# Patient Record
Sex: FEMALE | Race: WHITE | Employment: OTHER | URBAN - METROPOLITAN AREA
[De-identification: names, ages, dates, MRNs, and addresses within clinical notes are randomized per-mention and may not be internally consistent; named-entity substitution may affect disease eponyms.]

---

## 2017-10-26 ENCOUNTER — HOSPITAL ENCOUNTER (EMERGENCY)
Age: 62
Discharge: HOME | End: 2017-10-26
Attending: EMERGENCY MEDICINE
Payer: COMMERCIAL

## 2017-10-26 VITALS
TEMPERATURE: 98.6 F | HEIGHT: 65 IN | SYSTOLIC BLOOD PRESSURE: 122 MMHG | RESPIRATION RATE: 16 BRPM | OXYGEN SATURATION: 100 % | DIASTOLIC BLOOD PRESSURE: 61 MMHG | WEIGHT: 138.67 LBS | BODY MASS INDEX: 23.1 KG/M2 | HEART RATE: 43 BPM

## 2017-10-26 DIAGNOSIS — R00.1 SINUS BRADYCARDIA: Primary | ICD-10-CM

## 2017-10-26 LAB
ALBUMIN SERPL-MCNC: 3.9 G/DL (ref 3.5–5)
ALBUMIN/GLOB SERPL: 1 {RATIO} (ref 1.1–2.2)
ALP SERPL-CCNC: 63 U/L (ref 45–117)
ALT SERPL-CCNC: 25 U/L (ref 12–78)
ANION GAP SERPL CALC-SCNC: 8 MMOL/L (ref 5–15)
AST SERPL-CCNC: 14 U/L (ref 15–37)
BASOPHILS # BLD: 0 K/UL (ref 0–0.1)
BASOPHILS NFR BLD: 1 % (ref 0–1)
BILIRUB SERPL-MCNC: 0.3 MG/DL (ref 0.2–1)
BUN SERPL-MCNC: 14 MG/DL (ref 6–20)
BUN/CREAT SERPL: 19 (ref 12–20)
CALCIUM SERPL-MCNC: 8.7 MG/DL (ref 8.5–10.1)
CHLORIDE SERPL-SCNC: 103 MMOL/L (ref 97–108)
CK MB CFR SERPL CALC: NORMAL % (ref 0–2.5)
CK MB SERPL-MCNC: <1 NG/ML (ref 5–25)
CK SERPL-CCNC: 35 U/L (ref 26–192)
CO2 SERPL-SCNC: 27 MMOL/L (ref 21–32)
CREAT SERPL-MCNC: 0.72 MG/DL (ref 0.55–1.02)
EOSINOPHIL # BLD: 0 K/UL (ref 0–0.4)
EOSINOPHIL NFR BLD: 1 % (ref 0–7)
ERYTHROCYTE [DISTWIDTH] IN BLOOD BY AUTOMATED COUNT: 14.4 % (ref 11.5–14.5)
GLOBULIN SER CALC-MCNC: 3.8 G/DL (ref 2–4)
GLUCOSE SERPL-MCNC: 101 MG/DL (ref 65–100)
HCT VFR BLD AUTO: 37.3 % (ref 35–47)
HGB BLD-MCNC: 12.2 G/DL (ref 11.5–16)
LIPASE SERPL-CCNC: 218 U/L (ref 73–393)
LYMPHOCYTES # BLD: 2.5 K/UL (ref 0.8–3.5)
LYMPHOCYTES NFR BLD: 48 % (ref 12–49)
MAGNESIUM SERPL-MCNC: 2 MG/DL (ref 1.6–2.4)
MCH RBC QN AUTO: 30.2 PG (ref 26–34)
MCHC RBC AUTO-ENTMCNC: 32.7 G/DL (ref 30–36.5)
MCV RBC AUTO: 92.3 FL (ref 80–99)
MONOCYTES # BLD: 0.4 K/UL (ref 0–1)
MONOCYTES NFR BLD: 8 % (ref 5–13)
NEUTS SEG # BLD: 2.2 K/UL (ref 1.8–8)
NEUTS SEG NFR BLD: 42 % (ref 32–75)
PLATELET # BLD AUTO: 260 K/UL (ref 150–400)
POTASSIUM SERPL-SCNC: 3.8 MMOL/L (ref 3.5–5.1)
PROT SERPL-MCNC: 7.7 G/DL (ref 6.4–8.2)
RBC # BLD AUTO: 4.04 M/UL (ref 3.8–5.2)
SODIUM SERPL-SCNC: 138 MMOL/L (ref 136–145)
TROPONIN I SERPL-MCNC: <0.04 NG/ML
TSH SERPL DL<=0.05 MIU/L-ACNC: 2.62 UIU/ML (ref 0.36–3.74)
WBC # BLD AUTO: 5.2 K/UL (ref 3.6–11)

## 2017-10-26 PROCEDURE — 99285 EMERGENCY DEPT VISIT HI MDM: CPT

## 2017-10-26 PROCEDURE — 74011250636 HC RX REV CODE- 250/636: Performed by: EMERGENCY MEDICINE

## 2017-10-26 PROCEDURE — 85025 COMPLETE CBC W/AUTO DIFF WBC: CPT | Performed by: EMERGENCY MEDICINE

## 2017-10-26 PROCEDURE — 84443 ASSAY THYROID STIM HORMONE: CPT | Performed by: EMERGENCY MEDICINE

## 2017-10-26 PROCEDURE — 36415 COLL VENOUS BLD VENIPUNCTURE: CPT | Performed by: EMERGENCY MEDICINE

## 2017-10-26 PROCEDURE — 82550 ASSAY OF CK (CPK): CPT | Performed by: EMERGENCY MEDICINE

## 2017-10-26 PROCEDURE — 96361 HYDRATE IV INFUSION ADD-ON: CPT

## 2017-10-26 PROCEDURE — 96374 THER/PROPH/DIAG INJ IV PUSH: CPT

## 2017-10-26 PROCEDURE — 84484 ASSAY OF TROPONIN QUANT: CPT | Performed by: EMERGENCY MEDICINE

## 2017-10-26 PROCEDURE — 80053 COMPREHEN METABOLIC PANEL: CPT | Performed by: EMERGENCY MEDICINE

## 2017-10-26 PROCEDURE — 96376 TX/PRO/DX INJ SAME DRUG ADON: CPT

## 2017-10-26 PROCEDURE — 83735 ASSAY OF MAGNESIUM: CPT | Performed by: EMERGENCY MEDICINE

## 2017-10-26 PROCEDURE — 83690 ASSAY OF LIPASE: CPT | Performed by: EMERGENCY MEDICINE

## 2017-10-26 PROCEDURE — 93005 ELECTROCARDIOGRAM TRACING: CPT

## 2017-10-26 RX ORDER — ONDANSETRON 4 MG/1
4 TABLET, ORALLY DISINTEGRATING ORAL
Qty: 10 TAB | Refills: 0 | Status: SHIPPED | OUTPATIENT
Start: 2017-10-26 | End: 2019-01-01

## 2017-10-26 RX ORDER — ONDANSETRON 2 MG/ML
4 INJECTION INTRAMUSCULAR; INTRAVENOUS
Status: COMPLETED | OUTPATIENT
Start: 2017-10-26 | End: 2017-10-26

## 2017-10-26 RX ORDER — ATROPINE SULFATE 0.1 MG/ML
INJECTION INTRAVENOUS
Status: DISCONTINUED
Start: 2017-10-26 | End: 2017-10-26 | Stop reason: HOSPADM

## 2017-10-26 RX ORDER — METOPROLOL TARTRATE 50 MG/1
25 TABLET ORAL 2 TIMES DAILY
COMMUNITY
End: 2017-10-26

## 2017-10-26 RX ADMIN — ONDANSETRON HYDROCHLORIDE 4 MG: 2 INJECTION, SOLUTION INTRAMUSCULAR; INTRAVENOUS at 13:36

## 2017-10-26 RX ADMIN — ONDANSETRON HYDROCHLORIDE 4 MG: 2 INJECTION, SOLUTION INTRAMUSCULAR; INTRAVENOUS at 11:43

## 2017-10-26 RX ADMIN — SODIUM CHLORIDE 1000 ML: 900 INJECTION, SOLUTION INTRAVENOUS at 11:43

## 2017-10-26 NOTE — ED NOTES
1 amp atropine placed at bedside per md request.  Not opened at this time. Will continue to monitor.

## 2017-10-26 NOTE — ED PROVIDER NOTES
[address]  EMERGENCY DEPARTMENT HISTORY AND PHYSICAL EXAM       Date of Service: 10/26/2017   Patient Name: Laureen Cr   YOB: 1955  Medical Record Number: [id number]    History of Presenting Illness     Chief Complaint   Patient presents with    Fatigue     along with diaphoresis, nausea        History Provided By:  patient    Additional History:   Laureen Cr is a 60 y.o. female with PMhx significant for HTN and hyperthroidism who presents ambulatory to the ED with cc of progressively worsening, mild fatigue x months. Pt also presents with associated sweating, lightheadedness, and nausea since 10:00 AM this morning. Pt notes she takes Metoprolol and ASA daily. She states she had a cardiac catheretization performed in 2008 and was diagnosed with CAD. She notes her vessels were too small for stents so they recommended to manage her medically. She specifically denies any vomiting, CP, SOB or palpitations. Social Hx: - Tobacco, + EtOH, - Illicit Drugs    There are no other complaints, changes or physical findings at this time. Primary Care Provider: Brea Reynolds MD   Specialist: Dr. Link Brown, Cardiology    Past History     Past Medical History:   Past Medical History:   Diagnosis Date    Acid reflux     Hypertension     Other ill-defined conditions(319.89)     high cholesterol    Thyroid disease     hyperthyroidism        Past Surgical History:   Past Surgical History:   Procedure Laterality Date    HX CHOLECYSTECTOMY      HX GYN      hysterectomy    HX HEENT      thyroid        Family History:   History reviewed. No pertinent family history. Social History:   Social History   Substance Use Topics    Smoking status: Never Smoker    Smokeless tobacco: Never Used    Alcohol use Yes      Comment: Socially. Allergies:    Allergies   Allergen Reactions    Codeine Itching    Lisinopril Angioedema         Review of Systems Review of Systems   Constitutional: Positive for diaphoresis and fatigue. Negative for chills, fever and unexpected weight change. HENT: Negative for rhinorrhea and sore throat. Eyes: Negative for pain. Respiratory: Negative for shortness of breath, wheezing and stridor. Cardiovascular: Negative for chest pain, palpitations and leg swelling. Gastrointestinal: Positive for nausea. Negative for abdominal pain, blood in stool, diarrhea and vomiting. Genitourinary: Negative for difficulty urinating, dysuria and flank pain. Musculoskeletal: Negative for back pain and neck stiffness. Skin: Negative for rash. Neurological: Positive for light-headedness. Negative for seizures, syncope, weakness and headaches. Psychiatric/Behavioral: Negative for confusion. Physical Exam  Physical Exam   Constitutional: She is oriented to person, place, and time. She appears well-developed and well-nourished. No distress. HENT:   Nose: Nose normal.   Mouth/Throat: Oropharynx is clear and moist. No oropharyngeal exudate. Eyes: Conjunctivae and EOM are normal. Pupils are equal, round, and reactive to light. Right eye exhibits no discharge. Left eye exhibits no discharge. No scleral icterus. Neck: Normal range of motion. Neck supple. No JVD present. Cardiovascular: Regular rhythm, normal heart sounds and intact distal pulses. Bradycardia present. No murmur heard. Pulmonary/Chest: Effort normal and breath sounds normal. No stridor. No respiratory distress. She has no wheezes. She has no rales. Abdominal: Soft. Bowel sounds are normal. She exhibits no distension. There is no tenderness. There is no rebound and no guarding. Musculoskeletal: She exhibits no edema or tenderness. Neurological: She is alert and oriented to person, place, and time. Skin: Skin is warm and dry. No rash noted. She is not diaphoretic. Well healed thyroidectomy scar    Psychiatric: She has a normal mood and affect. Nursing note and vitals reviewed. Medical Decision Making   I am the first provider for this patient. I reviewed the vital signs, available nursing notes, past medical history, past surgical history, family history and social history. Old Medical Records: Old medical records. Catheretization record reviewed from 12/24/08 and showed diffuse RCA, atherosclerosis of mid distal portion, small vessel throughout course, diffuse mid and distal LAD disease, normal circumflex. EF 55%. Provider Notes:   Symptomatic sinus bradycardia. Pt hemodynamically stable. Labs unremarkable. Discussed case with cardiology who recommend discontinuing beta blocker. Pt can follow up as outpatient. Stable for discharge. ED Course:  11:25 AM   Initial assessment performed. The patients presenting problems have been discussed, and they are in agreement with the care plan formulated and outlined with them. I have encouraged them to ask questions as they arise throughout their visit. Progress Notes:   12:11 PM   Pt states she has been taking Metoprolol 25 mg BID since May. Consult Note:  1:57 PM  Carley Gurrola MD spoke with Dr. Andrew Christopher  Specialty: Cardiology  Discussed pts hx, disposition, and available diagnostic and imaging results. Reviewed care plans. Consultant agrees with plans as outlined. Recommends discontinuing the Metoprolol and to follow up with cardiology.        Diagnostic Study Results     Labs -      Recent Results (from the past 12 hour(s))   CBC WITH AUTOMATED DIFF    Collection Time: 10/26/17 11:25 AM   Result Value Ref Range    WBC 5.2 3.6 - 11.0 K/uL    RBC 4.04 3.80 - 5.20 M/uL    HGB 12.2 11.5 - 16.0 g/dL    HCT 37.3 35.0 - 47.0 %    MCV 92.3 80.0 - 99.0 FL    MCH 30.2 26.0 - 34.0 PG    MCHC 32.7 30.0 - 36.5 g/dL    RDW 14.4 11.5 - 14.5 %    PLATELET 157 646 - 603 K/uL    NEUTROPHILS 42 32 - 75 %    LYMPHOCYTES 48 12 - 49 %    MONOCYTES 8 5 - 13 %    EOSINOPHILS 1 0 - 7 %    BASOPHILS 1 0 - 1 %    ABS. NEUTROPHILS 2.2 1.8 - 8.0 K/UL    ABS. LYMPHOCYTES 2.5 0.8 - 3.5 K/UL    ABS. MONOCYTES 0.4 0.0 - 1.0 K/UL    ABS. EOSINOPHILS 0.0 0.0 - 0.4 K/UL    ABS. BASOPHILS 0.0 0.0 - 0.1 K/UL   METABOLIC PANEL, COMPREHENSIVE    Collection Time: 10/26/17 11:25 AM   Result Value Ref Range    Sodium 138 136 - 145 mmol/L    Potassium 3.8 3.5 - 5.1 mmol/L    Chloride 103 97 - 108 mmol/L    CO2 27 21 - 32 mmol/L    Anion gap 8 5 - 15 mmol/L    Glucose 101 (H) 65 - 100 mg/dL    BUN 14 6 - 20 MG/DL    Creatinine 0.72 0.55 - 1.02 MG/DL    BUN/Creatinine ratio 19 12 - 20      GFR est AA >60 >60 ml/min/1.73m2    GFR est non-AA >60 >60 ml/min/1.73m2    Calcium 8.7 8.5 - 10.1 MG/DL    Bilirubin, total 0.3 0.2 - 1.0 MG/DL    ALT (SGPT) 25 12 - 78 U/L    AST (SGOT) 14 (L) 15 - 37 U/L    Alk. phosphatase 63 45 - 117 U/L    Protein, total 7.7 6.4 - 8.2 g/dL    Albumin 3.9 3.5 - 5.0 g/dL    Globulin 3.8 2.0 - 4.0 g/dL    A-G Ratio 1.0 (L) 1.1 - 2.2     LIPASE    Collection Time: 10/26/17 11:25 AM   Result Value Ref Range    Lipase 218 73 - 393 U/L   MAGNESIUM    Collection Time: 10/26/17 11:40 AM   Result Value Ref Range    Magnesium 2.0 1.6 - 2.4 mg/dL   TSH 3RD GENERATION    Collection Time: 10/26/17 11:40 AM   Result Value Ref Range    TSH 2.62 0.36 - 3.74 uIU/mL   CK W/ CKMB & INDEX    Collection Time: 10/26/17 11:40 AM   Result Value Ref Range    CK 35 26 - 192 U/L    CK - MB <1.0 <3.6 NG/ML    CK-MB Index Cannot be calculated 0 - 2.5     TROPONIN I    Collection Time: 10/26/17 11:40 AM   Result Value Ref Range    Troponin-I, Qt. <0.04 <0.05 ng/mL       Vital Signs-Reviewed the patient's vital signs.    Patient Vitals for the past 12 hrs:   Temp Pulse Resp BP SpO2   10/26/17 1415 - (!) 43 16 122/61 100 %   10/26/17 1400 - (!) 44 12 121/56 98 %   10/26/17 1330 - (!) 47 11 116/66 100 %   10/26/17 1300 - (!) 43 13 130/55 98 %   10/26/17 1230 - (!) 45 16 150/59 100 %   10/26/17 1200 - (!) 45 16 156/61 99 %   10/26/17 1130 - (!) 42 12 139/74 100 %   10/26/17 1121 98.6 °F (37 °C) (!) 38 18 157/66 100 %   10/26/17 1120 - (!) 42 15 145/70 100 %   10/26/17 1118 - (!) 40 17 157/66 100 %     EKG interpretation: (Preliminary) 11:13  Rhythm: sinus bradycardia; and regular . Rate (approx.): 40 bpm; Axis: normal; OH interval: normal; QRS interval: normal ; ST/T wave: normal.  Written by Sarkis Patiño, ED Scribe, as dictated by Carley Gurrola MD.      Medications Given in the ED:  Medications   sodium chloride 0.9 % bolus infusion 1,000 mL (1,000 mL IntraVENous New Bag 10/26/17 1143)   ondansetron (ZOFRAN) injection 4 mg (4 mg IntraVENous Given 10/26/17 1143)   ondansetron (ZOFRAN) injection 4 mg (4 mg IntraVENous Given 10/26/17 1336)       Diagnosis   Clinical Impression:   1. Sinus bradycardia         Plan:  1:   Follow-up Information     Follow up With Details Comments [address], [place] Call in 1 day  [address]  [address]  [facility]  [phone]      hospitals EMERGENCY DEPT  As needed, If symptoms worsen [address] [place] [address]  [phone]        2:   Discharge Medication List as of 10/26/2017  2:28 PM      CONTINUE these medications which have NOT CHANGED    Details   famotidine (PEPCID) 20 mg tablet Take 1 Tab by mouth two (2) times a day., Print, Disp-20 Tab, R-0      diazepam (VALIUM) 5 mg tablet Take 1 tablet by mouth every twelve (12) hours as needed (spasm). , Print, Disp-10 tablet, R-0      omeprazole (PRILOSEC) 20 mg capsule Take 20 mg by mouth daily. Historical Med, 20 mg      estradiol (ESTRACE) 1 mg tablet Take 1 mg by mouth daily. Historical Med, 1 mg      aspirin 81 mg tablet Take 81 mg by mouth. Historical Med, 81 mg         STOP taking these medications       metoprolol tartrate (LOPRESSOR) 50 mg tablet Comments:   Reason for Stopping:             Return to ED if Worse    Disposition Note:  DISCHARGE NOTE  2:31 PM  The patient has been re-evaluated and is ready for discharge. Reviewed available results with patient. Counseled pt on diagnosis and care plan. Pt has expressed understanding, and all questions have been answered. Pt agrees with plan and agrees to follow up as recommended, or return to the ED if their symptoms worsen. Discharge instructions have been provided and explained to the pt, along with reasons to return to the ED. Attestations: This note is prepared by Juventino Ling, acting as Scribe for Khushbu Canas MD.    Khushbu Canas MD: The scribe's documentation has been prepared under my direction and personally reviewed by me in its entirety. I confirm that the note above accurately reflects all work, treatment, procedures, and medical decision making performed by me.

## 2017-10-26 NOTE — DISCHARGE INSTRUCTIONS
Bradycardia: Care Instructions  Your Care Instructions    Bradycardia is a slow heart rate. If your heart beats too slowly, it can't supply your body with enough blood. This can make you weak or dizzy. Or it may make you pass out. Sometimes medicine can cause this problem. If this happens, your doctor may have you adjust one of your medicines. If a medicine is not the problem, your doctor may recommend a pacemaker. It is important to treat bradycardia so that you don't get more serious health problems. Your doctor will want to see you on a routine schedule to make sure that your heartbeat is normal.  Follow-up care is a key part of your treatment and safety. Be sure to make and go to all appointments, and call your doctor if you are having problems. It's also a good idea to know your test results and keep a list of the medicines you take. How can you care for yourself at home? · Take your medicines exactly as prescribed. Call your doctor if you think you are having a problem with your medicine. If your bradycardia is caused by another disease, your doctor will try to treat the disease. If it is caused by heart medicines, he or she will adjust your medicines. · Make lifestyle changes to improve your heart health. ¨ Get regular exercise. Try for 30 minutes on most days of the week. If you do not have other heart problems, you likely do not have limits on the type or level of activity that you can do. You may want to walk, swim, bike, or do other activities. Ask your doctor what level of exercise is safe for you. ¨ To control your cholesterol, avoid foods with a lot of fat, saturated fat, or sodium. Try to eat more fiber. And if your doctor says it's okay, get some exercise on most days. ¨ Do not smoke. Smoking can make your heart condition worse. If you need help quitting, talk to your doctor about stop-smoking programs and medicines. These can increase your chances of quitting for good.   ¨ Limit alcohol to 2 drinks a day for men and 1 drink a day for women. Too much alcohol can cause health problems. Pacemaker  If you have a pacemaker, you will get more specific information about it. Be sure to:  · Check your pulse as your doctor tells you. · Have your pacemaker checked as often as your doctor recommends. You may be able to do this over the phone or computer. · Avoid strong magnetic or electrical fields. These include wand metal detectors used in airports, MRIs, welding equipment, and generators. · You will be checked several times right after you get your pacemaker and when it is time to have the battery changed. Batteries last for 5 to 15 years. · You can talk on a cell phone. But keep it 6 inches away from your pacemaker. · Microwaves, TVs, radios, and kitchen and bathroom appliances won't harm you. When should you call for help? Call 911 anytime you think you may need emergency care. For example, call if:  ? · You have symptoms of sudden heart failure. These may include:  ¨ Severe trouble breathing. ¨ A fast or irregular heartbeat. ¨ Coughing up pink, foamy mucus. ¨ You passed out. ? · You have symptoms of a stroke. These may include:  ¨ Sudden numbness, tingling, weakness, or loss of movement in your face, arm, or leg, especially on only one side of your body. ¨ Sudden vision changes. ¨ Sudden trouble speaking. ¨ Sudden confusion or trouble understanding simple statements. ¨ Sudden problems with walking or balance. ¨ A sudden, severe headache that is different from past headaches. ?Call your doctor now or seek immediate medical care if:  ? · You have new or changed symptoms of heart failure, such as:  ¨ New or increased shortness of breath. ¨ New or worse swelling in your legs, ankles, or feet. ¨ Sudden weight gain, such as more than 2 to 3 pounds in a day or 5 pounds in a week.  (Your doctor may suggest a different range of weight gain.)  ¨ Feeling dizzy or lightheaded or like you may faint.  ¨ Feeling so tired or weak that you cannot do your usual activities. ¨ Not sleeping well. Shortness of breath wakes you at night. You need extra pillows to prop yourself up to breathe easier. ? Watch closely for changes in your health, and be sure to contact your doctor if:  ? · You do not get better as expected. Where can you learn more? Go to http://olivia-salina.info/. Enter Z787 in the search box to learn more about \"Bradycardia: Care Instructions. \"  Current as of: September 21, 2016  Content Version: 11.4  © 6320-7564 [employer]. Care instructions adapted under license by [employer] (which disclaims liability or warranty for this information). If you have questions about a medical condition or this instruction, always ask your healthcare professional. [address] any warranty or liability for your use of this information.

## 2017-10-26 NOTE — ED TRIAGE NOTES
Pt states that she was out and about this am, and then developed an acute onset of weakness, dizziness, nausea, and profuse diaphoresis. Denies any pain or sob. States symptoms have about all resolved, with the exception of her nose itching.

## 2017-10-27 LAB
ATRIAL RATE: 27 BPM
CALCULATED T AXIS, ECG11: 40 DEGREES
DIAGNOSIS, 93000: NORMAL
Q-T INTERVAL, ECG07: 500 MS
QRS DURATION, ECG06: 86 MS
QTC CALCULATION (BEZET), ECG08: 407 MS
VENTRICULAR RATE, ECG03: 40 BPM

## 2017-11-26 ENCOUNTER — APPOINTMENT (OUTPATIENT)
Dept: CT IMAGING | Age: 62
End: 2017-11-26
Attending: EMERGENCY MEDICINE
Payer: COMMERCIAL

## 2017-11-26 ENCOUNTER — APPOINTMENT (OUTPATIENT)
Dept: GENERAL RADIOLOGY | Age: 62
End: 2017-11-26
Attending: EMERGENCY MEDICINE
Payer: COMMERCIAL

## 2017-11-26 ENCOUNTER — HOSPITAL ENCOUNTER (EMERGENCY)
Age: 62
Discharge: HOME | End: 2017-11-26
Attending: EMERGENCY MEDICINE
Payer: COMMERCIAL

## 2017-11-26 VITALS
WEIGHT: 148.37 LBS | TEMPERATURE: 98.1 F | RESPIRATION RATE: 20 BRPM | HEIGHT: 64 IN | HEART RATE: 77 BPM | OXYGEN SATURATION: 100 % | BODY MASS INDEX: 25.33 KG/M2 | SYSTOLIC BLOOD PRESSURE: 166 MMHG | DIASTOLIC BLOOD PRESSURE: 82 MMHG
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
## 2017-11-26 DIAGNOSIS — S09.90XA CLOSED HEAD INJURY, INITIAL ENCOUNTER: Primary | ICD-10-CM

## 2017-11-26 DIAGNOSIS — S80.01XA CONTUSION OF RIGHT KNEE, INITIAL ENCOUNTER: ICD-10-CM

## 2017-11-26 DIAGNOSIS — S46.911A STRAIN OF RIGHT SHOULDER, INITIAL ENCOUNTER: ICD-10-CM

## 2017-11-26 LAB
ATRIAL RATE: 78 BPM
CALCULATED P AXIS, ECG09: 47 DEGREES
CALCULATED R AXIS, ECG10: -13 DEGREES
CALCULATED T AXIS, ECG11: 42 DEGREES
DIAGNOSIS, 93000: NORMAL
P-R INTERVAL, ECG05: 150 MS
Q-T INTERVAL, ECG07: 408 MS
QRS DURATION, ECG06: 90 MS
QTC CALCULATION (BEZET), ECG08: 465 MS
VENTRICULAR RATE, ECG03: 78 BPM

## 2017-11-26 PROCEDURE — 73562 X-RAY EXAM OF KNEE 3: CPT

## 2017-11-26 PROCEDURE — 93005 ELECTROCARDIOGRAM TRACING: CPT

## 2017-11-26 PROCEDURE — 70450 CT HEAD/BRAIN W/O DYE: CPT

## 2017-11-26 PROCEDURE — 99284 EMERGENCY DEPT VISIT MOD MDM: CPT

## 2017-11-26 PROCEDURE — 71020 XR CHEST PA LAT: CPT

## 2017-11-26 PROCEDURE — 73030 X-RAY EXAM OF SHOULDER: CPT

## 2017-11-26 PROCEDURE — 99285 EMERGENCY DEPT VISIT HI MDM: CPT

## 2017-11-26 PROCEDURE — 74011250637 HC RX REV CODE- 250/637: Performed by: EMERGENCY MEDICINE

## 2017-11-26 RX ORDER — HYDROCODONE BITARTRATE AND ACETAMINOPHEN 7.5; 325 MG/1; MG/1
1 TABLET ORAL
Qty: 10 TAB | Refills: 0 | Status: SHIPPED | OUTPATIENT
Start: 2017-11-26 | End: 2017-12-01

## 2017-11-26 RX ORDER — DIAZEPAM 5 MG/1
5 TABLET ORAL
Status: COMPLETED | OUTPATIENT
Start: 2017-11-26 | End: 2017-11-26

## 2017-11-26 RX ORDER — HYDROCODONE BITARTRATE AND ACETAMINOPHEN 5; 325 MG/1; MG/1
1 TABLET ORAL
Status: COMPLETED | OUTPATIENT
Start: 2017-11-26 | End: 2017-11-26

## 2017-11-26 RX ORDER — HYDROCODONE BITARTRATE AND ACETAMINOPHEN 7.5; 325 MG/1; MG/1
1 TABLET ORAL
Status: COMPLETED | OUTPATIENT
Start: 2017-11-26 | End: 2017-11-26

## 2017-11-26 RX ORDER — ONDANSETRON 4 MG/1
4 TABLET, ORALLY DISINTEGRATING ORAL
Status: COMPLETED | OUTPATIENT
Start: 2017-11-26 | End: 2017-11-26

## 2017-11-26 RX ADMIN — HYDROCODONE BITARTRATE AND ACETAMINOPHEN 1 TABLET: 7.5; 325 TABLET ORAL at 07:10

## 2017-11-26 RX ADMIN — HYDROCODONE BITARTRATE AND ACETAMINOPHEN 1 TABLET: 5; 325 TABLET ORAL at 09:43

## 2017-11-26 RX ADMIN — DIAZEPAM 5 MG: 5 TABLET ORAL at 09:43

## 2017-11-26 RX ADMIN — ONDANSETRON 4 MG: 4 TABLET, ORALLY DISINTEGRATING ORAL at 07:10
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
## 2017-11-26 NOTE — ED NOTES
Bedside shift change report given to Page B., RN (oncoming nurse) by Caroline Kaplan RN (offgoing nurse). Report included the following information SBAR, Kardex, ED Summary, MAR, Recent Results and Cardiac Rhythm NSR.

## 2017-11-26 NOTE — DISCHARGE INSTRUCTIONS
Contusion: Care Instructions  Your Care Instructions  Contusion is the medical term for a bruise. It is the result of a direct blow or an impact, such as a fall. Contusions are common sports injuries. Most people think of a bruise as a black-and-blue spot. This happens when small blood vessels get torn and leak blood under the skin. But bones, muscles, and organs can also get bruised. This may damage deep tissues but not cause a bruise you can see. The doctor will do a physical exam to find the location of your contusion. You may also have tests to make sure you do not have a more serious injury, such as a broken bone or nerve damage. These may include X-rays or other imaging tests like a CT scan or MRI. Deep-tissue contusions may cause pain and swelling. But if there is no serious damage, they will often get better in a few weeks with home treatment. The doctor has checked you carefully, but problems can develop later. If you notice any problems or new symptoms, get medical treatment right away. Follow-up care is a key part of your treatment and safety. Be sure to make and go to all appointments, and call your doctor if you are having problems. It's also a good idea to know your test results and keep a list of the medicines you take. How can you care for yourself at home? · Put ice or a cold pack on the sore area for 10 to 20 minutes at a time to stop swelling. Put a thin cloth between the ice pack and your skin. · Be safe with medicines. Read and follow all instructions on the label. ¨ If the doctor gave you a prescription medicine for pain, take it as prescribed. ¨ If you are not taking a prescription pain medicine, ask your doctor if you can take an over-the-counter medicine. · If you can, prop up the sore area on pillows as much as possible for the next few days. Try to keep the sore area above the level of your heart. When should you call for help?   Call your doctor now or seek immediate medical care if:  · Your pain gets worse. · You have new or worse swelling. · You have tingling, weakness, or numbness in the area near the contusion. · The area near the contusion is cold or pale. Watch closely for changes in your health, and be sure to contact your doctor if:  · You do not get better as expected. Where can you learn more? Go to [employer]  Enter [id number] in the search box to learn more about \"Contusion: Care Instructions. \"   © 5488-3278 [employer]. Care instructions adapted under license by Marce Reddy (which disclaims liability or warranty for this information). This care instruction is for use with your licensed healthcare professional. If you have questions about a medical condition or this instruction, always ask your healthcare professional. [address] any warranty or liability for your use of this information. Content Version: 82.3.051679; Current as of: May 22, 2015             Learning About a Closed Head Injury  What is a closed head injury? A closed head injury happens when your head gets hit hard. The strong force of the blow causes your brain to shake in your skull. This movement can cause the brain to bruise, swell, or tear. Sometimes nerves or blood vessels also get damaged. This can cause bleeding in or around the brain. A concussion is a type of closed head injury. What are the symptoms? If you have a mild concussion, you may have a mild headache or feel \"not quite right. \" These symptoms are common. They usually go away over a few days to 4 weeks. But sometimes after a concussion, you feel like you can't function as well as before the injury. And you have new symptoms. This is called postconcussive syndrome. You may:  · Find it harder to solve problems, think, concentrate, or remember. · Have headaches. · Have changes in your sleep patterns, such as not being able to sleep or sleeping all the time.   · Have changes in your personality. · Not be interested in your usual activities. · Feel angry or anxious without a clear reason. · Lose your sense of taste or smell. · Be dizzy, lightheaded, or unsteady. It may be hard to stand or walk. How is a closed head injury treated? Any person who may have a concussion needs to see a doctor. Some people have to stay in the hospital to be watched. Others can go home safely. If you go home, follow your doctor's instructions. He or she will tell you if you need someone to watch you closely for the next 24 hours or longer. Rest is the best treatment. Get plenty of sleep at night. And try to rest during the day. · Avoid activities that are physically or mentally demanding. These include housework, exercise, and schoolwork. And don't play video games, send text messages, or use the computer. You may need to change your school or work schedule to be able to avoid these activities. · Ask your doctor when it's okay to drive, ride a bike, or operate machinery. · Take an over-the-counter pain medicine, such as acetaminophen (Tylenol), ibuprofen (Advil, Motrin), or naproxen (Aleve). Be safe with medicines. Read and follow all instructions on the label. · Check with your doctor before you use any other medicines for pain. · Do not drink alcohol or use illegal drugs. They can slow recovery. They can also increase your risk of getting a second head injury. Follow-up care is a key part of your treatment and safety. Be sure to make and go to all appointments, and call your doctor if you are having problems. It's also a good idea to know your test results and keep a list of the medicines you take. Where can you learn more? Go to http://olivia-salina.info/. Enter E235 in the search box to learn more about \"Learning About a Closed Head Injury. \"  Current as of: October 14, 2016  Content Version: 11.4  © 0817-8763 [employer].  Care instructions adapted under license by [employer] (which disclaims liability or warranty for this information). If you have questions about a medical condition or this instruction, always ask your healthcare professional. [address] any warranty or liability for your use of this information.

## 2017-11-28 PROCEDURE — 99284 EMERGENCY DEPT VISIT MOD MDM: CPT

## 2017-11-28 PROCEDURE — 94762 N-INVAS EAR/PLS OXIMTRY CONT: CPT

## 2017-11-29 ENCOUNTER — APPOINTMENT (OUTPATIENT)
Dept: CT IMAGING | Age: 62
DRG: 395 | End: 2017-11-29
Attending: EMERGENCY MEDICINE
Payer: COMMERCIAL

## 2017-11-29 ENCOUNTER — HOSPITAL ENCOUNTER (INPATIENT)
Age: 62
LOS: 2 days | Discharge: HOME | DRG: 395 | End: 2017-12-01
Attending: EMERGENCY MEDICINE | Admitting: INTERNAL MEDICINE
Payer: COMMERCIAL

## 2017-11-29 DIAGNOSIS — K92.2 GASTROINTESTINAL HEMORRHAGE, UNSPECIFIED GASTROINTESTINAL HEMORRHAGE TYPE: Primary | ICD-10-CM

## 2017-11-29 DIAGNOSIS — K52.9 COLITIS: ICD-10-CM

## 2017-11-29 LAB
ABO + RH BLD: NORMAL
ALBUMIN SERPL-MCNC: 3.5 G/DL (ref 3.5–5)
ALBUMIN/GLOB SERPL: 1.1 {RATIO} (ref 1.1–2.2)
ALP SERPL-CCNC: 58 U/L (ref 45–117)
ALT SERPL-CCNC: 26 U/L (ref 12–78)
ANION GAP SERPL CALC-SCNC: 7 MMOL/L (ref 5–15)
APTT PPP: 24.6 SEC (ref 22.1–32.5)
AST SERPL-CCNC: 22 U/L (ref 15–37)
BASOPHILS # BLD: 0 K/UL (ref 0–0.1)
BASOPHILS NFR BLD: 0 % (ref 0–1)
BILIRUB SERPL-MCNC: 0.5 MG/DL (ref 0.2–1)
BLOOD GROUP ANTIBODIES SERPL: NORMAL
BUN SERPL-MCNC: 8 MG/DL (ref 6–20)
BUN/CREAT SERPL: 13 (ref 12–20)
CALCIUM SERPL-MCNC: 8.3 MG/DL (ref 8.5–10.1)
CHLORIDE SERPL-SCNC: 105 MMOL/L (ref 97–108)
CO2 SERPL-SCNC: 28 MMOL/L (ref 21–32)
CREAT SERPL-MCNC: 0.62 MG/DL (ref 0.55–1.02)
EOSINOPHIL # BLD: 0 K/UL (ref 0–0.4)
EOSINOPHIL NFR BLD: 1 % (ref 0–7)
ERYTHROCYTE [DISTWIDTH] IN BLOOD BY AUTOMATED COUNT: 14.1 % (ref 11.5–14.5)
GLOBULIN SER CALC-MCNC: 3.3 G/DL (ref 2–4)
GLUCOSE SERPL-MCNC: 86 MG/DL (ref 65–100)
HCT VFR BLD AUTO: 33.1 % (ref 35–47)
HEMOCCULT STL QL: POSITIVE
HGB BLD-MCNC: 11.1 G/DL (ref 11.5–16)
INR PPP: 1 (ref 0.9–1.1)
LACTATE SERPL-SCNC: 0.7 MMOL/L (ref 0.4–2)
LYMPHOCYTES # BLD: 1.9 K/UL (ref 0.8–3.5)
LYMPHOCYTES NFR BLD: 32 % (ref 12–49)
MCH RBC QN AUTO: 30.7 PG (ref 26–34)
MCHC RBC AUTO-ENTMCNC: 33.5 G/DL (ref 30–36.5)
MCV RBC AUTO: 91.7 FL (ref 80–99)
MONOCYTES # BLD: 0.4 K/UL (ref 0–1)
MONOCYTES NFR BLD: 7 % (ref 5–13)
NEUTS SEG # BLD: 3.6 K/UL (ref 1.8–8)
NEUTS SEG NFR BLD: 60 % (ref 32–75)
PLATELET # BLD AUTO: 200 K/UL (ref 150–400)
POTASSIUM SERPL-SCNC: 3.4 MMOL/L (ref 3.5–5.1)
PROT SERPL-MCNC: 6.8 G/DL (ref 6.4–8.2)
PROTHROMBIN TIME: 10.2 SEC (ref 9–11.1)
RBC # BLD AUTO: 3.61 M/UL (ref 3.8–5.2)
SODIUM SERPL-SCNC: 140 MMOL/L (ref 136–145)
SPECIMEN EXP DATE BLD: NORMAL
THERAPEUTIC RANGE,PTTT: NORMAL SECS (ref 58–77)
WBC # BLD AUTO: 6 K/UL (ref 3.6–11)

## 2017-11-29 PROCEDURE — 36415 COLL VENOUS BLD VENIPUNCTURE: CPT | Performed by: EMERGENCY MEDICINE

## 2017-11-29 PROCEDURE — 85025 COMPLETE CBC W/AUTO DIFF WBC: CPT | Performed by: EMERGENCY MEDICINE

## 2017-11-29 PROCEDURE — 96375 TX/PRO/DX INJ NEW DRUG ADDON: CPT

## 2017-11-29 PROCEDURE — 96365 THER/PROPH/DIAG IV INF INIT: CPT

## 2017-11-29 PROCEDURE — 86900 BLOOD TYPING SEROLOGIC ABO: CPT | Performed by: INTERNAL MEDICINE

## 2017-11-29 PROCEDURE — 87040 BLOOD CULTURE FOR BACTERIA: CPT | Performed by: INTERNAL MEDICINE

## 2017-11-29 PROCEDURE — 74177 CT ABD & PELVIS W/CONTRAST: CPT

## 2017-11-29 PROCEDURE — 82272 OCCULT BLD FECES 1-3 TESTS: CPT | Performed by: EMERGENCY MEDICINE

## 2017-11-29 PROCEDURE — 83605 ASSAY OF LACTIC ACID: CPT | Performed by: EMERGENCY MEDICINE

## 2017-11-29 PROCEDURE — 74011000250 HC RX REV CODE- 250: Performed by: EMERGENCY MEDICINE

## 2017-11-29 PROCEDURE — 85730 THROMBOPLASTIN TIME PARTIAL: CPT | Performed by: EMERGENCY MEDICINE

## 2017-11-29 PROCEDURE — 96361 HYDRATE IV INFUSION ADD-ON: CPT

## 2017-11-29 PROCEDURE — 96376 TX/PRO/DX INJ SAME DRUG ADON: CPT

## 2017-11-29 PROCEDURE — 74011250636 HC RX REV CODE- 250/636: Performed by: INTERNAL MEDICINE

## 2017-11-29 PROCEDURE — 74011250636 HC RX REV CODE- 250/636: Performed by: EMERGENCY MEDICINE

## 2017-11-29 PROCEDURE — 74011636320 HC RX REV CODE- 636/320: Performed by: EMERGENCY MEDICINE

## 2017-11-29 PROCEDURE — 85610 PROTHROMBIN TIME: CPT | Performed by: EMERGENCY MEDICINE

## 2017-11-29 PROCEDURE — 74011250637 HC RX REV CODE- 250/637: Performed by: INTERNAL MEDICINE

## 2017-11-29 PROCEDURE — 80053 COMPREHEN METABOLIC PANEL: CPT | Performed by: EMERGENCY MEDICINE

## 2017-11-29 PROCEDURE — C9113 INJ PANTOPRAZOLE SODIUM, VIA: HCPCS | Performed by: EMERGENCY MEDICINE

## 2017-11-29 PROCEDURE — 96366 THER/PROPH/DIAG IV INF ADDON: CPT

## 2017-11-29 PROCEDURE — 74011000250 HC RX REV CODE- 250: Performed by: PHYSICIAN ASSISTANT

## 2017-11-29 PROCEDURE — 65660000000 HC RM CCU STEPDOWN

## 2017-11-29 RX ORDER — LEVOFLOXACIN 5 MG/ML
750 INJECTION, SOLUTION INTRAVENOUS EVERY 24 HOURS
Status: DISCONTINUED | OUTPATIENT
Start: 2017-11-30 | End: 2017-11-30

## 2017-11-29 RX ORDER — LANOLIN ALCOHOL/MO/W.PET/CERES
3 CREAM (GRAM) TOPICAL
Status: DISCONTINUED | OUTPATIENT
Start: 2017-11-29 | End: 2017-12-01 | Stop reason: HOSPADM

## 2017-11-29 RX ORDER — GABAPENTIN 300 MG/1
300 CAPSULE ORAL 3 TIMES DAILY
Status: DISCONTINUED | OUTPATIENT
Start: 2017-11-29 | End: 2017-12-01 | Stop reason: HOSPADM

## 2017-11-29 RX ORDER — PANTOPRAZOLE SODIUM 40 MG/10ML
40 INJECTION, POWDER, LYOPHILIZED, FOR SOLUTION INTRAVENOUS
Status: COMPLETED | OUTPATIENT
Start: 2017-11-29 | End: 2017-11-29

## 2017-11-29 RX ORDER — BISACODYL 5 MG
5 TABLET, DELAYED RELEASE (ENTERIC COATED) ORAL DAILY PRN
Status: DISCONTINUED | OUTPATIENT
Start: 2017-11-29 | End: 2017-12-01 | Stop reason: HOSPADM

## 2017-11-29 RX ORDER — SODIUM CHLORIDE 0.9 % (FLUSH) 0.9 %
5-10 SYRINGE (ML) INJECTION EVERY 8 HOURS
Status: DISCONTINUED | OUTPATIENT
Start: 2017-11-29 | End: 2017-12-01 | Stop reason: HOSPADM

## 2017-11-29 RX ORDER — ENOXAPARIN SODIUM 100 MG/ML
40 INJECTION SUBCUTANEOUS EVERY 24 HOURS
Status: DISCONTINUED | OUTPATIENT
Start: 2017-11-29 | End: 2017-11-29

## 2017-11-29 RX ORDER — SODIUM CHLORIDE 0.9 % (FLUSH) 0.9 %
10 SYRINGE (ML) INJECTION
Status: COMPLETED | OUTPATIENT
Start: 2017-11-29 | End: 2017-11-29

## 2017-11-29 RX ORDER — LEVOFLOXACIN 5 MG/ML
750 INJECTION, SOLUTION INTRAVENOUS ONCE
Status: COMPLETED | OUTPATIENT
Start: 2017-11-29 | End: 2017-12-01

## 2017-11-29 RX ORDER — GABAPENTIN 300 MG/1
600 CAPSULE ORAL
COMMUNITY
Start: 2019-01-01

## 2017-11-29 RX ORDER — HYDROMORPHONE HYDROCHLORIDE 2 MG/ML
0.5 INJECTION, SOLUTION INTRAMUSCULAR; INTRAVENOUS; SUBCUTANEOUS
Status: COMPLETED | OUTPATIENT
Start: 2017-11-29 | End: 2017-11-29

## 2017-11-29 RX ORDER — SODIUM CHLORIDE 9 MG/ML
75 INJECTION, SOLUTION INTRAVENOUS CONTINUOUS
Status: DISCONTINUED | OUTPATIENT
Start: 2017-11-29 | End: 2017-12-01 | Stop reason: HOSPADM

## 2017-11-29 RX ORDER — HYDROMORPHONE HYDROCHLORIDE 2 MG/ML
0.5 INJECTION, SOLUTION INTRAMUSCULAR; INTRAVENOUS; SUBCUTANEOUS
Status: DISCONTINUED | OUTPATIENT
Start: 2017-11-29 | End: 2017-11-30

## 2017-11-29 RX ORDER — SODIUM CHLORIDE 9 MG/ML
50 INJECTION, SOLUTION INTRAVENOUS
Status: COMPLETED | OUTPATIENT
Start: 2017-11-29 | End: 2017-12-01

## 2017-11-29 RX ORDER — SODIUM CHLORIDE 0.9 % (FLUSH) 0.9 %
5-10 SYRINGE (ML) INJECTION AS NEEDED
Status: DISCONTINUED | OUTPATIENT
Start: 2017-11-29 | End: 2017-12-01 | Stop reason: HOSPADM

## 2017-11-29 RX ORDER — PANTOPRAZOLE SODIUM 40 MG/1
40 TABLET, DELAYED RELEASE ORAL
Status: DISCONTINUED | OUTPATIENT
Start: 2017-11-29 | End: 2017-12-01 | Stop reason: HOSPADM

## 2017-11-29 RX ORDER — DIAZEPAM 5 MG/1
5 TABLET ORAL
Status: DISCONTINUED | OUTPATIENT
Start: 2017-11-29 | End: 2017-12-01 | Stop reason: HOSPADM

## 2017-11-29 RX ORDER — ACETAMINOPHEN 325 MG/1
650 TABLET ORAL
Status: DISCONTINUED | OUTPATIENT
Start: 2017-11-29 | End: 2017-12-01 | Stop reason: HOSPADM

## 2017-11-29 RX ORDER — HYDROMORPHONE HYDROCHLORIDE 2 MG/ML
0.5 INJECTION, SOLUTION INTRAMUSCULAR; INTRAVENOUS; SUBCUTANEOUS
Status: DISCONTINUED | OUTPATIENT
Start: 2017-11-29 | End: 2017-11-29

## 2017-11-29 RX ORDER — CIPROFLOXACIN 2 MG/ML
400 INJECTION, SOLUTION INTRAVENOUS
Status: DISCONTINUED | OUTPATIENT
Start: 2017-11-29 | End: 2017-11-29

## 2017-11-29 RX ORDER — PROMETHAZINE HYDROCHLORIDE 25 MG/1
25 TABLET ORAL
COMMUNITY
End: 2019-01-01

## 2017-11-29 RX ORDER — FAMOTIDINE 20 MG/1
20 TABLET, FILM COATED ORAL 2 TIMES DAILY
Status: DISCONTINUED | OUTPATIENT
Start: 2017-11-29 | End: 2017-11-29

## 2017-11-29 RX ORDER — HYDROCODONE BITARTRATE AND ACETAMINOPHEN 5; 325 MG/1; MG/1
1 TABLET ORAL
Status: DISCONTINUED | OUTPATIENT
Start: 2017-11-29 | End: 2017-11-30

## 2017-11-29 RX ORDER — METRONIDAZOLE 500 MG/100ML
500 INJECTION, SOLUTION INTRAVENOUS
Status: DISCONTINUED | OUTPATIENT
Start: 2017-11-29 | End: 2017-11-29

## 2017-11-29 RX ORDER — DIAZEPAM 5 MG/1
5 TABLET ORAL
COMMUNITY
End: 2019-01-01

## 2017-11-29 RX ORDER — PANTOPRAZOLE SODIUM 40 MG/1
40 TABLET, DELAYED RELEASE ORAL
COMMUNITY
End: 2019-01-01

## 2017-11-29 RX ORDER — METRONIDAZOLE 500 MG/100ML
500 INJECTION, SOLUTION INTRAVENOUS EVERY 8 HOURS
Status: DISCONTINUED | OUTPATIENT
Start: 2017-11-29 | End: 2017-11-29

## 2017-11-29 RX ORDER — HYDRALAZINE HYDROCHLORIDE 20 MG/ML
10 INJECTION INTRAMUSCULAR; INTRAVENOUS
Status: DISCONTINUED | OUTPATIENT
Start: 2017-11-29 | End: 2017-12-01 | Stop reason: HOSPADM

## 2017-11-29 RX ORDER — ZOLPIDEM TARTRATE 10 MG/1
10 TABLET ORAL
COMMUNITY
End: 2019-01-01

## 2017-11-29 RX ORDER — METRONIDAZOLE 500 MG/100ML
500 INJECTION, SOLUTION INTRAVENOUS EVERY 12 HOURS
Status: DISCONTINUED | OUTPATIENT
Start: 2017-11-29 | End: 2017-11-30

## 2017-11-29 RX ORDER — ONDANSETRON 2 MG/ML
4 INJECTION INTRAMUSCULAR; INTRAVENOUS
Status: DISCONTINUED | OUTPATIENT
Start: 2017-11-29 | End: 2017-12-01 | Stop reason: HOSPADM

## 2017-11-29 RX ORDER — ONDANSETRON 2 MG/ML
4 INJECTION INTRAMUSCULAR; INTRAVENOUS
Status: COMPLETED | OUTPATIENT
Start: 2017-11-29 | End: 2017-11-29

## 2017-11-29 RX ADMIN — ONDANSETRON 4 MG: 2 INJECTION INTRAMUSCULAR; INTRAVENOUS at 10:39

## 2017-11-29 RX ADMIN — SODIUM CHLORIDE 1000 ML: 900 INJECTION, SOLUTION INTRAVENOUS at 03:54

## 2017-11-29 RX ADMIN — FAMOTIDINE 20 MG: 20 TABLET, FILM COATED ORAL at 10:40

## 2017-11-29 RX ADMIN — SODIUM CHLORIDE 75 ML/HR: 900 INJECTION, SOLUTION INTRAVENOUS at 23:14

## 2017-11-29 RX ADMIN — Medication 10 ML: at 05:14

## 2017-11-29 RX ADMIN — HYDROCODONE BITARTRATE AND ACETAMINOPHEN 1 TABLET: 5; 325 TABLET ORAL at 21:39

## 2017-11-29 RX ADMIN — HYDROMORPHONE HYDROCHLORIDE 0.5 MG: 2 INJECTION, SOLUTION INTRAMUSCULAR; INTRAVENOUS; SUBCUTANEOUS at 15:20

## 2017-11-29 RX ADMIN — ONDANSETRON 4 MG: 2 INJECTION INTRAMUSCULAR; INTRAVENOUS at 17:01

## 2017-11-29 RX ADMIN — LEVOFLOXACIN 750 MG: 5 INJECTION, SOLUTION INTRAVENOUS at 06:30

## 2017-11-29 RX ADMIN — Medication 10 ML: at 10:39

## 2017-11-29 RX ADMIN — Medication 10 ML: at 19:48

## 2017-11-29 RX ADMIN — Medication 10 ML: at 10:40

## 2017-11-29 RX ADMIN — MELATONIN 3 MG ORAL TABLET 3 MG: 3 TABLET ORAL at 21:39

## 2017-11-29 RX ADMIN — HYDROMORPHONE HYDROCHLORIDE 0.5 MG: 2 INJECTION INTRAMUSCULAR; INTRAVENOUS; SUBCUTANEOUS at 10:39

## 2017-11-29 RX ADMIN — PANTOPRAZOLE SODIUM 40 MG: 40 INJECTION, POWDER, FOR SOLUTION INTRAVENOUS at 04:03

## 2017-11-29 RX ADMIN — HYDROMORPHONE HYDROCHLORIDE 0.5 MG: 2 INJECTION INTRAMUSCULAR; INTRAVENOUS; SUBCUTANEOUS at 06:30

## 2017-11-29 RX ADMIN — IOPAMIDOL 100 ML: 755 INJECTION, SOLUTION INTRAVENOUS at 05:14

## 2017-11-29 RX ADMIN — HYDROMORPHONE HYDROCHLORIDE 0.5 MG: 2 INJECTION, SOLUTION INTRAMUSCULAR; INTRAVENOUS; SUBCUTANEOUS at 19:48

## 2017-11-29 RX ADMIN — METRONIDAZOLE 500 MG: 500 INJECTION, SOLUTION INTRAVENOUS at 21:21

## 2017-11-29 RX ADMIN — SODIUM CHLORIDE 75 ML/HR: 900 INJECTION, SOLUTION INTRAVENOUS at 10:42

## 2017-11-29 RX ADMIN — PANTOPRAZOLE SODIUM 40 MG: 40 TABLET, DELAYED RELEASE ORAL at 10:40

## 2017-11-29 RX ADMIN — Medication 10 ML: at 10:41

## 2017-11-29 RX ADMIN — POLYETHYLENE GLYCOL 3350, SODIUM SULFATE ANHYDROUS, SODIUM BICARBONATE, SODIUM CHLORIDE, POTASSIUM CHLORIDE 4000 ML: 236; 22.74; 6.74; 5.86; 2.97 POWDER, FOR SOLUTION ORAL at 19:48

## 2017-11-29 RX ADMIN — SODIUM CHLORIDE 50 ML/HR: 900 INJECTION, SOLUTION INTRAVENOUS at 05:15

## 2017-11-29 RX ADMIN — Medication 10 ML: at 21:21

## 2017-11-29 RX ADMIN — Medication 10 ML: at 17:01

## 2017-11-29 RX ADMIN — HYDROMORPHONE HYDROCHLORIDE 0.5 MG: 2 INJECTION, SOLUTION INTRAMUSCULAR; INTRAVENOUS; SUBCUTANEOUS at 04:02

## 2017-11-29 RX ADMIN — ONDANSETRON 4 MG: 2 INJECTION INTRAMUSCULAR; INTRAVENOUS at 04:01

## 2017-11-29 RX ADMIN — GABAPENTIN 300 MG: 300 CAPSULE ORAL at 21:21

## 2017-11-29 RX ADMIN — METRONIDAZOLE 500 MG: 500 INJECTION, SOLUTION INTRAVENOUS at 10:21

## 2017-11-29 RX ADMIN — Medication 10 ML: at 15:20

## 2017-11-29 RX ADMIN — GABAPENTIN 300 MG: 300 CAPSULE ORAL at 15:20

## 2017-11-29 NOTE — ED NOTES
Bedside and verbal report given to Say RN on Arya Milder at shift change for routine progression of care. Report consisted of patients Situation, Background, Assessment and Recommendations(SBAR). Information from the following report(s) SBAR, ED Summary, [facility] and Recent Results was reviewed with the receiving nurse. Opportunity for questions and clarification was provided.
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
## 2017-11-29 NOTE — PROGRESS NOTES
Pharmacy Clarification of the Prior to Admission Medication Regimen Retrospective to the Admission Medication Reconciliation    The patient was interviewed regarding clarification of the prior to admission medication regimen and was questioned regarding use of any other inhalers, topical products, over the counter medications, herbal medications, vitamin products or ophthalmic/nasal/otic medication use. Information Obtained From: RX Query, Patient    Recommendations/Findings: The following amendments were made to the patient's active medication list on file at [facility]:     1) Additions:    gabapentin (NEURONTIN) 300 mg capsule   multivit-min-FA-lycopen-lutein (CENTRUM SILVER) 0.4-300-250 mg-mcg-mcg tab   pantoprazole (PROTONIX) 40 mg tablet   promethazine (PHENERGAN) 25 mg tablet   zolpidem CR (AMBIEN CR) 12.5 mg tablet    2) Removals:    famotidine (PEPCID) 20 mg tablet   omeprazole (PRILOSEC) 20 mg capsule    3) Changes:   diazePAM (VALIUM) 5 mg tablet (Old regimen: 1 tablet BID PRN spasm /New regimen: 5 mg every 8 hours PRN anxiety)    4) Pertinent Pharmacy Findings:   Patient stated that she was previously taking metoprolol tartrate 50 mg daily, but her PCP discontinued this agent due to her HR being too low. Patient stated that this medication was discontinued 'at least 30 days ago'. PTA medication list was corrected to the following:     Prior to Admission Medications   Prescriptions Last Dose Informant Patient Reported? Taking? HYDROcodone-acetaminophen (NORCO) 7.5-325 mg per tablet 11/28/2017 at Unknown time Other No Yes   Sig: Take 1 Tab by mouth every six (6) hours as needed for Pain. Max Daily Amount: 4 Tabs. aspirin 81 mg tablet 11/28/2017 at Unknown time Self Yes Yes   Sig: Take 81 mg by mouth. diazePAM (VALIUM) 5 mg tablet 11/28/2017 at Unknown time Other Yes Yes   Sig: Take 5 mg by mouth every eight (8) hours as needed for Anxiety.    estradiol (ESTRACE) 1 mg tablet 11/28/2017 at Unknown time Other Yes Yes   Sig: Take 1 mg by mouth daily. gabapentin (NEURONTIN) 300 mg capsule 11/28/2017 at Unknown time Other Yes Yes   Sig: Take 300 mg by mouth three (3) times daily. multivit-min-FA-lycopen-lutein (CENTRUM SILVER) 0.4-300-250 mg-mcg-mcg tab 11/27/2017 at Unknown time Self Yes Yes   Sig: Take 1 Tab by mouth daily. ondansetron (ZOFRAN ODT) 4 mg disintegrating tablet 11/28/2017 at Unknown time Other No Yes   Sig: Take 1 Tab by mouth every eight (8) hours as needed for Nausea. pantoprazole (PROTONIX) 40 mg tablet 11/28/2017 at Unknown time Other Yes Yes   Sig: Take 40 mg by mouth two (2) times daily as needed (reflux). promethazine (PHENERGAN) 25 mg tablet 11/27/2017 at Unknown Other Yes Yes   Sig: Take 25 mg by mouth every six (6) hours as needed for Nausea. zolpidem CR (AMBIEN CR) 12.5 mg tablet 11/28/2017 at Unknown time Other Yes Yes   Sig: Take 12.5 mg by mouth nightly as needed for Sleep. Facility-Administered Medications: None          Thank you,  Madyson Nino. ERIC Candidate Aamir Cabrera

## 2017-11-29 NOTE — ED NOTES
TRANSFER - OUT REPORT:    Verbal report given to MIRELLA Riley(name) on Lucienne Lundborg  being transferred to (unit) for routine progression of care       Report consisted of patients Situation, Background, Assessment and   Recommendations(SBAR). Information from the following report(s) ED Summary and MAR was reviewed with the receiving nurse. Lines:       Opportunity for questions and clarification was provided.

## 2017-11-29 NOTE — ED NOTES
Assumed care of patient resting on stretcher with complaint of mild to moderate lower abdominal pain (7/10)

## 2017-11-29 NOTE — IP AVS SNAPSHOT
[address] [facility], [place] 
[phone] Patient: Liliane Jeffery MRN: [id number] About your hospitalization You were admitted on:  November 29, 2017 You last received care in the:  [facility] You were discharged on:  December 1, 2017 Why you were hospitalized Your primary diagnosis was:  Not on File Your diagnoses also included:  Colitis Things You Need To Do (next 8 weeks) Tuesday Dec 05, 2017 Follow up with Verna Mohan MD  
4;30pm hospital follow-up with Adalberto Madrigal NP Phone:  [phone] Where:  [address] [place] [address], . [phone] [id number] Discharge Orders None A check salvatore indicates which time of day the medication should be taken. My Medications STOP taking these medications HYDROcodone-acetaminophen 7.5-325 mg per tablet Commonly known as:  [address] these medications as instructed Instructions Each Dose to Equal  
 Morning Noon Evening Bedtime  
 aspirin 81 mg tablet Your last dose was: Your next dose is: Take 81 mg by mouth. 81 mg CENTRUM SILVER 0.4-300-250 mg-mcg-mcg Tab Generic drug:  multivit-min-FA-lycopen-lutein Your last dose was: Your next dose is: Take 1 Tab by mouth daily. 1 Tab  
    
   
   
   
  
 diazePAM 5 mg tablet Commonly known as:  VALIUM Your last dose was: Your next dose is: Take 5 mg by mouth every eight (8) hours as needed for Anxiety. 5 mg  
    
   
   
   
  
 estradiol 1 mg tablet Commonly known as:  ESTRACE Your last dose was: Your next dose is: Take 1 mg by mouth daily. 1 mg  
    
   
   
   
  
 gabapentin 300 mg capsule Commonly known as:  NEURONTIN Your last dose was: Your next dose is: Take 300 mg by mouth three (3) times daily. 300 mg  
    
   
   
   
  
 levoFLOXacin 750 mg tablet Commonly known as:  Lamount Chalemili Start taking on:  12/2/2017 Your last dose was: Your next dose is: Take 1 Tab by mouth every twenty-four (24) hours for 5 days. 750 mg  
    
   
   
   
  
 metroNIDAZOLE 500 mg tablet Commonly known as:  FLAGYL Your last dose was: Your next dose is: Take 1 Tab by mouth three (3) times daily for 5 days. 500 mg  
    
   
   
   
  
 ondansetron 4 mg disintegrating tablet Commonly known as:  ZOFRAN ODT Your last dose was: Your next dose is: Take 1 Tab by mouth every eight (8) hours as needed for Nausea. 4 mg  
    
   
   
   
  
 oxyCODONE-acetaminophen  mg per tablet Commonly known as:  PERCOCET 10 Your last dose was: Your next dose is: Take 1 Tab by mouth every four (4) hours as needed. Max Daily Amount: 6 Tabs. 1 Tab  
    
   
   
   
  
 pantoprazole 40 mg tablet Commonly known as:  PROTONIX Your last dose was: Your next dose is: Take 40 mg by mouth two (2) times daily as needed (reflux). 40 mg  
    
   
   
   
  
 promethazine 25 mg tablet Commonly known as:  PHENERGAN Your last dose was: Your next dose is: Take 25 mg by mouth every six (6) hours as needed for Nausea. 25 mg  
    
   
   
   
  
 zolpidem CR 12.5 mg tablet Commonly known as:  AMBIEN CR Your last dose was: Your next dose is: Take 12.5 mg by mouth nightly as needed for Sleep. 12.5 mg Where to Get Your Medications Information on where to get these meds will be given to you by the nurse or doctor. ! Ask your nurse or doctor about these medications  
  levoFLOXacin 750 mg tablet  
 metroNIDAZOLE 500 mg tablet oxyCODONE-acetaminophen  mg per tablet Discharge Instructions Please hold your aspirin for the next 7 days Call Dr. Villa Trevino from  to follow up on your biopsy results Introducing [facility]! [facility] introduces [employer] patient portal. Now you can access parts of your medical record, email your doctor's office, and request medication refills online. 1. In your internet browser, go to [email or web address] 2. Click on the First Time User? Click Here link in the Sign In box. You will see the New Member Sign Up page. 3. Enter your ChipX Access Code exactly as it appears below. You will not need to use this code after youve completed the sign-up process. If you do not sign up before the expiration date, you must request a new code. · ChipX Access Code: [email or web address] Expires: 1/24/2018 10:31 AM 
 
4. Enter the last four digits of your Social Security Number (xxxx) and Date of Birth (mm/dd/yyyy) as indicated and click Submit. You will be taken to the next sign-up page. 5. Create a ChipX ID. This will be your ChipX login ID and cannot be changed, so think of one that is secure and easy to remember. 6. Create a ChipX password. You can change your password at any time. 7. Enter your Password Reset Question and Answer. This can be used at a later time if you forget your password. 8. Enter your e-mail address. You will receive e-mail notification when new information is available in [address] 9. Click Sign Up. You can now view and download portions of your medical record. 10. Click the Download Summary menu link to download a portable copy of your medical information. If you have questions, please visit the Frequently Asked Questions section of the ChipX website. Remember, ChipX is NOT to be used for urgent needs. For medical emergencies, dial 911. Now available from your iPhone and Android! Unresulted Labs-Please follow up with your PCP about these lab tests Order Current Status CULTURE, BLOOD, PAIRED Preliminary result Providers Seen During Your Hospitalization Provider Specialty Primary office phone April Kiya Dubois MD Emergency Medicine [phone] Kimmy Servin MD Internal Medicine [phone] Your Primary Care Physician (PCP) Primary Care Physician Office Phone Office Fax Malissa Gonsales [phone] You are allergic to the following Allergen Reactions Codeine Itching Lisinopril Angioedema Recent Documentation Height Weight Breastfeeding? BMI OB Status Smoking Status 1.626 m 66.8 kg No 25.28 kg/m2 Hysterectomy Never Smoker Emergency Contacts Name Discharge Info Relation Home Work Mobile Gavin Dewey Jr. N/A  AT THIS TIME [6] Son [22] [phone] Patient Belongings The following personal items are in your possession at time of discharge: 
  Dental Appliances: At bedside  Visual Aid: None      Home Medications: None   Jewelry: None  Clothing: None    Other Valuables: Purse, Wallet (offered to lock up belongings- pt refused at this time) Please provide this summary of care documentation to your next provider. Signatures-by signing, you are acknowledging that this After Visit Summary has been reviewed with you and you have received a copy. Patient Signature:  ____________________________________________________________ Date:  ____________________________________________________________  
  
Polina Platt Provider Signature:  ____________________________________________________________ Date:  ____________________________________________________________
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
## 2017-11-29 NOTE — PROGRESS NOTES
Pharmacy Automatic Renal Dosing Protocol - Antimicrobials    Indication for Antimicrobials: intraabdominal infection     Current Regimen of Each Antimicrobial:  Levofloxacin 750 mg IV daily (Start Date ; Day # 1)  Metronidazole 500 mg Q8H (Start Date ; Day # 1)    Significant Cultures:     Paralysis, amputations, malnutrition: NA    Labs:  Recent Labs      17   0252   CREA  0.62   BUN  8   WBC  6.0     Temp (24hrs), Av.9 °F (36.1 °C), Min:96.5 °F (35.8 °C), Max:97.3 °F (36.3 °C)    Creatinine Clearance (mL/min) or Dialysis: 87  No results found for: PCT    Impression/Plan:   Levofloxacin dose appropriate for renal function, continue same. Metronidazole dose adjusted to 500 mg Q12H per protocol     Pharmacy will follow daily and adjust medications as appropriate for renal function and/or serum levels. Thank you,  Allyssa Ji, PHARMD          Recommended duration of therapy  [email or web address]/Pharmacy/Clinical%20Companion/Duration%20of%[email or web address]. docx    Renal Dosing  [email or web address]/Pharmacy/[email or web address]/Renal%[email or web address]. pdf
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
## 2017-11-29 NOTE — PROGRESS NOTES
TRANSFER - IN REPORT:    Verbal report received from Rishi Orantes (name) on Abram Castaneda  being received from ER (unit) for routine progression of care      Report consisted of patients Situation, Background, Assessment and   Recommendations(SBAR). Information from the following report(s) SBAR, Kardex, Intake/Output, MAR and Recent Results was reviewed with the receiving nurse. Opportunity for questions and clarification was provided. Assessment completed upon patients arrival to unit and care assumed. Primary Nurse Jerod Fuentes RN and Beatriz Ascencio RN performed a dual skin assessment on this patient. Small, red abrasion to R elbow, bruises to bilateral upper arms, hematoma to R posterior head, tattoos to R lower back and bilateral forearms noted. Pankaj score is 21.

## 2017-11-30 ENCOUNTER — ANESTHESIA EVENT (OUTPATIENT)
Dept: ENDOSCOPY | Age: 62
DRG: 395 | End: 2017-11-30
Payer: COMMERCIAL

## 2017-11-30 ENCOUNTER — ANESTHESIA (OUTPATIENT)
Dept: ENDOSCOPY | Age: 62
DRG: 395 | End: 2017-11-30
Payer: COMMERCIAL

## 2017-11-30 LAB
ANION GAP SERPL CALC-SCNC: 7 MMOL/L (ref 5–15)
BASOPHILS # BLD: 0 K/UL (ref 0–0.1)
BASOPHILS NFR BLD: 1 % (ref 0–1)
BUN SERPL-MCNC: 3 MG/DL (ref 6–20)
BUN/CREAT SERPL: 6 (ref 12–20)
CALCIUM SERPL-MCNC: 7 MG/DL (ref 8.5–10.1)
CHLORIDE SERPL-SCNC: 105 MMOL/L (ref 97–108)
CO2 SERPL-SCNC: 28 MMOL/L (ref 21–32)
CREAT SERPL-MCNC: 0.49 MG/DL (ref 0.55–1.02)
EOSINOPHIL # BLD: 0 K/UL (ref 0–0.4)
EOSINOPHIL NFR BLD: 1 % (ref 0–7)
ERYTHROCYTE [DISTWIDTH] IN BLOOD BY AUTOMATED COUNT: 14.4 % (ref 11.5–14.5)
GLUCOSE SERPL-MCNC: 83 MG/DL (ref 65–100)
HCT VFR BLD AUTO: 28.9 % (ref 35–47)
HGB BLD-MCNC: 9.5 G/DL (ref 11.5–16)
LYMPHOCYTES # BLD: 1.3 K/UL (ref 0.8–3.5)
LYMPHOCYTES NFR BLD: 33 % (ref 12–49)
MCH RBC QN AUTO: 30.4 PG (ref 26–34)
MCHC RBC AUTO-ENTMCNC: 32.9 G/DL (ref 30–36.5)
MCV RBC AUTO: 92.3 FL (ref 80–99)
MONOCYTES # BLD: 0.3 K/UL (ref 0–1)
MONOCYTES NFR BLD: 7 % (ref 5–13)
NEUTS SEG # BLD: 2.4 K/UL (ref 1.8–8)
NEUTS SEG NFR BLD: 58 % (ref 32–75)
PLATELET # BLD AUTO: 169 K/UL (ref 150–400)
POTASSIUM SERPL-SCNC: 3.6 MMOL/L (ref 3.5–5.1)
RBC # BLD AUTO: 3.13 M/UL (ref 3.8–5.2)
SODIUM SERPL-SCNC: 140 MMOL/L (ref 136–145)
WBC # BLD AUTO: 4.1 K/UL (ref 3.6–11)

## 2017-11-30 PROCEDURE — 74011250637 HC RX REV CODE- 250/637: Performed by: INTERNAL MEDICINE

## 2017-11-30 PROCEDURE — 74011250636 HC RX REV CODE- 250/636: Performed by: INTERNAL MEDICINE

## 2017-11-30 PROCEDURE — C1726 CATH, BAL DIL, NON-VASCULAR: HCPCS | Performed by: INTERNAL MEDICINE

## 2017-11-30 PROCEDURE — 0DB48ZX EXCISION OF ESOPHAGOGASTRIC JUNCTION, VIA NATURAL OR ARTIFICIAL OPENING ENDOSCOPIC, DIAGNOSTIC: ICD-10-PCS | Performed by: INTERNAL MEDICINE

## 2017-11-30 PROCEDURE — 0DBL8ZX EXCISION OF TRANSVERSE COLON, VIA NATURAL OR ARTIFICIAL OPENING ENDOSCOPIC, DIAGNOSTIC: ICD-10-PCS | Performed by: INTERNAL MEDICINE

## 2017-11-30 PROCEDURE — 77030019988 HC FCPS ENDOSC DISP BSC -B: Performed by: INTERNAL MEDICINE

## 2017-11-30 PROCEDURE — 36415 COLL VENOUS BLD VENIPUNCTURE: CPT | Performed by: INTERNAL MEDICINE

## 2017-11-30 PROCEDURE — 74011250636 HC RX REV CODE- 250/636

## 2017-11-30 PROCEDURE — 76040000019: Performed by: INTERNAL MEDICINE

## 2017-11-30 PROCEDURE — 0D758ZZ DILATION OF ESOPHAGUS, VIA NATURAL OR ARTIFICIAL OPENING ENDOSCOPIC: ICD-10-PCS | Performed by: INTERNAL MEDICINE

## 2017-11-30 PROCEDURE — 74011000250 HC RX REV CODE- 250: Performed by: EMERGENCY MEDICINE

## 2017-11-30 PROCEDURE — 88342 IMHCHEM/IMCYTCHM 1ST ANTB: CPT | Performed by: INTERNAL MEDICINE

## 2017-11-30 PROCEDURE — 77030009426 HC FCPS BIOP ENDOSC BSC -B: Performed by: INTERNAL MEDICINE

## 2017-11-30 PROCEDURE — 65660000000 HC RM CCU STEPDOWN

## 2017-11-30 PROCEDURE — 0DB68ZX EXCISION OF STOMACH, VIA NATURAL OR ARTIFICIAL OPENING ENDOSCOPIC, DIAGNOSTIC: ICD-10-PCS | Performed by: INTERNAL MEDICINE

## 2017-11-30 PROCEDURE — 85025 COMPLETE CBC W/AUTO DIFF WBC: CPT | Performed by: INTERNAL MEDICINE

## 2017-11-30 PROCEDURE — 0DBM8ZX EXCISION OF DESCENDING COLON, VIA NATURAL OR ARTIFICIAL OPENING ENDOSCOPIC, DIAGNOSTIC: ICD-10-PCS | Performed by: INTERNAL MEDICINE

## 2017-11-30 PROCEDURE — 88305 TISSUE EXAM BY PATHOLOGIST: CPT | Performed by: INTERNAL MEDICINE

## 2017-11-30 PROCEDURE — 77030018712 HC DEV BLLN INFL BSC -B: Performed by: INTERNAL MEDICINE

## 2017-11-30 PROCEDURE — 76060000031 HC ANESTHESIA FIRST 0.5 HR: Performed by: INTERNAL MEDICINE

## 2017-11-30 PROCEDURE — 74011250637 HC RX REV CODE- 250/637: Performed by: ANESTHESIOLOGY

## 2017-11-30 PROCEDURE — 80048 BASIC METABOLIC PNL TOTAL CA: CPT | Performed by: INTERNAL MEDICINE

## 2017-11-30 RX ORDER — SODIUM CHLORIDE 0.9 % (FLUSH) 0.9 %
5-10 SYRINGE (ML) INJECTION AS NEEDED
Status: ACTIVE | OUTPATIENT
Start: 2017-11-30 | End: 2017-11-30

## 2017-11-30 RX ORDER — FLUMAZENIL 0.1 MG/ML
0.2 INJECTION INTRAVENOUS
Status: DISCONTINUED | OUTPATIENT
Start: 2017-11-30 | End: 2017-11-30 | Stop reason: HOSPADM

## 2017-11-30 RX ORDER — NALOXONE HYDROCHLORIDE 0.4 MG/ML
0.4 INJECTION, SOLUTION INTRAMUSCULAR; INTRAVENOUS; SUBCUTANEOUS
Status: DISCONTINUED | OUTPATIENT
Start: 2017-11-30 | End: 2017-11-30 | Stop reason: HOSPADM

## 2017-11-30 RX ORDER — OXYCODONE AND ACETAMINOPHEN 10; 325 MG/1; MG/1
1 TABLET ORAL
Status: DISCONTINUED | OUTPATIENT
Start: 2017-11-30 | End: 2017-12-01 | Stop reason: HOSPADM

## 2017-11-30 RX ORDER — ATROPINE SULFATE 0.1 MG/ML
0.5 INJECTION INTRAVENOUS
Status: DISCONTINUED | OUTPATIENT
Start: 2017-11-30 | End: 2017-11-30 | Stop reason: HOSPADM

## 2017-11-30 RX ORDER — DEXTROMETHORPHAN/PSEUDOEPHED 2.5-7.5/.8
DROPS ORAL
Status: DISPENSED
Start: 2017-11-30 | End: 2017-12-01

## 2017-11-30 RX ORDER — SODIUM CHLORIDE 9 MG/ML
75 INJECTION, SOLUTION INTRAVENOUS CONTINUOUS
Status: DISPENSED | OUTPATIENT
Start: 2017-11-30 | End: 2017-11-30

## 2017-11-30 RX ORDER — METRONIDAZOLE 250 MG/1
500 TABLET ORAL 3 TIMES DAILY
Status: DISCONTINUED | OUTPATIENT
Start: 2017-11-30 | End: 2017-12-01 | Stop reason: HOSPADM

## 2017-11-30 RX ORDER — PROPOFOL 10 MG/ML
INJECTION, EMULSION INTRAVENOUS AS NEEDED
Status: DISCONTINUED | OUTPATIENT
Start: 2017-11-30 | End: 2017-11-30 | Stop reason: HOSPADM

## 2017-11-30 RX ORDER — EPINEPHRINE 0.1 MG/ML
1 INJECTION INTRACARDIAC; INTRAVENOUS
Status: DISCONTINUED | OUTPATIENT
Start: 2017-11-30 | End: 2017-11-30 | Stop reason: HOSPADM

## 2017-11-30 RX ORDER — BUTALBITAL, ACETAMINOPHEN AND CAFFEINE 50; 325; 40 MG/1; MG/1; MG/1
1 TABLET ORAL
Status: DISCONTINUED | OUTPATIENT
Start: 2017-11-30 | End: 2017-12-01 | Stop reason: HOSPADM

## 2017-11-30 RX ORDER — MIDAZOLAM HYDROCHLORIDE 1 MG/ML
.25-5 INJECTION, SOLUTION INTRAMUSCULAR; INTRAVENOUS
Status: DISCONTINUED | OUTPATIENT
Start: 2017-11-30 | End: 2017-11-30 | Stop reason: HOSPADM

## 2017-11-30 RX ORDER — DEXTROMETHORPHAN/PSEUDOEPHED 2.5-7.5/.8
1.2 DROPS ORAL
Status: DISCONTINUED | OUTPATIENT
Start: 2017-11-30 | End: 2017-11-30 | Stop reason: HOSPADM

## 2017-11-30 RX ORDER — SODIUM CHLORIDE 0.9 % (FLUSH) 0.9 %
5-10 SYRINGE (ML) INJECTION EVERY 8 HOURS
Status: DISPENSED | OUTPATIENT
Start: 2017-11-30 | End: 2017-11-30

## 2017-11-30 RX ORDER — SODIUM CHLORIDE 0.9 % (FLUSH) 0.9 %
5-10 SYRINGE (ML) INJECTION EVERY 8 HOURS
Status: COMPLETED | OUTPATIENT
Start: 2017-11-30 | End: 2017-11-30

## 2017-11-30 RX ORDER — LEVOFLOXACIN 750 MG/1
750 TABLET ORAL EVERY 24 HOURS
Status: DISCONTINUED | OUTPATIENT
Start: 2017-12-01 | End: 2017-12-01 | Stop reason: HOSPADM

## 2017-11-30 RX ADMIN — SODIUM CHLORIDE 75 ML/HR: 900 INJECTION, SOLUTION INTRAVENOUS at 19:43

## 2017-11-30 RX ADMIN — OXYCODONE HYDROCHLORIDE AND ACETAMINOPHEN 1 TABLET: 10; 325 TABLET ORAL at 19:39

## 2017-11-30 RX ADMIN — ONDANSETRON 4 MG: 2 INJECTION INTRAMUSCULAR; INTRAVENOUS at 14:24

## 2017-11-30 RX ADMIN — PROPOFOL 50 MG: 10 INJECTION, EMULSION INTRAVENOUS at 12:24

## 2017-11-30 RX ADMIN — METRONIDAZOLE 500 MG: 250 TABLET ORAL at 17:20

## 2017-11-30 RX ADMIN — Medication 10 ML: at 08:50

## 2017-11-30 RX ADMIN — PROPOFOL 30 MG: 10 INJECTION, EMULSION INTRAVENOUS at 12:28

## 2017-11-30 RX ADMIN — DIAZEPAM 5 MG: 5 TABLET ORAL at 21:56

## 2017-11-30 RX ADMIN — GABAPENTIN 300 MG: 300 CAPSULE ORAL at 21:50

## 2017-11-30 RX ADMIN — LEVOFLOXACIN 750 MG: 5 INJECTION, SOLUTION INTRAVENOUS at 05:55

## 2017-11-30 RX ADMIN — PROPOFOL 50 MG: 10 INJECTION, EMULSION INTRAVENOUS at 12:26

## 2017-11-30 RX ADMIN — HYDROMORPHONE HYDROCHLORIDE 0.5 MG: 2 INJECTION, SOLUTION INTRAMUSCULAR; INTRAVENOUS; SUBCUTANEOUS at 04:29

## 2017-11-30 RX ADMIN — SODIUM CHLORIDE 75 ML/HR: 900 INJECTION, SOLUTION INTRAVENOUS at 11:31

## 2017-11-30 RX ADMIN — METRONIDAZOLE 500 MG: 500 INJECTION, SOLUTION INTRAVENOUS at 08:47

## 2017-11-30 RX ADMIN — METRONIDAZOLE 500 MG: 250 TABLET ORAL at 21:50

## 2017-11-30 RX ADMIN — SIMETHICONE 80 MG: 20 SUSPENSION/ DROPS ORAL at 12:56

## 2017-11-30 RX ADMIN — GABAPENTIN 300 MG: 300 CAPSULE ORAL at 17:20

## 2017-11-30 RX ADMIN — ONDANSETRON 4 MG: 2 INJECTION INTRAMUSCULAR; INTRAVENOUS at 04:29

## 2017-11-30 RX ADMIN — MELATONIN 3 MG ORAL TABLET 3 MG: 3 TABLET ORAL at 21:50

## 2017-11-30 RX ADMIN — PROPOFOL 25 MG: 10 INJECTION, EMULSION INTRAVENOUS at 12:43

## 2017-11-30 RX ADMIN — Medication 10 ML: at 14:24

## 2017-11-30 RX ADMIN — HYDROMORPHONE HYDROCHLORIDE 0.5 MG: 2 INJECTION, SOLUTION INTRAMUSCULAR; INTRAVENOUS; SUBCUTANEOUS at 00:45

## 2017-11-30 RX ADMIN — Medication 10 ML: at 05:55

## 2017-11-30 RX ADMIN — PROPOFOL 50 MG: 10 INJECTION, EMULSION INTRAVENOUS at 12:40

## 2017-11-30 RX ADMIN — Medication 10 ML: at 21:51

## 2017-11-30 RX ADMIN — PROPOFOL 20 MG: 10 INJECTION, EMULSION INTRAVENOUS at 12:37

## 2017-11-30 RX ADMIN — HYDROMORPHONE HYDROCHLORIDE 0.5 MG: 2 INJECTION, SOLUTION INTRAMUSCULAR; INTRAVENOUS; SUBCUTANEOUS at 08:47

## 2017-11-30 RX ADMIN — HYDROMORPHONE HYDROCHLORIDE 0.5 MG: 2 INJECTION, SOLUTION INTRAMUSCULAR; INTRAVENOUS; SUBCUTANEOUS at 14:24

## 2017-11-30 RX ADMIN — BUTALBITAL, ACETAMINOPHEN AND CAFFEINE 1 TABLET: 50; 325; 40 TABLET ORAL at 21:56

## 2017-11-30 RX ADMIN — PROPOFOL 50 MG: 10 INJECTION, EMULSION INTRAVENOUS at 12:36

## 2017-11-30 NOTE — PROGRESS NOTES
Gena Callahan  1955  [id number]    Situation:  Verbal report received from: Edgardo Ambriz RN  Procedure: Procedure(s):  COLONOSCOPY  ESOPHAGOGASTRODUODENOSCOPY (EGD)  ESOPHAGEAL DILATION  ESOPHAGOGASTRODUODENAL (EGD) BIOPSY  COLON BIOPSY    Background:    Preoperative diagnosis: colitis  Postoperative diagnosis: EGD - hiatal hernia, gastritis  COLON - rule out ischemic colitis    :  Dr. Carol Becerra  Assistant(s): Endoscopy Technician-1: Hanna Barrera  Endoscopy RN-1: Jackson Matias RN; Emmie Perez RN    Specimens:   ID Type Source Tests Collected by Time Destination   1 : Gastric prominent fold bx Preservative   Lisset Santana MD 11/30/2017 1231 Pathology   2 : gastric bx Preservative Gastric  Juan Vargas MD 11/30/2017 1231 Pathology   3 : [place] bx Preservative   Juan Vargas MD 11/30/2017 1232 Pathology   4 : Splenic Flexure + Left Colon bx Preservative   Juan Vargas MD 11/30/2017 1242 Pathology     Assessment:  Intra-procedure medications   Anesthesia gave intra-procedure sedation and medications, see anesthesia flow sheet yes    Intravenous fluids: NS@ KVO     Vital signs stable     Abdominal assessment: round and soft     Recommendation:  Return to floor

## 2017-11-30 NOTE — PROGRESS NOTES
Occupational Therapy  Medical record reviewed and nurse cleared pt for therapy. Pt was supine upon arrival, reporting 8/10 pain and feeling \"loopy\" after her procedure earlier today. Pt declined participating in OT Evaluation at this time. At baseline, pt reports that she is independent in adls and IADLs including driving and managing her home. Will defer per pt request and continue to follow.

## 2017-11-30 NOTE — PROGRESS NOTES
Bedside and Verbal shift change report given to Shelbie Perry (oncoming nurse) by Moncho Dudley RN (offgoing nurse). Report included the following information Kardex, MAR and Recent Results. Zone Phone for oncoming shift:       Shift Summary: Resting quietly  LDAs               Peripheral IV 11/29/17 Right Wrist (Active)   Site Assessment Clean, dry, & intact 11/29/2017  8:56 PM   Phlebitis Assessment 0 11/29/2017  8:56 PM   Infiltration Assessment 0 11/29/2017  8:56 PM   Dressing Status Clean, dry, & intact 11/29/2017  8:56 PM   Dressing Type Transparent 11/29/2017  8:56 PM   Hub Color/Line Status Pink; Infusing 11/29/2017  8:56 PM                        Intake & Output     Last Bowel Movement Last Bowel Movement Date: 11/28/17   Glucose Checks [] N/A  [] AC/HS  [] Q6  Concerns:   Nutrition Active Orders   Diet    DIET CLEAR LIQUID    DIET NPO       Consults []PT  []OT  []Speech  []Case Management   Cardiac Monitoring []N/A [x]Yes Expires:

## 2017-11-30 NOTE — ANESTHESIA PREPROCEDURE EVALUATION
Anesthetic History   No history of anesthetic complications            Review of Systems / Medical History  Patient summary reviewed, nursing notes reviewed and pertinent labs reviewed    Pulmonary  Within defined limits                 Neuro/Psych   Within defined limits           Cardiovascular  Within defined limits  Hypertension: well controlled          Hyperlipidemia    Exercise tolerance: >4 METS     GI/Hepatic/Renal  Within defined limits   GERD: poorly controlled           Endo/Other  Within defined limits    Hypothyroidism: well controlled  Anemia     Other Findings              Physical Exam    Airway  Mallampati: II  TM Distance: > 6 cm  Neck ROM: normal range of motion   Mouth opening: Normal     Cardiovascular  Regular rate and rhythm,  S1 and S2 normal,  no murmur, click, rub, or gallop  Rhythm: regular  Rate: normal         Dental    Dentition: Upper partial plate     Pulmonary  Breath sounds clear to auscultation               Abdominal  GI exam deferred       Other Findings            Anesthetic Plan    ASA: 2  Anesthesia type: total IV anesthesia          Induction: Intravenous  Anesthetic plan and risks discussed with: Patient

## 2017-11-30 NOTE — PROGRESS NOTES
Spiritual Care Partner Volunteer visited patient in [address] on 11/30/17. Documented by:  KEYSHA Novoa

## 2017-11-30 NOTE — PROGRESS NOTES
Bedside shift change report given to [address] [place] [id number] (oncoming nurse) by [facility] (offgoing nurse). Report included the following information SBAR, Kardex, Intake/Output, MAR, Recent Results and Cardiac Rhythm . Francisca Gearing Zone Phone for oncoming shift:   [phone]    Shift Summary: Pt admitted from ER today. Colonoscopy prep in progress. LDAs               Peripheral IV 11/29/17 Right Wrist (Active)   Site Assessment Clean, dry, & intact 11/29/2017  2:31 PM   Phlebitis Assessment 0 11/29/2017  2:31 PM   Infiltration Assessment 0 11/29/2017  2:31 PM   Dressing Status Clean, dry, & intact 11/29/2017  2:31 PM   Dressing Type Transparent;Tape 11/29/2017  2:31 PM   Hub Color/Line Status Pink; Infusing 11/29/2017  2:31 PM                        Intake & Output     Last Bowel Movement Last Bowel Movement Date: 11/28/17   Glucose Checks [] N/A  [] AC/HS  [] Q6  Concerns:   Nutrition Active Orders   Diet    DIET CLEAR LIQUID    DIET NPO       Consults []PT  []OT  []Speech  []Case Management   Cardiac Monitoring []N/A []Yes Expires:

## 2017-11-30 NOTE — PROCEDURES
[place] Office: [phone]      Esophagogastroduodenoscopy Procedure Note      Jana Francisco  1955  [id number]    Indication:  Dyphagia/odynophagia     : Elpidio Walker MD    Referring Provider:  Carito Hayes MD    Sedation:  MAC anesthesia Propofol    Procedure Details:  After detailed informed consent was obtained for the procedure, with all risks and benefits of procedure explained the patient was taken to the endoscopy suite and placed in the left lateral decubitus position. Following sequential administration of sedation as per above, the endoscope was inserted into the mouth and advanced under direct vision to second portion of the duodenum. A careful inspection was made as the gastroscope was withdrawn, including a retroflexed view of the proximal stomach; findings and interventions are described below. Findings:     Esophagus: The esophageal mucosa in the proximal, mid and distal esophagus is normal.   There is spasm. Empiric TTS CRE 19-20 mm dilation was performed, each station over 6 seconds. The squamo-columnar junction is at 36 cm where the Z-line was noted. A small hiatal hernia is noted with possible SS Alford's    Stomach: The gastric mucosa has bile with erythema in the body. This was biopsied. A sessile 7 mm nodule is noted on the body-biopsied. The fundus was found to be normal with no lesions noted on retroflexion. The angularis is normal as well. Duodenum:   The bulb and post bulbar mucosa is normal in appearance. The duodenal folds are normal.     Therapies:  esophageal dilation with 19-20 mm sized balloon  biopsy of esophagus  biopsy of stomach body, nodule    Specimen:  Specimens were collected as described and send to the laboratory. Complications:   None were encountered during the procedure. EBL:  None. Recommendations:     -Await pathology. ,   -Follow symptoms. -PPI daily. Juan Manzo MD  11/30/2017  12:33 PM

## 2017-11-30 NOTE — PROGRESS NOTES
Initial Nutrition Assessment:    INTERVENTIONS/RECOMMENDATIONS:   · Meals/Snacks: General/healthful diet: Advance diet as tolerated    ASSESSMENT:   Patient medically noted for colitis. PMH for HTN, GERD, and Anxiety. Patient currently NPO for colonoscopy today. Weight appears to be up 9# over the past month per chart review. Will monitor diet advancement and PO intake. Diet Order: NPO  % Eaten:  No data found. Pertinent Medications: [x]Reviewed []Other: Flagyl, Protonix  Pertinent Labs: [x]Reviewed []Other:   Food Allergies: [x]None []Other   Last BM: 11/28 [x]Active     []Hyperactive  []Hypoactive       [] Absent BS  Skin:    [x] Intact   [] Incision  [] Breakdown  [] Other:    Anthropometrics:   Height: 5' 4\" (162.6 cm) Weight: 66.8 kg (147 lb 4.3 oz)   IBW (%IBW):   ( ) UBW (%UBW):   (  %)   Last Weight Metrics:  Weight Loss Metrics 11/29/2017 11/28/2017 11/26/2017 10/26/2017 10/14/2016 9/20/2016 8/25/2016   Today's Wt - 147 lb 4.3 oz 148 lb 5.9 oz 138 lb 10.7 oz 160 lb 160 lb 160 lb 0.9 oz   BMI 25.28 kg/m2 - 25.47 kg/m2 23.08 kg/m2 26.63 kg/m2 26.63 kg/m2 26.63 kg/m2       BMI: Body mass index is 25.28 kg/(m^2). This BMI is indicative of:   []Underweight    []Normal    [x]Overweight    [] Obesity   [] Extreme Obesity (BMI>40)     Estimated Nutrition Needs (Based on):   1573 Kcals/day (BMR (1210) x 1. 3AF) , 67 g (-80g (1.0-1.2 g/kg bw)) Protein  Carbohydrate:  At Least 130 g/day  Fluids: 1600 mL/day (1ml/kcal)    Pt expected to meet estimated nutrient needs: []Yes [x]No    NUTRITION DIAGNOSES:   Problem:  Altered GI function      Etiology: related to suspected ischemic colitis     Signs/Symptoms: as evidenced by NPO status, colonoscopy, bleeding      NUTRITION INTERVENTIONS:  Meals/Snacks: General/healthful diet                  GOAL:   Diet advanced next 1-2 days; PO intake >50% of meals next 2-4 days    LEARNING NEEDS (Diet, Food/Nutrient-Drug Interaction):    [x] None Identified   [] Identified and Education Provided/Documented   [] Identified and Pt declined/was not appropriate     Cultureal, Gnosticism, OR Ethnic Dietary Needs:    [x] None Identified   [] Identified and Addressed     [x] Interdisciplinary Care Plan Reviewed/Documented    [x] Discharge Planning: Resume regular diet as tolerated      MONITORING /EVALUATION:   Food/Nutrient Intake Outcomes:  Total energy intake  Physical Signs/Symptoms Outcomes: Weight/weight change    NUTRITION RISK:    [] High              [x] Moderate           []  Low  []  Minimal/Uncompromised    PT SEEN FOR:    []  MD Consult: []Calorie Count      []Diabetic Diet Education        []Diet Education     []Electrolyte Management     []General Nutrition Management and Supplements     []Management of Tube Feeding     []TPN Recommendations    [x]  RN Referral:  [x]MST score >=2     []Enteral/Parenteral Nutrition PTA     []Pregnant: Gestational DM or Multigestation     []Pressure Ulcer/Wound Care needs        []  Low BMI  []  MAXIM Alfredo Due  Pager [phone]                 Weekend Pager [phone]

## 2017-11-30 NOTE — PROGRESS NOTES
TRANSFER - IN REPORT:    Verbal report received from Damian Whittington RN (name) on Rajesh Liconan  being received from Renal (unit) for ordered procedure      Report consisted of patients Situation, Background, Assessment and   Recommendations(SBAR). Information from the following report(s) SBAR, Procedure Summary, Intake/Output, MAR and Recent Results was reviewed with the receiving nurse. Opportunity for questions and clarification was provided. Assessment completed upon patients arrival to unit and care assumed.

## 2017-11-30 NOTE — PROCEDURES
Colonoscopy Procedure Note    Jana Francisco  1955  [id number]        Pre-operative Diagnosis: colitis    Post-operative Diagnosis: colitis at splenic flexure and left colon      : Nandini Villalobos. Luis Alberto Manzo MD    Referring Provider: Carito Hayes MD    Sedation:  MAC anesthesia Propofol        Procedure Details:    After detailed informed consent was obtained with all risks and benefits of procedure explained and preoperative exam completed, the patient was taken to the endoscopy suite and placed in the left lateral decubitus position. Upon sequential sedation as per above, a digital rectal exam was performed  And was normal.  The Olympus videocolonoscope  was inserted in the rectum and carefully advanced to the cecum, which was identified by the ileocecal valve and appendiceal orifice. The quality of preparation was good. The colonoscope was slowly withdrawn with careful evaluation between folds. Retroflexion in the rectum was performed. Findings:     · Patchy colitis NOS but likely ischemic in nature is noted starting at 25 cm from the anus interspersed with normal mucosal areas. · It becomes moderate in the descending colon and again mild at the splenic flexure. · Multiple biopsies are taken. · Rest of the colon and rectum appears normal.  · Small internal hemorrhoids      Therapies:  biopsy of colon splenic flexure, descending colon and sigmoid colon    Specimen:  Specimens were collected as described above and sent to pathology. Complications: None were encountered during the procedure. EBL:  None. Recommendations:    -Await pathology. -GI lite diet    -Treat symptomatically. Juan Manzo MD  11/30/2017  12:47 PM

## 2017-11-30 NOTE — PROGRESS NOTES
CRE balloon dilatation of the esophagus   19 mm Balloon inflated to 4.5 ATMs and held for 60 seconds. 20 mm Balloon inflated to 6 ATMs and held for 60 seconds. No subcutaneous crepitus of the chest or cervical region was noted post dilatation.

## 2017-11-30 NOTE — ANESTHESIA POSTPROCEDURE EVALUATION
Post-Anesthesia Evaluation and Assessment    Patient: Boris Altamirano MRN: [id number]  SSN: [id number]    YOB: 1955  Age: 60 y.o. Sex: female       Cardiovascular Function/Vital Signs  Visit Vitals    /65    Pulse 70    Temp 36.2 °C (97.2 °F)    Resp 13    Ht 5' 4\" (1.626 m)    Wt 66.8 kg (147 lb 4.3 oz)    SpO2 95%    Breastfeeding No    BMI 25.28 kg/m2       Patient is status post general anesthesia for Procedure(s):  COLONOSCOPY  ESOPHAGOGASTRODUODENOSCOPY (EGD)  ESOPHAGEAL DILATION  ESOPHAGOGASTRODUODENAL (EGD) BIOPSY  COLON BIOPSY. Nausea/Vomiting: None    Postoperative hydration reviewed and adequate. Pain:  Pain Scale 1: Numeric (0 - 10) (11/30/17 1300)  Pain Intensity 1: 0 (11/30/17 1300)   Managed    Neurological Status:   Neuro  Neurologic State: Alert (11/30/17 7771)  Orientation Level: Oriented X4 (11/30/17 0755)  Cognition: Appropriate decision making; Appropriate for age attention/concentration; Appropriate safety awareness; Follows commands (11/30/17 0755)  Speech: Clear (11/30/17 0755)  Assessment L Pupil: Brisk (11/29/17 0156)  Assessment R Pupil: Brisk (11/29/17 0156)  LUE Motor Response: Purposeful (11/29/17 1945)  LLE Motor Response: Purposeful (11/29/17 1945)  RUE Motor Response: Purposeful (11/29/17 1945)  RLE Motor Response: Purposeful (11/29/17 1945)   At baseline    Mental Status and Level of Consciousness: Arousable    Pulmonary Status:   O2 Device: Room air (11/30/17 1300)   Adequate oxygenation and airway patent    Complications related to anesthesia: None    Post-anesthesia assessment completed.  No concerns    Signed By: Sahil Quiroz DO     November 30, 2017

## 2017-11-30 NOTE — PROGRESS NOTES
Paper/ written report received from preop nurse,Sagrario Dudley . Will take over care of patient at this time.

## 2017-11-30 NOTE — PROGRESS NOTES
Anesthesia reports 275 mg Propofol and 600 mL NS given during procedure. Received report from anesthesia staff on vital signs and status of patient.
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
## 2017-11-30 NOTE — PROGRESS NOTES
Gastroenterology Progress Note    11/30/2017    Admit Date: 11/29/2017    Subjective: Follow up for: colitis NOS      C/o headache. Prepped for the colonoscopy. Patient was seen in rounds by me today. At this time, the patient is resting comfortably. Current Facility-Administered Medications   Medication Dose Route Frequency    sodium chloride (NS) flush 5-10 mL  5-10 mL IntraVENous Q8H    sodium chloride (NS) flush 5-10 mL  5-10 mL IntraVENous PRN    levoFLOXacin (LEVAQUIN) 750 mg in D5W IVPB  750 mg IntraVENous Q24H    metroNIDAZOLE (FLAGYL) IVPB premix 500 mg  500 mg IntraVENous Q12H    sodium chloride (NS) flush 5-10 mL  5-10 mL IntraVENous Q8H    sodium chloride (NS) flush 5-10 mL  5-10 mL IntraVENous PRN    0.9% sodium chloride infusion  75 mL/hr IntraVENous CONTINUOUS    acetaminophen (TYLENOL) tablet 650 mg  650 mg Oral Q4H PRN    ondansetron (ZOFRAN) injection 4 mg  4 mg IntraVENous Q4H PRN    bisacodyl (DULCOLAX) tablet 5 mg  5 mg Oral DAILY PRN    HYDROcodone-acetaminophen (NORCO) 5-325 mg per tablet 1 Tab  1 Tab Oral Q6H PRN    diazePAM (VALIUM) tablet 5 mg  5 mg Oral Q12H PRN    pantoprazole (PROTONIX) tablet 40 mg  40 mg Oral ACB    hydrALAZINE (APRESOLINE) 20 mg/mL injection 10 mg  10 mg IntraVENous Q6H PRN    melatonin tablet 3 mg  3 mg Oral QHS PRN    gabapentin (NEURONTIN) capsule 300 mg  300 mg Oral TID    HYDROmorphone (PF) (DILAUDID) injection 0.5 mg  0.5 mg IntraVENous Q4H PRN        Objective:     Blood pressure 101/50, pulse 64, temperature 98.1 °F (36.7 °C), resp. rate 17, height 5' 4\" (1.626 m), weight 66.8 kg (147 lb 4.3 oz), SpO2 95 %. 11/28 1901 - 11/30 0700  In: 922.5 [I.V.:922.5]  Out: -         Physical Examination:       General:AAO x 3,   HEENT:  EOMI,  Chest:  CTA, No rhonchi, rales or rubs.   Heart: S1, S2, RRR  GI: Soft, NT, ND + bowel sounds  Extremities: No edema     Data Review    Recent Results (from the past 24 hour(s)) CULTURE, BLOOD, PAIRED    Collection Time: 11/29/17 10:17 AM   Result Value Ref Range    Special Requests: NO SPECIAL REQUESTS      Culture result: NO GROWTH AFTER 21 HOURS     TYPE & SCREEN    Collection Time: 11/29/17 10:23 AM   Result Value Ref Range    Crossmatch Expiration 12/02/2017     ABO/Rh(D) A NEGATIVE     Antibody screen NEG    METABOLIC PANEL, BASIC    Collection Time: 11/30/17  4:19 AM   Result Value Ref Range    Sodium 140 136 - 145 mmol/L    Potassium 3.6 3.5 - 5.1 mmol/L    Chloride 105 97 - 108 mmol/L    CO2 28 21 - 32 mmol/L    Anion gap 7 5 - 15 mmol/L    Glucose 83 65 - 100 mg/dL    BUN 3 (L) 6 - 20 MG/DL    Creatinine 0.49 (L) 0.55 - 1.02 MG/DL    BUN/Creatinine ratio 6 (L) 12 - 20      GFR est AA >60 >60 ml/min/1.73m2    GFR est non-AA >60 >60 ml/min/1.73m2    Calcium 7.0 (L) 8.5 - 10.1 MG/DL   CBC WITH AUTOMATED DIFF    Collection Time: 11/30/17  4:19 AM   Result Value Ref Range    WBC 4.1 3.6 - 11.0 K/uL    RBC 3.13 (L) 3.80 - 5.20 M/uL    HGB 9.5 (L) 11.5 - 16.0 g/dL    HCT 28.9 (L) 35.0 - 47.0 %    MCV 92.3 80.0 - 99.0 FL    MCH 30.4 26.0 - 34.0 PG    MCHC 32.9 30.0 - 36.5 g/dL    RDW 14.4 11.5 - 14.5 %    PLATELET 413 427 - 853 K/uL    NEUTROPHILS 58 32 - 75 %    LYMPHOCYTES 33 12 - 49 %    MONOCYTES 7 5 - 13 %    EOSINOPHILS 1 0 - 7 %    BASOPHILS 1 0 - 1 %    ABS. NEUTROPHILS 2.4 1.8 - 8.0 K/UL    ABS. LYMPHOCYTES 1.3 0.8 - 3.5 K/UL    ABS. MONOCYTES 0.3 0.0 - 1.0 K/UL    ABS. EOSINOPHILS 0.0 0.0 - 0.4 K/UL    ABS.  BASOPHILS 0.0 0.0 - 0.1 K/UL     Recent Labs      11/30/17   0419  11/29/17 0252   WBC  4.1  6.0   HGB  9.5*  11.1*   HCT  28.9*  33.1*   PLT  169  200     Recent Labs      11/30/17 0419 11/29/17 0252   NA  140  140   K  3.6  3.4*   CL  105  105   CO2  28  28   BUN  3*  8   CREA  0.49*  0.62   GLU  83  86   CA  7.0*  8.3*     Recent Labs      11/29/17 0252   SGOT  22   AP  58   TP  6.8   ALB  3.5   GLOB  3.3     Recent Labs      11/29/17 0356   INR  1.0   PTP 10. 2   APTT  24.6      No results for input(s): FE, TIBC, PSAT, FERR in the last 72 hours. No results found for: FOL, RBCF   No results for input(s): PH, PCO2, PO2 in the last 72 hours. No results for input(s): CPK, CKNDX, TROIQ in the last 72 hours. No lab exists for component: CPKMB  No results found for: CHOL, CHOLX, CHLST, CHOLV, HDL, LDL, LDLC, DLDLP, TGLX, TRIGL, TRIGP, CHHD, CHHDX  No components found for: [place]  Lab Results   Component Value Date/Time    Color YELLOW/STRAW 08/25/2016 04:40 PM    Appearance CLEAR 08/25/2016 04:40 PM    Specific gravity 1.024 08/25/2016 04:40 PM    pH (UA) 5.5 08/25/2016 04:40 PM    Protein NEGATIVE  08/25/2016 04:40 PM    Glucose NEGATIVE  08/25/2016 04:40 PM    Ketone TRACE 08/25/2016 04:40 PM    Bilirubin NEGATIVE  08/25/2016 04:40 PM    Urobilinogen 1.0 08/25/2016 04:40 PM    Nitrites NEGATIVE  08/25/2016 04:40 PM    Leukocyte Esterase NEGATIVE  08/25/2016 04:40 PM    Epithelial cells FEW 08/25/2016 04:40 PM    Bacteria NEGATIVE  08/25/2016 04:40 PM    WBC 0-4 08/25/2016 04:40 PM    RBC 0-5 08/25/2016 04:40 PM        ROS: -CP, SOB, Dysuria, palpitations, cough. Assessment:    Active Problems:    Colitis (11/29/2017)             Plan/Discussion:     1. We will plan on a colonoscopy today at 1200 pm.  2. NPO for now please. 3. C/o headache. Defer treatment to admitting team.        Signed By: Soham Sky.  Katiuska Enriquez MD    11/30/2017  7:54 AM

## 2017-11-30 NOTE — PROGRESS NOTES
Problem: Falls - Risk of  Goal: *Absence of Falls  Document Conor Fall Risk and appropriate interventions in the flowsheet.    Outcome: Progressing Towards Goal  Fall Risk Interventions:            Medication Interventions: Patient to call before getting OOB         History of Falls Interventions: Room close to nurse's station, Investigate reason for fall

## 2017-11-30 NOTE — PROGRESS NOTES
Occupational Therapy  Pt is off the floor for procedure at this time. Will defer initiating OT Evaluation and continue to follow.
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
## 2017-11-30 NOTE — PROGRESS NOTES
Adria Rod TRANSFER - IN REPORT:    Verbal report received from Jamari Darling [facility] on Tse Crease  being received from Room 3262 Renal for ordered procedure      Report consisted of patients Situation, Background, Assessment and   Recommendations(SBAR). Information from the following report(s) SBAR was reviewed with the receiving nurse. Opportunity for questions and clarification was provided. Assessment completed upon patients arrival to unit and care assumed.

## 2017-11-30 NOTE — PROGRESS NOTES
Hospitalist Progress Note    NAME: Lucienne Lundborg   :  1955   MRN:  [id number]   LOS:   1      Assessment / Plan:  Abdominal pain due to likely ischemic colitis  -colonoscopy shows patchy colitis throughout colon most consistent with ischemic colitis, less likely related to recent trauma  -follow up biopsy results  -will continue empiric abx  -holding aspirin  -hgb lower but stable  -switch to PO meds  -plan for d/c home tomorrow    Anxiety  -valium    GERD  -PPI    HTN  -not on meds    Insomnia   -melatonin    Code status: Full  Prophylaxis: SCD's  Recommended Disposition: Home w/Family     Subjective:     Chief Complaint: abd pain  Patient's pain continues but feeling ready to go home soon      Objective:     VITALS:   Last 24hrs VS reviewed since prior progress note. Most recent are:  Patient Vitals for the past 24 hrs:   Temp Pulse Resp BP SpO2   17 1621 97.8 °F (36.6 °C) 64 20 124/59 95 %   17 1413 98 °F (36.7 °C) 72 18 140/82 98 %   17 1315 - 72 16 144/70 98 %   17 1314 - 67 14 (!) 128/91 97 %   17 1310 - 65 13 130/65 96 %   17 1308 - 68 12 130/65 95 %   17 1305 - 75 21 130/65 94 %   17 1302 - 71 13 128/65 93 %   17 1300 97.2 °F (36.2 °C) 70 13 128/65 95 %   17 1255 - 74 17 122/58 97 %   17 1250 - 75 17 116/58 98 %   17 1248 - 74 18 110/52 100 %   17 1116 98.2 °F (36.8 °C) 68 16 132/74 97 %   17 0755 98.3 °F (36.8 °C) 79 20 128/80 92 %   17 0336 98.1 °F (36.7 °C) 64 17 101/50 -   17 2313 96.6 °F (35.9 °C) 64 15 117/63 95 %   17 2122 97.2 °F (36.2 °C) 80 15 114/78 93 %       Intake/Output Summary (Last 24 hours) at 17 1709  Last data filed at 17 1245   Gross per 24 hour   Intake           1622.5 ml   Output                0 ml   Net           1622.5 ml        PHYSICAL EXAM:  General: Alert, cooperative, no acute distress    EENT:  EOMI. Anicteric sclerae.  Mucous membranes moist  Resp:  CTA bilaterally, no wheezing or rales. No accessory muscle use  CV:  Regular rhythm, no edema  GI:  Soft, non distended,+tender. +Bowel sounds  Neurologic:  Alert and oriented X 3, normal speech  Psych:   Good insight, not anxious nor agitated  Skin:  No rashes, no jaundice, various bruising  ________________________________________________________________________  Care Plan discussed with:    Comments   Patient x    Family      RN x    Care Manager x    Consultant                       x Multidiciplinary team rounds were held today with , nursing, pharmacist and clinical coordinator. Patient's plan of care was discussed; medications were reviewed and discharge planning was addressed. ________________________________________________________________________  Total NON critical care TIME:  25   Minutes    >50% of visit spent in counseling and coordination of care       ________________________________________________________________________    Procedures: see electronic medical records for all procedures/Xrays and details which were not copied into this note but were reviewed prior to creation of Plan. LABS:  I reviewed today's most current labs and imaging studies.   Pertinent labs include:  Recent Labs      11/30/17 0419 11/29/17 0252   WBC  4.1  6.0   HGB  9.5*  11.1*   HCT  28.9*  33.1*   PLT  169  200     Recent Labs      11/30/17 0419 11/29/17 0356  11/29/17 0252   NA  140   --   140   K  3.6   --   3.4*   CL  105   --   105   CO2  28   --   28   GLU  83   --   86   BUN  3*   --   8   CREA  0.49*   --   0.62   CA  7.0*   --   8.3*   ALB   --    --   3.5   TBILI   --    --   0.5   SGOT   --    --   22   ALT   --    --   26   INR   --   1.0   --        Signed: Marline Patel MD

## 2017-12-01 VITALS
SYSTOLIC BLOOD PRESSURE: 150 MMHG | DIASTOLIC BLOOD PRESSURE: 89 MMHG | WEIGHT: 147.27 LBS | RESPIRATION RATE: 20 BRPM | HEART RATE: 71 BPM | HEIGHT: 64 IN | TEMPERATURE: 97.8 F | OXYGEN SATURATION: 94 % | BODY MASS INDEX: 25.14 KG/M2

## 2017-12-01 PROCEDURE — 74011250637 HC RX REV CODE- 250/637: Performed by: INTERNAL MEDICINE

## 2017-12-01 PROCEDURE — 74011250636 HC RX REV CODE- 250/636: Performed by: INTERNAL MEDICINE

## 2017-12-01 RX ORDER — METRONIDAZOLE 500 MG/1
500 TABLET ORAL 3 TIMES DAILY
Qty: 15 TAB | Refills: 0 | Status: SHIPPED | OUTPATIENT
Start: 2017-12-01 | End: 2017-12-06

## 2017-12-01 RX ORDER — LEVOFLOXACIN 750 MG/1
750 TABLET ORAL EVERY 24 HOURS
Qty: 5 TAB | Refills: 0 | Status: SHIPPED | OUTPATIENT
Start: 2017-12-02 | End: 2017-12-07

## 2017-12-01 RX ORDER — OXYCODONE AND ACETAMINOPHEN 10; 325 MG/1; MG/1
1 TABLET ORAL
Qty: 20 TAB | Refills: 0 | Status: SHIPPED | OUTPATIENT
Start: 2017-12-01 | End: 2019-01-01

## 2017-12-01 RX ADMIN — PANTOPRAZOLE SODIUM 40 MG: 40 TABLET, DELAYED RELEASE ORAL at 08:09

## 2017-12-01 RX ADMIN — GABAPENTIN 300 MG: 300 CAPSULE ORAL at 08:09

## 2017-12-01 RX ADMIN — Medication 5 ML: at 06:03

## 2017-12-01 RX ADMIN — LEVOFLOXACIN 750 MG: 750 TABLET, FILM COATED ORAL at 08:09

## 2017-12-01 RX ADMIN — METRONIDAZOLE 500 MG: 250 TABLET ORAL at 08:09

## 2017-12-01 RX ADMIN — SODIUM CHLORIDE 75 ML/HR: 900 INJECTION, SOLUTION INTRAVENOUS at 09:34

## 2017-12-01 NOTE — PROGRESS NOTES
Bedside and Verbal shift change report given to Maricruz Dey (oncoming nurse) by Wilfredo York (offgoing nurse). Report included the following information Kardex, MAR and Recent Results. Zone Phone for oncoming shift:       Shift Summary: Resting quietly    LDAs               Peripheral IV 11/30/17 Left Wrist (Active)   Site Assessment Clean, dry, & intact 11/30/2017  8:46 PM   Phlebitis Assessment 0 11/30/2017  8:46 PM   Infiltration Assessment 0 11/30/2017  8:46 PM   Dressing Status Clean, dry, & intact 11/30/2017  8:46 PM   Dressing Type Transparent 11/30/2017  8:46 PM   Hub Color/Line Status Blue; Infusing 11/30/2017  8:46 PM                        Intake & Output   Date 11/29/17 1900 - 11/30/17 0659 11/30/17 0700 - 12/01/17 0659   Shift 7493-1880 24 Hour Total 9661-6377 9666-2530 24 Hour Total   I  N  T  A  K  E   I.V.  (mL/kg/hr) 922.5 922.5 700  (0.9) 900 1600      Volume (0.9% sodium chloride infusion) 922.5 922.5  900 900      Volume (0.9% sodium chloride infusion)   600  600      Volume (metroNIDAZOLE (FLAGYL) IVPB premix 500 mg)   100  100    Shift Total  (mL/kg) 922.5  (13.8) 922.5  (13.8) 700  (10.5) 900  (13.5) 1600  (24)   O  U  T  P  U  T   Stool           Stool Occurrence(s) 9 x 9 x       Shift Total  (mL/kg)        .5 922.5    Weight (kg) 66.8 66.8 66.8 66.8 66.8      Last Bowel Movement Last Bowel Movement Date: 11/30/17   Glucose Checks [] N/A  [] AC/HS  [] Q6  Concerns:   Nutrition Active Orders   Diet    DIET GI LITE (POST SURGICAL)       Consults []PT  []OT  []Speech  []Case Management   Cardiac Monitoring [x]N/A []Yes Expires:

## 2017-12-01 NOTE — PROGRESS NOTES
D/C plans discussed with pt, staff, and unit secretary. [place] will arrange transport. Pt in agreement.

## 2017-12-01 NOTE — PROGRESS NOTES
Gastroenterology Progress Note    12/1/2017    Admit Date: 11/29/2017    Subjective: Follow up for: colitis      No new GI issues reported. Grateful for getting both procedures done at the same time. C/o headache. Current Facility-Administered Medications   Medication Dose Route Frequency    butalbital-acetaminophen-caffeine (FIORICET, ESGIC) -40 mg per tablet 1 Tab  1 Tab Oral Q4H PRN    oxyCODONE-acetaminophen (PERCOCET 10)  mg per tablet 1 Tab  1 Tab Oral Q4H PRN    metroNIDAZOLE (FLAGYL) tablet 500 mg  500 mg Oral TID    levoFLOXacin (LEVAQUIN) tablet 750 mg  750 mg Oral Q24H    sodium chloride (NS) flush 5-10 mL  5-10 mL IntraVENous Q8H    sodium chloride (NS) flush 5-10 mL  5-10 mL IntraVENous PRN    sodium chloride (NS) flush 5-10 mL  5-10 mL IntraVENous Q8H    sodium chloride (NS) flush 5-10 mL  5-10 mL IntraVENous PRN    0.9% sodium chloride infusion  75 mL/hr IntraVENous CONTINUOUS    acetaminophen (TYLENOL) tablet 650 mg  650 mg Oral Q4H PRN    ondansetron (ZOFRAN) injection 4 mg  4 mg IntraVENous Q4H PRN    bisacodyl (DULCOLAX) tablet 5 mg  5 mg Oral DAILY PRN    diazePAM (VALIUM) tablet 5 mg  5 mg Oral Q12H PRN    pantoprazole (PROTONIX) tablet 40 mg  40 mg Oral ACB    hydrALAZINE (APRESOLINE) 20 mg/mL injection 10 mg  10 mg IntraVENous Q6H PRN    melatonin tablet 3 mg  3 mg Oral QHS PRN    gabapentin (NEURONTIN) capsule 300 mg  300 mg Oral TID        Objective:     Blood pressure 150/89, pulse 71, temperature 97.8 °F (36.6 °C), resp. rate 20, height 5' 4\" (1.626 m), weight 66.8 kg (147 lb 4.3 oz), SpO2 94 %, not currently breastfeeding. 11/29 1901 - 12/01 0700  In: 3418.8 [I.V.:3418.8]  Out: -         Physical Examination:       General:AAO x 3,   HEENT:  EOMI,   Chest:  CTA, No rhonchi, rales or rubs.   Heart: S1, S2, RRR  GI: Soft, NT, ND + bowel sounds  CNS: CNs grossly normal.    Data Review  No results found for this or any previous visit (from the past 24 hour(s)). Recent Labs      11/30/17 0419 11/29/17 0252   WBC  4.1  6.0   HGB  9.5*  11.1*   HCT  28.9*  33.1*   PLT  169  200     Recent Labs      11/30/17 0419 11/29/17 0252   NA  140  140   K  3.6  3.4*   CL  105  105   CO2  28  28   BUN  3*  8   CREA  0.49*  0.62   GLU  83  86   CA  7.0*  8.3*     Recent Labs      11/29/17 0252   SGOT  22   AP  58   TP  6.8   ALB  3.5   GLOB  3.3     Recent Labs      11/29/17 0356   INR  1.0   PTP  10.2   APTT  24.6      No results for input(s): FE, TIBC, PSAT, FERR in the last 72 hours. No results found for: FOL, RBCF   No results for input(s): PH, PCO2, PO2 in the last 72 hours. No results for input(s): CPK, CKNDX, TROIQ in the last 72 hours. No lab exists for component: CPKMB  No results found for: CHOL, CHOLX, CHLST, CHOLV, HDL, LDL, LDLC, DLDLP, TGLX, TRIGL, TRIGP, CHHD, CHHDX  No components found for: [place]  Lab Results   Component Value Date/Time    Color YELLOW/STRAW 08/25/2016 04:40 PM    Appearance CLEAR 08/25/2016 04:40 PM    Specific gravity 1.024 08/25/2016 04:40 PM    pH (UA) 5.5 08/25/2016 04:40 PM    Protein NEGATIVE  08/25/2016 04:40 PM    Glucose NEGATIVE  08/25/2016 04:40 PM    Ketone TRACE 08/25/2016 04:40 PM    Bilirubin NEGATIVE  08/25/2016 04:40 PM    Urobilinogen 1.0 08/25/2016 04:40 PM    Nitrites NEGATIVE  08/25/2016 04:40 PM    Leukocyte Esterase NEGATIVE  08/25/2016 04:40 PM    Epithelial cells FEW 08/25/2016 04:40 PM    Bacteria NEGATIVE  08/25/2016 04:40 PM    WBC 0-4 08/25/2016 04:40 PM    RBC 0-5 08/25/2016 04:40 PM        ROS: -CP, SOB, Dysuria, palpitations, cough. Assessment:    Active Problems:    Colitis (11/29/2017)             Plan/Discussion:     1. We await biopsies taken during the procedure yesterday but I feel she can go home and can be discharged. 2. F/u with me in 4 weeks' time. I will arrange. 3. Avoid estrogenic compounds. 4. PO abx for 7 days total        Signed By: Juan Herndon, MD    12/1/2017  11:17 AM

## 2017-12-01 NOTE — DISCHARGE INSTRUCTIONS
Please hold your aspirin for the next 7 days  Call Dr. Rodger Kerr from GI to follow up on your biopsy results

## 2017-12-01 NOTE — PROGRESS NOTES
Bedside shift change report given to Joseph Corona (oncoming nurse) by Diana Benton RN   (offgoing nurse). Report included the following information SBAR, Kardex, Intake/Output, MAR and Recent Results. Zone Phone for oncoming shift:   [phone]    Shift Summary: Pt had colonoscopy today. PRN pain medication changed to PO. LDAs               Peripheral IV 11/30/17 Left Wrist (Active)   Site Assessment Clean, dry, & intact 11/30/2017  2:28 PM   Phlebitis Assessment 0 11/30/2017  2:28 PM   Infiltration Assessment 0 11/30/2017  2:28 PM   Dressing Status Clean, dry, & intact 11/30/2017  2:28 PM   Dressing Type Transparent;Tape 11/30/2017  2:28 PM   Hub Color/Line Status Blue; Infusing 11/30/2017  2:28 PM                        Intake & Output   Date 11/29/17 1900 - 11/30/17 0659 11/30/17 0700 - 12/01/17 0659   Shift 1607-1478 24 Hour Total 5408-5423 4458-5771 24 Hour Total   I  N  T  A  K  E   I.V.  (mL/kg/hr) 922.5 922.5 700  (0.9)  700      Volume (0.9% sodium chloride infusion) 922.5 922.5         Volume (0.9% sodium chloride infusion)   600  600      Volume (metroNIDAZOLE (FLAGYL) IVPB premix 500 mg)   100  100    Shift Total  (mL/kg) 922.5  (13.8) 922.5  (13.8) 700  (10.5)  700  (10.5)   O  U  T  P  U  T   Stool           Stool Occurrence(s) 9 x 9 x       Shift Total  (mL/kg)        .5 922.5 700  700   Weight (kg) 66.8 66.8 66.8 66.8 66.8      Last Bowel Movement Last Bowel Movement Date: 11/30/17   Glucose Checks [] N/A  [] AC/HS  [] Q6  Concerns:   Nutrition Active Orders   Diet    DIET GI LITE (POST SURGICAL)       Consults []PT  []OT  []Speech  []Case Management   Cardiac Monitoring []N/A []Yes Expires:

## 2017-12-01 NOTE — PROGRESS NOTES
Problem: Falls - Risk of  Goal: *Absence of Falls  Document Conor Fall Risk and appropriate interventions in the flowsheet. Outcome: Progressing Towards Goal  Fall Risk Interventions:            Medication Interventions: Patient to call before getting OOB         History of Falls Interventions:  Investigate reason for fall

## 2017-12-01 NOTE — PROGRESS NOTES
Bedside shift change report given to Matt Campbell RN by Cynthia Pa RN. Report included the following information SBAR, Kardex, ED Summary, Intake/Output, MAR and Cardiac Rhythm NSR.     1115 I have reviewed discharge instructions, prescriptions and follow up appts with the patient. The patient verbalized understanding. IV removed without difficulty.

## 2017-12-01 NOTE — PROGRESS NOTES
Occupational Therapy  Attempted to see patient again today for OT evaluation. Upon entry, patient was completely dressed and ready for discharge. Discussed role of OT and patient denies any difficulty with ADL at this time. Will complete the OT order and sign off.
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
## 2017-12-04 LAB
BACTERIA SPEC CULT: NORMAL
SERVICE CMNT-IMP: NORMAL

## 2018-05-02 ENCOUNTER — HOSPITAL ENCOUNTER (EMERGENCY)
Age: 63
Discharge: HOME | End: 2018-05-03
Attending: EMERGENCY MEDICINE | Admitting: EMERGENCY MEDICINE
Payer: SELF-PAY

## 2018-05-02 DIAGNOSIS — G43.009 MIGRAINE WITHOUT AURA AND WITHOUT STATUS MIGRAINOSUS, NOT INTRACTABLE: Primary | ICD-10-CM

## 2018-05-02 PROCEDURE — 96374 THER/PROPH/DIAG INJ IV PUSH: CPT

## 2018-05-02 PROCEDURE — 74011250636 HC RX REV CODE- 250/636: Performed by: EMERGENCY MEDICINE

## 2018-05-02 PROCEDURE — 99282 EMERGENCY DEPT VISIT SF MDM: CPT

## 2018-05-02 PROCEDURE — 96375 TX/PRO/DX INJ NEW DRUG ADDON: CPT

## 2018-05-02 PROCEDURE — 96361 HYDRATE IV INFUSION ADD-ON: CPT

## 2018-05-02 RX ORDER — DEXAMETHASONE SODIUM PHOSPHATE 4 MG/ML
10 INJECTION, SOLUTION INTRA-ARTICULAR; INTRALESIONAL; INTRAMUSCULAR; INTRAVENOUS; SOFT TISSUE
Status: COMPLETED | OUTPATIENT
Start: 2018-05-02 | End: 2018-05-02

## 2018-05-02 RX ORDER — DIPHENHYDRAMINE HYDROCHLORIDE 50 MG/ML
25 INJECTION, SOLUTION INTRAMUSCULAR; INTRAVENOUS
Status: COMPLETED | OUTPATIENT
Start: 2018-05-02 | End: 2018-05-02

## 2018-05-02 RX ORDER — PROCHLORPERAZINE EDISYLATE 5 MG/ML
10 INJECTION INTRAMUSCULAR; INTRAVENOUS
Status: COMPLETED | OUTPATIENT
Start: 2018-05-02 | End: 2018-05-02

## 2018-05-02 RX ADMIN — PROCHLORPERAZINE EDISYLATE 10 MG: 5 INJECTION INTRAMUSCULAR; INTRAVENOUS at 23:46

## 2018-05-02 RX ADMIN — DEXAMETHASONE SODIUM PHOSPHATE 10 MG: 4 INJECTION, SOLUTION INTRAMUSCULAR; INTRAVENOUS at 23:48

## 2018-05-02 RX ADMIN — DIPHENHYDRAMINE HYDROCHLORIDE 25 MG: 50 INJECTION, SOLUTION INTRAMUSCULAR; INTRAVENOUS at 23:44

## 2018-05-02 RX ADMIN — SODIUM CHLORIDE 1000 ML: 900 INJECTION, SOLUTION INTRAVENOUS at 23:44

## 2018-05-03 VITALS
HEIGHT: 65 IN | RESPIRATION RATE: 17 BRPM | BODY MASS INDEX: 22.08 KG/M2 | OXYGEN SATURATION: 100 % | DIASTOLIC BLOOD PRESSURE: 74 MMHG | SYSTOLIC BLOOD PRESSURE: 162 MMHG | HEART RATE: 68 BPM | TEMPERATURE: 98 F | WEIGHT: 132.5 LBS

## 2018-05-03 RX ORDER — BUTALBITAL, ACETAMINOPHEN AND CAFFEINE 300; 40; 50 MG/1; MG/1; MG/1
1 CAPSULE ORAL
Qty: 15 CAP | Refills: 0 | Status: SHIPPED | OUTPATIENT
Start: 2018-05-03
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
## 2018-05-03 NOTE — DISCHARGE INSTRUCTIONS
Migraine Headache: Care Instructions  Your Care Instructions  Migraines are painful, throbbing headaches that often start on one side of the head. They may cause nausea and vomiting and make you sensitive to light, sound, or smell. Without treatment, migraines can last from 4 hours to a few days. Medicines can help prevent migraines or stop them after they have started. Your doctor can help you find which ones work best for you. Follow-up care is a key part of your treatment and safety. Be sure to make and go to all appointments, and call your doctor if you are having problems. It's also a good idea to know your test results and keep a list of the medicines you take. How can you care for yourself at home? · Do not drive if you have taken a prescription pain medicine. · Rest in a quiet, dark room until your headache is gone. Close your eyes, and try to relax or go to sleep. Don't watch TV or read. · Put a cold, moist cloth or cold pack on the painful area for 10 to 20 minutes at a time. Put a thin cloth between the cold pack and your skin. · Use a warm, moist towel or a heating pad set on low to relax tight shoulder and neck muscles. · Have someone gently massage your neck and shoulders. · Take your medicines exactly as prescribed. Call your doctor if you think you are having a problem with your medicine. You will get more details on the specific medicines your doctor prescribes. · Be careful not to take pain medicine more often than the instructions allow. You could get worse or more frequent headaches when the medicine wears off. To prevent migraines  · Keep a headache diary so you can figure out what triggers your headaches. Avoiding triggers may help you prevent headaches. Record when each headache began, how long it lasted, and what the pain was like.  (Was it throbbing, aching, stabbing, or dull?) Write down any other symptoms you had with the headache, such as nausea, flashing lights or dark spots, or sensitivity to bright light or loud noise. Note if the headache occurred near your period. List anything that might have triggered the headache. Triggers may include certain foods (chocolate, cheese, wine) or odors, smoke, bright light, stress, or lack of sleep. · If your doctor has prescribed medicine for your migraines, take it as directed. You may have medicine that you take only when you get a migraine and medicine that you take all the time to help prevent migraines. ¨ If your doctor has prescribed medicine for when you get a headache, take it at the first sign of a migraine, unless your doctor has given you other instructions. ¨ If your doctor has prescribed medicine to prevent migraines, take it exactly as prescribed. Call your doctor if you think you are having a problem with your medicine. · Find healthy ways to deal with stress. Migraines are most common during or right after stressful times. Take time to relax before and after you do something that has caused a migraine in the past.  · Try to keep your muscles relaxed by keeping good posture. Check your jaw, face, neck, and shoulder muscles for tension. Try to relax them. When you sit at a desk, change positions often. And make sure to stretch for 30 seconds each hour. · Get plenty of sleep and exercise. · Eat meals on a regular schedule. Avoid foods and drinks that often trigger migraines. These include chocolate, alcohol (especially red wine and port), aspartame, monosodium glutamate (MSG), and some additives found in foods (such as hot dogs, schwartz, cold cuts, aged cheeses, and pickled foods). · Limit caffeine. Don't drink too much coffee, tea, or soda. But don't quit caffeine suddenly. That can also give you migraines. · Do not smoke or allow others to smoke around you. If you need help quitting, talk to your doctor about stop-smoking programs and medicines. These can increase your chances of quitting for good.   · If you are taking birth control pills or hormone therapy, talk to your doctor about whether they are triggering your migraines. When should you call for help? Call 911 anytime you think you may need emergency care. For example, call if:  ? · You have signs of a stroke. These may include:  ¨ Sudden numbness, paralysis, or weakness in your face, arm, or leg, especially on only one side of your body. ¨ Sudden vision changes. ¨ Sudden trouble speaking. ¨ Sudden confusion or trouble understanding simple statements. ¨ Sudden problems with walking or balance. ¨ A sudden, severe headache that is different from past headaches. ?Call your doctor now or seek immediate medical care if:  ? · You have new or worse nausea and vomiting. ? · You have a new or higher fever. ? · Your headache gets much worse. ? Watch closely for changes in your health, and be sure to contact your doctor if:  ? · You are not getting better after 2 days (48 hours). Where can you learn more? Go to http://olivia-salina.info/. Enter V175 in the search box to learn more about \"Migraine Headache: Care Instructions. \"  Current as of: October 14, 2016  Content Version: 11.4  © 1431-2226 [employer]. Care instructions adapted under license by [employer] (which disclaims liability or warranty for this information). If you have questions about a medical condition or this instruction, always ask your healthcare professional. [place] [address] any warranty or liability for your use of this information.

## 2018-05-08 ENCOUNTER — HOSPITAL ENCOUNTER (OUTPATIENT)
Dept: CT IMAGING | Age: 63
Discharge: HOME | End: 2018-05-08
Attending: INTERNAL MEDICINE
Payer: COMMERCIAL

## 2018-05-08 DIAGNOSIS — R10.32 LEFT LOWER QUADRANT PAIN: ICD-10-CM

## 2018-05-08 DIAGNOSIS — K22.70 BARRETT'S ESOPHAGUS WITHOUT DYSPLASIA: ICD-10-CM

## 2018-05-08 DIAGNOSIS — K55.9 ISCHEMIC COLITIS (HCC): ICD-10-CM

## 2018-05-08 DIAGNOSIS — K59.00 CONSTIPATION: ICD-10-CM

## 2018-05-08 PROCEDURE — 74011250636 HC RX REV CODE- 250/636: Performed by: INTERNAL MEDICINE

## 2018-05-08 PROCEDURE — 74011636320 HC RX REV CODE- 636/320: Performed by: INTERNAL MEDICINE

## 2018-05-08 PROCEDURE — 74011000255 HC RX REV CODE- 255: Performed by: INTERNAL MEDICINE

## 2018-05-08 PROCEDURE — 74177 CT ABD & PELVIS W/CONTRAST: CPT

## 2018-05-08 RX ORDER — SODIUM CHLORIDE 0.9 % (FLUSH) 0.9 %
10 SYRINGE (ML) INJECTION
Status: COMPLETED | OUTPATIENT
Start: 2018-05-08 | End: 2018-05-08

## 2018-05-08 RX ORDER — BARIUM SULFATE 20 MG/ML
900 SUSPENSION ORAL
Status: COMPLETED | OUTPATIENT
Start: 2018-05-08 | End: 2018-05-08

## 2018-05-08 RX ORDER — SODIUM CHLORIDE 9 MG/ML
50 INJECTION, SOLUTION INTRAVENOUS
Status: COMPLETED | OUTPATIENT
Start: 2018-05-08 | End: 2018-05-08

## 2018-05-08 RX ADMIN — Medication 10 ML: at 06:57

## 2018-05-08 RX ADMIN — SODIUM CHLORIDE 50 ML/HR: 900 INJECTION, SOLUTION INTRAVENOUS at 06:57

## 2018-05-08 RX ADMIN — BARIUM SULFATE 900 ML: 21 SUSPENSION ORAL at 06:57

## 2018-05-08 RX ADMIN — IOPAMIDOL 100 ML: 755 INJECTION, SOLUTION INTRAVENOUS at 06:57
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
## 2019-01-01 ENCOUNTER — APPOINTMENT (OUTPATIENT)
Dept: CT IMAGING | Age: 64
DRG: 856 | End: 2019-01-01
Attending: SURGERY
Payer: MEDICARE

## 2019-01-01 ENCOUNTER — APPOINTMENT (OUTPATIENT)
Dept: GENERAL RADIOLOGY | Age: 64
DRG: 330 | End: 2019-01-01
Attending: SURGERY
Payer: MEDICARE

## 2019-01-01 ENCOUNTER — HOSPITAL ENCOUNTER (INPATIENT)
Age: 64
LOS: 5 days | Discharge: HOSPICE | DRG: 375 | End: 2019-05-22
Attending: INTERNAL MEDICINE | Admitting: INTERNAL MEDICINE
Payer: OTHER MISCELLANEOUS

## 2019-01-01 ENCOUNTER — APPOINTMENT (OUTPATIENT)
Dept: CT IMAGING | Age: 64
DRG: 371 | End: 2019-01-01
Attending: SURGERY
Payer: MEDICARE

## 2019-01-01 ENCOUNTER — HOME CARE VISIT (OUTPATIENT)
Dept: HOSPICE | Facility: HOSPICE | Age: 64
End: 2019-01-01
Payer: MEDICARE

## 2019-01-01 ENCOUNTER — HOME CARE VISIT (OUTPATIENT)
Dept: SCHEDULING | Facility: HOME HEALTH | Age: 64
End: 2019-01-01
Payer: MEDICARE

## 2019-01-01 ENCOUNTER — HOSPITAL ENCOUNTER (INPATIENT)
Age: 64
LOS: 7 days | Discharge: HOME HEALTH | DRG: 330 | End: 2019-04-04
Attending: EMERGENCY MEDICINE | Admitting: SURGERY
Payer: MEDICARE

## 2019-01-01 ENCOUNTER — ANESTHESIA EVENT (OUTPATIENT)
Dept: SURGERY | Age: 64
DRG: 330 | End: 2019-01-01
Payer: MEDICARE

## 2019-01-01 ENCOUNTER — APPOINTMENT (OUTPATIENT)
Dept: GENERAL RADIOLOGY | Age: 64
DRG: 856 | End: 2019-01-01
Attending: EMERGENCY MEDICINE
Payer: MEDICARE

## 2019-01-01 ENCOUNTER — ANESTHESIA (OUTPATIENT)
Dept: SURGERY | Age: 64
DRG: 856 | End: 2019-01-01
Payer: MEDICARE

## 2019-01-01 ENCOUNTER — APPOINTMENT (OUTPATIENT)
Dept: CT IMAGING | Age: 64
DRG: 330 | End: 2019-01-01
Attending: EMERGENCY MEDICINE
Payer: MEDICARE

## 2019-01-01 ENCOUNTER — APPOINTMENT (OUTPATIENT)
Dept: GENERAL RADIOLOGY | Age: 64
DRG: 856 | End: 2019-01-01
Attending: SURGERY
Payer: MEDICARE

## 2019-01-01 ENCOUNTER — HOME HEALTH ADMISSION (OUTPATIENT)
Dept: HOME HEALTH SERVICES | Facility: HOME HEALTH | Age: 64
End: 2019-01-01
Payer: MEDICARE

## 2019-01-01 ENCOUNTER — ANESTHESIA EVENT (OUTPATIENT)
Dept: ENDOSCOPY | Age: 64
End: 2019-01-01
Payer: MEDICARE

## 2019-01-01 ENCOUNTER — ANESTHESIA EVENT (OUTPATIENT)
Dept: ENDOSCOPY | Age: 64
DRG: 330 | End: 2019-01-01
Payer: MEDICARE

## 2019-01-01 ENCOUNTER — APPOINTMENT (OUTPATIENT)
Dept: CT IMAGING | Age: 64
DRG: 330 | End: 2019-01-01
Attending: INTERNAL MEDICINE
Payer: MEDICARE

## 2019-01-01 ENCOUNTER — APPOINTMENT (OUTPATIENT)
Dept: CT IMAGING | Age: 64
DRG: 856 | End: 2019-01-01
Attending: EMERGENCY MEDICINE
Payer: MEDICARE

## 2019-01-01 ENCOUNTER — HOSPITAL ENCOUNTER (INPATIENT)
Age: 64
LOS: 17 days | Discharge: HOME HEALTH | DRG: 856 | End: 2019-04-29
Attending: EMERGENCY MEDICINE | Admitting: SURGERY
Payer: MEDICARE

## 2019-01-01 ENCOUNTER — HOSPITAL ENCOUNTER (OUTPATIENT)
Dept: CT IMAGING | Age: 64
Discharge: HOME | DRG: 330 | End: 2019-03-26
Attending: INTERNAL MEDICINE
Payer: MEDICARE

## 2019-01-01 ENCOUNTER — HOSPITAL ENCOUNTER (EMERGENCY)
Age: 64
Discharge: HOME | DRG: 371 | End: 2019-04-30
Attending: EMERGENCY MEDICINE
Payer: MEDICARE

## 2019-01-01 ENCOUNTER — TELEPHONE (OUTPATIENT)
Dept: SURGERY | Age: 64
End: 2019-01-01

## 2019-01-01 ENCOUNTER — ANESTHESIA (OUTPATIENT)
Dept: ENDOSCOPY | Age: 64
DRG: 330 | End: 2019-01-01
Payer: MEDICARE

## 2019-01-01 ENCOUNTER — ANESTHESIA EVENT (OUTPATIENT)
Dept: SURGERY | Age: 64
DRG: 856 | End: 2019-01-01
Payer: MEDICARE

## 2019-01-01 ENCOUNTER — HOME CARE VISIT (OUTPATIENT)
Dept: HOME HEALTH SERVICES | Facility: HOME HEALTH | Age: 64
End: 2019-01-01
Payer: MEDICARE

## 2019-01-01 ENCOUNTER — APPOINTMENT (OUTPATIENT)
Dept: CT IMAGING | Age: 64
DRG: 371 | End: 2019-01-01
Attending: EMERGENCY MEDICINE
Payer: MEDICARE

## 2019-01-01 ENCOUNTER — HOSPICE ADMISSION (OUTPATIENT)
Dept: HOSPICE | Facility: HOSPICE | Age: 64
End: 2019-01-01
Payer: MEDICARE

## 2019-01-01 ENCOUNTER — APPOINTMENT (OUTPATIENT)
Dept: GENERAL RADIOLOGY | Age: 64
DRG: 856 | End: 2019-01-01
Attending: PHYSICIAN ASSISTANT
Payer: MEDICARE

## 2019-01-01 ENCOUNTER — HOSPITAL ENCOUNTER (OUTPATIENT)
Age: 64
Setting detail: OUTPATIENT SURGERY
Discharge: HOME | End: 2019-03-20
Attending: INTERNAL MEDICINE | Admitting: INTERNAL MEDICINE
Payer: MEDICARE

## 2019-01-01 ENCOUNTER — TELEPHONE (OUTPATIENT)
Dept: ONCOLOGY | Age: 64
End: 2019-01-01

## 2019-01-01 ENCOUNTER — HOSPITAL ENCOUNTER (INPATIENT)
Age: 64
LOS: 12 days | Discharge: HOSPICE | DRG: 371 | End: 2019-05-14
Attending: EMERGENCY MEDICINE | Admitting: SURGERY
Payer: MEDICARE

## 2019-01-01 ENCOUNTER — HOSPITAL ENCOUNTER (OUTPATIENT)
Age: 64
Setting detail: OUTPATIENT SURGERY
Discharge: HOME | DRG: 330 | End: 2019-03-28
Attending: INTERNAL MEDICINE | Admitting: INTERNAL MEDICINE
Payer: MEDICARE

## 2019-01-01 ENCOUNTER — APPOINTMENT (OUTPATIENT)
Dept: CT IMAGING | Age: 64
End: 2019-01-01
Attending: EMERGENCY MEDICINE
Payer: MEDICARE

## 2019-01-01 ENCOUNTER — HOSPITAL ENCOUNTER (EMERGENCY)
Age: 64
Discharge: HOME | End: 2019-03-18
Attending: EMERGENCY MEDICINE
Payer: MEDICARE

## 2019-01-01 ENCOUNTER — ANESTHESIA (OUTPATIENT)
Dept: SURGERY | Age: 64
DRG: 330 | End: 2019-01-01
Payer: MEDICARE

## 2019-01-01 ENCOUNTER — ANESTHESIA (OUTPATIENT)
Dept: ENDOSCOPY | Age: 64
End: 2019-01-01
Payer: MEDICARE

## 2019-01-01 VITALS
RESPIRATION RATE: 16 BRPM | BODY MASS INDEX: 22.88 KG/M2 | OXYGEN SATURATION: 98 % | WEIGHT: 134 LBS | TEMPERATURE: 97.6 F | HEART RATE: 54 BPM | DIASTOLIC BLOOD PRESSURE: 87 MMHG | SYSTOLIC BLOOD PRESSURE: 175 MMHG | HEIGHT: 64 IN

## 2019-01-01 VITALS
WEIGHT: 119.49 LBS | RESPIRATION RATE: 16 BRPM | SYSTOLIC BLOOD PRESSURE: 88 MMHG | OXYGEN SATURATION: 93 % | HEART RATE: 99 BPM | BODY MASS INDEX: 21.17 KG/M2 | HEIGHT: 63 IN | TEMPERATURE: 99.2 F | DIASTOLIC BLOOD PRESSURE: 66 MMHG

## 2019-01-01 VITALS
DIASTOLIC BLOOD PRESSURE: 58 MMHG | HEART RATE: 100 BPM | SYSTOLIC BLOOD PRESSURE: 92 MMHG | RESPIRATION RATE: 18 BRPM | TEMPERATURE: 98.2 F

## 2019-01-01 VITALS
RESPIRATION RATE: 20 BRPM | SYSTOLIC BLOOD PRESSURE: 110 MMHG | OXYGEN SATURATION: 98 % | HEART RATE: 111 BPM | DIASTOLIC BLOOD PRESSURE: 80 MMHG | TEMPERATURE: 97.6 F

## 2019-01-01 VITALS
BODY MASS INDEX: 22.43 KG/M2 | DIASTOLIC BLOOD PRESSURE: 100 MMHG | HEART RATE: 87 BPM | OXYGEN SATURATION: 100 % | TEMPERATURE: 97.4 F | HEIGHT: 64 IN | WEIGHT: 131.39 LBS | SYSTOLIC BLOOD PRESSURE: 207 MMHG | RESPIRATION RATE: 16 BRPM

## 2019-01-01 VITALS
DIASTOLIC BLOOD PRESSURE: 79 MMHG | SYSTOLIC BLOOD PRESSURE: 152 MMHG | BODY MASS INDEX: 24.84 KG/M2 | TEMPERATURE: 97.7 F | HEART RATE: 93 BPM | OXYGEN SATURATION: 99 % | RESPIRATION RATE: 16 BRPM | HEIGHT: 64 IN | WEIGHT: 145.5 LBS

## 2019-01-01 VITALS
RESPIRATION RATE: 20 BRPM | DIASTOLIC BLOOD PRESSURE: 79 MMHG | BODY MASS INDEX: 20.97 KG/M2 | TEMPERATURE: 97.5 F | SYSTOLIC BLOOD PRESSURE: 117 MMHG | WEIGHT: 126 LBS | HEART RATE: 90 BPM | OXYGEN SATURATION: 100 %

## 2019-01-01 VITALS
WEIGHT: 134 LBS | TEMPERATURE: 98.5 F | HEART RATE: 77 BPM | OXYGEN SATURATION: 94 % | BODY MASS INDEX: 22.33 KG/M2 | HEIGHT: 65 IN | DIASTOLIC BLOOD PRESSURE: 70 MMHG | SYSTOLIC BLOOD PRESSURE: 154 MMHG | RESPIRATION RATE: 20 BRPM

## 2019-01-01 VITALS
RESPIRATION RATE: 18 BRPM | SYSTOLIC BLOOD PRESSURE: 110 MMHG | TEMPERATURE: 98.1 F | HEART RATE: 121 BPM | DIASTOLIC BLOOD PRESSURE: 77 MMHG

## 2019-01-01 VITALS
HEIGHT: 64 IN | DIASTOLIC BLOOD PRESSURE: 74 MMHG | HEART RATE: 57 BPM | BODY MASS INDEX: 23.05 KG/M2 | OXYGEN SATURATION: 99 % | WEIGHT: 135 LBS | RESPIRATION RATE: 22 BRPM | TEMPERATURE: 97.4 F | SYSTOLIC BLOOD PRESSURE: 152 MMHG

## 2019-01-01 VITALS
DIASTOLIC BLOOD PRESSURE: 72 MMHG | SYSTOLIC BLOOD PRESSURE: 117 MMHG | RESPIRATION RATE: 18 BRPM | HEART RATE: 103 BPM | TEMPERATURE: 98.1 F | OXYGEN SATURATION: 98 %

## 2019-01-01 VITALS
DIASTOLIC BLOOD PRESSURE: 80 MMHG | SYSTOLIC BLOOD PRESSURE: 110 MMHG | HEART RATE: 106 BPM | OXYGEN SATURATION: 95 % | TEMPERATURE: 99 F | RESPIRATION RATE: 24 BRPM

## 2019-01-01 VITALS
HEIGHT: 63 IN | DIASTOLIC BLOOD PRESSURE: 60 MMHG | SYSTOLIC BLOOD PRESSURE: 90 MMHG | RESPIRATION RATE: 16 BRPM | OXYGEN SATURATION: 96 % | HEART RATE: 90 BPM | WEIGHT: 119 LBS | BODY MASS INDEX: 21.09 KG/M2

## 2019-01-01 VITALS
SYSTOLIC BLOOD PRESSURE: 94 MMHG | TEMPERATURE: 97.8 F | HEART RATE: 103 BPM | RESPIRATION RATE: 28 BRPM | OXYGEN SATURATION: 97 % | DIASTOLIC BLOOD PRESSURE: 60 MMHG

## 2019-01-01 VITALS
TEMPERATURE: 98 F | OXYGEN SATURATION: 92 % | HEART RATE: 90 BPM | SYSTOLIC BLOOD PRESSURE: 122 MMHG | DIASTOLIC BLOOD PRESSURE: 64 MMHG

## 2019-01-01 VITALS
DIASTOLIC BLOOD PRESSURE: 69 MMHG | OXYGEN SATURATION: 96 % | WEIGHT: 126.7 LBS | SYSTOLIC BLOOD PRESSURE: 148 MMHG | RESPIRATION RATE: 16 BRPM | BODY MASS INDEX: 21.11 KG/M2 | HEIGHT: 65 IN | TEMPERATURE: 97.9 F | HEART RATE: 82 BPM

## 2019-01-01 VITALS — RESPIRATION RATE: 32 BRPM | TEMPERATURE: 97.9 F | HEART RATE: 120 BPM

## 2019-01-01 VITALS
TEMPERATURE: 98.4 F | DIASTOLIC BLOOD PRESSURE: 76 MMHG | OXYGEN SATURATION: 96 % | RESPIRATION RATE: 18 BRPM | HEART RATE: 116 BPM | SYSTOLIC BLOOD PRESSURE: 99 MMHG

## 2019-01-01 DIAGNOSIS — G89.3 CANCER ASSOCIATED PAIN: ICD-10-CM

## 2019-01-01 DIAGNOSIS — R11.0 NAUSEA: ICD-10-CM

## 2019-01-01 DIAGNOSIS — Z83.71 FAMILY HISTORY OF COLONIC POLYPS: ICD-10-CM

## 2019-01-01 DIAGNOSIS — R10.32 ABDOMINAL PAIN, LEFT LOWER QUADRANT: ICD-10-CM

## 2019-01-01 DIAGNOSIS — R64 CACHECTIC (HCC): ICD-10-CM

## 2019-01-01 DIAGNOSIS — K52.9 COLITIS: Primary | ICD-10-CM

## 2019-01-01 DIAGNOSIS — K57.30 DIVERTICULOSIS OF LARGE INTESTINE WITHOUT DIVERTICULITIS: ICD-10-CM

## 2019-01-01 DIAGNOSIS — F41.9 ANXIETY: ICD-10-CM

## 2019-01-01 DIAGNOSIS — K20.90 GASTROESOPHAGITIS: ICD-10-CM

## 2019-01-01 DIAGNOSIS — Z71.89 GOALS OF CARE, COUNSELING/DISCUSSION: ICD-10-CM

## 2019-01-01 DIAGNOSIS — K65.9 PERITONITIS (HCC): ICD-10-CM

## 2019-01-01 DIAGNOSIS — T81.44XA: Primary | ICD-10-CM

## 2019-01-01 DIAGNOSIS — K22.70 BARRETT'S ESOPHAGUS: ICD-10-CM

## 2019-01-01 DIAGNOSIS — K29.70 GASTROESOPHAGITIS: ICD-10-CM

## 2019-01-01 DIAGNOSIS — E44.0 MODERATE PROTEIN-CALORIE MALNUTRITION (HCC): ICD-10-CM

## 2019-01-01 DIAGNOSIS — K52.9 COLITIS: ICD-10-CM

## 2019-01-01 DIAGNOSIS — Z71.89 ADVANCED CARE PLANNING/COUNSELING DISCUSSION: ICD-10-CM

## 2019-01-01 DIAGNOSIS — A41.9 SEPSIS, DUE TO UNSPECIFIED ORGANISM: ICD-10-CM

## 2019-01-01 DIAGNOSIS — G89.3 CANCER RELATED PAIN: ICD-10-CM

## 2019-01-01 DIAGNOSIS — C78.6 PERITONEAL CARCINOMATOSIS (HCC): Primary | ICD-10-CM

## 2019-01-01 DIAGNOSIS — K22.4 DYSKINESIA OF ESOPHAGUS: ICD-10-CM

## 2019-01-01 DIAGNOSIS — C78.6 PERITONEAL CARCINOMATOSIS (HCC): ICD-10-CM

## 2019-01-01 DIAGNOSIS — R54 FRAILTY: ICD-10-CM

## 2019-01-01 DIAGNOSIS — C18.1 APPENDIX CARCINOMA (HCC): ICD-10-CM

## 2019-01-01 DIAGNOSIS — R19.7 DIARRHEA OF PRESUMED INFECTIOUS ORIGIN: ICD-10-CM

## 2019-01-01 DIAGNOSIS — K56.609 SBO (SMALL BOWEL OBSTRUCTION) (HCC): Primary | ICD-10-CM

## 2019-01-01 DIAGNOSIS — R10.84 GENERALIZED ABDOMINAL PAIN: ICD-10-CM

## 2019-01-01 DIAGNOSIS — K55.9 ISCHEMIC COLITIS (HCC): ICD-10-CM

## 2019-01-01 DIAGNOSIS — Z98.890 HISTORY OF INTESTINAL SURGERY: Primary | ICD-10-CM

## 2019-01-01 DIAGNOSIS — F32.A DEPRESSION: ICD-10-CM

## 2019-01-01 DIAGNOSIS — D49.0 NEOPLASM OF APPENDIX: ICD-10-CM

## 2019-01-01 DIAGNOSIS — R53.81 DEBILITY: ICD-10-CM

## 2019-01-01 DIAGNOSIS — K25.9 GASTRIC ULCER: ICD-10-CM

## 2019-01-01 DIAGNOSIS — Z51.89 VISIT FOR WOUND CHECK: ICD-10-CM

## 2019-01-01 DIAGNOSIS — K56.609 SBO (SMALL BOWEL OBSTRUCTION) (HCC): ICD-10-CM

## 2019-01-01 LAB
ABO + RH BLD: NORMAL
ALBUMIN SERPL-MCNC: 1.6 G/DL (ref 3.5–5)
ALBUMIN SERPL-MCNC: 1.8 G/DL (ref 3.5–5)
ALBUMIN SERPL-MCNC: 2 G/DL (ref 3.5–5)
ALBUMIN SERPL-MCNC: 2.3 G/DL (ref 3.5–5)
ALBUMIN SERPL-MCNC: 2.3 G/DL (ref 3.5–5)
ALBUMIN SERPL-MCNC: 2.9 G/DL (ref 3.5–5)
ALBUMIN SERPL-MCNC: 3.2 G/DL (ref 3.5–5)
ALBUMIN SERPL-MCNC: 3.9 G/DL (ref 3.5–5)
ALBUMIN/GLOB SERPL: 0.4 {RATIO} (ref 1.1–2.2)
ALBUMIN/GLOB SERPL: 0.5 {RATIO} (ref 1.1–2.2)
ALBUMIN/GLOB SERPL: 0.5 {RATIO} (ref 1.1–2.2)
ALBUMIN/GLOB SERPL: 0.6 {RATIO} (ref 1.1–2.2)
ALBUMIN/GLOB SERPL: 0.7 {RATIO} (ref 1.1–2.2)
ALBUMIN/GLOB SERPL: 0.8 {RATIO} (ref 1.1–2.2)
ALBUMIN/GLOB SERPL: 0.9 {RATIO} (ref 1.1–2.2)
ALBUMIN/GLOB SERPL: 1 {RATIO} (ref 1.1–2.2)
ALP SERPL-CCNC: 104 U/L (ref 45–117)
ALP SERPL-CCNC: 109 U/L (ref 45–117)
ALP SERPL-CCNC: 159 U/L (ref 45–117)
ALP SERPL-CCNC: 31 U/L (ref 45–117)
ALP SERPL-CCNC: 43 U/L (ref 45–117)
ALP SERPL-CCNC: 64 U/L (ref 45–117)
ALP SERPL-CCNC: 86 U/L (ref 45–117)
ALP SERPL-CCNC: 87 U/L (ref 45–117)
ALT SERPL-CCNC: 10 U/L (ref 12–78)
ALT SERPL-CCNC: 10 U/L (ref 12–78)
ALT SERPL-CCNC: 12 U/L (ref 12–78)
ALT SERPL-CCNC: 13 U/L (ref 12–78)
ALT SERPL-CCNC: 15 U/L (ref 12–78)
ALT SERPL-CCNC: 19 U/L (ref 12–78)
ALT SERPL-CCNC: 24 U/L (ref 12–78)
ALT SERPL-CCNC: 8 U/L (ref 12–78)
ANION GAP SERPL CALC-SCNC: 10 MMOL/L (ref 5–15)
ANION GAP SERPL CALC-SCNC: 2 MMOL/L (ref 5–15)
ANION GAP SERPL CALC-SCNC: 2 MMOL/L (ref 5–15)
ANION GAP SERPL CALC-SCNC: 3 MMOL/L (ref 5–15)
ANION GAP SERPL CALC-SCNC: 4 MMOL/L (ref 5–15)
ANION GAP SERPL CALC-SCNC: 5 MMOL/L (ref 5–15)
ANION GAP SERPL CALC-SCNC: 6 MMOL/L (ref 5–15)
ANION GAP SERPL CALC-SCNC: 7 MMOL/L (ref 5–15)
ANION GAP SERPL CALC-SCNC: 8 MMOL/L (ref 5–15)
ANION GAP SERPL CALC-SCNC: 9 MMOL/L (ref 5–15)
ANION GAP SERPL CALC-SCNC: 9 MMOL/L (ref 5–15)
APPEARANCE UR: ABNORMAL
APPEARANCE UR: CLEAR
AST SERPL-CCNC: 11 U/L (ref 15–37)
AST SERPL-CCNC: 12 U/L (ref 15–37)
AST SERPL-CCNC: 14 U/L (ref 15–37)
AST SERPL-CCNC: 17 U/L (ref 15–37)
AST SERPL-CCNC: 17 U/L (ref 15–37)
AST SERPL-CCNC: 19 U/L (ref 15–37)
AST SERPL-CCNC: 21 U/L (ref 15–37)
AST SERPL-CCNC: 27 U/L (ref 15–37)
ATRIAL RATE: 106 BPM
ATRIAL RATE: 99 BPM
BACTERIA SPEC CULT: ABNORMAL
BACTERIA SPEC CULT: NORMAL
BACTERIA URNS QL MICRO: ABNORMAL /HPF
BACTERIA URNS QL MICRO: NEGATIVE /HPF
BASOPHILS # BLD: 0 K/UL (ref 0–0.1)
BASOPHILS # BLD: 0.1 K/UL (ref 0–0.1)
BASOPHILS NFR BLD: 0 % (ref 0–1)
BASOPHILS NFR BLD: 1 % (ref 0–1)
BILIRUB DIRECT SERPL-MCNC: 0.2 MG/DL (ref 0–0.2)
BILIRUB SERPL-MCNC: 0.2 MG/DL (ref 0.2–1)
BILIRUB SERPL-MCNC: 0.2 MG/DL (ref 0.2–1)
BILIRUB SERPL-MCNC: 0.3 MG/DL (ref 0.2–1)
BILIRUB SERPL-MCNC: 0.3 MG/DL (ref 0.2–1)
BILIRUB SERPL-MCNC: 0.4 MG/DL (ref 0.2–1)
BILIRUB SERPL-MCNC: 0.4 MG/DL (ref 0.2–1)
BILIRUB SERPL-MCNC: 0.6 MG/DL (ref 0.2–1)
BILIRUB SERPL-MCNC: 0.6 MG/DL (ref 0.2–1)
BILIRUB UR QL: NEGATIVE
BILIRUB UR QL: NEGATIVE
BLD PROD TYP BPU: NORMAL
BLOOD GROUP ANTIBODIES SERPL: NORMAL
BPU ID: NORMAL
BUN SERPL-MCNC: 1 MG/DL (ref 6–20)
BUN SERPL-MCNC: 10 MG/DL (ref 6–20)
BUN SERPL-MCNC: 11 MG/DL (ref 6–20)
BUN SERPL-MCNC: 12 MG/DL (ref 6–20)
BUN SERPL-MCNC: 2 MG/DL (ref 6–20)
BUN SERPL-MCNC: 3 MG/DL (ref 6–20)
BUN SERPL-MCNC: 3 MG/DL (ref 6–20)
BUN SERPL-MCNC: 39 MG/DL (ref 6–20)
BUN SERPL-MCNC: 4 MG/DL (ref 6–20)
BUN SERPL-MCNC: 4 MG/DL (ref 6–20)
BUN SERPL-MCNC: 5 MG/DL (ref 6–20)
BUN SERPL-MCNC: 6 MG/DL (ref 6–20)
BUN SERPL-MCNC: 7 MG/DL (ref 6–20)
BUN SERPL-MCNC: 9 MG/DL (ref 6–20)
BUN SERPL-MCNC: 9 MG/DL (ref 6–20)
BUN/CREAT SERPL: 10 (ref 12–20)
BUN/CREAT SERPL: 10 (ref 12–20)
BUN/CREAT SERPL: 11 (ref 12–20)
BUN/CREAT SERPL: 11 (ref 12–20)
BUN/CREAT SERPL: 13 (ref 12–20)
BUN/CREAT SERPL: 13 (ref 12–20)
BUN/CREAT SERPL: 14 (ref 12–20)
BUN/CREAT SERPL: 15 (ref 12–20)
BUN/CREAT SERPL: 19 (ref 12–20)
BUN/CREAT SERPL: 2 (ref 12–20)
BUN/CREAT SERPL: 20 (ref 12–20)
BUN/CREAT SERPL: 22 (ref 12–20)
BUN/CREAT SERPL: 24 (ref 12–20)
BUN/CREAT SERPL: 4 (ref 12–20)
BUN/CREAT SERPL: 5 (ref 12–20)
BUN/CREAT SERPL: 6 (ref 12–20)
BUN/CREAT SERPL: 8 (ref 12–20)
BUN/CREAT SERPL: 8 (ref 12–20)
BUN/CREAT SERPL: 9 (ref 12–20)
BUN/CREAT SERPL: 9 (ref 12–20)
CALCIUM SERPL-MCNC: 6.4 MG/DL (ref 8.5–10.1)
CALCIUM SERPL-MCNC: 6.7 MG/DL (ref 8.5–10.1)
CALCIUM SERPL-MCNC: 6.8 MG/DL (ref 8.5–10.1)
CALCIUM SERPL-MCNC: 7 MG/DL (ref 8.5–10.1)
CALCIUM SERPL-MCNC: 7.2 MG/DL (ref 8.5–10.1)
CALCIUM SERPL-MCNC: 7.2 MG/DL (ref 8.5–10.1)
CALCIUM SERPL-MCNC: 7.4 MG/DL (ref 8.5–10.1)
CALCIUM SERPL-MCNC: 7.4 MG/DL (ref 8.5–10.1)
CALCIUM SERPL-MCNC: 7.5 MG/DL (ref 8.5–10.1)
CALCIUM SERPL-MCNC: 7.6 MG/DL (ref 8.5–10.1)
CALCIUM SERPL-MCNC: 7.7 MG/DL (ref 8.5–10.1)
CALCIUM SERPL-MCNC: 7.7 MG/DL (ref 8.5–10.1)
CALCIUM SERPL-MCNC: 7.9 MG/DL (ref 8.5–10.1)
CALCIUM SERPL-MCNC: 8 MG/DL (ref 8.5–10.1)
CALCIUM SERPL-MCNC: 8 MG/DL (ref 8.5–10.1)
CALCIUM SERPL-MCNC: 8.1 MG/DL (ref 8.5–10.1)
CALCIUM SERPL-MCNC: 8.2 MG/DL (ref 8.5–10.1)
CALCIUM SERPL-MCNC: 8.3 MG/DL (ref 8.5–10.1)
CALCIUM SERPL-MCNC: 8.6 MG/DL (ref 8.5–10.1)
CALCIUM SERPL-MCNC: 9.4 MG/DL (ref 8.5–10.1)
CALCULATED P AXIS, ECG09: 69 DEGREES
CALCULATED P AXIS, ECG09: 70 DEGREES
CALCULATED R AXIS, ECG10: 14 DEGREES
CALCULATED T AXIS, ECG11: -19 DEGREES
CALCULATED T AXIS, ECG11: 87 DEGREES
CC UR VC: ABNORMAL
CEA SERPL-MCNC: 0.4 NG/ML
CHLORIDE SERPL-SCNC: 100 MMOL/L (ref 97–108)
CHLORIDE SERPL-SCNC: 100 MMOL/L (ref 97–108)
CHLORIDE SERPL-SCNC: 102 MMOL/L (ref 97–108)
CHLORIDE SERPL-SCNC: 103 MMOL/L (ref 97–108)
CHLORIDE SERPL-SCNC: 104 MMOL/L (ref 97–108)
CHLORIDE SERPL-SCNC: 104 MMOL/L (ref 97–108)
CHLORIDE SERPL-SCNC: 105 MMOL/L (ref 97–108)
CHLORIDE SERPL-SCNC: 105 MMOL/L (ref 97–108)
CHLORIDE SERPL-SCNC: 107 MMOL/L (ref 97–108)
CHLORIDE SERPL-SCNC: 107 MMOL/L (ref 97–108)
CHLORIDE SERPL-SCNC: 108 MMOL/L (ref 97–108)
CHLORIDE SERPL-SCNC: 109 MMOL/L (ref 97–108)
CHLORIDE SERPL-SCNC: 110 MMOL/L (ref 97–108)
CHLORIDE SERPL-SCNC: 94 MMOL/L (ref 97–108)
CHLORIDE SERPL-SCNC: 95 MMOL/L (ref 97–108)
CHLORIDE SERPL-SCNC: 97 MMOL/L (ref 97–108)
CHLORIDE SERPL-SCNC: 99 MMOL/L (ref 97–108)
CK MB CFR SERPL CALC: 1.2 % (ref 0–2.5)
CK MB SERPL-MCNC: 1.1 NG/ML (ref 5–25)
CK SERPL-CCNC: 95 U/L (ref 26–192)
CO2 SERPL-SCNC: 22 MMOL/L (ref 21–32)
CO2 SERPL-SCNC: 23 MMOL/L (ref 21–32)
CO2 SERPL-SCNC: 24 MMOL/L (ref 21–32)
CO2 SERPL-SCNC: 24 MMOL/L (ref 21–32)
CO2 SERPL-SCNC: 25 MMOL/L (ref 21–32)
CO2 SERPL-SCNC: 26 MMOL/L (ref 21–32)
CO2 SERPL-SCNC: 27 MMOL/L (ref 21–32)
CO2 SERPL-SCNC: 28 MMOL/L (ref 21–32)
CO2 SERPL-SCNC: 29 MMOL/L (ref 21–32)
CO2 SERPL-SCNC: 30 MMOL/L (ref 21–32)
CO2 SERPL-SCNC: 31 MMOL/L (ref 21–32)
CO2 SERPL-SCNC: 31 MMOL/L (ref 21–32)
CO2 SERPL-SCNC: 33 MMOL/L (ref 21–32)
COLOR UR: ABNORMAL
COLOR UR: ABNORMAL
COMMENT, HOLDF: NORMAL
CREAT BLD-MCNC: 0.7 MG/DL (ref 0.6–1.3)
CREAT SERPL-MCNC: 0.34 MG/DL (ref 0.55–1.02)
CREAT SERPL-MCNC: 0.36 MG/DL (ref 0.55–1.02)
CREAT SERPL-MCNC: 0.36 MG/DL (ref 0.55–1.02)
CREAT SERPL-MCNC: 0.37 MG/DL (ref 0.55–1.02)
CREAT SERPL-MCNC: 0.37 MG/DL (ref 0.55–1.02)
CREAT SERPL-MCNC: 0.41 MG/DL (ref 0.55–1.02)
CREAT SERPL-MCNC: 0.42 MG/DL (ref 0.55–1.02)
CREAT SERPL-MCNC: 0.43 MG/DL (ref 0.55–1.02)
CREAT SERPL-MCNC: 0.44 MG/DL (ref 0.55–1.02)
CREAT SERPL-MCNC: 0.46 MG/DL (ref 0.55–1.02)
CREAT SERPL-MCNC: 0.47 MG/DL (ref 0.55–1.02)
CREAT SERPL-MCNC: 0.48 MG/DL (ref 0.55–1.02)
CREAT SERPL-MCNC: 0.49 MG/DL (ref 0.55–1.02)
CREAT SERPL-MCNC: 0.52 MG/DL (ref 0.55–1.02)
CREAT SERPL-MCNC: 0.55 MG/DL (ref 0.55–1.02)
CREAT SERPL-MCNC: 0.56 MG/DL (ref 0.55–1.02)
CREAT SERPL-MCNC: 0.57 MG/DL (ref 0.55–1.02)
CREAT SERPL-MCNC: 0.59 MG/DL (ref 0.55–1.02)
CREAT SERPL-MCNC: 0.59 MG/DL (ref 0.55–1.02)
CREAT SERPL-MCNC: 0.61 MG/DL (ref 0.55–1.02)
CREAT SERPL-MCNC: 0.66 MG/DL (ref 0.55–1.02)
CREAT SERPL-MCNC: 0.71 MG/DL (ref 0.55–1.02)
CREAT SERPL-MCNC: 0.81 MG/DL (ref 0.55–1.02)
CREAT SERPL-MCNC: 2.07 MG/DL (ref 0.55–1.02)
CROSSMATCH RESULT,%XM: NORMAL
DIAGNOSIS, 93000: NORMAL
DIAGNOSIS, 93000: NORMAL
DIFFERENTIAL METHOD BLD: ABNORMAL
EOSINOPHIL # BLD: 0 K/UL (ref 0–0.4)
EOSINOPHIL # BLD: 0.1 K/UL (ref 0–0.4)
EOSINOPHIL # BLD: 0.1 K/UL (ref 0–0.4)
EOSINOPHIL NFR BLD: 0 % (ref 0–7)
EOSINOPHIL NFR BLD: 1 % (ref 0–7)
EOSINOPHIL NFR BLD: 2 % (ref 0–7)
EPITH CASTS URNS QL MICRO: ABNORMAL /LPF
EPITH CASTS URNS QL MICRO: ABNORMAL /LPF
ERYTHROCYTE [DISTWIDTH] IN BLOOD BY AUTOMATED COUNT: 14.4 % (ref 11.5–14.5)
ERYTHROCYTE [DISTWIDTH] IN BLOOD BY AUTOMATED COUNT: 14.6 % (ref 11.5–14.5)
ERYTHROCYTE [DISTWIDTH] IN BLOOD BY AUTOMATED COUNT: 14.7 % (ref 11.5–14.5)
ERYTHROCYTE [DISTWIDTH] IN BLOOD BY AUTOMATED COUNT: 14.7 % (ref 11.5–14.5)
ERYTHROCYTE [DISTWIDTH] IN BLOOD BY AUTOMATED COUNT: 15.1 % (ref 11.5–14.5)
ERYTHROCYTE [DISTWIDTH] IN BLOOD BY AUTOMATED COUNT: 15.2 % (ref 11.5–14.5)
ERYTHROCYTE [DISTWIDTH] IN BLOOD BY AUTOMATED COUNT: 15.4 % (ref 11.5–14.5)
ERYTHROCYTE [DISTWIDTH] IN BLOOD BY AUTOMATED COUNT: 15.4 % (ref 11.5–14.5)
ERYTHROCYTE [DISTWIDTH] IN BLOOD BY AUTOMATED COUNT: 15.6 % (ref 11.5–14.5)
ERYTHROCYTE [DISTWIDTH] IN BLOOD BY AUTOMATED COUNT: 15.6 % (ref 11.5–14.5)
ERYTHROCYTE [DISTWIDTH] IN BLOOD BY AUTOMATED COUNT: 15.7 % (ref 11.5–14.5)
ERYTHROCYTE [DISTWIDTH] IN BLOOD BY AUTOMATED COUNT: 15.8 % (ref 11.5–14.5)
ERYTHROCYTE [DISTWIDTH] IN BLOOD BY AUTOMATED COUNT: 15.9 % (ref 11.5–14.5)
ERYTHROCYTE [DISTWIDTH] IN BLOOD BY AUTOMATED COUNT: 16.2 % (ref 11.5–14.5)
GLOBULIN SER CALC-MCNC: 2.3 G/DL (ref 2–4)
GLOBULIN SER CALC-MCNC: 2.6 G/DL (ref 2–4)
GLOBULIN SER CALC-MCNC: 3.3 G/DL (ref 2–4)
GLOBULIN SER CALC-MCNC: 3.4 G/DL (ref 2–4)
GLOBULIN SER CALC-MCNC: 4.1 G/DL (ref 2–4)
GLOBULIN SER CALC-MCNC: 4.2 G/DL (ref 2–4)
GLOBULIN SER CALC-MCNC: 4.8 G/DL (ref 2–4)
GLOBULIN SER CALC-MCNC: 6.5 G/DL (ref 2–4)
GLUCOSE BLD STRIP.AUTO-MCNC: 100 MG/DL (ref 65–100)
GLUCOSE BLD STRIP.AUTO-MCNC: 100 MG/DL (ref 65–100)
GLUCOSE BLD STRIP.AUTO-MCNC: 101 MG/DL (ref 65–100)
GLUCOSE BLD STRIP.AUTO-MCNC: 101 MG/DL (ref 65–100)
GLUCOSE BLD STRIP.AUTO-MCNC: 104 MG/DL (ref 65–100)
GLUCOSE BLD STRIP.AUTO-MCNC: 104 MG/DL (ref 65–100)
GLUCOSE BLD STRIP.AUTO-MCNC: 105 MG/DL (ref 65–100)
GLUCOSE BLD STRIP.AUTO-MCNC: 107 MG/DL (ref 65–100)
GLUCOSE BLD STRIP.AUTO-MCNC: 108 MG/DL (ref 65–100)
GLUCOSE BLD STRIP.AUTO-MCNC: 108 MG/DL (ref 65–100)
GLUCOSE BLD STRIP.AUTO-MCNC: 110 MG/DL (ref 65–100)
GLUCOSE BLD STRIP.AUTO-MCNC: 111 MG/DL (ref 65–100)
GLUCOSE BLD STRIP.AUTO-MCNC: 112 MG/DL (ref 65–100)
GLUCOSE BLD STRIP.AUTO-MCNC: 112 MG/DL (ref 65–100)
GLUCOSE BLD STRIP.AUTO-MCNC: 114 MG/DL (ref 65–100)
GLUCOSE BLD STRIP.AUTO-MCNC: 115 MG/DL (ref 65–100)
GLUCOSE BLD STRIP.AUTO-MCNC: 115 MG/DL (ref 65–100)
GLUCOSE BLD STRIP.AUTO-MCNC: 118 MG/DL (ref 65–100)
GLUCOSE BLD STRIP.AUTO-MCNC: 119 MG/DL (ref 65–100)
GLUCOSE BLD STRIP.AUTO-MCNC: 120 MG/DL (ref 65–100)
GLUCOSE BLD STRIP.AUTO-MCNC: 120 MG/DL (ref 65–100)
GLUCOSE BLD STRIP.AUTO-MCNC: 121 MG/DL (ref 65–100)
GLUCOSE BLD STRIP.AUTO-MCNC: 122 MG/DL (ref 65–100)
GLUCOSE BLD STRIP.AUTO-MCNC: 122 MG/DL (ref 65–100)
GLUCOSE BLD STRIP.AUTO-MCNC: 125 MG/DL (ref 65–100)
GLUCOSE BLD STRIP.AUTO-MCNC: 125 MG/DL (ref 65–100)
GLUCOSE BLD STRIP.AUTO-MCNC: 126 MG/DL (ref 65–100)
GLUCOSE BLD STRIP.AUTO-MCNC: 127 MG/DL (ref 65–100)
GLUCOSE BLD STRIP.AUTO-MCNC: 128 MG/DL (ref 65–100)
GLUCOSE BLD STRIP.AUTO-MCNC: 129 MG/DL (ref 65–100)
GLUCOSE BLD STRIP.AUTO-MCNC: 132 MG/DL (ref 65–100)
GLUCOSE BLD STRIP.AUTO-MCNC: 134 MG/DL (ref 65–100)
GLUCOSE BLD STRIP.AUTO-MCNC: 137 MG/DL (ref 65–100)
GLUCOSE BLD STRIP.AUTO-MCNC: 145 MG/DL (ref 65–100)
GLUCOSE BLD STRIP.AUTO-MCNC: 175 MG/DL (ref 65–100)
GLUCOSE BLD STRIP.AUTO-MCNC: 184 MG/DL (ref 65–100)
GLUCOSE BLD STRIP.AUTO-MCNC: 67 MG/DL (ref 65–100)
GLUCOSE BLD STRIP.AUTO-MCNC: 68 MG/DL (ref 65–100)
GLUCOSE BLD STRIP.AUTO-MCNC: 74 MG/DL (ref 65–100)
GLUCOSE BLD STRIP.AUTO-MCNC: 75 MG/DL (ref 65–100)
GLUCOSE BLD STRIP.AUTO-MCNC: 79 MG/DL (ref 65–100)
GLUCOSE BLD STRIP.AUTO-MCNC: 83 MG/DL (ref 65–100)
GLUCOSE BLD STRIP.AUTO-MCNC: 87 MG/DL (ref 65–100)
GLUCOSE BLD STRIP.AUTO-MCNC: 90 MG/DL (ref 65–100)
GLUCOSE BLD STRIP.AUTO-MCNC: 91 MG/DL (ref 65–100)
GLUCOSE BLD STRIP.AUTO-MCNC: 92 MG/DL (ref 65–100)
GLUCOSE BLD STRIP.AUTO-MCNC: 93 MG/DL (ref 65–100)
GLUCOSE BLD STRIP.AUTO-MCNC: 94 MG/DL (ref 65–100)
GLUCOSE BLD STRIP.AUTO-MCNC: 99 MG/DL (ref 65–100)
GLUCOSE SERPL-MCNC: 101 MG/DL (ref 65–100)
GLUCOSE SERPL-MCNC: 102 MG/DL (ref 65–100)
GLUCOSE SERPL-MCNC: 102 MG/DL (ref 65–100)
GLUCOSE SERPL-MCNC: 105 MG/DL (ref 65–100)
GLUCOSE SERPL-MCNC: 107 MG/DL (ref 65–100)
GLUCOSE SERPL-MCNC: 109 MG/DL (ref 65–100)
GLUCOSE SERPL-MCNC: 114 MG/DL (ref 65–100)
GLUCOSE SERPL-MCNC: 117 MG/DL (ref 65–100)
GLUCOSE SERPL-MCNC: 121 MG/DL (ref 65–100)
GLUCOSE SERPL-MCNC: 121 MG/DL (ref 65–100)
GLUCOSE SERPL-MCNC: 141 MG/DL (ref 65–100)
GLUCOSE SERPL-MCNC: 172 MG/DL (ref 65–100)
GLUCOSE SERPL-MCNC: 177 MG/DL (ref 65–100)
GLUCOSE SERPL-MCNC: 69 MG/DL (ref 65–100)
GLUCOSE SERPL-MCNC: 73 MG/DL (ref 65–100)
GLUCOSE SERPL-MCNC: 85 MG/DL (ref 65–100)
GLUCOSE SERPL-MCNC: 89 MG/DL (ref 65–100)
GLUCOSE SERPL-MCNC: 93 MG/DL (ref 65–100)
GLUCOSE SERPL-MCNC: 93 MG/DL (ref 65–100)
GLUCOSE SERPL-MCNC: 97 MG/DL (ref 65–100)
GLUCOSE SERPL-MCNC: 98 MG/DL (ref 65–100)
GLUCOSE SERPL-MCNC: 99 MG/DL (ref 65–100)
GLUCOSE UR STRIP.AUTO-MCNC: NEGATIVE MG/DL
GLUCOSE UR STRIP.AUTO-MCNC: NEGATIVE MG/DL
GRAM STN SPEC: ABNORMAL
HCT VFR BLD AUTO: 16.4 % (ref 35–47)
HCT VFR BLD AUTO: 19.2 % (ref 35–47)
HCT VFR BLD AUTO: 21 % (ref 35–47)
HCT VFR BLD AUTO: 21.2 % (ref 35–47)
HCT VFR BLD AUTO: 21.6 % (ref 35–47)
HCT VFR BLD AUTO: 23 % (ref 35–47)
HCT VFR BLD AUTO: 23.2 % (ref 35–47)
HCT VFR BLD AUTO: 24 % (ref 35–47)
HCT VFR BLD AUTO: 24.4 % (ref 35–47)
HCT VFR BLD AUTO: 26.4 % (ref 35–47)
HCT VFR BLD AUTO: 26.6 % (ref 35–47)
HCT VFR BLD AUTO: 27 % (ref 35–47)
HCT VFR BLD AUTO: 27.3 % (ref 35–47)
HCT VFR BLD AUTO: 28.3 % (ref 35–47)
HCT VFR BLD AUTO: 28.6 % (ref 35–47)
HCT VFR BLD AUTO: 30.8 % (ref 35–47)
HCT VFR BLD AUTO: 32.4 % (ref 35–47)
HCT VFR BLD AUTO: 33.7 % (ref 35–47)
HCT VFR BLD AUTO: 35.8 % (ref 35–47)
HCT VFR BLD AUTO: 36.9 % (ref 35–47)
HCT VFR BLD AUTO: 39.4 % (ref 35–47)
HCT VFR BLD AUTO: 40.6 % (ref 35–47)
HGB BLD-MCNC: 10.9 G/DL (ref 11.5–16)
HGB BLD-MCNC: 11.3 G/DL (ref 11.5–16)
HGB BLD-MCNC: 11.7 G/DL (ref 11.5–16)
HGB BLD-MCNC: 12.5 G/DL (ref 11.5–16)
HGB BLD-MCNC: 12.9 G/DL (ref 11.5–16)
HGB BLD-MCNC: 13.6 G/DL (ref 11.5–16)
HGB BLD-MCNC: 6 G/DL (ref 11.5–16)
HGB BLD-MCNC: 6.4 G/DL (ref 11.5–16)
HGB BLD-MCNC: 6.9 G/DL (ref 11.5–16)
HGB BLD-MCNC: 6.9 G/DL (ref 11.5–16)
HGB BLD-MCNC: 7.2 G/DL (ref 11.5–16)
HGB BLD-MCNC: 7.6 G/DL (ref 11.5–16)
HGB BLD-MCNC: 7.7 G/DL (ref 11.5–16)
HGB BLD-MCNC: 7.7 G/DL (ref 11.5–16)
HGB BLD-MCNC: 8.2 G/DL (ref 11.5–16)
HGB BLD-MCNC: 8.5 G/DL (ref 11.5–16)
HGB BLD-MCNC: 8.7 G/DL (ref 11.5–16)
HGB BLD-MCNC: 8.7 G/DL (ref 11.5–16)
HGB BLD-MCNC: 9.2 G/DL (ref 11.5–16)
HGB BLD-MCNC: 9.3 G/DL (ref 11.5–16)
HGB BLD-MCNC: 9.5 G/DL (ref 11.5–16)
HGB BLD-MCNC: 9.6 G/DL (ref 11.5–16)
HGB UR QL STRIP: NEGATIVE
HGB UR QL STRIP: NEGATIVE
IMM GRANULOCYTES # BLD AUTO: 0 K/UL (ref 0–0.04)
IMM GRANULOCYTES # BLD AUTO: 0.1 K/UL (ref 0–0.04)
IMM GRANULOCYTES NFR BLD AUTO: 0 % (ref 0–0.5)
IMM GRANULOCYTES NFR BLD AUTO: 1 % (ref 0–0.5)
KETONES UR QL STRIP.AUTO: NEGATIVE MG/DL
KETONES UR QL STRIP.AUTO: NEGATIVE MG/DL
LACTATE BLD-SCNC: 1.21 MMOL/L (ref 0.4–2)
LACTATE BLD-SCNC: 2.11 MMOL/L (ref 0.4–2)
LACTATE SERPL-SCNC: 1.6 MMOL/L (ref 0.4–2)
LEUKOCYTE ESTERASE UR QL STRIP.AUTO: ABNORMAL
LEUKOCYTE ESTERASE UR QL STRIP.AUTO: ABNORMAL
LIPASE SERPL-CCNC: 485 U/L (ref 73–393)
LIPASE SERPL-CCNC: 76 U/L (ref 73–393)
LYMPHOCYTES # BLD: 0.4 K/UL (ref 0.8–3.5)
LYMPHOCYTES # BLD: 0.9 K/UL (ref 0.8–3.5)
LYMPHOCYTES # BLD: 0.9 K/UL (ref 0.8–3.5)
LYMPHOCYTES # BLD: 1.1 K/UL (ref 0.8–3.5)
LYMPHOCYTES # BLD: 1.4 K/UL (ref 0.8–3.5)
LYMPHOCYTES # BLD: 1.6 K/UL (ref 0.8–3.5)
LYMPHOCYTES # BLD: 1.6 K/UL (ref 0.8–3.5)
LYMPHOCYTES # BLD: 1.7 K/UL (ref 0.8–3.5)
LYMPHOCYTES # BLD: 2 K/UL (ref 0.8–3.5)
LYMPHOCYTES NFR BLD: 12 % (ref 12–49)
LYMPHOCYTES NFR BLD: 13 % (ref 12–49)
LYMPHOCYTES NFR BLD: 16 % (ref 12–49)
LYMPHOCYTES NFR BLD: 18 % (ref 12–49)
LYMPHOCYTES NFR BLD: 21 % (ref 12–49)
LYMPHOCYTES NFR BLD: 3 % (ref 12–49)
LYMPHOCYTES NFR BLD: 34 % (ref 12–49)
LYMPHOCYTES NFR BLD: 5 % (ref 12–49)
LYMPHOCYTES NFR BLD: 7 % (ref 12–49)
MAGNESIUM SERPL-MCNC: 1.4 MG/DL (ref 1.6–2.4)
MAGNESIUM SERPL-MCNC: 1.7 MG/DL (ref 1.6–2.4)
MAGNESIUM SERPL-MCNC: 1.8 MG/DL (ref 1.6–2.4)
MAGNESIUM SERPL-MCNC: 1.9 MG/DL (ref 1.6–2.4)
MAGNESIUM SERPL-MCNC: 2 MG/DL (ref 1.6–2.4)
MAGNESIUM SERPL-MCNC: 2.1 MG/DL (ref 1.6–2.4)
MCH RBC QN AUTO: 26.8 PG (ref 26–34)
MCH RBC QN AUTO: 28 PG (ref 26–34)
MCH RBC QN AUTO: 28.1 PG (ref 26–34)
MCH RBC QN AUTO: 28.5 PG (ref 26–34)
MCH RBC QN AUTO: 28.7 PG (ref 26–34)
MCH RBC QN AUTO: 28.9 PG (ref 26–34)
MCH RBC QN AUTO: 29 PG (ref 26–34)
MCH RBC QN AUTO: 29.1 PG (ref 26–34)
MCH RBC QN AUTO: 29.2 PG (ref 26–34)
MCH RBC QN AUTO: 29.3 PG (ref 26–34)
MCH RBC QN AUTO: 29.4 PG (ref 26–34)
MCH RBC QN AUTO: 29.4 PG (ref 26–34)
MCH RBC QN AUTO: 29.5 PG (ref 26–34)
MCH RBC QN AUTO: 29.6 PG (ref 26–34)
MCH RBC QN AUTO: 29.9 PG (ref 26–34)
MCH RBC QN AUTO: 29.9 PG (ref 26–34)
MCH RBC QN AUTO: 30 PG (ref 26–34)
MCH RBC QN AUTO: 30.3 PG (ref 26–34)
MCH RBC QN AUTO: 30.3 PG (ref 26–34)
MCH RBC QN AUTO: 31.4 PG (ref 26–34)
MCHC RBC AUTO-ENTMCNC: 31.2 G/DL (ref 30–36.5)
MCHC RBC AUTO-ENTMCNC: 32.1 G/DL (ref 30–36.5)
MCHC RBC AUTO-ENTMCNC: 32.2 G/DL (ref 30–36.5)
MCHC RBC AUTO-ENTMCNC: 32.2 G/DL (ref 30–36.5)
MCHC RBC AUTO-ENTMCNC: 32.5 G/DL (ref 30–36.5)
MCHC RBC AUTO-ENTMCNC: 32.5 G/DL (ref 30–36.5)
MCHC RBC AUTO-ENTMCNC: 32.7 G/DL (ref 30–36.5)
MCHC RBC AUTO-ENTMCNC: 32.9 G/DL (ref 30–36.5)
MCHC RBC AUTO-ENTMCNC: 33 G/DL (ref 30–36.5)
MCHC RBC AUTO-ENTMCNC: 33.2 G/DL (ref 30–36.5)
MCHC RBC AUTO-ENTMCNC: 33.2 G/DL (ref 30–36.5)
MCHC RBC AUTO-ENTMCNC: 33.3 G/DL (ref 30–36.5)
MCHC RBC AUTO-ENTMCNC: 33.3 G/DL (ref 30–36.5)
MCHC RBC AUTO-ENTMCNC: 33.5 G/DL (ref 30–36.5)
MCHC RBC AUTO-ENTMCNC: 33.5 G/DL (ref 30–36.5)
MCHC RBC AUTO-ENTMCNC: 33.6 G/DL (ref 30–36.5)
MCHC RBC AUTO-ENTMCNC: 33.6 G/DL (ref 30–36.5)
MCHC RBC AUTO-ENTMCNC: 33.9 G/DL (ref 30–36.5)
MCHC RBC AUTO-ENTMCNC: 34.1 G/DL (ref 30–36.5)
MCHC RBC AUTO-ENTMCNC: 36.6 G/DL (ref 30–36.5)
MCV RBC AUTO: 85.5 FL (ref 80–99)
MCV RBC AUTO: 85.8 FL (ref 80–99)
MCV RBC AUTO: 85.9 FL (ref 80–99)
MCV RBC AUTO: 86 FL (ref 80–99)
MCV RBC AUTO: 86.1 FL (ref 80–99)
MCV RBC AUTO: 86.2 FL (ref 80–99)
MCV RBC AUTO: 86.9 FL (ref 80–99)
MCV RBC AUTO: 87.1 FL (ref 80–99)
MCV RBC AUTO: 87.6 FL (ref 80–99)
MCV RBC AUTO: 87.9 FL (ref 80–99)
MCV RBC AUTO: 87.9 FL (ref 80–99)
MCV RBC AUTO: 88.1 FL (ref 80–99)
MCV RBC AUTO: 88.3 FL (ref 80–99)
MCV RBC AUTO: 88.8 FL (ref 80–99)
MCV RBC AUTO: 88.9 FL (ref 80–99)
MCV RBC AUTO: 89.1 FL (ref 80–99)
MCV RBC AUTO: 89.4 FL (ref 80–99)
MCV RBC AUTO: 90.7 FL (ref 80–99)
MCV RBC AUTO: 91.1 FL (ref 80–99)
MCV RBC AUTO: 91.6 FL (ref 80–99)
MCV RBC AUTO: 92.7 FL (ref 80–99)
MCV RBC AUTO: 93 FL (ref 80–99)
MONOCYTES # BLD: 0.4 K/UL (ref 0–1)
MONOCYTES # BLD: 0.5 K/UL (ref 0–1)
MONOCYTES # BLD: 0.5 K/UL (ref 0–1)
MONOCYTES # BLD: 0.6 K/UL (ref 0–1)
MONOCYTES # BLD: 0.7 K/UL (ref 0–1)
MONOCYTES # BLD: 0.8 K/UL (ref 0–1)
MONOCYTES # BLD: 0.8 K/UL (ref 0–1)
MONOCYTES # BLD: 0.9 K/UL (ref 0–1)
MONOCYTES # BLD: 1 K/UL (ref 0–1)
MONOCYTES NFR BLD: 10 % (ref 5–13)
MONOCYTES NFR BLD: 5 % (ref 5–13)
MONOCYTES NFR BLD: 5 % (ref 5–13)
MONOCYTES NFR BLD: 6 % (ref 5–13)
MONOCYTES NFR BLD: 7 % (ref 5–13)
MONOCYTES NFR BLD: 8 % (ref 5–13)
MONOCYTES NFR BLD: 8 % (ref 5–13)
NEUTS SEG # BLD: 10 K/UL (ref 1.8–8)
NEUTS SEG # BLD: 10.6 K/UL (ref 1.8–8)
NEUTS SEG # BLD: 13.5 K/UL (ref 1.8–8)
NEUTS SEG # BLD: 15.2 K/UL (ref 1.8–8)
NEUTS SEG # BLD: 2.5 K/UL (ref 1.8–8)
NEUTS SEG # BLD: 5 K/UL (ref 1.8–8)
NEUTS SEG # BLD: 6.3 K/UL (ref 1.8–8)
NEUTS SEG # BLD: 7.2 K/UL (ref 1.8–8)
NEUTS SEG # BLD: 7.5 K/UL (ref 1.8–8)
NEUTS SEG NFR BLD: 55 % (ref 32–75)
NEUTS SEG NFR BLD: 71 % (ref 32–75)
NEUTS SEG NFR BLD: 75 % (ref 32–75)
NEUTS SEG NFR BLD: 75 % (ref 32–75)
NEUTS SEG NFR BLD: 76 % (ref 32–75)
NEUTS SEG NFR BLD: 82 % (ref 32–75)
NEUTS SEG NFR BLD: 87 % (ref 32–75)
NEUTS SEG NFR BLD: 89 % (ref 32–75)
NEUTS SEG NFR BLD: 91 % (ref 32–75)
NITRITE UR QL STRIP.AUTO: NEGATIVE
NITRITE UR QL STRIP.AUTO: NEGATIVE
NRBC # BLD: 0 K/UL (ref 0–0.01)
NRBC # BLD: 0.03 K/UL (ref 0–0.01)
NRBC BLD-RTO: 0 PER 100 WBC
NRBC BLD-RTO: 0.2 PER 100 WBC
P-R INTERVAL, ECG05: 128 MS
P-R INTERVAL, ECG05: 164 MS
PH UR STRIP: 5 [PH] (ref 5–8)
PH UR STRIP: 6.5 [PH] (ref 5–8)
PHOSPHATE SERPL-MCNC: 0.9 MG/DL (ref 2.6–4.7)
PHOSPHATE SERPL-MCNC: 1.9 MG/DL (ref 2.6–4.7)
PHOSPHATE SERPL-MCNC: 2.5 MG/DL (ref 2.6–4.7)
PHOSPHATE SERPL-MCNC: 3.2 MG/DL (ref 2.6–4.7)
PHOSPHATE SERPL-MCNC: 4.6 MG/DL (ref 2.6–4.7)
PHOSPHATE SERPL-MCNC: 4.7 MG/DL (ref 2.6–4.7)
PLATELET # BLD AUTO: 172 K/UL (ref 150–400)
PLATELET # BLD AUTO: 240 K/UL (ref 150–400)
PLATELET # BLD AUTO: 241 K/UL (ref 150–400)
PLATELET # BLD AUTO: 242 K/UL (ref 150–400)
PLATELET # BLD AUTO: 270 K/UL (ref 150–400)
PLATELET # BLD AUTO: 285 K/UL (ref 150–400)
PLATELET # BLD AUTO: 285 K/UL (ref 150–400)
PLATELET # BLD AUTO: 304 K/UL (ref 150–400)
PLATELET # BLD AUTO: 308 K/UL (ref 150–400)
PLATELET # BLD AUTO: 315 K/UL (ref 150–400)
PLATELET # BLD AUTO: 329 K/UL (ref 150–400)
PLATELET # BLD AUTO: 367 K/UL (ref 150–400)
PLATELET # BLD AUTO: 396 K/UL (ref 150–400)
PLATELET # BLD AUTO: 456 K/UL (ref 150–400)
PLATELET # BLD AUTO: 463 K/UL (ref 150–400)
PLATELET # BLD AUTO: 465 K/UL (ref 150–400)
PLATELET # BLD AUTO: 490 K/UL (ref 150–400)
PLATELET # BLD AUTO: 505 K/UL (ref 150–400)
PLATELET # BLD AUTO: 521 K/UL (ref 150–400)
PLATELET # BLD AUTO: 584 K/UL (ref 150–400)
PLATELET # BLD AUTO: 666 K/UL (ref 150–400)
PLATELET # BLD AUTO: 943 K/UL (ref 150–400)
PMV BLD AUTO: 10 FL (ref 8.9–12.9)
PMV BLD AUTO: 10.1 FL (ref 8.9–12.9)
PMV BLD AUTO: 10.3 FL (ref 8.9–12.9)
PMV BLD AUTO: 10.6 FL (ref 8.9–12.9)
PMV BLD AUTO: 9.1 FL (ref 8.9–12.9)
PMV BLD AUTO: 9.2 FL (ref 8.9–12.9)
PMV BLD AUTO: 9.3 FL (ref 8.9–12.9)
PMV BLD AUTO: 9.4 FL (ref 8.9–12.9)
PMV BLD AUTO: 9.6 FL (ref 8.9–12.9)
PMV BLD AUTO: 9.7 FL (ref 8.9–12.9)
PMV BLD AUTO: 9.8 FL (ref 8.9–12.9)
PMV BLD AUTO: 9.8 FL (ref 8.9–12.9)
PMV BLD AUTO: 9.9 FL (ref 8.9–12.9)
POTASSIUM SERPL-SCNC: 2.8 MMOL/L (ref 3.5–5.1)
POTASSIUM SERPL-SCNC: 2.9 MMOL/L (ref 3.5–5.1)
POTASSIUM SERPL-SCNC: 3.1 MMOL/L (ref 3.5–5.1)
POTASSIUM SERPL-SCNC: 3.3 MMOL/L (ref 3.5–5.1)
POTASSIUM SERPL-SCNC: 3.4 MMOL/L (ref 3.5–5.1)
POTASSIUM SERPL-SCNC: 3.5 MMOL/L (ref 3.5–5.1)
POTASSIUM SERPL-SCNC: 3.7 MMOL/L (ref 3.5–5.1)
POTASSIUM SERPL-SCNC: 3.7 MMOL/L (ref 3.5–5.1)
POTASSIUM SERPL-SCNC: 3.8 MMOL/L (ref 3.5–5.1)
POTASSIUM SERPL-SCNC: 3.9 MMOL/L (ref 3.5–5.1)
POTASSIUM SERPL-SCNC: 3.9 MMOL/L (ref 3.5–5.1)
POTASSIUM SERPL-SCNC: 4 MMOL/L (ref 3.5–5.1)
POTASSIUM SERPL-SCNC: 4.1 MMOL/L (ref 3.5–5.1)
POTASSIUM SERPL-SCNC: 4.1 MMOL/L (ref 3.5–5.1)
POTASSIUM SERPL-SCNC: 4.2 MMOL/L (ref 3.5–5.1)
POTASSIUM SERPL-SCNC: 4.3 MMOL/L (ref 3.5–5.1)
POTASSIUM SERPL-SCNC: 4.3 MMOL/L (ref 3.5–5.1)
POTASSIUM SERPL-SCNC: 4.6 MMOL/L (ref 3.5–5.1)
PROT SERPL-MCNC: 4.1 G/DL (ref 6.4–8.2)
PROT SERPL-MCNC: 4.2 G/DL (ref 6.4–8.2)
PROT SERPL-MCNC: 5.6 G/DL (ref 6.4–8.2)
PROT SERPL-MCNC: 6.2 G/DL (ref 6.4–8.2)
PROT SERPL-MCNC: 6.6 G/DL (ref 6.4–8.2)
PROT SERPL-MCNC: 7.1 G/DL (ref 6.4–8.2)
PROT SERPL-MCNC: 8 G/DL (ref 6.4–8.2)
PROT SERPL-MCNC: 9.4 G/DL (ref 6.4–8.2)
PROT UR STRIP-MCNC: ABNORMAL MG/DL
PROT UR STRIP-MCNC: NEGATIVE MG/DL
Q-T INTERVAL, ECG07: 346 MS
Q-T INTERVAL, ECG07: 376 MS
QRS DURATION, ECG06: 84 MS
QRS DURATION, ECG06: 90 MS
QTC CALCULATION (BEZET), ECG08: 459 MS
QTC CALCULATION (BEZET), ECG08: 482 MS
RBC # BLD AUTO: 1.91 M/UL (ref 3.8–5.2)
RBC # BLD AUTO: 2.18 M/UL (ref 3.8–5.2)
RBC # BLD AUTO: 2.39 M/UL (ref 3.8–5.2)
RBC # BLD AUTO: 2.42 M/UL (ref 3.8–5.2)
RBC # BLD AUTO: 2.43 M/UL (ref 3.8–5.2)
RBC # BLD AUTO: 2.59 M/UL (ref 3.8–5.2)
RBC # BLD AUTO: 2.62 M/UL (ref 3.8–5.2)
RBC # BLD AUTO: 2.64 M/UL (ref 3.8–5.2)
RBC # BLD AUTO: 2.8 M/UL (ref 3.8–5.2)
RBC # BLD AUTO: 2.84 M/UL (ref 3.8–5.2)
RBC # BLD AUTO: 3.03 M/UL (ref 3.8–5.2)
RBC # BLD AUTO: 3.11 M/UL (ref 3.8–5.2)
RBC # BLD AUTO: 3.12 M/UL (ref 3.8–5.2)
RBC # BLD AUTO: 3.14 M/UL (ref 3.8–5.2)
RBC # BLD AUTO: 3.17 M/UL (ref 3.8–5.2)
RBC # BLD AUTO: 3.58 M/UL (ref 3.8–5.2)
RBC # BLD AUTO: 3.76 M/UL (ref 3.8–5.2)
RBC # BLD AUTO: 3.77 M/UL (ref 3.8–5.2)
RBC # BLD AUTO: 3.86 M/UL (ref 3.8–5.2)
RBC # BLD AUTO: 4.18 M/UL (ref 3.8–5.2)
RBC # BLD AUTO: 4.59 M/UL (ref 3.8–5.2)
RBC # BLD AUTO: 4.67 M/UL (ref 3.8–5.2)
RBC #/AREA URNS HPF: ABNORMAL /HPF (ref 0–5)
RBC #/AREA URNS HPF: ABNORMAL /HPF (ref 0–5)
RBC MORPH BLD: ABNORMAL
RBC MORPH BLD: ABNORMAL
SAMPLES BEING HELD,HOLD: NORMAL
SERVICE CMNT-IMP: ABNORMAL
SERVICE CMNT-IMP: NORMAL
SODIUM SERPL-SCNC: 127 MMOL/L (ref 136–145)
SODIUM SERPL-SCNC: 129 MMOL/L (ref 136–145)
SODIUM SERPL-SCNC: 132 MMOL/L (ref 136–145)
SODIUM SERPL-SCNC: 133 MMOL/L (ref 136–145)
SODIUM SERPL-SCNC: 134 MMOL/L (ref 136–145)
SODIUM SERPL-SCNC: 134 MMOL/L (ref 136–145)
SODIUM SERPL-SCNC: 135 MMOL/L (ref 136–145)
SODIUM SERPL-SCNC: 135 MMOL/L (ref 136–145)
SODIUM SERPL-SCNC: 136 MMOL/L (ref 136–145)
SODIUM SERPL-SCNC: 137 MMOL/L (ref 136–145)
SODIUM SERPL-SCNC: 137 MMOL/L (ref 136–145)
SODIUM SERPL-SCNC: 138 MMOL/L (ref 136–145)
SODIUM SERPL-SCNC: 139 MMOL/L (ref 136–145)
SODIUM SERPL-SCNC: 139 MMOL/L (ref 136–145)
SODIUM SERPL-SCNC: 140 MMOL/L (ref 136–145)
SODIUM SERPL-SCNC: 141 MMOL/L (ref 136–145)
SODIUM SERPL-SCNC: 141 MMOL/L (ref 136–145)
SP GR UR REFRACTOMETRY: 1.01 (ref 1–1.03)
SP GR UR REFRACTOMETRY: <1.005 (ref 1–1.03)
SPECIMEN EXP DATE BLD: NORMAL
STATUS OF UNIT,%ST: NORMAL
TROPONIN I SERPL-MCNC: <0.05 NG/ML
TROPONIN I SERPL-MCNC: <0.05 NG/ML
UA: UC IF INDICATED,UAUC: ABNORMAL
UNIT DIVISION, %UDIV: 0
UR CULT HOLD, URHOLD: NORMAL
UROBILINOGEN UR QL STRIP.AUTO: 0.2 EU/DL (ref 0.2–1)
UROBILINOGEN UR QL STRIP.AUTO: 0.2 EU/DL (ref 0.2–1)
VENTRICULAR RATE, ECG03: 106 BPM
VENTRICULAR RATE, ECG03: 99 BPM
WBC # BLD AUTO: 10.2 K/UL (ref 3.6–11)
WBC # BLD AUTO: 11.2 K/UL (ref 3.6–11)
WBC # BLD AUTO: 11.6 K/UL (ref 3.6–11)
WBC # BLD AUTO: 11.6 K/UL (ref 3.6–11)
WBC # BLD AUTO: 11.7 K/UL (ref 3.6–11)
WBC # BLD AUTO: 12.6 K/UL (ref 3.6–11)
WBC # BLD AUTO: 14 K/UL (ref 3.6–11)
WBC # BLD AUTO: 16.6 K/UL (ref 3.6–11)
WBC # BLD AUTO: 17.1 K/UL (ref 3.6–11)
WBC # BLD AUTO: 17.2 K/UL (ref 3.6–11)
WBC # BLD AUTO: 4.6 K/UL (ref 3.6–11)
WBC # BLD AUTO: 4.7 K/UL (ref 3.6–11)
WBC # BLD AUTO: 5.3 K/UL (ref 3.6–11)
WBC # BLD AUTO: 5.4 K/UL (ref 3.6–11)
WBC # BLD AUTO: 7 K/UL (ref 3.6–11)
WBC # BLD AUTO: 7.2 K/UL (ref 3.6–11)
WBC # BLD AUTO: 7.6 K/UL (ref 3.6–11)
WBC # BLD AUTO: 7.9 K/UL (ref 3.6–11)
WBC # BLD AUTO: 8.4 K/UL (ref 3.6–11)
WBC # BLD AUTO: 8.5 K/UL (ref 3.6–11)
WBC # BLD AUTO: 9.7 K/UL (ref 3.6–11)
WBC # BLD AUTO: 9.9 K/UL (ref 3.6–11)
WBC URNS QL MICRO: ABNORMAL /HPF (ref 0–4)
WBC URNS QL MICRO: ABNORMAL /HPF (ref 0–4)

## 2019-01-01 PROCEDURE — 3331090001 HH PPS REVENUE CREDIT

## 2019-01-01 PROCEDURE — A4450 NON-WATERPROOF TAPE: HCPCS

## 2019-01-01 PROCEDURE — 74011000258 HC RX REV CODE- 258: Performed by: SURGERY

## 2019-01-01 PROCEDURE — 0651 HSPC ROUTINE HOME CARE

## 2019-01-01 PROCEDURE — 88309 TISSUE EXAM BY PATHOLOGIST: CPT

## 2019-01-01 PROCEDURE — 74011250636 HC RX REV CODE- 250/636: Performed by: SURGERY

## 2019-01-01 PROCEDURE — 74011250637 HC RX REV CODE- 250/637: Performed by: INTERNAL MEDICINE

## 2019-01-01 PROCEDURE — G0299 HHS/HOSPICE OF RN EA 15 MIN: HCPCS

## 2019-01-01 PROCEDURE — 96361 HYDRATE IV INFUSION ADD-ON: CPT

## 2019-01-01 PROCEDURE — 71045 X-RAY EXAM CHEST 1 VIEW: CPT

## 2019-01-01 PROCEDURE — 65270000029 HC RM PRIVATE

## 2019-01-01 PROCEDURE — 87185 SC STD ENZYME DETCJ PER NZM: CPT

## 2019-01-01 PROCEDURE — 83735 ASSAY OF MAGNESIUM: CPT

## 2019-01-01 PROCEDURE — 74011000250 HC RX REV CODE- 250: Performed by: INTERNAL MEDICINE

## 2019-01-01 PROCEDURE — 65270000015 HC RM PRIVATE ONCOLOGY

## 2019-01-01 PROCEDURE — 0655 HSPC INPATIENT RESPITE

## 2019-01-01 PROCEDURE — 74011250637 HC RX REV CODE- 250/637: Performed by: NURSE PRACTITIONER

## 2019-01-01 PROCEDURE — 74011250636 HC RX REV CODE- 250/636: Performed by: ANESTHESIOLOGY

## 2019-01-01 PROCEDURE — 74174 CTA ABD&PLVS W/CONTRAST: CPT

## 2019-01-01 PROCEDURE — 3331090002 HH PPS REVENUE DEBIT

## 2019-01-01 PROCEDURE — 74011250637 HC RX REV CODE- 250/637: Performed by: SURGERY

## 2019-01-01 PROCEDURE — 94760 N-INVAS EAR/PLS OXIMETRY 1: CPT

## 2019-01-01 PROCEDURE — 74011000250 HC RX REV CODE- 250: Performed by: FAMILY MEDICINE

## 2019-01-01 PROCEDURE — 80048 BASIC METABOLIC PNL TOTAL CA: CPT

## 2019-01-01 PROCEDURE — 77010033678 HC OXYGEN DAILY

## 2019-01-01 PROCEDURE — 85027 COMPLETE CBC AUTOMATED: CPT

## 2019-01-01 PROCEDURE — 82962 GLUCOSE BLOOD TEST: CPT

## 2019-01-01 PROCEDURE — 77030018786 HC NDL GD F/USND BARD -B

## 2019-01-01 PROCEDURE — 74011250636 HC RX REV CODE- 250/636: Performed by: INTERNAL MEDICINE

## 2019-01-01 PROCEDURE — 76210000018 HC OR PH I REC EA ADDL 0.5 HR: Performed by: SURGERY

## 2019-01-01 PROCEDURE — 74011000250 HC RX REV CODE- 250: Performed by: SURGERY

## 2019-01-01 PROCEDURE — 74011250636 HC RX REV CODE- 250/636: Performed by: HOSPITALIST

## 2019-01-01 PROCEDURE — 76060000031 HC ANESTHESIA FIRST 0.5 HR: Performed by: INTERNAL MEDICINE

## 2019-01-01 PROCEDURE — 99222 1ST HOSP IP/OBS MODERATE 55: CPT | Performed by: SURGERY

## 2019-01-01 PROCEDURE — A6216 NON-STERILE GAUZE<=16 SQ IN: HCPCS

## 2019-01-01 PROCEDURE — A6248 HYDROGEL DRSG GEL FILLER: HCPCS

## 2019-01-01 PROCEDURE — A6260 WOUND CLEANSER ANY TYPE/SIZE: HCPCS

## 2019-01-01 PROCEDURE — 97530 THERAPEUTIC ACTIVITIES: CPT

## 2019-01-01 PROCEDURE — 77030025162 HC DRSG VAC ASST 1 KCON -B

## 2019-01-01 PROCEDURE — 87075 CULTR BACTERIA EXCEPT BLOOD: CPT

## 2019-01-01 PROCEDURE — 36415 COLL VENOUS BLD VENIPUNCTURE: CPT

## 2019-01-01 PROCEDURE — 77030020847 HC STATLOK BARD -A

## 2019-01-01 PROCEDURE — 97605 NEG PRS WND THER DME<=50SQCM: CPT

## 2019-01-01 PROCEDURE — 65610000006 HC RM INTENSIVE CARE

## 2019-01-01 PROCEDURE — 96375 TX/PRO/DX INJ NEW DRUG ADDON: CPT

## 2019-01-01 PROCEDURE — 77030031139 HC SUT VCRL2 J&J -A: Performed by: SURGERY

## 2019-01-01 PROCEDURE — 74011250636 HC RX REV CODE- 250/636: Performed by: RADIOLOGY

## 2019-01-01 PROCEDURE — 87077 CULTURE AEROBIC IDENTIFY: CPT

## 2019-01-01 PROCEDURE — 76450000000

## 2019-01-01 PROCEDURE — 84100 ASSAY OF PHOSPHORUS: CPT

## 2019-01-01 PROCEDURE — 77030018836 HC SOL IRR NACL ICUM -A: Performed by: SURGERY

## 2019-01-01 PROCEDURE — C1729 CATH, DRAINAGE: HCPCS

## 2019-01-01 PROCEDURE — 86900 BLOOD TYPING SEROLOGIC ABO: CPT

## 2019-01-01 PROCEDURE — 77030018846 HC SOL IRR STRL H20 ICUM -A

## 2019-01-01 PROCEDURE — 74011250636 HC RX REV CODE- 250/636: Performed by: STUDENT IN AN ORGANIZED HEALTH CARE EDUCATION/TRAINING PROGRAM

## 2019-01-01 PROCEDURE — 77030019934 HC DRSG VAC ASST KCON -B

## 2019-01-01 PROCEDURE — 77030008771 HC TU NG SALEM SUMP -A: Performed by: ANESTHESIOLOGY

## 2019-01-01 PROCEDURE — 77030032490 HC SLV COMPR SCD KNE COVD -B: Performed by: SURGERY

## 2019-01-01 PROCEDURE — C1726 CATH, BAL DIL, NON-VASCULAR: HCPCS | Performed by: INTERNAL MEDICINE

## 2019-01-01 PROCEDURE — 65660000000 HC RM CCU STEPDOWN

## 2019-01-01 PROCEDURE — 85025 COMPLETE CBC W/AUTO DIFF WBC: CPT

## 2019-01-01 PROCEDURE — 74011250636 HC RX REV CODE- 250/636

## 2019-01-01 PROCEDURE — C9113 INJ PANTOPRAZOLE SODIUM, VIA: HCPCS | Performed by: SURGERY

## 2019-01-01 PROCEDURE — 87186 SC STD MICRODIL/AGAR DIL: CPT

## 2019-01-01 PROCEDURE — 76040000019: Performed by: INTERNAL MEDICINE

## 2019-01-01 PROCEDURE — 80053 COMPREHEN METABOLIC PANEL: CPT

## 2019-01-01 PROCEDURE — 84484 ASSAY OF TROPONIN QUANT: CPT

## 2019-01-01 PROCEDURE — 0DBL0ZZ EXCISION OF TRANSVERSE COLON, OPEN APPROACH: ICD-10-PCS | Performed by: SURGERY

## 2019-01-01 PROCEDURE — 87040 BLOOD CULTURE FOR BACTERIA: CPT

## 2019-01-01 PROCEDURE — 74011250636 HC RX REV CODE- 250/636: Performed by: NURSE PRACTITIONER

## 2019-01-01 PROCEDURE — 77030010541

## 2019-01-01 PROCEDURE — G0156 HHCP-SVS OF AIDE,EA 15 MIN: HCPCS

## 2019-01-01 PROCEDURE — 0DBH8ZX EXCISION OF CECUM, VIA NATURAL OR ARTIFICIAL OPENING ENDOSCOPIC, DIAGNOSTIC: ICD-10-PCS | Performed by: INTERNAL MEDICINE

## 2019-01-01 PROCEDURE — 74011636320 HC RX REV CODE- 636/320: Performed by: EMERGENCY MEDICINE

## 2019-01-01 PROCEDURE — 97162 PT EVAL MOD COMPLEX 30 MIN: CPT

## 2019-01-01 PROCEDURE — 0D1B0Z4 BYPASS ILEUM TO CUTANEOUS, OPEN APPROACH: ICD-10-PCS | Performed by: SURGERY

## 2019-01-01 PROCEDURE — 74011000258 HC RX REV CODE- 258: Performed by: INTERNAL MEDICINE

## 2019-01-01 PROCEDURE — 77030035051: Performed by: SURGERY

## 2019-01-01 PROCEDURE — 77030010541: Performed by: SURGERY

## 2019-01-01 PROCEDURE — G0300 HHS/HOSPICE OF LPN EA 15 MIN: HCPCS

## 2019-01-01 PROCEDURE — 77030003029 HC SUT VCRL J&J -B: Performed by: SURGERY

## 2019-01-01 PROCEDURE — 77030018781

## 2019-01-01 PROCEDURE — 76210000016 HC OR PH I REC 1 TO 1.5 HR: Performed by: SURGERY

## 2019-01-01 PROCEDURE — 74011000250 HC RX REV CODE- 250

## 2019-01-01 PROCEDURE — A6197 ALGINATE DRSG >16 <=48 SQ IN: HCPCS

## 2019-01-01 PROCEDURE — A4385 OST SKN BARRIER SLD EXT WEAR: HCPCS

## 2019-01-01 PROCEDURE — 74018 RADEX ABDOMEN 1 VIEW: CPT

## 2019-01-01 PROCEDURE — 77030026438 HC STYL ET INTUB CARD -A: Performed by: ANESTHESIOLOGY

## 2019-01-01 PROCEDURE — 99285 EMERGENCY DEPT VISIT HI MDM: CPT

## 2019-01-01 PROCEDURE — 0DBP8ZX EXCISION OF RECTUM, VIA NATURAL OR ARTIFICIAL OPENING ENDOSCOPIC, DIAGNOSTIC: ICD-10-PCS | Performed by: INTERNAL MEDICINE

## 2019-01-01 PROCEDURE — 77030013079 HC BLNKT BAIR HGGR 3M -A: Performed by: ANESTHESIOLOGY

## 2019-01-01 PROCEDURE — 97161 PT EVAL LOW COMPLEX 20 MIN: CPT

## 2019-01-01 PROCEDURE — 77030010520

## 2019-01-01 PROCEDURE — 3E0436Z INTRODUCTION OF NUTRITIONAL SUBSTANCE INTO CENTRAL VEIN, PERCUTANEOUS APPROACH: ICD-10-PCS | Performed by: SURGERY

## 2019-01-01 PROCEDURE — 77030008684 HC TU ET CUF COVD -B: Performed by: ANESTHESIOLOGY

## 2019-01-01 PROCEDURE — 74022 RADEX COMPL AQT ABD SERIES: CPT

## 2019-01-01 PROCEDURE — 77030010545

## 2019-01-01 PROCEDURE — 74011250636 HC RX REV CODE- 250/636: Performed by: EMERGENCY MEDICINE

## 2019-01-01 PROCEDURE — 74177 CT ABD & PELVIS W/CONTRAST: CPT

## 2019-01-01 PROCEDURE — 3336500001 HSPC ELECTION

## 2019-01-01 PROCEDURE — 93005 ELECTROCARDIOGRAM TRACING: CPT

## 2019-01-01 PROCEDURE — 97165 OT EVAL LOW COMPLEX 30 MIN: CPT

## 2019-01-01 PROCEDURE — 76040000007: Performed by: INTERNAL MEDICINE

## 2019-01-01 PROCEDURE — 77030002966 HC SUT PDS J&J -A: Performed by: SURGERY

## 2019-01-01 PROCEDURE — 74011000258 HC RX REV CODE- 258: Performed by: EMERGENCY MEDICINE

## 2019-01-01 PROCEDURE — 77030005518 HC CATH URETH FOL 2W BARD -B: Performed by: SURGERY

## 2019-01-01 PROCEDURE — 99152 MOD SED SAME PHYS/QHP 5/>YRS: CPT

## 2019-01-01 PROCEDURE — 77030011265 HC ELECTRD BLD HEX COVD -A: Performed by: SURGERY

## 2019-01-01 PROCEDURE — 74011636320 HC RX REV CODE- 636/320: Performed by: SURGERY

## 2019-01-01 PROCEDURE — 77030020061 HC IV BLD WRMR ADMIN SET 3M -B: Performed by: ANESTHESIOLOGY

## 2019-01-01 PROCEDURE — 77030018712 HC DEV BLLN INFL BSC -B: Performed by: INTERNAL MEDICINE

## 2019-01-01 PROCEDURE — 74011250636 HC RX REV CODE- 250/636: Performed by: PHYSICIAN ASSISTANT

## 2019-01-01 PROCEDURE — P9045 ALBUMIN (HUMAN), 5%, 250 ML: HCPCS

## 2019-01-01 PROCEDURE — G0151 HHCP-SERV OF PT,EA 15 MIN: HCPCS

## 2019-01-01 PROCEDURE — 77030011267 HC ELECTRD BLD COVD -A: Performed by: SURGERY

## 2019-01-01 PROCEDURE — 77030002933 HC SUT MCRYL J&J -A: Performed by: SURGERY

## 2019-01-01 PROCEDURE — 74011000272 HC RX REV CODE- 272: Performed by: SURGERY

## 2019-01-01 PROCEDURE — 97116 GAIT TRAINING THERAPY: CPT | Performed by: PHYSICAL THERAPIST

## 2019-01-01 PROCEDURE — 77030011943

## 2019-01-01 PROCEDURE — 88305 TISSUE EXAM BY PATHOLOGIST: CPT

## 2019-01-01 PROCEDURE — 82550 ASSAY OF CK (CPK): CPT

## 2019-01-01 PROCEDURE — P9016 RBC LEUKOCYTES REDUCED: HCPCS

## 2019-01-01 PROCEDURE — 77030003560 HC NDL HUBR BARD -A

## 2019-01-01 PROCEDURE — 77030008771 HC TU NG SALEM SUMP -A: Performed by: SURGERY

## 2019-01-01 PROCEDURE — 74011000250 HC RX REV CODE- 250: Performed by: NURSE PRACTITIONER

## 2019-01-01 PROCEDURE — 0DBB0ZZ EXCISION OF ILEUM, OPEN APPROACH: ICD-10-PCS | Performed by: SURGERY

## 2019-01-01 PROCEDURE — 400013 HH SOC

## 2019-01-01 PROCEDURE — 71275 CT ANGIOGRAPHY CHEST: CPT

## 2019-01-01 PROCEDURE — 76210000015 HC REC RM PH I 9.5 TO 10 HR: Performed by: SURGERY

## 2019-01-01 PROCEDURE — 87086 URINE CULTURE/COLONY COUNT: CPT

## 2019-01-01 PROCEDURE — A5120 SKIN BARRIER, WIPE OR SWAB: HCPCS

## 2019-01-01 PROCEDURE — 77030011278 HC ELECTRD LIG IMPT COVD -F: Performed by: SURGERY

## 2019-01-01 PROCEDURE — 74011636320 HC RX REV CODE- 636/320: Performed by: INTERNAL MEDICINE

## 2019-01-01 PROCEDURE — 83605 ASSAY OF LACTIC ACID: CPT

## 2019-01-01 PROCEDURE — 74011250637 HC RX REV CODE- 250/637: Performed by: ANESTHESIOLOGY

## 2019-01-01 PROCEDURE — 76010000131 HC OR TIME 2 TO 2.5 HR: Performed by: SURGERY

## 2019-01-01 PROCEDURE — 81001 URINALYSIS AUTO W/SCOPE: CPT

## 2019-01-01 PROCEDURE — 77030005401 HC CATH RAD ARRO -A: Performed by: ANESTHESIOLOGY

## 2019-01-01 PROCEDURE — 77030008467 HC STPLR SKN COVD -B: Performed by: SURGERY

## 2019-01-01 PROCEDURE — 97116 GAIT TRAINING THERAPY: CPT

## 2019-01-01 PROCEDURE — 87076 CULTURE ANAEROBE IDENT EACH: CPT

## 2019-01-01 PROCEDURE — A4927 NON-STERILE GLOVES: HCPCS

## 2019-01-01 PROCEDURE — G0155 HHCP-SVS OF CSW,EA 15 MIN: HCPCS

## 2019-01-01 PROCEDURE — 77030019908 HC STETH ESOPH SIMS -A: Performed by: ANESTHESIOLOGY

## 2019-01-01 PROCEDURE — 0WJP4ZZ INSPECTION OF GASTROINTESTINAL TRACT, PERCUTANEOUS ENDOSCOPIC APPROACH: ICD-10-PCS | Performed by: SURGERY

## 2019-01-01 PROCEDURE — 99284 EMERGENCY DEPT VISIT MOD MDM: CPT

## 2019-01-01 PROCEDURE — 97535 SELF CARE MNGMENT TRAINING: CPT

## 2019-01-01 PROCEDURE — 76060000035 HC ANESTHESIA 2 TO 2.5 HR: Performed by: SURGERY

## 2019-01-01 PROCEDURE — 77030018836 HC SOL IRR NACL ICUM -A

## 2019-01-01 PROCEDURE — 77030012390 HC DRN CHST BTL GTNG -B

## 2019-01-01 PROCEDURE — 77030005537 HC CATH URETH BARD -A: Performed by: SURGERY

## 2019-01-01 PROCEDURE — 97110 THERAPEUTIC EXERCISES: CPT

## 2019-01-01 PROCEDURE — 77030009965 HC RELD STPLR ENDOS COVD -D: Performed by: SURGERY

## 2019-01-01 PROCEDURE — 77030019988 HC FCPS ENDOSC DISP BSC -B: Performed by: INTERNAL MEDICINE

## 2019-01-01 PROCEDURE — 87205 SMEAR GRAM STAIN: CPT

## 2019-01-01 PROCEDURE — 77030011640 HC PAD GRND REM COVD -A: Performed by: SURGERY

## 2019-01-01 PROCEDURE — 96376 TX/PRO/DX INJ SAME DRUG ADON: CPT

## 2019-01-01 PROCEDURE — 77030035048 HC TRCR ENDOSC OPTCL COVD -B: Performed by: SURGERY

## 2019-01-01 PROCEDURE — 3331090004 HSPC SERVICE INTENSITY ADD-ON

## 2019-01-01 PROCEDURE — 0DTF0ZZ RESECTION OF RIGHT LARGE INTESTINE, OPEN APPROACH: ICD-10-PCS | Performed by: SURGERY

## 2019-01-01 PROCEDURE — 74011250637 HC RX REV CODE- 250/637: Performed by: PHYSICAL MEDICINE & REHABILITATION

## 2019-01-01 PROCEDURE — 0DBB8ZX EXCISION OF ILEUM, VIA NATURAL OR ARTIFICIAL OPENING ENDOSCOPIC, DIAGNOSTIC: ICD-10-PCS | Performed by: INTERNAL MEDICINE

## 2019-01-01 PROCEDURE — 77030009526 HC GEL CARSYN MDII -A

## 2019-01-01 PROCEDURE — 88307 TISSUE EXAM BY PATHOLOGIST: CPT

## 2019-01-01 PROCEDURE — 96374 THER/PROPH/DIAG INJ IV PUSH: CPT

## 2019-01-01 PROCEDURE — 44160 REMOVAL OF COLON: CPT | Performed by: SURGERY

## 2019-01-01 PROCEDURE — 83690 ASSAY OF LIPASE: CPT

## 2019-01-01 PROCEDURE — 77030011256 HC DRSG MEPILEX <16IN NO BORD MOLN -A

## 2019-01-01 PROCEDURE — 82378 CARCINOEMBRYONIC ANTIGEN: CPT

## 2019-01-01 PROCEDURE — 77030034850: Performed by: SURGERY

## 2019-01-01 PROCEDURE — 77030025240 HC RELD STPL GIA 2 COVD -C: Performed by: SURGERY

## 2019-01-01 PROCEDURE — 80076 HEPATIC FUNCTION PANEL: CPT

## 2019-01-01 PROCEDURE — 36430 TRANSFUSION BLD/BLD COMPNT: CPT

## 2019-01-01 PROCEDURE — 0W9G30Z DRAINAGE OF PERITONEAL CAVITY WITH DRAINAGE DEVICE, PERCUTANEOUS APPROACH: ICD-10-PCS | Performed by: RADIOLOGY

## 2019-01-01 PROCEDURE — 77030019952 HC CANSTR VAC ASST KCON -B

## 2019-01-01 PROCEDURE — 77030038269 HC DRN EXT URIN PURWCK BARD -A

## 2019-01-01 PROCEDURE — 99283 EMERGENCY DEPT VISIT LOW MDM: CPT

## 2019-01-01 PROCEDURE — A6252 ABSORPT DRG >16 <=48 W/O BDR: HCPCS

## 2019-01-01 PROCEDURE — 77030013797 HC KT TRNSDUC PRSSR EDWD -A: Performed by: ANESTHESIOLOGY

## 2019-01-01 PROCEDURE — P9045 ALBUMIN (HUMAN), 5%, 250 ML: HCPCS | Performed by: INTERNAL MEDICINE

## 2019-01-01 PROCEDURE — 74011000250 HC RX REV CODE- 250: Performed by: HOSPITALIST

## 2019-01-01 PROCEDURE — 77030002996 HC SUT SLK J&J -A: Performed by: SURGERY

## 2019-01-01 PROCEDURE — 77030034850

## 2019-01-01 PROCEDURE — 86923 COMPATIBILITY TEST ELECTRIC: CPT

## 2019-01-01 PROCEDURE — 36593 DECLOT VASCULAR DEVICE: CPT

## 2019-01-01 PROCEDURE — 77030037878 HC DRSG MEPILEX >48IN BORD MOLN -B

## 2019-01-01 PROCEDURE — 76060000032 HC ANESTHESIA 0.5 TO 1 HR: Performed by: INTERNAL MEDICINE

## 2019-01-01 PROCEDURE — 77030032490 HC SLV COMPR SCD KNE COVD -B

## 2019-01-01 PROCEDURE — 97535 SELF CARE MNGMENT TRAINING: CPT | Performed by: OCCUPATIONAL THERAPIST

## 2019-01-01 PROCEDURE — C1751 CATH, INF, PER/CENT/MIDLINE: HCPCS

## 2019-01-01 PROCEDURE — 82565 ASSAY OF CREATININE: CPT

## 2019-01-01 PROCEDURE — 0DBU0ZZ EXCISION OF OMENTUM, OPEN APPROACH: ICD-10-PCS | Performed by: SURGERY

## 2019-01-01 PROCEDURE — 77030011641 HC PASTE OST ADH BMS -A

## 2019-01-01 PROCEDURE — HOSPICE MEDICATION HC HH HOSPICE MEDICATION

## 2019-01-01 PROCEDURE — A4424 OST PCH DRAIN W BAR & FILTER: HCPCS

## 2019-01-01 PROCEDURE — 36569 INSJ PICC 5 YR+ W/O IMAGING: CPT | Performed by: SURGERY

## 2019-01-01 PROCEDURE — 77030009426 HC FCPS BIOP ENDOSC BSC -B: Performed by: INTERNAL MEDICINE

## 2019-01-01 PROCEDURE — 76937 US GUIDE VASCULAR ACCESS: CPT

## 2019-01-01 PROCEDURE — 77030034818 HC STPLR INT GIA COVD -C: Performed by: SURGERY

## 2019-01-01 PROCEDURE — 77030012389: Performed by: SURGERY

## 2019-01-01 PROCEDURE — 77030008756 HC TU IRR SUC STRY -B: Performed by: SURGERY

## 2019-01-01 PROCEDURE — 77030018809 HC RETRCTR ALXSO DISP AMR -B: Performed by: SURGERY

## 2019-01-01 PROCEDURE — 74011250637 HC RX REV CODE- 250/637

## 2019-01-01 PROCEDURE — T4541 LARGE DISPOSABLE UNDERPAD: HCPCS

## 2019-01-01 PROCEDURE — 99282 EMERGENCY DEPT VISIT SF MDM: CPT

## 2019-01-01 PROCEDURE — 77030011255 HC DSG AQUACEL AG BMS -A

## 2019-01-01 PROCEDURE — 0W9G30Z DRAINAGE OF PERITONEAL CAVITY WITH DRAINAGE DEVICE, PERCUTANEOUS APPROACH: ICD-10-PCS | Performed by: STUDENT IN AN ORGANIZED HEALTH CARE EDUCATION/TRAINING PROGRAM

## 2019-01-01 PROCEDURE — A9270 NON-COVERED ITEM OR SERVICE: HCPCS

## 2019-01-01 PROCEDURE — C1894 INTRO/SHEATH, NON-LASER: HCPCS

## 2019-01-01 PROCEDURE — 51798 US URINE CAPACITY MEASURE: CPT

## 2019-01-01 PROCEDURE — 77030035220 HC TRCR ENDOSC BLNTPRT ANCHR COVD -B: Performed by: SURGERY

## 2019-01-01 PROCEDURE — 30233N1 TRANSFUSION OF NONAUTOLOGOUS RED BLOOD CELLS INTO PERIPHERAL VEIN, PERCUTANEOUS APPROACH: ICD-10-PCS | Performed by: INTERNAL MEDICINE

## 2019-01-01 PROCEDURE — 77030010296 HC STPLR INT COVD -B: Performed by: SURGERY

## 2019-01-01 PROCEDURE — 74019 RADEX ABDOMEN 2 VIEWS: CPT

## 2019-01-01 PROCEDURE — 77030038157 HC DEV PWR CNTR DISP SIGNIA COVD -C: Performed by: SURGERY

## 2019-01-01 PROCEDURE — 77030003375 HC MRK BIOP BEEK -A

## 2019-01-01 RX ORDER — ONDANSETRON 2 MG/ML
INJECTION INTRAMUSCULAR; INTRAVENOUS AS NEEDED
Status: DISCONTINUED | OUTPATIENT
Start: 2019-01-01 | End: 2019-01-01 | Stop reason: HOSPADM

## 2019-01-01 RX ORDER — MORPHINE SULFATE 100 MG/5ML
10 SOLUTION ORAL
Status: DISCONTINUED | OUTPATIENT
Start: 2019-01-01 | End: 2019-01-01

## 2019-01-01 RX ORDER — SODIUM CHLORIDE 0.9 % (FLUSH) 0.9 %
5-40 SYRINGE (ML) INJECTION AS NEEDED
Status: DISCONTINUED | OUTPATIENT
Start: 2019-01-01 | End: 2019-01-01 | Stop reason: HOSPADM

## 2019-01-01 RX ORDER — GABAPENTIN 300 MG/1
600 CAPSULE ORAL
Status: DISCONTINUED | OUTPATIENT
Start: 2019-01-01 | End: 2019-01-01 | Stop reason: HOSPADM

## 2019-01-01 RX ORDER — SODIUM CHLORIDE 450 MG/100ML
25 INJECTION, SOLUTION INTRAVENOUS CONTINUOUS
Status: DISCONTINUED | OUTPATIENT
Start: 2019-01-01 | End: 2019-01-01

## 2019-01-01 RX ORDER — PROPOFOL 10 MG/ML
INJECTION, EMULSION INTRAVENOUS AS NEEDED
Status: DISCONTINUED | OUTPATIENT
Start: 2019-01-01 | End: 2019-01-01 | Stop reason: HOSPADM

## 2019-01-01 RX ORDER — BUPIVACAINE HYDROCHLORIDE 5 MG/ML
INJECTION, SOLUTION EPIDURAL; INTRACAUDAL AS NEEDED
Status: DISCONTINUED | OUTPATIENT
Start: 2019-01-01 | End: 2019-01-01 | Stop reason: HOSPADM

## 2019-01-01 RX ORDER — SUCCINYLCHOLINE CHLORIDE 20 MG/ML INJECTION SOLUTION
SOLUTION
Status: DISCONTINUED
Start: 2019-01-01 | End: 2019-01-01 | Stop reason: WASHOUT

## 2019-01-01 RX ORDER — LORAZEPAM 2 MG/ML
2 INJECTION INTRAMUSCULAR
Status: DISCONTINUED | OUTPATIENT
Start: 2019-01-01 | End: 2019-01-01

## 2019-01-01 RX ORDER — EPINEPHRINE 0.1 MG/ML
1 INJECTION INTRACARDIAC; INTRAVENOUS
Status: DISCONTINUED | OUTPATIENT
Start: 2019-01-01 | End: 2019-01-01 | Stop reason: HOSPADM

## 2019-01-01 RX ORDER — ONDANSETRON 4 MG/1
4 TABLET, ORALLY DISINTEGRATING ORAL
Qty: 20 TAB | Refills: 0 | Status: SHIPPED | OUTPATIENT
Start: 2019-01-01 | End: 2019-01-01 | Stop reason: SDUPTHER

## 2019-01-01 RX ORDER — ENOXAPARIN SODIUM 100 MG/ML
30 INJECTION SUBCUTANEOUS EVERY 24 HOURS
Status: DISCONTINUED | OUTPATIENT
Start: 2019-01-01 | End: 2019-01-01

## 2019-01-01 RX ORDER — NEOSTIGMINE METHYLSULFATE 1 MG/ML
INJECTION INTRAVENOUS AS NEEDED
Status: DISCONTINUED | OUTPATIENT
Start: 2019-01-01 | End: 2019-01-01 | Stop reason: HOSPADM

## 2019-01-01 RX ORDER — POTASSIUM CHLORIDE 7.45 MG/ML
10 INJECTION INTRAVENOUS
Status: DISCONTINUED | OUTPATIENT
Start: 2019-01-01 | End: 2019-01-01 | Stop reason: SDUPTHER

## 2019-01-01 RX ORDER — FENTANYL CITRATE 50 UG/ML
100 INJECTION, SOLUTION INTRAMUSCULAR; INTRAVENOUS
Status: DISCONTINUED | OUTPATIENT
Start: 2019-01-01 | End: 2019-01-01

## 2019-01-01 RX ORDER — NALOXONE HYDROCHLORIDE 0.4 MG/ML
0.4 INJECTION, SOLUTION INTRAMUSCULAR; INTRAVENOUS; SUBCUTANEOUS
Status: DISCONTINUED | OUTPATIENT
Start: 2019-01-01 | End: 2019-01-01 | Stop reason: HOSPADM

## 2019-01-01 RX ORDER — SODIUM CHLORIDE 0.9 % (FLUSH) 0.9 %
5-40 SYRINGE (ML) INJECTION EVERY 8 HOURS
Status: DISCONTINUED | OUTPATIENT
Start: 2019-01-01 | End: 2019-01-01 | Stop reason: HOSPADM

## 2019-01-01 RX ORDER — MUPIROCIN 20 MG/G
OINTMENT TOPICAL 2 TIMES DAILY
Status: DISPENSED | OUTPATIENT
Start: 2019-01-01 | End: 2019-01-01

## 2019-01-01 RX ORDER — HYDROMORPHONE HYDROCHLORIDE 1 MG/ML
1 INJECTION, SOLUTION INTRAMUSCULAR; INTRAVENOUS; SUBCUTANEOUS
Status: COMPLETED | OUTPATIENT
Start: 2019-01-01 | End: 2019-01-01

## 2019-01-01 RX ORDER — DOCUSATE SODIUM 100 MG/1
200 CAPSULE, LIQUID FILLED ORAL 2 TIMES DAILY
COMMUNITY
Start: 2019-01-01

## 2019-01-01 RX ORDER — HYDROMORPHONE HCL IN 0.9% NACL 15 MG/30ML
PATIENT CONTROLLED ANALGESIA VIAL INTRAVENOUS CONTINUOUS
Status: CANCELLED | OUTPATIENT
Start: 2019-01-01

## 2019-01-01 RX ORDER — BUTALBITAL, ACETAMINOPHEN AND CAFFEINE 50; 325; 40 MG/1; MG/1; MG/1
1 TABLET ORAL
Status: DISCONTINUED | OUTPATIENT
Start: 2019-01-01 | End: 2019-01-01 | Stop reason: HOSPADM

## 2019-01-01 RX ORDER — HYDROMORPHONE HYDROCHLORIDE 1 MG/ML
0.5 INJECTION, SOLUTION INTRAMUSCULAR; INTRAVENOUS; SUBCUTANEOUS
Status: DISCONTINUED | OUTPATIENT
Start: 2019-01-01 | End: 2019-01-01

## 2019-01-01 RX ORDER — FAMOTIDINE 20 MG/1
20 TABLET, FILM COATED ORAL 2 TIMES DAILY
Status: DISCONTINUED | OUTPATIENT
Start: 2019-01-01 | End: 2019-01-01 | Stop reason: HOSPADM

## 2019-01-01 RX ORDER — FENTANYL 75 UG/H
1 PATCH TRANSDERMAL
Qty: 10 PATCH | Refills: 0 | Status: SHIPPED | OUTPATIENT
Start: 2019-01-01 | End: 2019-06-14

## 2019-01-01 RX ORDER — SUCCINYLCHOLINE CHLORIDE 20 MG/ML
INJECTION INTRAMUSCULAR; INTRAVENOUS AS NEEDED
Status: DISCONTINUED | OUTPATIENT
Start: 2019-01-01 | End: 2019-01-01 | Stop reason: HOSPADM

## 2019-01-01 RX ORDER — SODIUM CHLORIDE 450 MG/100ML
75 INJECTION, SOLUTION INTRAVENOUS CONTINUOUS
Status: DISCONTINUED | OUTPATIENT
Start: 2019-01-01 | End: 2019-01-01

## 2019-01-01 RX ORDER — QUETIAPINE FUMARATE 25 MG/1
25 TABLET, FILM COATED ORAL
Status: DISCONTINUED | OUTPATIENT
Start: 2019-01-01 | End: 2019-01-01 | Stop reason: HOSPADM

## 2019-01-01 RX ORDER — HYDROMORPHONE HYDROCHLORIDE 5 MG/5ML
1 SOLUTION ORAL
Qty: 30 ML | Refills: 0 | Status: SHIPPED | OUTPATIENT
Start: 2019-01-01 | End: 2019-01-01

## 2019-01-01 RX ORDER — DEXTROSE, SODIUM CHLORIDE, AND POTASSIUM CHLORIDE 5; .45; .15 G/100ML; G/100ML; G/100ML
100 INJECTION INTRAVENOUS CONTINUOUS
Status: DISCONTINUED | OUTPATIENT
Start: 2019-01-01 | End: 2019-01-01 | Stop reason: HOSPADM

## 2019-01-01 RX ORDER — SODIUM CHLORIDE, SODIUM LACTATE, POTASSIUM CHLORIDE, CALCIUM CHLORIDE 600; 310; 30; 20 MG/100ML; MG/100ML; MG/100ML; MG/100ML
INJECTION, SOLUTION INTRAVENOUS
Status: DISCONTINUED | OUTPATIENT
Start: 2019-01-01 | End: 2019-01-01 | Stop reason: HOSPADM

## 2019-01-01 RX ORDER — LORAZEPAM 2 MG/ML
1 CONCENTRATE ORAL
Status: DISCONTINUED | OUTPATIENT
Start: 2019-01-01 | End: 2019-01-01 | Stop reason: HOSPADM

## 2019-01-01 RX ORDER — MORPHINE SULFATE 2 MG/ML
4 INJECTION, SOLUTION INTRAMUSCULAR; INTRAVENOUS ONCE
Status: COMPLETED | OUTPATIENT
Start: 2019-01-01 | End: 2019-01-01

## 2019-01-01 RX ORDER — DEXTROSE, SODIUM CHLORIDE, AND POTASSIUM CHLORIDE 5; .45; .15 G/100ML; G/100ML; G/100ML
100 INJECTION INTRAVENOUS CONTINUOUS
Status: DISCONTINUED | OUTPATIENT
Start: 2019-01-01 | End: 2019-01-01

## 2019-01-01 RX ORDER — DEXTROSE, SODIUM CHLORIDE, AND POTASSIUM CHLORIDE 5; .45; .15 G/100ML; G/100ML; G/100ML
50 INJECTION INTRAVENOUS CONTINUOUS
Status: DISCONTINUED | OUTPATIENT
Start: 2019-01-01 | End: 2019-01-01 | Stop reason: HOSPADM

## 2019-01-01 RX ORDER — ONDANSETRON 2 MG/ML
4 INJECTION INTRAMUSCULAR; INTRAVENOUS AS NEEDED
Status: DISCONTINUED | OUTPATIENT
Start: 2019-01-01 | End: 2019-01-01 | Stop reason: HOSPADM

## 2019-01-01 RX ORDER — SODIUM CHLORIDE 9 MG/ML
125 INJECTION, SOLUTION INTRAVENOUS CONTINUOUS
Status: DISCONTINUED | OUTPATIENT
Start: 2019-01-01 | End: 2019-01-01

## 2019-01-01 RX ORDER — MIDAZOLAM HYDROCHLORIDE 1 MG/ML
5 INJECTION, SOLUTION INTRAMUSCULAR; INTRAVENOUS
Status: DISCONTINUED | OUTPATIENT
Start: 2019-01-01 | End: 2019-01-01

## 2019-01-01 RX ORDER — QUETIAPINE FUMARATE 25 MG/1
25 TABLET, FILM COATED ORAL
COMMUNITY
Start: 2019-01-01

## 2019-01-01 RX ORDER — PANTOPRAZOLE SODIUM 40 MG/1
40 TABLET, DELAYED RELEASE ORAL
COMMUNITY
Start: 2019-01-01

## 2019-01-01 RX ORDER — MAGNESIUM SULFATE HEPTAHYDRATE 40 MG/ML
2 INJECTION, SOLUTION INTRAVENOUS ONCE
Status: COMPLETED | OUTPATIENT
Start: 2019-01-01 | End: 2019-01-01

## 2019-01-01 RX ORDER — SODIUM CHLORIDE 9 MG/ML
250 INJECTION, SOLUTION INTRAVENOUS AS NEEDED
Status: DISCONTINUED | OUTPATIENT
Start: 2019-01-01 | End: 2019-01-01

## 2019-01-01 RX ORDER — OXYCODONE AND ACETAMINOPHEN 5; 325 MG/1; MG/1
1 TABLET ORAL
COMMUNITY
End: 2019-01-01

## 2019-01-01 RX ORDER — ONDANSETRON 2 MG/ML
4 INJECTION INTRAMUSCULAR; INTRAVENOUS ONCE
Status: COMPLETED | OUTPATIENT
Start: 2019-01-01 | End: 2019-01-01

## 2019-01-01 RX ORDER — HYDROMORPHONE HCL/0.9% NACL/PF 0.5 MG/ML
PLASTIC BAG, INJECTION (ML) INTRAVENOUS CONTINUOUS
Status: DISCONTINUED | OUTPATIENT
Start: 2019-01-01 | End: 2019-01-01 | Stop reason: HOSPADM

## 2019-01-01 RX ORDER — HYDROMORPHONE HYDROCHLORIDE 5 MG/5ML
1 SOLUTION ORAL
Status: DISCONTINUED | OUTPATIENT
Start: 2019-01-01 | End: 2019-01-01 | Stop reason: HOSPADM

## 2019-01-01 RX ORDER — SODIUM CHLORIDE 9 MG/ML
75 INJECTION, SOLUTION INTRAVENOUS CONTINUOUS
Status: DISCONTINUED | OUTPATIENT
Start: 2019-01-01 | End: 2019-01-01 | Stop reason: HOSPADM

## 2019-01-01 RX ORDER — POTASSIUM CHLORIDE 7.45 MG/ML
10 INJECTION INTRAVENOUS
Status: COMPLETED | OUTPATIENT
Start: 2019-01-01 | End: 2019-01-01

## 2019-01-01 RX ORDER — FENTANYL 75 UG/H
1 PATCH TRANSDERMAL
Status: DISCONTINUED | OUTPATIENT
Start: 2019-01-01 | End: 2019-01-01 | Stop reason: HOSPADM

## 2019-01-01 RX ORDER — FENTANYL CITRATE 50 UG/ML
INJECTION, SOLUTION INTRAMUSCULAR; INTRAVENOUS AS NEEDED
Status: DISCONTINUED | OUTPATIENT
Start: 2019-01-01 | End: 2019-01-01 | Stop reason: HOSPADM

## 2019-01-01 RX ORDER — MORPHINE SULFATE 2 MG/ML
2 INJECTION, SOLUTION INTRAMUSCULAR; INTRAVENOUS
Status: COMPLETED | OUTPATIENT
Start: 2019-01-01 | End: 2019-01-01

## 2019-01-01 RX ORDER — DIAZEPAM 5 MG/1
5 TABLET ORAL
Status: DISCONTINUED | OUTPATIENT
Start: 2019-01-01 | End: 2019-01-01 | Stop reason: HOSPADM

## 2019-01-01 RX ORDER — SODIUM CHLORIDE 0.9 % (FLUSH) 0.9 %
5-10 SYRINGE (ML) INJECTION AS NEEDED
Status: DISCONTINUED | OUTPATIENT
Start: 2019-01-01 | End: 2019-01-01 | Stop reason: SDUPTHER

## 2019-01-01 RX ORDER — ESTRADIOL 1 MG/1
1 TABLET ORAL DAILY
Status: DISCONTINUED | OUTPATIENT
Start: 2019-01-01 | End: 2019-01-01 | Stop reason: HOSPADM

## 2019-01-01 RX ORDER — FLUMAZENIL 0.1 MG/ML
0.2 INJECTION INTRAVENOUS
Status: DISCONTINUED | OUTPATIENT
Start: 2019-01-01 | End: 2019-01-01 | Stop reason: HOSPADM

## 2019-01-01 RX ORDER — MEGESTROL ACETATE 40 MG/1
40 TABLET ORAL
Status: DISCONTINUED | OUTPATIENT
Start: 2019-01-01 | End: 2019-01-01 | Stop reason: HOSPADM

## 2019-01-01 RX ORDER — MORPHINE SULFATE 20 MG/ML
5 SOLUTION ORAL
Status: DISCONTINUED | OUTPATIENT
Start: 2019-01-01 | End: 2019-01-01 | Stop reason: HOSPADM

## 2019-01-01 RX ORDER — DOCUSATE SODIUM 100 MG/1
100 CAPSULE, LIQUID FILLED ORAL 2 TIMES DAILY
Status: DISCONTINUED | OUTPATIENT
Start: 2019-01-01 | End: 2019-01-01 | Stop reason: HOSPADM

## 2019-01-01 RX ORDER — ZOLPIDEM TARTRATE 5 MG/1
10 TABLET ORAL
Status: DISCONTINUED | OUTPATIENT
Start: 2019-01-01 | End: 2019-01-01

## 2019-01-01 RX ORDER — PEPPERMINT OIL
SPIRIT ORAL ONCE
Status: DISCONTINUED | OUTPATIENT
Start: 2019-01-01 | End: 2019-01-01

## 2019-01-01 RX ORDER — OXYCODONE HCL 5 MG/5 ML
5 SOLUTION, ORAL ORAL
Qty: 210 ML | Refills: 0 | Status: SHIPPED | OUTPATIENT
Start: 2019-01-01 | End: 2019-01-01

## 2019-01-01 RX ORDER — METRONIDAZOLE 500 MG/1
500 TABLET ORAL 2 TIMES DAILY
Qty: 20 TAB | Refills: 0 | Status: SHIPPED | OUTPATIENT
Start: 2019-01-01 | End: 2019-01-01

## 2019-01-01 RX ORDER — POTASSIUM CHLORIDE 20 MEQ/1
20 TABLET, EXTENDED RELEASE ORAL 2 TIMES DAILY
Status: DISCONTINUED | OUTPATIENT
Start: 2019-01-01 | End: 2019-01-01 | Stop reason: HOSPADM

## 2019-01-01 RX ORDER — GABAPENTIN 300 MG/1
300 CAPSULE ORAL 3 TIMES DAILY
Status: DISCONTINUED | OUTPATIENT
Start: 2019-01-01 | End: 2019-01-01 | Stop reason: HOSPADM

## 2019-01-01 RX ORDER — AMOXICILLIN AND CLAVULANATE POTASSIUM 875; 125 MG/1; MG/1
1 TABLET, FILM COATED ORAL EVERY 12 HOURS
Status: DISCONTINUED | OUTPATIENT
Start: 2019-01-01 | End: 2019-01-01 | Stop reason: HOSPADM

## 2019-01-01 RX ORDER — AMITRIPTYLINE HYDROCHLORIDE 50 MG/1
25 TABLET, FILM COATED ORAL
Status: DISCONTINUED | OUTPATIENT
Start: 2019-01-01 | End: 2019-01-01 | Stop reason: HOSPADM

## 2019-01-01 RX ORDER — MIDAZOLAM HYDROCHLORIDE 1 MG/ML
.25-5 INJECTION, SOLUTION INTRAMUSCULAR; INTRAVENOUS
Status: DISCONTINUED | OUTPATIENT
Start: 2019-01-01 | End: 2019-01-01 | Stop reason: HOSPADM

## 2019-01-01 RX ORDER — HYDROCODONE BITARTRATE AND ACETAMINOPHEN 5; 325 MG/1; MG/1
1 TABLET ORAL
Status: DISCONTINUED | OUTPATIENT
Start: 2019-01-01 | End: 2019-01-01

## 2019-01-01 RX ORDER — DOCUSATE SODIUM 100 MG/1
100 CAPSULE, LIQUID FILLED ORAL 2 TIMES DAILY
Status: DISCONTINUED | OUTPATIENT
Start: 2019-01-01 | End: 2019-01-01

## 2019-01-01 RX ORDER — ALBUMIN HUMAN 50 G/1000ML
12.5 SOLUTION INTRAVENOUS
Status: ACTIVE | OUTPATIENT
Start: 2019-01-01 | End: 2019-01-01

## 2019-01-01 RX ORDER — DEXTROSE MONOHYDRATE AND SODIUM CHLORIDE 5; .9 G/100ML; G/100ML
50 INJECTION, SOLUTION INTRAVENOUS CONTINUOUS
Status: DISCONTINUED | OUTPATIENT
Start: 2019-01-01 | End: 2019-01-01

## 2019-01-01 RX ORDER — LIDOCAINE HYDROCHLORIDE 20 MG/ML
INJECTION, SOLUTION EPIDURAL; INFILTRATION; INTRACAUDAL; PERINEURAL AS NEEDED
Status: DISCONTINUED | OUTPATIENT
Start: 2019-01-01 | End: 2019-01-01 | Stop reason: HOSPADM

## 2019-01-01 RX ORDER — POTASSIUM CHLORIDE 1.5 G/1.77G
20 POWDER, FOR SOLUTION ORAL
Status: DISCONTINUED | OUTPATIENT
Start: 2019-01-01 | End: 2019-01-01

## 2019-01-01 RX ORDER — POLYETHYLENE GLYCOL 3350 17 G/17G
17 POWDER, FOR SOLUTION ORAL ONCE
Status: DISCONTINUED | OUTPATIENT
Start: 2019-01-01 | End: 2019-01-01

## 2019-01-01 RX ORDER — MORPHINE SULFATE 10 MG/ML
2 INJECTION, SOLUTION INTRAMUSCULAR; INTRAVENOUS
Status: DISCONTINUED | OUTPATIENT
Start: 2019-01-01 | End: 2019-01-01

## 2019-01-01 RX ORDER — DEXTROMETHORPHAN/PSEUDOEPHED 2.5-7.5/.8
1.2 DROPS ORAL
Status: DISCONTINUED | OUTPATIENT
Start: 2019-01-01 | End: 2019-01-01 | Stop reason: HOSPADM

## 2019-01-01 RX ORDER — FENTANYL CITRATE 50 UG/ML
25 INJECTION, SOLUTION INTRAMUSCULAR; INTRAVENOUS
Status: DISCONTINUED | OUTPATIENT
Start: 2019-01-01 | End: 2019-01-01

## 2019-01-01 RX ORDER — DEXTROMETHORPHAN/PSEUDOEPHED 2.5-7.5/.8
1.2 DROPS ORAL
Status: DISCONTINUED | OUTPATIENT
Start: 2019-01-01 | End: 2019-01-01 | Stop reason: SDUPTHER

## 2019-01-01 RX ORDER — LOPERAMIDE HYDROCHLORIDE 2 MG/1
2 CAPSULE ORAL 3 TIMES DAILY
Status: DISCONTINUED | OUTPATIENT
Start: 2019-01-01 | End: 2019-01-01 | Stop reason: HOSPADM

## 2019-01-01 RX ORDER — LORAZEPAM 2 MG/ML
1 CONCENTRATE ORAL
Qty: 30 ML | Refills: 0 | Status: SHIPPED | OUTPATIENT
Start: 2019-01-01

## 2019-01-01 RX ORDER — SODIUM CHLORIDE 0.9 % (FLUSH) 0.9 %
5-40 SYRINGE (ML) INJECTION EVERY 8 HOURS
Status: DISCONTINUED | OUTPATIENT
Start: 2019-01-01 | End: 2019-01-01 | Stop reason: SDUPTHER

## 2019-01-01 RX ORDER — DIAZEPAM 5 MG/1
5 TABLET ORAL EVERY 8 HOURS
Status: DISCONTINUED | OUTPATIENT
Start: 2019-01-01 | End: 2019-01-01

## 2019-01-01 RX ORDER — HYDROMORPHONE HYDROCHLORIDE 2 MG/1
1 TABLET ORAL
Status: DISCONTINUED | OUTPATIENT
Start: 2019-01-01 | End: 2019-01-01 | Stop reason: HOSPADM

## 2019-01-01 RX ORDER — GLYCOPYRROLATE 0.2 MG/ML
INJECTION INTRAMUSCULAR; INTRAVENOUS AS NEEDED
Status: DISCONTINUED | OUTPATIENT
Start: 2019-01-01 | End: 2019-01-01 | Stop reason: HOSPADM

## 2019-01-01 RX ORDER — POLYETHYLENE GLYCOL 3350 17 G/17G
17 POWDER, FOR SOLUTION ORAL DAILY
Status: DISCONTINUED | OUTPATIENT
Start: 2019-01-01 | End: 2019-01-01 | Stop reason: HOSPADM

## 2019-01-01 RX ORDER — ATROPINE SULFATE 0.1 MG/ML
0.5 INJECTION INTRAVENOUS
Status: DISCONTINUED | OUTPATIENT
Start: 2019-01-01 | End: 2019-01-01 | Stop reason: HOSPADM

## 2019-01-01 RX ORDER — LORAZEPAM 2 MG/ML
1 CONCENTRATE ORAL
Status: DISCONTINUED | OUTPATIENT
Start: 2019-01-01 | End: 2019-01-01

## 2019-01-01 RX ORDER — ACETAMINOPHEN 10 MG/ML
INJECTION, SOLUTION INTRAVENOUS AS NEEDED
Status: DISCONTINUED | OUTPATIENT
Start: 2019-01-01 | End: 2019-01-01 | Stop reason: HOSPADM

## 2019-01-01 RX ORDER — HYOSCYAMINE SULFATE 0.12 MG/1
0.12 TABLET SUBLINGUAL
Status: DISCONTINUED | OUTPATIENT
Start: 2019-01-01 | End: 2019-01-01 | Stop reason: HOSPADM

## 2019-01-01 RX ORDER — PROCHLORPERAZINE MALEATE 5 MG
5 TABLET ORAL
Status: DISCONTINUED | OUTPATIENT
Start: 2019-01-01 | End: 2019-01-01 | Stop reason: HOSPADM

## 2019-01-01 RX ORDER — LANOLIN ALCOHOL/MO/W.PET/CERES
3 CREAM (GRAM) TOPICAL
Status: DISCONTINUED | OUTPATIENT
Start: 2019-01-01 | End: 2019-01-01 | Stop reason: HOSPADM

## 2019-01-01 RX ORDER — SODIUM CHLORIDE, SODIUM LACTATE, POTASSIUM CHLORIDE, CALCIUM CHLORIDE 600; 310; 30; 20 MG/100ML; MG/100ML; MG/100ML; MG/100ML
25 INJECTION, SOLUTION INTRAVENOUS CONTINUOUS
Status: DISCONTINUED | OUTPATIENT
Start: 2019-01-01 | End: 2019-01-01

## 2019-01-01 RX ORDER — MORPHINE SULFATE 2 MG/ML
4 INJECTION, SOLUTION INTRAMUSCULAR; INTRAVENOUS
Status: COMPLETED | OUTPATIENT
Start: 2019-01-01 | End: 2019-01-01

## 2019-01-01 RX ORDER — CIPROFLOXACIN 500 MG/1
500 TABLET ORAL 2 TIMES DAILY
Qty: 20 TAB | Refills: 0 | Status: SHIPPED | OUTPATIENT
Start: 2019-01-01 | End: 2019-01-01

## 2019-01-01 RX ORDER — SODIUM CHLORIDE 0.9 % (FLUSH) 0.9 %
10 SYRINGE (ML) INJECTION
Status: COMPLETED | OUTPATIENT
Start: 2019-01-01 | End: 2019-01-01

## 2019-01-01 RX ORDER — DIPHENHYDRAMINE HYDROCHLORIDE 50 MG/ML
12.5 INJECTION, SOLUTION INTRAMUSCULAR; INTRAVENOUS AS NEEDED
Status: DISCONTINUED | OUTPATIENT
Start: 2019-01-01 | End: 2019-01-01

## 2019-01-01 RX ORDER — ACETAMINOPHEN 650 MG/1
SUPPOSITORY RECTAL
Status: COMPLETED
Start: 2019-01-01 | End: 2019-01-01

## 2019-01-01 RX ORDER — MORPHINE SULFATE 20 MG/ML
5 SOLUTION ORAL
Status: DISCONTINUED | OUTPATIENT
Start: 2019-01-01 | End: 2019-01-01

## 2019-01-01 RX ORDER — FLUCONAZOLE 200 MG/1
200 TABLET ORAL ONCE
Status: COMPLETED | OUTPATIENT
Start: 2019-01-01 | End: 2019-01-01

## 2019-01-01 RX ORDER — LIDOCAINE HYDROCHLORIDE 10 MG/ML
INJECTION, SOLUTION EPIDURAL; INFILTRATION; INTRACAUDAL; PERINEURAL
Status: COMPLETED
Start: 2019-01-01 | End: 2019-01-01

## 2019-01-01 RX ORDER — SODIUM CHLORIDE 0.9 % (FLUSH) 0.9 %
5-40 SYRINGE (ML) INJECTION AS NEEDED
Status: DISCONTINUED | OUTPATIENT
Start: 2019-01-01 | End: 2019-01-01

## 2019-01-01 RX ORDER — HYDROMORPHONE HYDROCHLORIDE 2 MG/ML
0.5 INJECTION, SOLUTION INTRAMUSCULAR; INTRAVENOUS; SUBCUTANEOUS
Status: DISCONTINUED | OUTPATIENT
Start: 2019-01-01 | End: 2019-01-01 | Stop reason: HOSPADM

## 2019-01-01 RX ORDER — SODIUM CHLORIDE 0.9 % (FLUSH) 0.9 %
5-40 SYRINGE (ML) INJECTION AS NEEDED
Status: DISCONTINUED | OUTPATIENT
Start: 2019-01-01 | End: 2019-01-01 | Stop reason: SDUPTHER

## 2019-01-01 RX ORDER — DEXAMETHASONE SODIUM PHOSPHATE 4 MG/ML
INJECTION, SOLUTION INTRA-ARTICULAR; INTRALESIONAL; INTRAMUSCULAR; INTRAVENOUS; SOFT TISSUE AS NEEDED
Status: DISCONTINUED | OUTPATIENT
Start: 2019-01-01 | End: 2019-01-01 | Stop reason: HOSPADM

## 2019-01-01 RX ORDER — HYDROCODONE BITARTRATE AND ACETAMINOPHEN 5; 325 MG/1; MG/1
1-2 TABLET ORAL
Status: DISCONTINUED | OUTPATIENT
Start: 2019-01-01 | End: 2019-01-01 | Stop reason: HOSPADM

## 2019-01-01 RX ORDER — ACETAMINOPHEN 10 MG/ML
1000 INJECTION, SOLUTION INTRAVENOUS ONCE
Status: COMPLETED | OUTPATIENT
Start: 2019-01-01 | End: 2019-01-01

## 2019-01-01 RX ORDER — FENTANYL CITRATE 50 UG/ML
INJECTION, SOLUTION INTRAMUSCULAR; INTRAVENOUS
Status: COMPLETED
Start: 2019-01-01 | End: 2019-01-01

## 2019-01-01 RX ORDER — SODIUM CHLORIDE 9 MG/ML
250 INJECTION, SOLUTION INTRAVENOUS AS NEEDED
Status: DISCONTINUED | OUTPATIENT
Start: 2019-01-01 | End: 2019-01-01 | Stop reason: ALTCHOICE

## 2019-01-01 RX ORDER — LORAZEPAM 2 MG/ML
0.5 INJECTION INTRAMUSCULAR
Status: DISCONTINUED | OUTPATIENT
Start: 2019-01-01 | End: 2019-01-01 | Stop reason: HOSPADM

## 2019-01-01 RX ORDER — DIPHENHYDRAMINE HYDROCHLORIDE 50 MG/ML
12.5 INJECTION, SOLUTION INTRAMUSCULAR; INTRAVENOUS
Status: DISCONTINUED | OUTPATIENT
Start: 2019-01-01 | End: 2019-01-01 | Stop reason: HOSPADM

## 2019-01-01 RX ORDER — ONDANSETRON 2 MG/ML
4 INJECTION INTRAMUSCULAR; INTRAVENOUS
Status: COMPLETED | OUTPATIENT
Start: 2019-01-01 | End: 2019-01-01

## 2019-01-01 RX ORDER — MORPHINE SULFATE 100 MG/5ML
20 SOLUTION ORAL
Status: DISCONTINUED | OUTPATIENT
Start: 2019-01-01 | End: 2019-01-01

## 2019-01-01 RX ORDER — HYDROMORPHONE HCL/0.9% NACL/PF 0.5 MG/ML
PLASTIC BAG, INJECTION (ML) INTRAVENOUS CONTINUOUS
Status: DISCONTINUED | OUTPATIENT
Start: 2019-01-01 | End: 2019-01-01

## 2019-01-01 RX ORDER — ROCURONIUM BROMIDE 10 MG/ML
INJECTION, SOLUTION INTRAVENOUS AS NEEDED
Status: DISCONTINUED | OUTPATIENT
Start: 2019-01-01 | End: 2019-01-01 | Stop reason: HOSPADM

## 2019-01-01 RX ORDER — SODIUM CHLORIDE, SODIUM LACTATE, POTASSIUM CHLORIDE, CALCIUM CHLORIDE 600; 310; 30; 20 MG/100ML; MG/100ML; MG/100ML; MG/100ML
25 INJECTION, SOLUTION INTRAVENOUS CONTINUOUS
Status: CANCELLED | OUTPATIENT
Start: 2019-01-01 | End: 2019-01-01

## 2019-01-01 RX ORDER — PROMETHAZINE HYDROCHLORIDE 25 MG/1
25 TABLET ORAL
Status: DISCONTINUED | OUTPATIENT
Start: 2019-01-01 | End: 2019-01-01 | Stop reason: HOSPADM

## 2019-01-01 RX ORDER — ZOLPIDEM TARTRATE 5 MG/1
5 TABLET ORAL
Status: DISCONTINUED | OUTPATIENT
Start: 2019-01-01 | End: 2019-01-01 | Stop reason: HOSPADM

## 2019-01-01 RX ORDER — ONDANSETRON 2 MG/ML
4 INJECTION INTRAMUSCULAR; INTRAVENOUS
Status: DISCONTINUED | OUTPATIENT
Start: 2019-01-01 | End: 2019-01-01 | Stop reason: HOSPADM

## 2019-01-01 RX ORDER — DEXTROSE 50 % IN WATER (D50W) INTRAVENOUS SYRINGE
12.5-25 AS NEEDED
Status: DISCONTINUED | OUTPATIENT
Start: 2019-01-01 | End: 2019-01-01 | Stop reason: HOSPADM

## 2019-01-01 RX ORDER — SODIUM CHLORIDE 0.9 % (FLUSH) 0.9 %
5-40 SYRINGE (ML) INJECTION AS NEEDED
Status: CANCELLED | OUTPATIENT
Start: 2019-01-01

## 2019-01-01 RX ORDER — POTASSIUM CHLORIDE 29.8 MG/ML
20 INJECTION INTRAVENOUS
Status: COMPLETED | OUTPATIENT
Start: 2019-01-01 | End: 2019-01-01

## 2019-01-01 RX ORDER — AMOXICILLIN 250 MG
1 CAPSULE ORAL
Status: DISCONTINUED | OUTPATIENT
Start: 2019-01-01 | End: 2019-01-01 | Stop reason: HOSPADM

## 2019-01-01 RX ORDER — HYDROMORPHONE HYDROCHLORIDE 1 MG/ML
2 INJECTION, SOLUTION INTRAMUSCULAR; INTRAVENOUS; SUBCUTANEOUS ONCE
Status: DISPENSED | OUTPATIENT
Start: 2019-01-01 | End: 2019-01-01

## 2019-01-01 RX ORDER — LIDOCAINE HYDROCHLORIDE 20 MG/ML
JELLY TOPICAL
Status: COMPLETED | OUTPATIENT
Start: 2019-01-01 | End: 2019-01-01

## 2019-01-01 RX ORDER — ACETAMINOPHEN 650 MG/1
650 SUPPOSITORY RECTAL
Status: DISCONTINUED | OUTPATIENT
Start: 2019-01-01 | End: 2019-01-01 | Stop reason: HOSPADM

## 2019-01-01 RX ORDER — NYSTATIN 100000 [USP'U]/G
POWDER TOPICAL AS NEEDED
Status: DISCONTINUED | OUTPATIENT
Start: 2019-01-01 | End: 2019-01-01 | Stop reason: HOSPADM

## 2019-01-01 RX ORDER — EPINEPHRINE 0.1 MG/ML
1 INJECTION INTRACARDIAC; INTRAVENOUS
Status: DISCONTINUED | OUTPATIENT
Start: 2019-01-01 | End: 2019-01-01 | Stop reason: SDUPTHER

## 2019-01-01 RX ORDER — ACETAMINOPHEN 325 MG/1
975 TABLET ORAL
COMMUNITY
End: 2019-01-01 | Stop reason: DRUGHIGH

## 2019-01-01 RX ORDER — PANTOPRAZOLE SODIUM 40 MG/1
40 TABLET, DELAYED RELEASE ORAL DAILY
Status: DISCONTINUED | OUTPATIENT
Start: 2019-01-01 | End: 2019-01-01 | Stop reason: HOSPADM

## 2019-01-01 RX ORDER — FACIAL-BODY WIPES
10 EACH TOPICAL DAILY PRN
Status: DISCONTINUED | OUTPATIENT
Start: 2019-01-01 | End: 2019-01-01 | Stop reason: HOSPADM

## 2019-01-01 RX ORDER — HYDROMORPHONE HYDROCHLORIDE 1 MG/ML
0.5 INJECTION, SOLUTION INTRAMUSCULAR; INTRAVENOUS; SUBCUTANEOUS
Status: DISCONTINUED | OUTPATIENT
Start: 2019-01-01 | End: 2019-01-01 | Stop reason: HOSPADM

## 2019-01-01 RX ORDER — ONDANSETRON 2 MG/ML
4 INJECTION INTRAMUSCULAR; INTRAVENOUS AS NEEDED
Status: DISCONTINUED | OUTPATIENT
Start: 2019-01-01 | End: 2019-01-01

## 2019-01-01 RX ORDER — OXYCODONE HYDROCHLORIDE 5 MG/1
5 TABLET ORAL
Status: DISCONTINUED | OUTPATIENT
Start: 2019-01-01 | End: 2019-01-01

## 2019-01-01 RX ORDER — OXYCODONE AND ACETAMINOPHEN 5; 325 MG/1; MG/1
1 TABLET ORAL
Qty: 30 TAB | Refills: 0 | Status: SHIPPED | OUTPATIENT
Start: 2019-01-01 | End: 2019-01-01

## 2019-01-01 RX ORDER — NYSTATIN 100000 [USP'U]/G
POWDER TOPICAL 2 TIMES DAILY
Status: DISCONTINUED | OUTPATIENT
Start: 2019-01-01 | End: 2019-01-01 | Stop reason: HOSPADM

## 2019-01-01 RX ORDER — DOCUSATE SODIUM 100 MG/1
100 CAPSULE, LIQUID FILLED ORAL
Status: DISCONTINUED | OUTPATIENT
Start: 2019-01-01 | End: 2019-01-01 | Stop reason: HOSPADM

## 2019-01-01 RX ORDER — OXYCODONE HCL 5 MG/5 ML
5 SOLUTION, ORAL ORAL
Status: DISCONTINUED | OUTPATIENT
Start: 2019-01-01 | End: 2019-01-01 | Stop reason: HOSPADM

## 2019-01-01 RX ORDER — PROMETHAZINE HYDROCHLORIDE 25 MG/1
25 TABLET ORAL
Qty: 30 TAB | Refills: 0 | Status: SHIPPED | OUTPATIENT
Start: 2019-01-01

## 2019-01-01 RX ORDER — OXYCODONE HCL 5 MG/5 ML
5 SOLUTION, ORAL ORAL
COMMUNITY
End: 2019-01-01

## 2019-01-01 RX ORDER — LIDOCAINE HYDROCHLORIDE 10 MG/ML
0.1 INJECTION, SOLUTION EPIDURAL; INFILTRATION; INTRACAUDAL; PERINEURAL AS NEEDED
Status: CANCELLED | OUTPATIENT
Start: 2019-01-01

## 2019-01-01 RX ORDER — SODIUM CHLORIDE 9 MG/ML
25 INJECTION, SOLUTION INTRAVENOUS CONTINUOUS
Status: DISCONTINUED | OUTPATIENT
Start: 2019-01-01 | End: 2019-01-01

## 2019-01-01 RX ORDER — MORPHINE SULFATE 100 MG/5ML
15 SOLUTION ORAL
Status: COMPLETED | OUTPATIENT
Start: 2019-01-01 | End: 2019-01-01

## 2019-01-01 RX ORDER — HYDROMORPHONE HYDROCHLORIDE 1 MG/ML
1 INJECTION, SOLUTION INTRAMUSCULAR; INTRAVENOUS; SUBCUTANEOUS
Status: DISCONTINUED | OUTPATIENT
Start: 2019-01-01 | End: 2019-01-01

## 2019-01-01 RX ORDER — PROPOFOL 10 MG/ML
INJECTION, EMULSION INTRAVENOUS
Status: DISCONTINUED
Start: 2019-01-01 | End: 2019-01-01 | Stop reason: WASHOUT

## 2019-01-01 RX ORDER — FLUMAZENIL 0.1 MG/ML
0.2 INJECTION INTRAVENOUS
Status: DISCONTINUED | OUTPATIENT
Start: 2019-01-01 | End: 2019-01-01 | Stop reason: SDUPTHER

## 2019-01-01 RX ORDER — OXYCODONE AND ACETAMINOPHEN 5; 325 MG/1; MG/1
1 TABLET ORAL
Qty: 14 TAB | Refills: 0 | Status: SHIPPED | OUTPATIENT
Start: 2019-01-01 | End: 2019-01-01

## 2019-01-01 RX ORDER — SODIUM CHLORIDE, SODIUM LACTATE, POTASSIUM CHLORIDE, CALCIUM CHLORIDE 600; 310; 30; 20 MG/100ML; MG/100ML; MG/100ML; MG/100ML
25 INJECTION, SOLUTION INTRAVENOUS CONTINUOUS
Status: DISCONTINUED | OUTPATIENT
Start: 2019-01-01 | End: 2019-01-01 | Stop reason: HOSPADM

## 2019-01-01 RX ORDER — LORAZEPAM 2 MG/ML
2 CONCENTRATE ORAL EVERY 6 HOURS
Status: DISCONTINUED | OUTPATIENT
Start: 2019-01-01 | End: 2019-01-01 | Stop reason: HOSPADM

## 2019-01-01 RX ORDER — SODIUM CHLORIDE 9 MG/ML
25 INJECTION, SOLUTION INTRAVENOUS CONTINUOUS
Status: DISCONTINUED | OUTPATIENT
Start: 2019-01-01 | End: 2019-01-01 | Stop reason: HOSPADM

## 2019-01-01 RX ORDER — HYDROMORPHONE HYDROCHLORIDE 2 MG/ML
INJECTION, SOLUTION INTRAMUSCULAR; INTRAVENOUS; SUBCUTANEOUS AS NEEDED
Status: DISCONTINUED | OUTPATIENT
Start: 2019-01-01 | End: 2019-01-01 | Stop reason: HOSPADM

## 2019-01-01 RX ORDER — SODIUM CHLORIDE 0.9 % (FLUSH) 0.9 %
5-40 SYRINGE (ML) INJECTION EVERY 8 HOURS
Status: CANCELLED | OUTPATIENT
Start: 2019-01-01

## 2019-01-01 RX ORDER — VITAMIN E 268 MG
CAPSULE ORAL DAILY
COMMUNITY
End: 2019-01-01

## 2019-01-01 RX ORDER — ETOMIDATE 2 MG/ML
INJECTION INTRAVENOUS AS NEEDED
Status: DISCONTINUED | OUTPATIENT
Start: 2019-01-01 | End: 2019-01-01 | Stop reason: HOSPADM

## 2019-01-01 RX ORDER — AMITRIPTYLINE HYDROCHLORIDE 25 MG/1
25 TABLET, FILM COATED ORAL
COMMUNITY
Start: 2019-01-01

## 2019-01-01 RX ORDER — ONDANSETRON 4 MG/1
4 TABLET, ORALLY DISINTEGRATING ORAL
Qty: 20 TAB | Refills: 0 | Status: SHIPPED | OUTPATIENT
Start: 2019-01-01

## 2019-01-01 RX ORDER — ALBUMIN HUMAN 50 G/1000ML
SOLUTION INTRAVENOUS AS NEEDED
Status: DISCONTINUED | OUTPATIENT
Start: 2019-01-01 | End: 2019-01-01 | Stop reason: HOSPADM

## 2019-01-01 RX ORDER — ATROPINE SULFATE 0.1 MG/ML
0.5 INJECTION INTRAVENOUS
Status: DISCONTINUED | OUTPATIENT
Start: 2019-01-01 | End: 2019-01-01 | Stop reason: SDUPTHER

## 2019-01-01 RX ORDER — ONDANSETRON 2 MG/ML
4 INJECTION INTRAMUSCULAR; INTRAVENOUS ONCE
Status: DISCONTINUED | OUTPATIENT
Start: 2019-01-01 | End: 2019-01-01 | Stop reason: HOSPADM

## 2019-01-01 RX ORDER — HYDROMORPHONE HYDROCHLORIDE 1 MG/ML
2 INJECTION, SOLUTION INTRAMUSCULAR; INTRAVENOUS; SUBCUTANEOUS ONCE
Status: DISCONTINUED | OUTPATIENT
Start: 2019-01-01 | End: 2019-01-01

## 2019-01-01 RX ORDER — ORANGE OIL
OIL (ML) MISCELLANEOUS ONCE
Status: COMPLETED | OUTPATIENT
Start: 2019-01-01 | End: 2019-01-01

## 2019-01-01 RX ORDER — NALOXONE HYDROCHLORIDE 0.4 MG/ML
0.4 INJECTION, SOLUTION INTRAMUSCULAR; INTRAVENOUS; SUBCUTANEOUS
Status: DISCONTINUED | OUTPATIENT
Start: 2019-01-01 | End: 2019-01-01 | Stop reason: SDUPTHER

## 2019-01-01 RX ORDER — HALOPERIDOL 2 MG/ML
2 SOLUTION ORAL
Status: DISCONTINUED | OUTPATIENT
Start: 2019-01-01 | End: 2019-01-01 | Stop reason: HOSPADM

## 2019-01-01 RX ORDER — FENTANYL CITRATE 50 UG/ML
25 INJECTION, SOLUTION INTRAMUSCULAR; INTRAVENOUS
Status: DISCONTINUED | OUTPATIENT
Start: 2019-01-01 | End: 2019-01-01 | Stop reason: HOSPADM

## 2019-01-01 RX ORDER — HYDROMORPHONE HYDROCHLORIDE 5 MG/5ML
1 SOLUTION ORAL
Status: DISCONTINUED | OUTPATIENT
Start: 2019-01-01 | End: 2019-01-01

## 2019-01-01 RX ORDER — HYDROCODONE BITARTRATE AND ACETAMINOPHEN 5; 325 MG/1; MG/1
1 TABLET ORAL
Qty: 18 TAB | Refills: 0 | Status: SHIPPED | OUTPATIENT
Start: 2019-01-01 | End: 2019-01-01

## 2019-01-01 RX ORDER — FLUCONAZOLE 100 MG/1
100 TABLET ORAL DAILY
Status: DISCONTINUED | OUTPATIENT
Start: 2019-01-01 | End: 2019-01-01 | Stop reason: HOSPADM

## 2019-01-01 RX ORDER — ACETAMINOPHEN 325 MG/1
650 TABLET ORAL
Status: DISCONTINUED | OUTPATIENT
Start: 2019-01-01 | End: 2019-01-01 | Stop reason: HOSPADM

## 2019-01-01 RX ORDER — SODIUM CHLORIDE 9 MG/ML
150 INJECTION, SOLUTION INTRAVENOUS CONTINUOUS
Status: DISCONTINUED | OUTPATIENT
Start: 2019-01-01 | End: 2019-01-01

## 2019-01-01 RX ORDER — SCOLOPAMINE TRANSDERMAL SYSTEM 1 MG/1
1 PATCH, EXTENDED RELEASE TRANSDERMAL
Status: DISCONTINUED | OUTPATIENT
Start: 2019-01-01 | End: 2019-01-01 | Stop reason: HOSPADM

## 2019-01-01 RX ORDER — INSULIN LISPRO 100 [IU]/ML
INJECTION, SOLUTION INTRAVENOUS; SUBCUTANEOUS EVERY 6 HOURS
Status: DISCONTINUED | OUTPATIENT
Start: 2019-01-01 | End: 2019-01-01

## 2019-01-01 RX ORDER — MORPHINE SULFATE 100 MG/5ML
15 SOLUTION ORAL
Status: DISCONTINUED | OUTPATIENT
Start: 2019-01-01 | End: 2019-01-01

## 2019-01-01 RX ORDER — LIDOCAINE HYDROCHLORIDE 20 MG/ML
JELLY TOPICAL
Status: COMPLETED
Start: 2019-01-01 | End: 2019-01-01

## 2019-01-01 RX ORDER — HYDROMORPHONE HYDROCHLORIDE 1 MG/ML
.2-.5 INJECTION, SOLUTION INTRAMUSCULAR; INTRAVENOUS; SUBCUTANEOUS
Status: DISCONTINUED | OUTPATIENT
Start: 2019-01-01 | End: 2019-01-01

## 2019-01-01 RX ORDER — FENTANYL CITRATE 50 UG/ML
25 INJECTION, SOLUTION INTRAMUSCULAR; INTRAVENOUS ONCE
Status: COMPLETED | OUTPATIENT
Start: 2019-01-01 | End: 2019-01-01

## 2019-01-01 RX ORDER — NYSTATIN 100000 [USP'U]/ML
500000 SUSPENSION ORAL 4 TIMES DAILY
Status: DISCONTINUED | OUTPATIENT
Start: 2019-01-01 | End: 2019-01-01 | Stop reason: HOSPADM

## 2019-01-01 RX ORDER — MORPHINE SULFATE 10 MG/ML
2 INJECTION, SOLUTION INTRAMUSCULAR; INTRAVENOUS
Status: DISCONTINUED | OUTPATIENT
Start: 2019-01-01 | End: 2019-01-01 | Stop reason: HOSPADM

## 2019-01-01 RX ORDER — FAMOTIDINE 20 MG/1
20 TABLET, FILM COATED ORAL 2 TIMES DAILY
Qty: 60 TAB | Refills: 0 | Status: SHIPPED | OUTPATIENT
Start: 2019-01-01

## 2019-01-01 RX ORDER — DEXTROSE, SODIUM CHLORIDE, AND POTASSIUM CHLORIDE 5; .45; .15 G/100ML; G/100ML; G/100ML
75 INJECTION INTRAVENOUS CONTINUOUS
Status: DISCONTINUED | OUTPATIENT
Start: 2019-01-01 | End: 2019-01-01 | Stop reason: HOSPADM

## 2019-01-01 RX ORDER — POTASSIUM CHLORIDE 7.45 MG/ML
10 INJECTION INTRAVENOUS
Status: DISCONTINUED | OUTPATIENT
Start: 2019-01-01 | End: 2019-01-01

## 2019-01-01 RX ORDER — MIDAZOLAM HYDROCHLORIDE 1 MG/ML
INJECTION, SOLUTION INTRAMUSCULAR; INTRAVENOUS AS NEEDED
Status: DISCONTINUED | OUTPATIENT
Start: 2019-01-01 | End: 2019-01-01 | Stop reason: HOSPADM

## 2019-01-01 RX ORDER — EPHEDRINE SULFATE 50 MG/ML
INJECTION, SOLUTION INTRAVENOUS AS NEEDED
Status: DISCONTINUED | OUTPATIENT
Start: 2019-01-01 | End: 2019-01-01 | Stop reason: HOSPADM

## 2019-01-01 RX ORDER — HEPARIN 100 UNIT/ML
300 SYRINGE INTRAVENOUS AS NEEDED
Status: DISCONTINUED | OUTPATIENT
Start: 2019-01-01 | End: 2019-01-01 | Stop reason: HOSPADM

## 2019-01-01 RX ORDER — ALBUMIN HUMAN 50 G/1000ML
25 SOLUTION INTRAVENOUS ONCE
Status: COMPLETED | OUTPATIENT
Start: 2019-01-01 | End: 2019-01-01

## 2019-01-01 RX ORDER — HEPARIN SODIUM 5000 [USP'U]/ML
5000 INJECTION, SOLUTION INTRAVENOUS; SUBCUTANEOUS EVERY 12 HOURS
Status: DISCONTINUED | OUTPATIENT
Start: 2019-01-01 | End: 2019-01-01 | Stop reason: HOSPADM

## 2019-01-01 RX ORDER — MIDAZOLAM HYDROCHLORIDE 1 MG/ML
1-3 INJECTION, SOLUTION INTRAMUSCULAR; INTRAVENOUS
Status: DISCONTINUED | OUTPATIENT
Start: 2019-01-01 | End: 2019-01-01 | Stop reason: HOSPADM

## 2019-01-01 RX ORDER — SODIUM CHLORIDE 0.9 % (FLUSH) 0.9 %
5-40 SYRINGE (ML) INJECTION EVERY 8 HOURS
Status: DISCONTINUED | OUTPATIENT
Start: 2019-01-01 | End: 2019-01-01

## 2019-01-01 RX ORDER — ENOXAPARIN SODIUM 100 MG/ML
40 INJECTION SUBCUTANEOUS DAILY
Status: DISCONTINUED | OUTPATIENT
Start: 2019-01-01 | End: 2019-01-01 | Stop reason: HOSPADM

## 2019-01-01 RX ORDER — DIPHENHYDRAMINE HYDROCHLORIDE 50 MG/ML
50 INJECTION, SOLUTION INTRAMUSCULAR; INTRAVENOUS
Status: DISCONTINUED | OUTPATIENT
Start: 2019-01-01 | End: 2019-01-01 | Stop reason: HOSPADM

## 2019-01-01 RX ORDER — ACETAMINOPHEN 650 MG/1
650 SUPPOSITORY RECTAL
Status: COMPLETED | OUTPATIENT
Start: 2019-01-01 | End: 2019-01-01

## 2019-01-01 RX ORDER — PANTOPRAZOLE SODIUM 40 MG/1
40 TABLET, DELAYED RELEASE ORAL
Status: DISCONTINUED | OUTPATIENT
Start: 2019-01-01 | End: 2019-01-01 | Stop reason: HOSPADM

## 2019-01-01 RX ORDER — DIAZEPAM 5 MG/1
5 TABLET ORAL
Status: DISCONTINUED | OUTPATIENT
Start: 2019-01-01 | End: 2019-01-01

## 2019-01-01 RX ORDER — NALOXONE HYDROCHLORIDE 0.4 MG/ML
0.4 INJECTION, SOLUTION INTRAMUSCULAR; INTRAVENOUS; SUBCUTANEOUS AS NEEDED
Status: DISCONTINUED | OUTPATIENT
Start: 2019-01-01 | End: 2019-01-01 | Stop reason: HOSPADM

## 2019-01-01 RX ORDER — OXYCODONE HYDROCHLORIDE 5 MG/1
5 TABLET ORAL
Qty: 20 TAB | Refills: 0 | Status: SHIPPED | OUTPATIENT
Start: 2019-01-01 | End: 2019-01-01

## 2019-01-01 RX ORDER — KETOROLAC TROMETHAMINE 30 MG/ML
30 INJECTION, SOLUTION INTRAMUSCULAR; INTRAVENOUS
Status: DISPENSED | OUTPATIENT
Start: 2019-01-01 | End: 2019-01-01

## 2019-01-01 RX ORDER — NITROGLYCERIN 0.4 MG/1
TABLET SUBLINGUAL
Status: COMPLETED
Start: 2019-01-01 | End: 2019-01-01

## 2019-01-01 RX ORDER — ONDANSETRON 4 MG/1
4 TABLET, ORALLY DISINTEGRATING ORAL
Status: DISCONTINUED | OUTPATIENT
Start: 2019-01-01 | End: 2019-01-01 | Stop reason: HOSPADM

## 2019-01-01 RX ORDER — MAGNESIUM SULFATE 100 %
4 CRYSTALS MISCELLANEOUS AS NEEDED
Status: DISCONTINUED | OUTPATIENT
Start: 2019-01-01 | End: 2019-01-01 | Stop reason: HOSPADM

## 2019-01-01 RX ORDER — QUETIAPINE FUMARATE 25 MG/1
25 TABLET, FILM COATED ORAL DAILY
Status: DISCONTINUED | OUTPATIENT
Start: 2019-01-01 | End: 2019-01-01 | Stop reason: HOSPADM

## 2019-01-01 RX ORDER — MORPHINE SULFATE 100 MG/5ML
20 SOLUTION ORAL EVERY 4 HOURS
Status: DISCONTINUED | OUTPATIENT
Start: 2019-01-01 | End: 2019-01-01

## 2019-01-01 RX ADMIN — Medication 10 ML: at 22:13

## 2019-01-01 RX ADMIN — DEXTROSE MONOHYDRATE, SODIUM CHLORIDE, AND POTASSIUM CHLORIDE 100 ML/HR: 50; 4.5; 1.49 INJECTION, SOLUTION INTRAVENOUS at 21:35

## 2019-01-01 RX ADMIN — SODIUM CHLORIDE 10 ML: 9 INJECTION, SOLUTION INTRAMUSCULAR; INTRAVENOUS; SUBCUTANEOUS at 05:28

## 2019-01-01 RX ADMIN — Medication 10 ML: at 05:14

## 2019-01-01 RX ADMIN — Medication 10 ML: at 13:34

## 2019-01-01 RX ADMIN — DAKIN'S SOLUTION 0.125% (QUARTER STRENGTH): 0.12 SOLUTION at 21:00

## 2019-01-01 RX ADMIN — MUPIROCIN: 20 OINTMENT TOPICAL at 18:05

## 2019-01-01 RX ADMIN — Medication 10 ML: at 18:06

## 2019-01-01 RX ADMIN — AMPICILLIN SODIUM AND SULBACTAM SODIUM 3 G: 2; 1 INJECTION, POWDER, FOR SOLUTION INTRAMUSCULAR; INTRAVENOUS at 23:12

## 2019-01-01 RX ADMIN — HEPARIN SODIUM 5000 UNITS: 5000 INJECTION INTRAVENOUS; SUBCUTANEOUS at 05:40

## 2019-01-01 RX ADMIN — HYDROMORPHONE HYDROCHLORIDE 0.5 MG: 1 INJECTION, SOLUTION INTRAMUSCULAR; INTRAVENOUS; SUBCUTANEOUS at 07:58

## 2019-01-01 RX ADMIN — HYDROCODONE BITARTRATE AND ACETAMINOPHEN 1 TABLET: 5; 325 TABLET ORAL at 03:25

## 2019-01-01 RX ADMIN — HYDROMORPHONE HYDROCHLORIDE 0.5 MG: 2 INJECTION, SOLUTION INTRAMUSCULAR; INTRAVENOUS; SUBCUTANEOUS at 06:08

## 2019-01-01 RX ADMIN — HYDROMORPHONE HYDROCHLORIDE 0.5 MG: 1 INJECTION, SOLUTION INTRAMUSCULAR; INTRAVENOUS; SUBCUTANEOUS at 17:38

## 2019-01-01 RX ADMIN — OXYCODONE HYDROCHLORIDE 5 MG: 5 TABLET ORAL at 17:41

## 2019-01-01 RX ADMIN — SODIUM CHLORIDE 25 ML/HR: 900 INJECTION, SOLUTION INTRAVENOUS at 10:01

## 2019-01-01 RX ADMIN — SODIUM CHLORIDE 1000 ML: 900 INJECTION, SOLUTION INTRAVENOUS at 15:41

## 2019-01-01 RX ADMIN — GABAPENTIN 600 MG: 300 CAPSULE ORAL at 21:41

## 2019-01-01 RX ADMIN — DEXTROSE MONOHYDRATE, SODIUM CHLORIDE, AND POTASSIUM CHLORIDE 75 ML/HR: 50; 4.5; 1.49 INJECTION, SOLUTION INTRAVENOUS at 16:08

## 2019-01-01 RX ADMIN — ONDANSETRON 4 MG: 2 INJECTION INTRAMUSCULAR; INTRAVENOUS at 04:03

## 2019-01-01 RX ADMIN — Medication: at 07:26

## 2019-01-01 RX ADMIN — HYDROMORPHONE HYDROCHLORIDE 0.5 MG: 1 INJECTION, SOLUTION INTRAMUSCULAR; INTRAVENOUS; SUBCUTANEOUS at 03:51

## 2019-01-01 RX ADMIN — AMITRIPTYLINE HYDROCHLORIDE 25 MG: 50 TABLET, FILM COATED ORAL at 21:15

## 2019-01-01 RX ADMIN — PIPERACILLIN AND TAZOBACTAM 3.38 G: 3; .375 INJECTION, POWDER, FOR SOLUTION INTRAVENOUS at 19:48

## 2019-01-01 RX ADMIN — HYDROMORPHONE HYDROCHLORIDE 1 MG: 1 INJECTION, SOLUTION INTRAMUSCULAR; INTRAVENOUS; SUBCUTANEOUS at 20:58

## 2019-01-01 RX ADMIN — QUETIAPINE FUMARATE 25 MG: 25 TABLET ORAL at 21:02

## 2019-01-01 RX ADMIN — DEXTROSE MONOHYDRATE, SODIUM CHLORIDE, AND POTASSIUM CHLORIDE 75 ML/HR: 50; 4.5; 1.49 INJECTION, SOLUTION INTRAVENOUS at 09:02

## 2019-01-01 RX ADMIN — FAMOTIDINE 20 MG: 20 TABLET ORAL at 17:41

## 2019-01-01 RX ADMIN — FENTANYL CITRATE 25 MCG: 50 INJECTION, SOLUTION INTRAMUSCULAR; INTRAVENOUS at 20:10

## 2019-01-01 RX ADMIN — FENTANYL CITRATE 25 MCG: 50 INJECTION, SOLUTION INTRAMUSCULAR; INTRAVENOUS at 10:48

## 2019-01-01 RX ADMIN — SODIUM CHLORIDE 10 ML: 9 INJECTION, SOLUTION INTRAMUSCULAR; INTRAVENOUS; SUBCUTANEOUS at 21:36

## 2019-01-01 RX ADMIN — HYDROCODONE BITARTRATE AND ACETAMINOPHEN 2 TABLET: 5; 325 TABLET ORAL at 10:56

## 2019-01-01 RX ADMIN — HYDROMORPHONE HYDROCHLORIDE 0.8 MG: 2 INJECTION, SOLUTION INTRAMUSCULAR; INTRAVENOUS; SUBCUTANEOUS at 18:43

## 2019-01-01 RX ADMIN — ONDANSETRON 4 MG: 2 INJECTION INTRAMUSCULAR; INTRAVENOUS at 20:09

## 2019-01-01 RX ADMIN — HYDROMORPHONE HYDROCHLORIDE 0.5 MG: 2 INJECTION, SOLUTION INTRAMUSCULAR; INTRAVENOUS; SUBCUTANEOUS at 13:41

## 2019-01-01 RX ADMIN — MEGESTROL ACETATE 40 MG: 40 TABLET ORAL at 17:24

## 2019-01-01 RX ADMIN — PIPERACILLIN AND TAZOBACTAM 3.38 G: 3; .375 INJECTION, POWDER, FOR SOLUTION INTRAVENOUS at 19:23

## 2019-01-01 RX ADMIN — Medication 10 ML: at 06:34

## 2019-01-01 RX ADMIN — DEXTROSE MONOHYDRATE, SODIUM CHLORIDE, AND POTASSIUM CHLORIDE 75 ML/HR: 50; 4.5; 1.49 INJECTION, SOLUTION INTRAVENOUS at 16:24

## 2019-01-01 RX ADMIN — PIPERACILLIN AND TAZOBACTAM 3.38 G: 3; .375 INJECTION, POWDER, FOR SOLUTION INTRAVENOUS at 20:09

## 2019-01-01 RX ADMIN — AMPICILLIN SODIUM AND SULBACTAM SODIUM 3 G: 2; 1 INJECTION, POWDER, FOR SOLUTION INTRAMUSCULAR; INTRAVENOUS at 05:15

## 2019-01-01 RX ADMIN — ALBUMIN (HUMAN) 25 G: 12.5 INJECTION, SOLUTION INTRAVENOUS at 11:03

## 2019-01-01 RX ADMIN — BUTALBITAL, ACETAMINOPHEN AND CAFFEINE 1 TABLET: 50; 325; 40 TABLET ORAL at 08:29

## 2019-01-01 RX ADMIN — PIPERACILLIN AND TAZOBACTAM 3.38 G: 3; .375 INJECTION, POWDER, FOR SOLUTION INTRAVENOUS at 13:18

## 2019-01-01 RX ADMIN — ROCURONIUM BROMIDE 20 MG: 10 INJECTION, SOLUTION INTRAVENOUS at 21:06

## 2019-01-01 RX ADMIN — AMPICILLIN SODIUM AND SULBACTAM SODIUM 3 G: 2; 1 INJECTION, POWDER, FOR SOLUTION INTRAMUSCULAR; INTRAVENOUS at 05:25

## 2019-01-01 RX ADMIN — Medication 10 ML: at 22:00

## 2019-01-01 RX ADMIN — FENTANYL CITRATE 25 MCG: 50 INJECTION, SOLUTION INTRAMUSCULAR; INTRAVENOUS at 23:08

## 2019-01-01 RX ADMIN — AMPICILLIN SODIUM AND SULBACTAM SODIUM 3 G: 2; 1 INJECTION, POWDER, FOR SOLUTION INTRAMUSCULAR; INTRAVENOUS at 18:20

## 2019-01-01 RX ADMIN — Medication 10 ML: at 13:00

## 2019-01-01 RX ADMIN — FENTANYL CITRATE 25 MCG: 50 INJECTION, SOLUTION INTRAMUSCULAR; INTRAVENOUS at 19:50

## 2019-01-01 RX ADMIN — HYDROMORPHONE HYDROCHLORIDE 1 MG: 1 INJECTION, SOLUTION INTRAMUSCULAR; INTRAVENOUS; SUBCUTANEOUS at 16:27

## 2019-01-01 RX ADMIN — DIAZEPAM 5 MG: 5 TABLET ORAL at 12:34

## 2019-01-01 RX ADMIN — MEGESTROL ACETATE 40 MG: 40 TABLET ORAL at 17:42

## 2019-01-01 RX ADMIN — GABAPENTIN 600 MG: 300 CAPSULE ORAL at 22:08

## 2019-01-01 RX ADMIN — HYDROMORPHONE HYDROCHLORIDE 0.5 MG: 1 INJECTION, SOLUTION INTRAMUSCULAR; INTRAVENOUS; SUBCUTANEOUS at 05:11

## 2019-01-01 RX ADMIN — ONDANSETRON HYDROCHLORIDE 4 MG: 2 INJECTION, SOLUTION INTRAMUSCULAR; INTRAVENOUS at 07:57

## 2019-01-01 RX ADMIN — Medication 10 ML: at 11:50

## 2019-01-01 RX ADMIN — SODIUM CHLORIDE 10 ML: 9 INJECTION, SOLUTION INTRAMUSCULAR; INTRAVENOUS; SUBCUTANEOUS at 21:09

## 2019-01-01 RX ADMIN — MUPIROCIN: 20 OINTMENT TOPICAL at 08:05

## 2019-01-01 RX ADMIN — SODIUM CHLORIDE 10 ML: 9 INJECTION, SOLUTION INTRAMUSCULAR; INTRAVENOUS; SUBCUTANEOUS at 22:00

## 2019-01-01 RX ADMIN — HEPARIN SODIUM 5000 UNITS: 5000 INJECTION INTRAVENOUS; SUBCUTANEOUS at 05:08

## 2019-01-01 RX ADMIN — SODIUM CHLORIDE 40 MG: 9 INJECTION, SOLUTION INTRAMUSCULAR; INTRAVENOUS; SUBCUTANEOUS at 08:50

## 2019-01-01 RX ADMIN — HYDROMORPHONE HYDROCHLORIDE 0.5 MG: 2 INJECTION, SOLUTION INTRAMUSCULAR; INTRAVENOUS; SUBCUTANEOUS at 21:34

## 2019-01-01 RX ADMIN — HYDROMORPHONE HYDROCHLORIDE 0.5 MG: 2 INJECTION, SOLUTION INTRAMUSCULAR; INTRAVENOUS; SUBCUTANEOUS at 06:13

## 2019-01-01 RX ADMIN — HYDROMORPHONE HYDROCHLORIDE 0.5 MG: 1 INJECTION, SOLUTION INTRAMUSCULAR; INTRAVENOUS; SUBCUTANEOUS at 22:16

## 2019-01-01 RX ADMIN — HYDROMORPHONE HYDROCHLORIDE 1 MG: 2 TABLET ORAL at 11:44

## 2019-01-01 RX ADMIN — HYDROMORPHONE HYDROCHLORIDE 0.5 MG: 1 INJECTION, SOLUTION INTRAMUSCULAR; INTRAVENOUS; SUBCUTANEOUS at 06:46

## 2019-01-01 RX ADMIN — Medication 10 ML: at 14:00

## 2019-01-01 RX ADMIN — FLUCONAZOLE 200 MG: 200 TABLET ORAL at 17:53

## 2019-01-01 RX ADMIN — HYDROMORPHONE HYDROCHLORIDE 0.5 MG: 1 INJECTION, SOLUTION INTRAMUSCULAR; INTRAVENOUS; SUBCUTANEOUS at 19:34

## 2019-01-01 RX ADMIN — SODIUM CHLORIDE 10 ML: 9 INJECTION, SOLUTION INTRAMUSCULAR; INTRAVENOUS; SUBCUTANEOUS at 13:07

## 2019-01-01 RX ADMIN — ZOLPIDEM TARTRATE 5 MG: 5 TABLET ORAL at 21:38

## 2019-01-01 RX ADMIN — OXYCODONE HYDROCHLORIDE 5 MG: 5 TABLET ORAL at 08:55

## 2019-01-01 RX ADMIN — NYSTATIN: 100000 POWDER TOPICAL at 08:58

## 2019-01-01 RX ADMIN — Medication 10 ML: at 16:27

## 2019-01-01 RX ADMIN — HYDROCODONE BITARTRATE AND ACETAMINOPHEN 1 TABLET: 5; 325 TABLET ORAL at 08:59

## 2019-01-01 RX ADMIN — SODIUM CHLORIDE 500 ML: 900 INJECTION, SOLUTION INTRAVENOUS at 00:15

## 2019-01-01 RX ADMIN — Medication 10 ML: at 22:03

## 2019-01-01 RX ADMIN — PROCHLORPERAZINE EDISYLATE 10 MG: 5 INJECTION INTRAMUSCULAR; INTRAVENOUS at 23:39

## 2019-01-01 RX ADMIN — DEXTROSE MONOHYDRATE, SODIUM CHLORIDE, AND POTASSIUM CHLORIDE 100 ML/HR: 50; 4.5; 1.49 INJECTION, SOLUTION INTRAVENOUS at 06:38

## 2019-01-01 RX ADMIN — HYDROMORPHONE HYDROCHLORIDE 0.5 MG: 1 INJECTION, SOLUTION INTRAMUSCULAR; INTRAVENOUS; SUBCUTANEOUS at 13:58

## 2019-01-01 RX ADMIN — MEGESTROL ACETATE 40 MG: 40 TABLET ORAL at 08:55

## 2019-01-01 RX ADMIN — FENTANYL CITRATE 25 MCG: 50 INJECTION, SOLUTION INTRAMUSCULAR; INTRAVENOUS at 19:30

## 2019-01-01 RX ADMIN — LIDOCAINE HYDROCHLORIDE 1 ML: 20 JELLY TOPICAL at 07:04

## 2019-01-01 RX ADMIN — PIPERACILLIN AND TAZOBACTAM 3.38 G: 3; .375 INJECTION, POWDER, FOR SOLUTION INTRAVENOUS at 13:15

## 2019-01-01 RX ADMIN — NYSTATIN: 100000 POWDER TOPICAL at 18:00

## 2019-01-01 RX ADMIN — NYSTATIN: 100000 POWDER TOPICAL at 09:00

## 2019-01-01 RX ADMIN — HYDROMORPHONE HYDROCHLORIDE 0.5 MG: 1 INJECTION, SOLUTION INTRAMUSCULAR; INTRAVENOUS; SUBCUTANEOUS at 19:23

## 2019-01-01 RX ADMIN — HEPARIN SODIUM 5000 UNITS: 5000 INJECTION INTRAVENOUS; SUBCUTANEOUS at 18:15

## 2019-01-01 RX ADMIN — HYDROMORPHONE HYDROCHLORIDE 1 MG: 2 TABLET ORAL at 04:43

## 2019-01-01 RX ADMIN — PIPERACILLIN AND TAZOBACTAM 3.38 G: 3; .375 INJECTION, POWDER, FOR SOLUTION INTRAVENOUS at 19:52

## 2019-01-01 RX ADMIN — HYDROMORPHONE HYDROCHLORIDE 0.5 MG: 2 INJECTION, SOLUTION INTRAMUSCULAR; INTRAVENOUS; SUBCUTANEOUS at 04:32

## 2019-01-01 RX ADMIN — ZOLPIDEM TARTRATE 10 MG: 5 TABLET ORAL at 22:09

## 2019-01-01 RX ADMIN — POTASSIUM CHLORIDE 10 MEQ: 10 INJECTION, SOLUTION INTRAVENOUS at 08:58

## 2019-01-01 RX ADMIN — Medication 10 ML: at 06:24

## 2019-01-01 RX ADMIN — HYDROMORPHONE HYDROCHLORIDE 0.5 MG: 2 INJECTION, SOLUTION INTRAMUSCULAR; INTRAVENOUS; SUBCUTANEOUS at 10:03

## 2019-01-01 RX ADMIN — ONDANSETRON HYDROCHLORIDE 4 MG: 2 INJECTION, SOLUTION INTRAMUSCULAR; INTRAVENOUS at 05:03

## 2019-01-01 RX ADMIN — PIPERACILLIN AND TAZOBACTAM 3.38 G: 3; .375 INJECTION, POWDER, FOR SOLUTION INTRAVENOUS at 03:46

## 2019-01-01 RX ADMIN — KETOROLAC TROMETHAMINE 30 MG: 30 INJECTION, SOLUTION INTRAMUSCULAR; INTRAVENOUS at 21:01

## 2019-01-01 RX ADMIN — POTASSIUM CHLORIDE 20 MEQ: 20 TABLET, EXTENDED RELEASE ORAL at 08:58

## 2019-01-01 RX ADMIN — FAMOTIDINE 20 MG: 20 TABLET ORAL at 17:26

## 2019-01-01 RX ADMIN — DIPHENHYDRAMINE HYDROCHLORIDE 50 MG: 50 INJECTION, SOLUTION INTRAMUSCULAR; INTRAVENOUS at 00:21

## 2019-01-01 RX ADMIN — Medication 10 ML: at 13:11

## 2019-01-01 RX ADMIN — OXYCODONE HYDROCHLORIDE 5 MG: 5 SOLUTION ORAL at 01:05

## 2019-01-01 RX ADMIN — FENTANYL CITRATE 25 MCG: 50 INJECTION, SOLUTION INTRAMUSCULAR; INTRAVENOUS at 13:35

## 2019-01-01 RX ADMIN — DIAZEPAM 5 MG: 5 TABLET ORAL at 22:15

## 2019-01-01 RX ADMIN — ONDANSETRON HYDROCHLORIDE 4 MG: 2 INJECTION, SOLUTION INTRAMUSCULAR; INTRAVENOUS at 12:30

## 2019-01-01 RX ADMIN — AMPICILLIN SODIUM AND SULBACTAM SODIUM 3 G: 2; 1 INJECTION, POWDER, FOR SOLUTION INTRAMUSCULAR; INTRAVENOUS at 18:14

## 2019-01-01 RX ADMIN — IOPAMIDOL 100 ML: 755 INJECTION, SOLUTION INTRAVENOUS at 06:56

## 2019-01-01 RX ADMIN — POTASSIUM CHLORIDE 10 MEQ: 10 INJECTION, SOLUTION INTRAVENOUS at 10:18

## 2019-01-01 RX ADMIN — Medication: at 11:44

## 2019-01-01 RX ADMIN — FENTANYL CITRATE 50 MCG: 50 INJECTION, SOLUTION INTRAMUSCULAR; INTRAVENOUS at 22:51

## 2019-01-01 RX ADMIN — ONDANSETRON HYDROCHLORIDE 4 MG: 2 INJECTION, SOLUTION INTRAMUSCULAR; INTRAVENOUS at 05:45

## 2019-01-01 RX ADMIN — SODIUM CHLORIDE 10 ML: 9 INJECTION, SOLUTION INTRAMUSCULAR; INTRAVENOUS; SUBCUTANEOUS at 15:07

## 2019-01-01 RX ADMIN — PANTOPRAZOLE SODIUM 40 MG: 40 TABLET, DELAYED RELEASE ORAL at 08:39

## 2019-01-01 RX ADMIN — ONDANSETRON HYDROCHLORIDE 4 MG: 2 INJECTION, SOLUTION INTRAMUSCULAR; INTRAVENOUS at 22:00

## 2019-01-01 RX ADMIN — QUETIAPINE FUMARATE 25 MG: 25 TABLET ORAL at 21:41

## 2019-01-01 RX ADMIN — MELATONIN TAB 3 MG 3 MG: 3 TAB at 22:14

## 2019-01-01 RX ADMIN — PANTOPRAZOLE SODIUM 40 MG: 40 TABLET, DELAYED RELEASE ORAL at 08:58

## 2019-01-01 RX ADMIN — DIAZEPAM 5 MG: 5 TABLET ORAL at 18:35

## 2019-01-01 RX ADMIN — ONDANSETRON 4 MG: 2 INJECTION INTRAMUSCULAR; INTRAVENOUS at 23:58

## 2019-01-01 RX ADMIN — MEGESTROL ACETATE 40 MG: 40 TABLET ORAL at 12:23

## 2019-01-01 RX ADMIN — SODIUM CHLORIDE 10 ML: 9 INJECTION, SOLUTION INTRAMUSCULAR; INTRAVENOUS; SUBCUTANEOUS at 13:37

## 2019-01-01 RX ADMIN — FAMOTIDINE 20 MG: 20 TABLET ORAL at 09:24

## 2019-01-01 RX ADMIN — ONDANSETRON 4 MG: 2 INJECTION INTRAMUSCULAR; INTRAVENOUS at 22:04

## 2019-01-01 RX ADMIN — OXYCODONE HYDROCHLORIDE 5 MG: 5 TABLET ORAL at 20:09

## 2019-01-01 RX ADMIN — I.V. FAT EMULSION 500 ML: 20 EMULSION INTRAVENOUS at 21:24

## 2019-01-01 RX ADMIN — FAMOTIDINE 20 MG: 20 TABLET ORAL at 08:20

## 2019-01-01 RX ADMIN — DEXTROSE MONOHYDRATE, SODIUM CHLORIDE, AND POTASSIUM CHLORIDE 75 ML/HR: 50; 4.5; 1.49 INJECTION, SOLUTION INTRAVENOUS at 04:22

## 2019-01-01 RX ADMIN — MAGNESIUM SULFATE HEPTAHYDRATE 2 G: 40 INJECTION, SOLUTION INTRAVENOUS at 08:33

## 2019-01-01 RX ADMIN — DEXTROSE MONOHYDRATE AND SODIUM CHLORIDE 125 ML/HR: 5; .9 INJECTION, SOLUTION INTRAVENOUS at 10:36

## 2019-01-01 RX ADMIN — MORPHINE SULFATE 5 MG: 20 SOLUTION ORAL at 04:39

## 2019-01-01 RX ADMIN — Medication: at 05:28

## 2019-01-01 RX ADMIN — MIDAZOLAM HYDROCHLORIDE 1 MG: 1 INJECTION, SOLUTION INTRAMUSCULAR; INTRAVENOUS at 10:41

## 2019-01-01 RX ADMIN — DIAZEPAM 5 MG: 5 TABLET ORAL at 21:38

## 2019-01-01 RX ADMIN — ONDANSETRON HYDROCHLORIDE 4 MG: 2 INJECTION, SOLUTION INTRAMUSCULAR; INTRAVENOUS at 22:53

## 2019-01-01 RX ADMIN — OXYCODONE HYDROCHLORIDE 5 MG: 5 TABLET ORAL at 17:54

## 2019-01-01 RX ADMIN — SODIUM CHLORIDE 75 ML/HR: 900 INJECTION, SOLUTION INTRAVENOUS at 12:03

## 2019-01-01 RX ADMIN — MEGESTROL ACETATE 40 MG: 40 TABLET ORAL at 12:31

## 2019-01-01 RX ADMIN — PIPERACILLIN SODIUM,TAZOBACTAM SODIUM 3.38 G: 3; .375 INJECTION, POWDER, FOR SOLUTION INTRAVENOUS at 22:01

## 2019-01-01 RX ADMIN — ENOXAPARIN SODIUM 30 MG: 30 INJECTION SUBCUTANEOUS at 08:42

## 2019-01-01 RX ADMIN — GLYCOPYRROLATE 0.4 MG: 0.2 INJECTION INTRAMUSCULAR; INTRAVENOUS at 22:39

## 2019-01-01 RX ADMIN — SODIUM CHLORIDE 25 ML/HR: 450 INJECTION, SOLUTION INTRAVENOUS at 23:39

## 2019-01-01 RX ADMIN — DEXTROSE MONOHYDRATE, SODIUM CHLORIDE, AND POTASSIUM CHLORIDE 125 ML/HR: 50; 4.5; 1.49 INJECTION, SOLUTION INTRAVENOUS at 13:59

## 2019-01-01 RX ADMIN — DIAZEPAM 5 MG: 5 TABLET ORAL at 18:46

## 2019-01-01 RX ADMIN — PIPERACILLIN AND TAZOBACTAM 3.38 G: 3; .375 INJECTION, POWDER, FOR SOLUTION INTRAVENOUS at 03:51

## 2019-01-01 RX ADMIN — HYDROMORPHONE HYDROCHLORIDE 0.5 MG: 1 INJECTION, SOLUTION INTRAMUSCULAR; INTRAVENOUS; SUBCUTANEOUS at 08:57

## 2019-01-01 RX ADMIN — FENTANYL CITRATE 50 MCG: 50 INJECTION, SOLUTION INTRAMUSCULAR; INTRAVENOUS at 21:19

## 2019-01-01 RX ADMIN — MORPHINE SULFATE 4 MG: 2 INJECTION, SOLUTION INTRAMUSCULAR; INTRAVENOUS at 16:02

## 2019-01-01 RX ADMIN — ONDANSETRON HYDROCHLORIDE 4 MG: 2 INJECTION, SOLUTION INTRAMUSCULAR; INTRAVENOUS at 19:36

## 2019-01-01 RX ADMIN — FENTANYL CITRATE 25 MCG: 50 INJECTION, SOLUTION INTRAMUSCULAR; INTRAVENOUS at 10:57

## 2019-01-01 RX ADMIN — QUETIAPINE FUMARATE 25 MG: 25 TABLET ORAL at 01:31

## 2019-01-01 RX ADMIN — HYDROMORPHONE HYDROCHLORIDE 1 MG: 1 INJECTION, SOLUTION INTRAMUSCULAR; INTRAVENOUS; SUBCUTANEOUS at 21:16

## 2019-01-01 RX ADMIN — DIAZEPAM 5 MG: 5 TABLET ORAL at 16:37

## 2019-01-01 RX ADMIN — ESTRADIOL 1 MG: 1 TABLET ORAL at 08:29

## 2019-01-01 RX ADMIN — MUPIROCIN: 20 OINTMENT TOPICAL at 09:04

## 2019-01-01 RX ADMIN — SODIUM CHLORIDE, SODIUM LACTATE, POTASSIUM CHLORIDE, CALCIUM CHLORIDE: 600; 310; 30; 20 INJECTION, SOLUTION INTRAVENOUS at 22:45

## 2019-01-01 RX ADMIN — QUETIAPINE FUMARATE 25 MG: 25 TABLET ORAL at 08:29

## 2019-01-01 RX ADMIN — GABAPENTIN 600 MG: 300 CAPSULE ORAL at 21:15

## 2019-01-01 RX ADMIN — FAMOTIDINE 20 MG: 20 TABLET ORAL at 17:28

## 2019-01-01 RX ADMIN — HYDROMORPHONE HYDROCHLORIDE 0.5 MG: 1 INJECTION, SOLUTION INTRAMUSCULAR; INTRAVENOUS; SUBCUTANEOUS at 01:12

## 2019-01-01 RX ADMIN — IOPAMIDOL 100 ML: 755 INJECTION, SOLUTION INTRAVENOUS at 16:19

## 2019-01-01 RX ADMIN — Medication: at 17:59

## 2019-01-01 RX ADMIN — SODIUM CHLORIDE 1000 ML: 900 INJECTION, SOLUTION INTRAVENOUS at 15:40

## 2019-01-01 RX ADMIN — HEPARIN SODIUM 5000 UNITS: 5000 INJECTION INTRAVENOUS; SUBCUTANEOUS at 18:20

## 2019-01-01 RX ADMIN — POTASSIUM CHLORIDE 20 MEQ: 20 TABLET, EXTENDED RELEASE ORAL at 08:20

## 2019-01-01 RX ADMIN — ASCORBIC ACID, VITAMIN A PALMITATE, CHOLECALCIFEROL, THIAMINE HYDROCHLORIDE, RIBOFLAVIN-5 PHOSPHATE SODIUM, PYRIDOXINE HYDROCHLORIDE, NIACINAMIDE, DEXPANTHENOL, ALPHA-TOCOPHEROL ACETATE, VITAMIN K1, FOLIC ACID, BIOTIN, CYANOCOBALAMIN: 200; 3300; 200; 6; 3.6; 6; 40; 15; 10; 150; 600; 60; 5 INJECTION, SOLUTION INTRAVENOUS at 17:57

## 2019-01-01 RX ADMIN — ONDANSETRON 4 MG: 2 INJECTION INTRAMUSCULAR; INTRAVENOUS at 03:11

## 2019-01-01 RX ADMIN — GABAPENTIN 600 MG: 300 CAPSULE ORAL at 21:36

## 2019-01-01 RX ADMIN — OXYCODONE HYDROCHLORIDE 5 MG: 5 TABLET ORAL at 14:29

## 2019-01-01 RX ADMIN — LORAZEPAM 1 MG: 2 SOLUTION, CONCENTRATE ORAL at 13:31

## 2019-01-01 RX ADMIN — FENTANYL CITRATE 25 MCG: 50 INJECTION, SOLUTION INTRAMUSCULAR; INTRAVENOUS at 09:44

## 2019-01-01 RX ADMIN — FAMOTIDINE 20 MG: 20 TABLET ORAL at 17:23

## 2019-01-01 RX ADMIN — HYDROCODONE BITARTRATE AND ACETAMINOPHEN 1 TABLET: 5; 325 TABLET ORAL at 17:13

## 2019-01-01 RX ADMIN — Medication 30 ML: at 18:16

## 2019-01-01 RX ADMIN — SODIUM CHLORIDE: 900 INJECTION, SOLUTION INTRAVENOUS at 10:52

## 2019-01-01 RX ADMIN — DEXTROSE MONOHYDRATE, SODIUM CHLORIDE, AND POTASSIUM CHLORIDE 125 ML/HR: 50; 4.5; 1.49 INJECTION, SOLUTION INTRAVENOUS at 19:48

## 2019-01-01 RX ADMIN — HYDROMORPHONE HYDROCHLORIDE 0.5 MG: 1 INJECTION, SOLUTION INTRAMUSCULAR; INTRAVENOUS; SUBCUTANEOUS at 23:31

## 2019-01-01 RX ADMIN — HYDROMORPHONE HYDROCHLORIDE 0.5 MG: 2 INJECTION, SOLUTION INTRAMUSCULAR; INTRAVENOUS; SUBCUTANEOUS at 19:01

## 2019-01-01 RX ADMIN — LORAZEPAM 2 MG: 2 SOLUTION, CONCENTRATE ORAL at 01:06

## 2019-01-01 RX ADMIN — HYDROMORPHONE HYDROCHLORIDE 0.5 MG: 1 INJECTION, SOLUTION INTRAMUSCULAR; INTRAVENOUS; SUBCUTANEOUS at 20:50

## 2019-01-01 RX ADMIN — HYDROMORPHONE HYDROCHLORIDE 0.5 MG: 1 INJECTION, SOLUTION INTRAMUSCULAR; INTRAVENOUS; SUBCUTANEOUS at 00:14

## 2019-01-01 RX ADMIN — GABAPENTIN 600 MG: 300 CAPSULE ORAL at 21:35

## 2019-01-01 RX ADMIN — MEGESTROL ACETATE 40 MG: 40 TABLET ORAL at 13:16

## 2019-01-01 RX ADMIN — MIDAZOLAM HYDROCHLORIDE 1 MG: 1 INJECTION, SOLUTION INTRAMUSCULAR; INTRAVENOUS at 10:47

## 2019-01-01 RX ADMIN — FENTANYL CITRATE 25 MCG: 50 INJECTION, SOLUTION INTRAMUSCULAR; INTRAVENOUS at 21:32

## 2019-01-01 RX ADMIN — NYSTATIN: 100000 POWDER TOPICAL at 18:18

## 2019-01-01 RX ADMIN — DEXTROSE MONOHYDRATE, SODIUM CHLORIDE, AND POTASSIUM CHLORIDE 100 ML/HR: 50; 4.5; 1.49 INJECTION, SOLUTION INTRAVENOUS at 16:25

## 2019-01-01 RX ADMIN — HALOPERIDOL 2 MG: 2 SOLUTION ORAL at 16:11

## 2019-01-01 RX ADMIN — SODIUM CHLORIDE 10 ML: 9 INJECTION, SOLUTION INTRAMUSCULAR; INTRAVENOUS; SUBCUTANEOUS at 08:11

## 2019-01-01 RX ADMIN — ESTRADIOL 1 MG: 1 TABLET ORAL at 08:32

## 2019-01-01 RX ADMIN — FAMOTIDINE 20 MG: 20 TABLET ORAL at 17:00

## 2019-01-01 RX ADMIN — OXYCODONE HYDROCHLORIDE 5 MG: 5 TABLET ORAL at 03:12

## 2019-01-01 RX ADMIN — MEGESTROL ACETATE 40 MG: 40 TABLET ORAL at 17:02

## 2019-01-01 RX ADMIN — SODIUM CHLORIDE 125 ML/HR: 900 INJECTION, SOLUTION INTRAVENOUS at 00:10

## 2019-01-01 RX ADMIN — DEXTROSE MONOHYDRATE, SODIUM CHLORIDE, AND POTASSIUM CHLORIDE 100 ML/HR: 50; 4.5; 1.49 INJECTION, SOLUTION INTRAVENOUS at 23:47

## 2019-01-01 RX ADMIN — MORPHINE SULFATE 2 MG: 2 INJECTION, SOLUTION INTRAMUSCULAR; INTRAVENOUS at 17:05

## 2019-01-01 RX ADMIN — Medication 10 ML: at 21:01

## 2019-01-01 RX ADMIN — PANTOPRAZOLE SODIUM 40 MG: 40 TABLET, DELAYED RELEASE ORAL at 08:10

## 2019-01-01 RX ADMIN — Medication 10 ML: at 06:56

## 2019-01-01 RX ADMIN — FAMOTIDINE 20 MG: 20 TABLET ORAL at 18:14

## 2019-01-01 RX ADMIN — Medication 10 ML: at 06:41

## 2019-01-01 RX ADMIN — DEXTROSE MONOHYDRATE, SODIUM CHLORIDE, AND POTASSIUM CHLORIDE 100 ML/HR: 50; 4.5; 1.49 INJECTION, SOLUTION INTRAVENOUS at 04:39

## 2019-01-01 RX ADMIN — SODIUM CHLORIDE 10 ML: 9 INJECTION, SOLUTION INTRAMUSCULAR; INTRAVENOUS; SUBCUTANEOUS at 16:24

## 2019-01-01 RX ADMIN — ONDANSETRON 4 MG: 4 TABLET, ORALLY DISINTEGRATING ORAL at 14:05

## 2019-01-01 RX ADMIN — HYDROMORPHONE HYDROCHLORIDE 1 MG: 1 INJECTION, SOLUTION INTRAMUSCULAR; INTRAVENOUS; SUBCUTANEOUS at 03:52

## 2019-01-01 RX ADMIN — DIAZEPAM 5 MG: 5 TABLET ORAL at 21:06

## 2019-01-01 RX ADMIN — IOHEXOL 50 ML: 240 INJECTION, SOLUTION INTRATHECAL; INTRAVASCULAR; INTRAVENOUS; ORAL at 13:32

## 2019-01-01 RX ADMIN — Medication 10 ML: at 06:29

## 2019-01-01 RX ADMIN — DIAZEPAM 5 MG: 5 TABLET ORAL at 08:56

## 2019-01-01 RX ADMIN — LORAZEPAM 2 MG: 2 SOLUTION, CONCENTRATE ORAL at 17:27

## 2019-01-01 RX ADMIN — ASCORBIC ACID, VITAMIN A PALMITATE, CHOLECALCIFEROL, THIAMINE HYDROCHLORIDE, RIBOFLAVIN-5 PHOSPHATE SODIUM, PYRIDOXINE HYDROCHLORIDE, NIACINAMIDE, DEXPANTHENOL, ALPHA-TOCOPHEROL ACETATE, VITAMIN K1, FOLIC ACID, BIOTIN, CYANOCOBALAMIN: 200; 3300; 200; 6; 3.6; 6; 40; 15; 10; 150; 600; 60; 5 INJECTION, SOLUTION INTRAVENOUS at 18:36

## 2019-01-01 RX ADMIN — IOPAMIDOL 100 ML: 755 INJECTION, SOLUTION INTRAVENOUS at 12:52

## 2019-01-01 RX ADMIN — SALINE NASAL SPRAY 2 SPRAY: 1.5 SOLUTION NASAL at 04:25

## 2019-01-01 RX ADMIN — HEPARIN SODIUM 5000 UNITS: 5000 INJECTION INTRAVENOUS; SUBCUTANEOUS at 05:34

## 2019-01-01 RX ADMIN — NYSTATIN: 100000 POWDER TOPICAL at 18:16

## 2019-01-01 RX ADMIN — HYDROMORPHONE HYDROCHLORIDE 0.5 MG: 2 INJECTION, SOLUTION INTRAMUSCULAR; INTRAVENOUS; SUBCUTANEOUS at 20:01

## 2019-01-01 RX ADMIN — HEPARIN SODIUM 5000 UNITS: 5000 INJECTION INTRAVENOUS; SUBCUTANEOUS at 05:11

## 2019-01-01 RX ADMIN — SODIUM CHLORIDE 10 ML: 9 INJECTION, SOLUTION INTRAMUSCULAR; INTRAVENOUS; SUBCUTANEOUS at 05:26

## 2019-01-01 RX ADMIN — HEPARIN SODIUM 5000 UNITS: 5000 INJECTION INTRAVENOUS; SUBCUTANEOUS at 17:14

## 2019-01-01 RX ADMIN — HYDROMORPHONE HYDROCHLORIDE 0.5 MG: 1 INJECTION, SOLUTION INTRAMUSCULAR; INTRAVENOUS; SUBCUTANEOUS at 05:15

## 2019-01-01 RX ADMIN — KETOROLAC TROMETHAMINE 30 MG: 30 INJECTION, SOLUTION INTRAMUSCULAR; INTRAVENOUS at 03:51

## 2019-01-01 RX ADMIN — SIMETHICONE 80 MG: 20 SUSPENSION/ DROPS ORAL at 12:45

## 2019-01-01 RX ADMIN — PROCHLORPERAZINE EDISYLATE 10 MG: 5 INJECTION INTRAMUSCULAR; INTRAVENOUS at 15:53

## 2019-01-01 RX ADMIN — KETOROLAC TROMETHAMINE 30 MG: 30 INJECTION, SOLUTION INTRAMUSCULAR; INTRAVENOUS at 22:06

## 2019-01-01 RX ADMIN — DEXTROSE MONOHYDRATE, SODIUM CHLORIDE, AND POTASSIUM CHLORIDE 75 ML/HR: 50; 4.5; 1.49 INJECTION, SOLUTION INTRAVENOUS at 07:44

## 2019-01-01 RX ADMIN — OXYCODONE HYDROCHLORIDE 5 MG: 5 TABLET ORAL at 03:02

## 2019-01-01 RX ADMIN — HYDROMORPHONE HYDROCHLORIDE 0.5 MG: 2 INJECTION, SOLUTION INTRAMUSCULAR; INTRAVENOUS; SUBCUTANEOUS at 19:40

## 2019-01-01 RX ADMIN — HYDROCODONE BITARTRATE AND ACETAMINOPHEN 2 TABLET: 5; 325 TABLET ORAL at 13:09

## 2019-01-01 RX ADMIN — AMITRIPTYLINE HYDROCHLORIDE 25 MG: 50 TABLET, FILM COATED ORAL at 22:09

## 2019-01-01 RX ADMIN — FAMOTIDINE 20 MG: 20 TABLET ORAL at 08:45

## 2019-01-01 RX ADMIN — OXYCODONE HYDROCHLORIDE 5 MG: 5 TABLET ORAL at 15:15

## 2019-01-01 RX ADMIN — SODIUM CHLORIDE 10 ML: 9 INJECTION, SOLUTION INTRAMUSCULAR; INTRAVENOUS; SUBCUTANEOUS at 01:35

## 2019-01-01 RX ADMIN — SODIUM CHLORIDE 10 ML: 9 INJECTION, SOLUTION INTRAMUSCULAR; INTRAVENOUS; SUBCUTANEOUS at 05:03

## 2019-01-01 RX ADMIN — DIAZEPAM 5 MG: 5 TABLET ORAL at 06:41

## 2019-01-01 RX ADMIN — MORPHINE SULFATE 4 MG: 2 INJECTION, SOLUTION INTRAMUSCULAR; INTRAVENOUS at 13:32

## 2019-01-01 RX ADMIN — PANTOPRAZOLE SODIUM 40 MG: 40 TABLET, DELAYED RELEASE ORAL at 08:56

## 2019-01-01 RX ADMIN — HYDROMORPHONE HYDROCHLORIDE 1 MG: 5 LIQUID ORAL at 06:51

## 2019-01-01 RX ADMIN — DEXTROSE MONOHYDRATE, SODIUM CHLORIDE, AND POTASSIUM CHLORIDE 50 ML/HR: 50; 4.5; 1.49 INJECTION, SOLUTION INTRAVENOUS at 12:58

## 2019-01-01 RX ADMIN — HEPARIN SODIUM 5000 UNITS: 5000 INJECTION INTRAVENOUS; SUBCUTANEOUS at 05:28

## 2019-01-01 RX ADMIN — ALBUMIN HUMAN 12.5 G: 50 SOLUTION INTRAVENOUS at 18:07

## 2019-01-01 RX ADMIN — HYDROMORPHONE HYDROCHLORIDE 0.4 MG: 2 INJECTION, SOLUTION INTRAMUSCULAR; INTRAVENOUS; SUBCUTANEOUS at 18:54

## 2019-01-01 RX ADMIN — ESTRADIOL 1 MG: 1 TABLET ORAL at 08:55

## 2019-01-01 RX ADMIN — HEPARIN SODIUM 5000 UNITS: 5000 INJECTION INTRAVENOUS; SUBCUTANEOUS at 05:58

## 2019-01-01 RX ADMIN — FENTANYL CITRATE 50 MCG: 50 INJECTION, SOLUTION INTRAMUSCULAR; INTRAVENOUS at 17:09

## 2019-01-01 RX ADMIN — DAKIN'S SOLUTION 0.125% (QUARTER STRENGTH): 0.12 SOLUTION at 21:23

## 2019-01-01 RX ADMIN — DAKIN'S SOLUTION 0.125% (QUARTER STRENGTH): 0.12 SOLUTION at 10:05

## 2019-01-01 RX ADMIN — HEPARIN SODIUM 5000 UNITS: 5000 INJECTION INTRAVENOUS; SUBCUTANEOUS at 17:25

## 2019-01-01 RX ADMIN — GABAPENTIN 600 MG: 300 CAPSULE ORAL at 21:02

## 2019-01-01 RX ADMIN — AMPICILLIN SODIUM AND SULBACTAM SODIUM 3 G: 2; 1 INJECTION, POWDER, FOR SOLUTION INTRAMUSCULAR; INTRAVENOUS at 05:33

## 2019-01-01 RX ADMIN — HYDROMORPHONE HYDROCHLORIDE 0.5 MG: 1 INJECTION, SOLUTION INTRAMUSCULAR; INTRAVENOUS; SUBCUTANEOUS at 18:38

## 2019-01-01 RX ADMIN — AMPICILLIN SODIUM AND SULBACTAM SODIUM 3 G: 2; 1 INJECTION, POWDER, FOR SOLUTION INTRAMUSCULAR; INTRAVENOUS at 06:11

## 2019-01-01 RX ADMIN — MORPHINE SULFATE 5 MG: 20 SOLUTION ORAL at 14:32

## 2019-01-01 RX ADMIN — OXYCODONE HYDROCHLORIDE 5 MG: 5 TABLET ORAL at 09:49

## 2019-01-01 RX ADMIN — Medication 10 ML: at 04:15

## 2019-01-01 RX ADMIN — HEPARIN SODIUM 5000 UNITS: 5000 INJECTION INTRAVENOUS; SUBCUTANEOUS at 05:25

## 2019-01-01 RX ADMIN — PIPERACILLIN SODIUM,TAZOBACTAM SODIUM 3.38 G: 3; .375 INJECTION, POWDER, FOR SOLUTION INTRAVENOUS at 22:37

## 2019-01-01 RX ADMIN — ONDANSETRON HYDROCHLORIDE 4 MG: 2 INJECTION, SOLUTION INTRAMUSCULAR; INTRAVENOUS at 22:57

## 2019-01-01 RX ADMIN — PIPERACILLIN AND TAZOBACTAM 3.38 G: 3; .375 INJECTION, POWDER, FOR SOLUTION INTRAVENOUS at 04:47

## 2019-01-01 RX ADMIN — HYDROMORPHONE HYDROCHLORIDE 0.5 MG: 2 INJECTION, SOLUTION INTRAMUSCULAR; INTRAVENOUS; SUBCUTANEOUS at 10:57

## 2019-01-01 RX ADMIN — NYSTATIN 500000 UNITS: 100000 SUSPENSION ORAL at 21:34

## 2019-01-01 RX ADMIN — AMPICILLIN SODIUM AND SULBACTAM SODIUM 3 G: 2; 1 INJECTION, POWDER, FOR SOLUTION INTRAMUSCULAR; INTRAVENOUS at 00:23

## 2019-01-01 RX ADMIN — HEPARIN SODIUM 5000 UNITS: 5000 INJECTION INTRAVENOUS; SUBCUTANEOUS at 17:53

## 2019-01-01 RX ADMIN — FENTANYL CITRATE 50 MCG: 50 INJECTION, SOLUTION INTRAMUSCULAR; INTRAVENOUS at 20:58

## 2019-01-01 RX ADMIN — Medication 10 ML: at 06:00

## 2019-01-01 RX ADMIN — Medication 10 ML: at 14:33

## 2019-01-01 RX ADMIN — SODIUM CHLORIDE 10 ML: 9 INJECTION, SOLUTION INTRAMUSCULAR; INTRAVENOUS; SUBCUTANEOUS at 06:13

## 2019-01-01 RX ADMIN — HYDROMORPHONE HYDROCHLORIDE 0.5 MG: 1 INJECTION, SOLUTION INTRAMUSCULAR; INTRAVENOUS; SUBCUTANEOUS at 00:44

## 2019-01-01 RX ADMIN — CHLORASEPTIC 1 SPRAY: 1.5 LIQUID ORAL at 00:46

## 2019-01-01 RX ADMIN — DEXAMETHASONE SODIUM PHOSPHATE 4 MG: 4 INJECTION, SOLUTION INTRA-ARTICULAR; INTRALESIONAL; INTRAMUSCULAR; INTRAVENOUS; SOFT TISSUE at 21:21

## 2019-01-01 RX ADMIN — Medication 10 ML: at 04:14

## 2019-01-01 RX ADMIN — MORPHINE SULFATE 5 MG: 20 SOLUTION ORAL at 11:57

## 2019-01-01 RX ADMIN — MEGESTROL ACETATE 40 MG: 40 TABLET ORAL at 08:26

## 2019-01-01 RX ADMIN — SODIUM CHLORIDE 40 MG: 9 INJECTION, SOLUTION INTRAMUSCULAR; INTRAVENOUS; SUBCUTANEOUS at 08:53

## 2019-01-01 RX ADMIN — DEXTROSE MONOHYDRATE, SODIUM CHLORIDE, AND POTASSIUM CHLORIDE 125 ML/HR: 50; 4.5; 1.49 INJECTION, SOLUTION INTRAVENOUS at 07:48

## 2019-01-01 RX ADMIN — MORPHINE SULFATE 5 MG: 20 SOLUTION ORAL at 23:52

## 2019-01-01 RX ADMIN — Medication 10 ML: at 18:52

## 2019-01-01 RX ADMIN — DIAZEPAM 5 MG: 5 TABLET ORAL at 07:42

## 2019-01-01 RX ADMIN — SODIUM CHLORIDE 10 ML: 9 INJECTION, SOLUTION INTRAMUSCULAR; INTRAVENOUS; SUBCUTANEOUS at 06:00

## 2019-01-01 RX ADMIN — MORPHINE SULFATE 10 MG: 100 SOLUTION ORAL at 18:10

## 2019-01-01 RX ADMIN — NYSTATIN: 100000 POWDER TOPICAL at 17:08

## 2019-01-01 RX ADMIN — HYDROMORPHONE HYDROCHLORIDE 0.5 MG: 1 INJECTION, SOLUTION INTRAMUSCULAR; INTRAVENOUS; SUBCUTANEOUS at 11:56

## 2019-01-01 RX ADMIN — FAMOTIDINE 20 MG: 20 TABLET ORAL at 08:08

## 2019-01-01 RX ADMIN — PIPERACILLIN AND TAZOBACTAM 3.38 G: 3; .375 INJECTION, POWDER, FOR SOLUTION INTRAVENOUS at 19:53

## 2019-01-01 RX ADMIN — ONDANSETRON HYDROCHLORIDE 4 MG: 2 INJECTION, SOLUTION INTRAMUSCULAR; INTRAVENOUS at 19:42

## 2019-01-01 RX ADMIN — ESTRADIOL 1 MG: 1 TABLET ORAL at 08:58

## 2019-01-01 RX ADMIN — LORAZEPAM 2 MG: 2 SOLUTION, CONCENTRATE ORAL at 06:05

## 2019-01-01 RX ADMIN — HYDROMORPHONE HYDROCHLORIDE 0.5 MG: 2 INJECTION, SOLUTION INTRAMUSCULAR; INTRAVENOUS; SUBCUTANEOUS at 02:25

## 2019-01-01 RX ADMIN — PANTOPRAZOLE SODIUM 40 MG: 40 TABLET, DELAYED RELEASE ORAL at 08:52

## 2019-01-01 RX ADMIN — ONDANSETRON 4 MG: 2 INJECTION INTRAMUSCULAR; INTRAVENOUS at 16:00

## 2019-01-01 RX ADMIN — ESTRADIOL 1 MG: 1 TABLET ORAL at 08:45

## 2019-01-01 RX ADMIN — SODIUM CHLORIDE 1000 ML: 900 INJECTION, SOLUTION INTRAVENOUS at 07:30

## 2019-01-01 RX ADMIN — HEPARIN SODIUM 5000 UNITS: 5000 INJECTION INTRAVENOUS; SUBCUTANEOUS at 05:03

## 2019-01-01 RX ADMIN — OXYCODONE HYDROCHLORIDE 5 MG: 5 SOLUTION ORAL at 03:38

## 2019-01-01 RX ADMIN — DEXTROSE MONOHYDRATE, SODIUM CHLORIDE, AND POTASSIUM CHLORIDE 100 ML/HR: 50; 4.5; 1.49 INJECTION, SOLUTION INTRAVENOUS at 06:25

## 2019-01-01 RX ADMIN — SODIUM CHLORIDE 125 ML/HR: 900 INJECTION, SOLUTION INTRAVENOUS at 07:35

## 2019-01-01 RX ADMIN — HEPARIN SODIUM 5000 UNITS: 5000 INJECTION INTRAVENOUS; SUBCUTANEOUS at 05:16

## 2019-01-01 RX ADMIN — PANTOPRAZOLE SODIUM 40 MG: 40 TABLET, DELAYED RELEASE ORAL at 08:41

## 2019-01-01 RX ADMIN — HYDROMORPHONE HYDROCHLORIDE 0.5 MG: 1 INJECTION, SOLUTION INTRAMUSCULAR; INTRAVENOUS; SUBCUTANEOUS at 13:54

## 2019-01-01 RX ADMIN — DIAZEPAM 5 MG: 5 TABLET ORAL at 02:41

## 2019-01-01 RX ADMIN — FAMOTIDINE 20 MG: 20 TABLET ORAL at 09:15

## 2019-01-01 RX ADMIN — ZOLPIDEM TARTRATE 10 MG: 5 TABLET ORAL at 21:14

## 2019-01-01 RX ADMIN — HEPARIN SODIUM 5000 UNITS: 5000 INJECTION INTRAVENOUS; SUBCUTANEOUS at 06:07

## 2019-01-01 RX ADMIN — FAMOTIDINE 20 MG: 20 TABLET ORAL at 08:27

## 2019-01-01 RX ADMIN — FAMOTIDINE 20 MG: 20 TABLET ORAL at 08:56

## 2019-01-01 RX ADMIN — FAMOTIDINE 20 MG: 20 TABLET ORAL at 17:07

## 2019-01-01 RX ADMIN — NYSTATIN: 100000 POWDER TOPICAL at 17:04

## 2019-01-01 RX ADMIN — AMPICILLIN SODIUM AND SULBACTAM SODIUM 3 G: 2; 1 INJECTION, POWDER, FOR SOLUTION INTRAMUSCULAR; INTRAVENOUS at 23:31

## 2019-01-01 RX ADMIN — DEXTROSE MONOHYDRATE AND SODIUM CHLORIDE 125 ML/HR: 5; .9 INJECTION, SOLUTION INTRAVENOUS at 19:03

## 2019-01-01 RX ADMIN — HYDROMORPHONE HYDROCHLORIDE 0.5 MG: 1 INJECTION, SOLUTION INTRAMUSCULAR; INTRAVENOUS; SUBCUTANEOUS at 04:00

## 2019-01-01 RX ADMIN — FAMOTIDINE 20 MG: 20 TABLET ORAL at 17:09

## 2019-01-01 RX ADMIN — ONDANSETRON 4 MG: 2 INJECTION INTRAMUSCULAR; INTRAVENOUS at 16:55

## 2019-01-01 RX ADMIN — ESTRADIOL 1 MG: 1 TABLET ORAL at 08:10

## 2019-01-01 RX ADMIN — MORPHINE SULFATE 5 MG: 20 SOLUTION ORAL at 12:49

## 2019-01-01 RX ADMIN — DIAZEPAM 5 MG: 5 TABLET ORAL at 21:35

## 2019-01-01 RX ADMIN — FAMOTIDINE 20 MG: 20 TABLET ORAL at 08:29

## 2019-01-01 RX ADMIN — AMITRIPTYLINE HYDROCHLORIDE 25 MG: 50 TABLET, FILM COATED ORAL at 21:05

## 2019-01-01 RX ADMIN — HYDROMORPHONE HYDROCHLORIDE 1 MG: 2 TABLET ORAL at 17:57

## 2019-01-01 RX ADMIN — HEPARIN SODIUM 5000 UNITS: 5000 INJECTION INTRAVENOUS; SUBCUTANEOUS at 18:58

## 2019-01-01 RX ADMIN — POTASSIUM CHLORIDE 10 MEQ: 10 INJECTION, SOLUTION INTRAVENOUS at 08:49

## 2019-01-01 RX ADMIN — OXYCODONE HYDROCHLORIDE 5 MG: 5 TABLET ORAL at 08:19

## 2019-01-01 RX ADMIN — SODIUM CHLORIDE 10 ML: 9 INJECTION, SOLUTION INTRAMUSCULAR; INTRAVENOUS; SUBCUTANEOUS at 21:57

## 2019-01-01 RX ADMIN — HYDROMORPHONE HYDROCHLORIDE 0.5 MG: 1 INJECTION, SOLUTION INTRAMUSCULAR; INTRAVENOUS; SUBCUTANEOUS at 15:14

## 2019-01-01 RX ADMIN — Medication 10 ML: at 23:58

## 2019-01-01 RX ADMIN — AMPICILLIN SODIUM AND SULBACTAM SODIUM 3 G: 2; 1 INJECTION, POWDER, FOR SOLUTION INTRAMUSCULAR; INTRAVENOUS at 10:59

## 2019-01-01 RX ADMIN — HYDROMORPHONE HYDROCHLORIDE 1 MG: 1 INJECTION, SOLUTION INTRAMUSCULAR; INTRAVENOUS; SUBCUTANEOUS at 15:40

## 2019-01-01 RX ADMIN — PIPERACILLIN SODIUM,TAZOBACTAM SODIUM 3.38 G: 3; .375 INJECTION, POWDER, FOR SOLUTION INTRAVENOUS at 06:25

## 2019-01-01 RX ADMIN — SALINE NASAL SPRAY 2 SPRAY: 1.5 SOLUTION NASAL at 19:53

## 2019-01-01 RX ADMIN — DEXTROSE MONOHYDRATE, SODIUM CHLORIDE, AND POTASSIUM CHLORIDE 75 ML/HR: 50; 4.5; 1.49 INJECTION, SOLUTION INTRAVENOUS at 22:00

## 2019-01-01 RX ADMIN — HYDROMORPHONE HYDROCHLORIDE 0.5 MG: 2 INJECTION, SOLUTION INTRAMUSCULAR; INTRAVENOUS; SUBCUTANEOUS at 00:27

## 2019-01-01 RX ADMIN — OXYCODONE HYDROCHLORIDE 5 MG: 5 TABLET ORAL at 19:42

## 2019-01-01 RX ADMIN — HYDROMORPHONE HYDROCHLORIDE 0.5 MG: 1 INJECTION, SOLUTION INTRAMUSCULAR; INTRAVENOUS; SUBCUTANEOUS at 11:53

## 2019-01-01 RX ADMIN — HYDROMORPHONE HYDROCHLORIDE 0.5 MG: 1 INJECTION, SOLUTION INTRAMUSCULAR; INTRAVENOUS; SUBCUTANEOUS at 06:51

## 2019-01-01 RX ADMIN — HYDROMORPHONE HYDROCHLORIDE 0.5 MG: 1 INJECTION, SOLUTION INTRAMUSCULAR; INTRAVENOUS; SUBCUTANEOUS at 11:00

## 2019-01-01 RX ADMIN — ENOXAPARIN SODIUM 30 MG: 30 INJECTION SUBCUTANEOUS at 08:20

## 2019-01-01 RX ADMIN — Medication 10 ML: at 13:59

## 2019-01-01 RX ADMIN — LOPERAMIDE HYDROCHLORIDE 2 MG: 2 CAPSULE ORAL at 17:09

## 2019-01-01 RX ADMIN — FAMOTIDINE 20 MG: 20 TABLET ORAL at 17:20

## 2019-01-01 RX ADMIN — HYDROMORPHONE HYDROCHLORIDE 0.5 MG: 1 INJECTION, SOLUTION INTRAMUSCULAR; INTRAVENOUS; SUBCUTANEOUS at 04:33

## 2019-01-01 RX ADMIN — HYDROMORPHONE HYDROCHLORIDE 0.5 MG: 1 INJECTION, SOLUTION INTRAMUSCULAR; INTRAVENOUS; SUBCUTANEOUS at 02:15

## 2019-01-01 RX ADMIN — ONDANSETRON 4 MG: 2 INJECTION INTRAMUSCULAR; INTRAVENOUS at 20:12

## 2019-01-01 RX ADMIN — SODIUM CHLORIDE 10 ML: 9 INJECTION, SOLUTION INTRAMUSCULAR; INTRAVENOUS; SUBCUTANEOUS at 21:31

## 2019-01-01 RX ADMIN — FENTANYL CITRATE 50 MCG: 50 INJECTION, SOLUTION INTRAMUSCULAR; INTRAVENOUS at 16:48

## 2019-01-01 RX ADMIN — ZOLPIDEM TARTRATE 10 MG: 5 TABLET ORAL at 21:06

## 2019-01-01 RX ADMIN — MEGESTROL ACETATE 40 MG: 40 TABLET ORAL at 08:56

## 2019-01-01 RX ADMIN — DIAZEPAM 5 MG: 5 TABLET ORAL at 21:07

## 2019-01-01 RX ADMIN — NYSTATIN: 100000 POWDER TOPICAL at 19:52

## 2019-01-01 RX ADMIN — LOPERAMIDE HYDROCHLORIDE 2 MG: 2 CAPSULE ORAL at 21:44

## 2019-01-01 RX ADMIN — NYSTATIN: 100000 POWDER TOPICAL at 08:44

## 2019-01-01 RX ADMIN — HYDROMORPHONE HYDROCHLORIDE 0.5 MG: 2 INJECTION, SOLUTION INTRAMUSCULAR; INTRAVENOUS; SUBCUTANEOUS at 18:23

## 2019-01-01 RX ADMIN — SODIUM CHLORIDE 10 ML: 9 INJECTION, SOLUTION INTRAMUSCULAR; INTRAVENOUS; SUBCUTANEOUS at 15:47

## 2019-01-01 RX ADMIN — ONDANSETRON 4 MG: 2 INJECTION INTRAMUSCULAR; INTRAVENOUS at 04:26

## 2019-01-01 RX ADMIN — OXYCODONE HYDROCHLORIDE 5 MG: 5 TABLET ORAL at 15:27

## 2019-01-01 RX ADMIN — OXYCODONE HYDROCHLORIDE 5 MG: 5 TABLET ORAL at 04:04

## 2019-01-01 RX ADMIN — HEPARIN SODIUM 5000 UNITS: 5000 INJECTION INTRAVENOUS; SUBCUTANEOUS at 17:45

## 2019-01-01 RX ADMIN — ROCURONIUM BROMIDE 5 MG: 10 INJECTION, SOLUTION INTRAVENOUS at 22:15

## 2019-01-01 RX ADMIN — SODIUM CHLORIDE 75 ML/HR: 450 INJECTION, SOLUTION INTRAVENOUS at 15:47

## 2019-01-01 RX ADMIN — OXYCODONE HYDROCHLORIDE 5 MG: 5 TABLET ORAL at 22:09

## 2019-01-01 RX ADMIN — Medication: at 05:39

## 2019-01-01 RX ADMIN — SODIUM CHLORIDE 125 ML/HR: 900 INJECTION, SOLUTION INTRAVENOUS at 23:14

## 2019-01-01 RX ADMIN — OXYCODONE HYDROCHLORIDE 5 MG: 5 SOLUTION ORAL at 16:37

## 2019-01-01 RX ADMIN — MEGESTROL ACETATE 40 MG: 40 TABLET ORAL at 12:34

## 2019-01-01 RX ADMIN — DAKIN'S SOLUTION 0.125% (QUARTER STRENGTH): 0.12 SOLUTION at 10:53

## 2019-01-01 RX ADMIN — LOPERAMIDE HYDROCHLORIDE 2 MG: 2 CAPSULE ORAL at 07:49

## 2019-01-01 RX ADMIN — SUCCINYLCHOLINE CHLORIDE 140 MG: 20 INJECTION INTRAMUSCULAR; INTRAVENOUS at 16:48

## 2019-01-01 RX ADMIN — ALTEPLASE 2 MG: 2.2 INJECTION, POWDER, LYOPHILIZED, FOR SOLUTION INTRAVENOUS at 18:05

## 2019-01-01 RX ADMIN — HYDROMORPHONE HYDROCHLORIDE 0.5 MG: 1 INJECTION, SOLUTION INTRAMUSCULAR; INTRAVENOUS; SUBCUTANEOUS at 01:40

## 2019-01-01 RX ADMIN — HYDROMORPHONE HYDROCHLORIDE 0.5 MG: 2 INJECTION, SOLUTION INTRAMUSCULAR; INTRAVENOUS; SUBCUTANEOUS at 08:14

## 2019-01-01 RX ADMIN — PIPERACILLIN AND TAZOBACTAM 3.38 G: 3; .375 INJECTION, POWDER, FOR SOLUTION INTRAVENOUS at 04:21

## 2019-01-01 RX ADMIN — HYDROMORPHONE HYDROCHLORIDE 0.5 MG: 2 INJECTION, SOLUTION INTRAMUSCULAR; INTRAVENOUS; SUBCUTANEOUS at 09:36

## 2019-01-01 RX ADMIN — HYDROMORPHONE HYDROCHLORIDE 0.5 MG: 2 INJECTION, SOLUTION INTRAMUSCULAR; INTRAVENOUS; SUBCUTANEOUS at 00:03

## 2019-01-01 RX ADMIN — Medication 10 ML: at 16:30

## 2019-01-01 RX ADMIN — HYDROCODONE BITARTRATE AND ACETAMINOPHEN 1 TABLET: 5; 325 TABLET ORAL at 09:03

## 2019-01-01 RX ADMIN — DIAZEPAM 5 MG: 5 TABLET ORAL at 03:03

## 2019-01-01 RX ADMIN — MEGESTROL ACETATE 40 MG: 40 TABLET ORAL at 12:42

## 2019-01-01 RX ADMIN — ROCURONIUM BROMIDE 10 MG: 10 INJECTION, SOLUTION INTRAVENOUS at 21:21

## 2019-01-01 RX ADMIN — MEGESTROL ACETATE 40 MG: 40 TABLET ORAL at 16:26

## 2019-01-01 RX ADMIN — DOCUSATE SODIUM 100 MG: 100 CAPSULE, LIQUID FILLED ORAL at 21:38

## 2019-01-01 RX ADMIN — MORPHINE SULFATE 10 MG: 100 SOLUTION ORAL at 23:36

## 2019-01-01 RX ADMIN — DIAZEPAM 5 MG: 5 TABLET ORAL at 03:38

## 2019-01-01 RX ADMIN — LORAZEPAM 2 MG: 2 SOLUTION, CONCENTRATE ORAL at 06:37

## 2019-01-01 RX ADMIN — ENOXAPARIN SODIUM 40 MG: 40 INJECTION SUBCUTANEOUS at 08:31

## 2019-01-01 RX ADMIN — HYDROMORPHONE HYDROCHLORIDE 0.2 MG: 2 INJECTION, SOLUTION INTRAMUSCULAR; INTRAVENOUS; SUBCUTANEOUS at 16:48

## 2019-01-01 RX ADMIN — FENTANYL CITRATE 25 MCG: 50 INJECTION, SOLUTION INTRAMUSCULAR; INTRAVENOUS at 23:17

## 2019-01-01 RX ADMIN — LORAZEPAM 2 MG: 2 SOLUTION, CONCENTRATE ORAL at 12:36

## 2019-01-01 RX ADMIN — GABAPENTIN 600 MG: 300 CAPSULE ORAL at 21:28

## 2019-01-01 RX ADMIN — PIPERACILLIN AND TAZOBACTAM 3.38 G: 3; .375 INJECTION, POWDER, FOR SOLUTION INTRAVENOUS at 03:03

## 2019-01-01 RX ADMIN — HYDROMORPHONE HYDROCHLORIDE 1 MG: 1 INJECTION, SOLUTION INTRAMUSCULAR; INTRAVENOUS; SUBCUTANEOUS at 06:49

## 2019-01-01 RX ADMIN — HYDROMORPHONE HYDROCHLORIDE 0.5 MG: 1 INJECTION, SOLUTION INTRAMUSCULAR; INTRAVENOUS; SUBCUTANEOUS at 00:16

## 2019-01-01 RX ADMIN — DEXTROSE MONOHYDRATE, SODIUM CHLORIDE, AND POTASSIUM CHLORIDE 100 ML/HR: 50; 4.5; 1.49 INJECTION, SOLUTION INTRAVENOUS at 08:22

## 2019-01-01 RX ADMIN — PROCHLORPERAZINE EDISYLATE 10 MG: 5 INJECTION INTRAMUSCULAR; INTRAVENOUS at 03:16

## 2019-01-01 RX ADMIN — MEGESTROL ACETATE 40 MG: 40 TABLET ORAL at 08:45

## 2019-01-01 RX ADMIN — OXYCODONE HYDROCHLORIDE 5 MG: 5 TABLET ORAL at 23:57

## 2019-01-01 RX ADMIN — GABAPENTIN 600 MG: 300 CAPSULE ORAL at 21:17

## 2019-01-01 RX ADMIN — AMPICILLIN SODIUM AND SULBACTAM SODIUM 3 G: 2; 1 INJECTION, POWDER, FOR SOLUTION INTRAMUSCULAR; INTRAVENOUS at 00:24

## 2019-01-01 RX ADMIN — HEPARIN SODIUM 5000 UNITS: 5000 INJECTION INTRAVENOUS; SUBCUTANEOUS at 06:10

## 2019-01-01 RX ADMIN — HYDROMORPHONE HYDROCHLORIDE 0.5 MG: 1 INJECTION, SOLUTION INTRAMUSCULAR; INTRAVENOUS; SUBCUTANEOUS at 06:30

## 2019-01-01 RX ADMIN — MIDAZOLAM HYDROCHLORIDE 1 MG: 1 INJECTION, SOLUTION INTRAMUSCULAR; INTRAVENOUS at 09:48

## 2019-01-01 RX ADMIN — HYDROMORPHONE HYDROCHLORIDE 0.5 MG: 1 INJECTION, SOLUTION INTRAMUSCULAR; INTRAVENOUS; SUBCUTANEOUS at 22:29

## 2019-01-01 RX ADMIN — FENTANYL CITRATE 25 MCG: 50 INJECTION, SOLUTION INTRAMUSCULAR; INTRAVENOUS at 10:03

## 2019-01-01 RX ADMIN — OXYCODONE HYDROCHLORIDE 5 MG: 5 TABLET ORAL at 04:47

## 2019-01-01 RX ADMIN — FAMOTIDINE 20 MG: 20 TABLET ORAL at 08:32

## 2019-01-01 RX ADMIN — AMPICILLIN SODIUM AND SULBACTAM SODIUM 3 G: 2; 1 INJECTION, POWDER, FOR SOLUTION INTRAMUSCULAR; INTRAVENOUS at 05:12

## 2019-01-01 RX ADMIN — FENTANYL CITRATE 25 MCG: 50 INJECTION, SOLUTION INTRAMUSCULAR; INTRAVENOUS at 03:27

## 2019-01-01 RX ADMIN — FENTANYL CITRATE 25 MCG: 50 INJECTION, SOLUTION INTRAMUSCULAR; INTRAVENOUS at 23:01

## 2019-01-01 RX ADMIN — DIAZEPAM 5 MG: 5 TABLET ORAL at 12:04

## 2019-01-01 RX ADMIN — DAKIN'S SOLUTION 0.125% (QUARTER STRENGTH): 0.12 SOLUTION at 17:24

## 2019-01-01 RX ADMIN — PROCHLORPERAZINE EDISYLATE 10 MG: 5 INJECTION INTRAMUSCULAR; INTRAVENOUS at 05:53

## 2019-01-01 RX ADMIN — MORPHINE SULFATE 15 MG: 100 SOLUTION ORAL at 10:17

## 2019-01-01 RX ADMIN — GABAPENTIN 600 MG: 300 CAPSULE ORAL at 22:00

## 2019-01-01 RX ADMIN — Medication 10 ML: at 15:25

## 2019-01-01 RX ADMIN — AMITRIPTYLINE HYDROCHLORIDE 25 MG: 50 TABLET, FILM COATED ORAL at 22:00

## 2019-01-01 RX ADMIN — ONDANSETRON HYDROCHLORIDE 4 MG: 2 INJECTION, SOLUTION INTRAMUSCULAR; INTRAVENOUS at 22:03

## 2019-01-01 RX ADMIN — HYDROMORPHONE HYDROCHLORIDE 0.5 MG: 2 INJECTION, SOLUTION INTRAMUSCULAR; INTRAVENOUS; SUBCUTANEOUS at 15:11

## 2019-01-01 RX ADMIN — PIPERACILLIN AND TAZOBACTAM 3.38 G: 3; .375 INJECTION, POWDER, FOR SOLUTION INTRAVENOUS at 20:23

## 2019-01-01 RX ADMIN — BUTALBITAL, ACETAMINOPHEN AND CAFFEINE 1 TABLET: 50; 325; 40 TABLET ORAL at 11:42

## 2019-01-01 RX ADMIN — ACETAMINOPHEN 1000 MG: 10 INJECTION, SOLUTION INTRAVENOUS at 18:33

## 2019-01-01 RX ADMIN — HEPARIN SODIUM 5000 UNITS: 5000 INJECTION INTRAVENOUS; SUBCUTANEOUS at 18:33

## 2019-01-01 RX ADMIN — DEXTROSE MONOHYDRATE AND SODIUM CHLORIDE 125 ML/HR: 5; .9 INJECTION, SOLUTION INTRAVENOUS at 01:06

## 2019-01-01 RX ADMIN — SODIUM CHLORIDE 1000 ML: 900 INJECTION, SOLUTION INTRAVENOUS at 11:11

## 2019-01-01 RX ADMIN — MEGESTROL ACETATE 40 MG: 40 TABLET ORAL at 17:46

## 2019-01-01 RX ADMIN — HYDROMORPHONE HYDROCHLORIDE 0.5 MG: 1 INJECTION, SOLUTION INTRAMUSCULAR; INTRAVENOUS; SUBCUTANEOUS at 00:23

## 2019-01-01 RX ADMIN — PIPERACILLIN SODIUM,TAZOBACTAM SODIUM 3.38 G: 3; .375 INJECTION, POWDER, FOR SOLUTION INTRAVENOUS at 06:10

## 2019-01-01 RX ADMIN — PIPERACILLIN AND TAZOBACTAM 3.38 G: 3; .375 INJECTION, POWDER, FOR SOLUTION INTRAVENOUS at 11:40

## 2019-01-01 RX ADMIN — GABAPENTIN 600 MG: 300 CAPSULE ORAL at 22:01

## 2019-01-01 RX ADMIN — ENOXAPARIN SODIUM 40 MG: 40 INJECTION SUBCUTANEOUS at 09:28

## 2019-01-01 RX ADMIN — ONDANSETRON 4 MG: 2 INJECTION INTRAMUSCULAR; INTRAVENOUS at 22:10

## 2019-01-01 RX ADMIN — NEOSTIGMINE METHYLSULFATE 3 MG: 1 INJECTION INTRAVENOUS at 18:46

## 2019-01-01 RX ADMIN — QUETIAPINE FUMARATE 25 MG: 25 TABLET ORAL at 21:05

## 2019-01-01 RX ADMIN — SODIUM CHLORIDE 75 ML/HR: 450 INJECTION, SOLUTION INTRAVENOUS at 10:55

## 2019-01-01 RX ADMIN — HYDROCODONE BITARTRATE AND ACETAMINOPHEN 2 TABLET: 5; 325 TABLET ORAL at 19:39

## 2019-01-01 RX ADMIN — POTASSIUM CHLORIDE 10 MEQ: 10 INJECTION, SOLUTION INTRAVENOUS at 06:31

## 2019-01-01 RX ADMIN — Medication 10 ML: at 12:52

## 2019-01-01 RX ADMIN — SODIUM CHLORIDE 10 ML: 9 INJECTION, SOLUTION INTRAMUSCULAR; INTRAVENOUS; SUBCUTANEOUS at 23:43

## 2019-01-01 RX ADMIN — DOCUSATE SODIUM 100 MG: 100 CAPSULE, LIQUID FILLED ORAL at 08:58

## 2019-01-01 RX ADMIN — SODIUM CHLORIDE 10 ML: 9 INJECTION, SOLUTION INTRAMUSCULAR; INTRAVENOUS; SUBCUTANEOUS at 15:53

## 2019-01-01 RX ADMIN — ENOXAPARIN SODIUM 40 MG: 40 INJECTION SUBCUTANEOUS at 09:37

## 2019-01-01 RX ADMIN — AMITRIPTYLINE HYDROCHLORIDE 25 MG: 50 TABLET, FILM COATED ORAL at 21:40

## 2019-01-01 RX ADMIN — HYDROMORPHONE HYDROCHLORIDE 0.5 MG: 1 INJECTION, SOLUTION INTRAMUSCULAR; INTRAVENOUS; SUBCUTANEOUS at 05:45

## 2019-01-01 RX ADMIN — AMITRIPTYLINE HYDROCHLORIDE 25 MG: 50 TABLET, FILM COATED ORAL at 21:17

## 2019-01-01 RX ADMIN — HYDROCODONE BITARTRATE AND ACETAMINOPHEN 1 TABLET: 5; 325 TABLET ORAL at 21:05

## 2019-01-01 RX ADMIN — QUETIAPINE FUMARATE 25 MG: 25 TABLET ORAL at 22:00

## 2019-01-01 RX ADMIN — ZOLPIDEM TARTRATE 5 MG: 5 TABLET ORAL at 21:59

## 2019-01-01 RX ADMIN — DOCUSATE SODIUM 100 MG: 100 CAPSULE, LIQUID FILLED ORAL at 17:28

## 2019-01-01 RX ADMIN — DAKIN'S SOLUTION 0.125% (QUARTER STRENGTH): 0.12 SOLUTION at 17:00

## 2019-01-01 RX ADMIN — NYSTATIN 500000 UNITS: 100000 SUSPENSION ORAL at 08:46

## 2019-01-01 RX ADMIN — FENTANYL CITRATE 25 MCG: 50 INJECTION, SOLUTION INTRAMUSCULAR; INTRAVENOUS at 13:30

## 2019-01-01 RX ADMIN — HYDROMORPHONE HYDROCHLORIDE 0.5 MG: 1 INJECTION, SOLUTION INTRAMUSCULAR; INTRAVENOUS; SUBCUTANEOUS at 14:42

## 2019-01-01 RX ADMIN — NYSTATIN: 100000 POWDER TOPICAL at 17:27

## 2019-01-01 RX ADMIN — ROCURONIUM BROMIDE 20 MG: 10 INJECTION, SOLUTION INTRAVENOUS at 17:47

## 2019-01-01 RX ADMIN — OXYCODONE HYDROCHLORIDE 5 MG: 5 TABLET ORAL at 22:26

## 2019-01-01 RX ADMIN — MUPIROCIN: 20 OINTMENT TOPICAL at 17:10

## 2019-01-01 RX ADMIN — ENOXAPARIN SODIUM 30 MG: 30 INJECTION SUBCUTANEOUS at 08:56

## 2019-01-01 RX ADMIN — HYDROCODONE BITARTRATE AND ACETAMINOPHEN 2 TABLET: 5; 325 TABLET ORAL at 09:55

## 2019-01-01 RX ADMIN — POTASSIUM PHOSPHATE, MONOBASIC AND POTASSIUM PHOSPHATE, DIBASIC: 224; 236 INJECTION, SOLUTION, CONCENTRATE INTRAVENOUS at 08:30

## 2019-01-01 RX ADMIN — Medication 10 ML: at 22:39

## 2019-01-01 RX ADMIN — HYDROMORPHONE HYDROCHLORIDE 0.5 MG: 1 INJECTION, SOLUTION INTRAMUSCULAR; INTRAVENOUS; SUBCUTANEOUS at 20:15

## 2019-01-01 RX ADMIN — NYSTATIN: 100000 POWDER TOPICAL at 08:39

## 2019-01-01 RX ADMIN — HYDROCODONE BITARTRATE AND ACETAMINOPHEN 2 TABLET: 5; 325 TABLET ORAL at 03:02

## 2019-01-01 RX ADMIN — ROCURONIUM BROMIDE 30 MG: 10 INJECTION, SOLUTION INTRAVENOUS at 17:14

## 2019-01-01 RX ADMIN — PIPERACILLIN SODIUM,TAZOBACTAM SODIUM 3.38 G: 3; .375 INJECTION, POWDER, FOR SOLUTION INTRAVENOUS at 14:06

## 2019-01-01 RX ADMIN — OXYCODONE HYDROCHLORIDE 5 MG: 5 TABLET ORAL at 13:55

## 2019-01-01 RX ADMIN — DEXTROSE MONOHYDRATE AND SODIUM CHLORIDE 50 ML/HR: 5; .9 INJECTION, SOLUTION INTRAVENOUS at 23:01

## 2019-01-01 RX ADMIN — HEPARIN SODIUM 5000 UNITS: 5000 INJECTION INTRAVENOUS; SUBCUTANEOUS at 05:10

## 2019-01-01 RX ADMIN — DEXTROSE MONOHYDRATE, SODIUM CHLORIDE, AND POTASSIUM CHLORIDE 75 ML/HR: 50; 4.5; 1.49 INJECTION, SOLUTION INTRAVENOUS at 19:46

## 2019-01-01 RX ADMIN — SODIUM CHLORIDE 10 ML: 9 INJECTION, SOLUTION INTRAMUSCULAR; INTRAVENOUS; SUBCUTANEOUS at 14:17

## 2019-01-01 RX ADMIN — AMPICILLIN SODIUM AND SULBACTAM SODIUM 3 G: 2; 1 INJECTION, POWDER, FOR SOLUTION INTRAMUSCULAR; INTRAVENOUS at 18:26

## 2019-01-01 RX ADMIN — HYDROMORPHONE HYDROCHLORIDE 0.5 MG: 2 INJECTION, SOLUTION INTRAMUSCULAR; INTRAVENOUS; SUBCUTANEOUS at 00:57

## 2019-01-01 RX ADMIN — HYDROMORPHONE HYDROCHLORIDE 0.5 MG: 1 INJECTION, SOLUTION INTRAMUSCULAR; INTRAVENOUS; SUBCUTANEOUS at 00:35

## 2019-01-01 RX ADMIN — MUPIROCIN: 20 OINTMENT TOPICAL at 17:51

## 2019-01-01 RX ADMIN — PIPERACILLIN SODIUM,TAZOBACTAM SODIUM 3.38 G: 3; .375 INJECTION, POWDER, FOR SOLUTION INTRAVENOUS at 08:26

## 2019-01-01 RX ADMIN — EPHEDRINE SULFATE 10 MG: 50 INJECTION, SOLUTION INTRAVENOUS at 21:04

## 2019-01-01 RX ADMIN — NYSTATIN: 100000 POWDER TOPICAL at 08:38

## 2019-01-01 RX ADMIN — MAGNESIUM SULFATE HEPTAHYDRATE 2 G: 40 INJECTION, SOLUTION INTRAVENOUS at 09:45

## 2019-01-01 RX ADMIN — Medication 30 ML: at 06:28

## 2019-01-01 RX ADMIN — SODIUM CHLORIDE 10 ML: 9 INJECTION, SOLUTION INTRAMUSCULAR; INTRAVENOUS; SUBCUTANEOUS at 22:10

## 2019-01-01 RX ADMIN — ONDANSETRON 4 MG: 2 INJECTION INTRAMUSCULAR; INTRAVENOUS at 06:23

## 2019-01-01 RX ADMIN — POTASSIUM CHLORIDE 20 MEQ: 400 INJECTION, SOLUTION INTRAVENOUS at 11:30

## 2019-01-01 RX ADMIN — QUETIAPINE FUMARATE 25 MG: 25 TABLET ORAL at 08:54

## 2019-01-01 RX ADMIN — ESTRADIOL 1 MG: 1 TABLET ORAL at 08:36

## 2019-01-01 RX ADMIN — LORAZEPAM 2 MG: 2 SOLUTION, CONCENTRATE ORAL at 06:33

## 2019-01-01 RX ADMIN — SODIUM CHLORIDE 125 ML/HR: 900 INJECTION, SOLUTION INTRAVENOUS at 16:19

## 2019-01-01 RX ADMIN — HYDROMORPHONE HYDROCHLORIDE 1 MG: 2 TABLET ORAL at 03:23

## 2019-01-01 RX ADMIN — FAMOTIDINE 20 MG: 20 TABLET ORAL at 09:49

## 2019-01-01 RX ADMIN — POTASSIUM CHLORIDE 10 MEQ: 10 INJECTION, SOLUTION INTRAVENOUS at 10:05

## 2019-01-01 RX ADMIN — ONDANSETRON HYDROCHLORIDE 4 MG: 2 INJECTION, SOLUTION INTRAMUSCULAR; INTRAVENOUS at 13:44

## 2019-01-01 RX ADMIN — PIPERACILLIN AND TAZOBACTAM 3.38 G: 3; .375 INJECTION, POWDER, FOR SOLUTION INTRAVENOUS at 03:11

## 2019-01-01 RX ADMIN — CEFOXITIN 2 G: 2 INJECTION, POWDER, FOR SOLUTION INTRAVENOUS at 21:05

## 2019-01-01 RX ADMIN — Medication 10 ML: at 22:06

## 2019-01-01 RX ADMIN — ASCORBIC ACID, VITAMIN A PALMITATE, CHOLECALCIFEROL, THIAMINE HYDROCHLORIDE, RIBOFLAVIN-5 PHOSPHATE SODIUM, PYRIDOXINE HYDROCHLORIDE, NIACINAMIDE, DEXPANTHENOL, ALPHA-TOCOPHEROL ACETATE, VITAMIN K1, FOLIC ACID, BIOTIN, CYANOCOBALAMIN: 200; 3300; 200; 6; 3.6; 6; 40; 15; 10; 150; 600; 60; 5 INJECTION, SOLUTION INTRAVENOUS at 18:39

## 2019-01-01 RX ADMIN — AMPICILLIN SODIUM AND SULBACTAM SODIUM 3 G: 2; 1 INJECTION, POWDER, FOR SOLUTION INTRAMUSCULAR; INTRAVENOUS at 17:15

## 2019-01-01 RX ADMIN — ESTRADIOL 1 MG: 1 TABLET ORAL at 08:26

## 2019-01-01 RX ADMIN — Medication: at 16:30

## 2019-01-01 RX ADMIN — FAMOTIDINE 20 MG: 20 TABLET ORAL at 08:41

## 2019-01-01 RX ADMIN — SODIUM CHLORIDE: 900 INJECTION, SOLUTION INTRAVENOUS at 10:20

## 2019-01-01 RX ADMIN — SODIUM CHLORIDE 10 ML: 9 INJECTION, SOLUTION INTRAMUSCULAR; INTRAVENOUS; SUBCUTANEOUS at 22:21

## 2019-01-01 RX ADMIN — ENOXAPARIN SODIUM 30 MG: 30 INJECTION SUBCUTANEOUS at 08:55

## 2019-01-01 RX ADMIN — FENTANYL CITRATE 25 MCG: 50 INJECTION, SOLUTION INTRAMUSCULAR; INTRAVENOUS at 11:08

## 2019-01-01 RX ADMIN — AMOXICILLIN AND CLAVULANATE POTASSIUM 1 TABLET: 875; 125 TABLET, FILM COATED ORAL at 22:01

## 2019-01-01 RX ADMIN — SODIUM CHLORIDE 1000 ML: 900 INJECTION, SOLUTION INTRAVENOUS at 13:33

## 2019-01-01 RX ADMIN — MORPHINE SULFATE 5 MG: 20 SOLUTION ORAL at 15:28

## 2019-01-01 RX ADMIN — ETOMIDATE 12 MG: 2 INJECTION INTRAVENOUS at 16:48

## 2019-01-01 RX ADMIN — I.V. FAT EMULSION 500 ML: 20 EMULSION INTRAVENOUS at 21:53

## 2019-01-01 RX ADMIN — HEPARIN SODIUM 5000 UNITS: 5000 INJECTION INTRAVENOUS; SUBCUTANEOUS at 17:10

## 2019-01-01 RX ADMIN — HYDROMORPHONE HYDROCHLORIDE 1 MG: 2 TABLET ORAL at 12:01

## 2019-01-01 RX ADMIN — ASCORBIC ACID, VITAMIN A PALMITATE, CHOLECALCIFEROL, THIAMINE HYDROCHLORIDE, RIBOFLAVIN-5 PHOSPHATE SODIUM, PYRIDOXINE HYDROCHLORIDE, NIACINAMIDE, DEXPANTHENOL, ALPHA-TOCOPHEROL ACETATE, VITAMIN K1, FOLIC ACID, BIOTIN, CYANOCOBALAMIN: 200; 3300; 200; 6; 3.6; 6; 40; 15; 10; 150; 600; 60; 5 INJECTION, SOLUTION INTRAVENOUS at 18:10

## 2019-01-01 RX ADMIN — DEXTROSE MONOHYDRATE, SODIUM CHLORIDE, AND POTASSIUM CHLORIDE 100 ML/HR: 50; 4.5; 1.49 INJECTION, SOLUTION INTRAVENOUS at 11:31

## 2019-01-01 RX ADMIN — AMITRIPTYLINE HYDROCHLORIDE 25 MG: 50 TABLET, FILM COATED ORAL at 21:02

## 2019-01-01 RX ADMIN — DIAZEPAM 5 MG: 5 TABLET ORAL at 11:56

## 2019-01-01 RX ADMIN — HYDROMORPHONE HYDROCHLORIDE 1 MG: 5 LIQUID ORAL at 20:28

## 2019-01-01 RX ADMIN — MORPHINE SULFATE 10 MG: 100 SOLUTION ORAL at 21:35

## 2019-01-01 RX ADMIN — MEGESTROL ACETATE 40 MG: 40 TABLET ORAL at 08:36

## 2019-01-01 RX ADMIN — SODIUM CHLORIDE 125 ML/HR: 900 INJECTION, SOLUTION INTRAVENOUS at 01:38

## 2019-01-01 RX ADMIN — ENOXAPARIN SODIUM 40 MG: 40 INJECTION SUBCUTANEOUS at 08:20

## 2019-01-01 RX ADMIN — SODIUM CHLORIDE 75 ML/HR: 450 INJECTION, SOLUTION INTRAVENOUS at 00:01

## 2019-01-01 RX ADMIN — MEGESTROL ACETATE 40 MG: 40 TABLET ORAL at 08:32

## 2019-01-01 RX ADMIN — HYDROCODONE BITARTRATE AND ACETAMINOPHEN 2 TABLET: 5; 325 TABLET ORAL at 00:16

## 2019-01-01 RX ADMIN — SODIUM CHLORIDE 125 ML/HR: 900 INJECTION, SOLUTION INTRAVENOUS at 16:18

## 2019-01-01 RX ADMIN — DEXTROSE MONOHYDRATE, SODIUM CHLORIDE, AND POTASSIUM CHLORIDE 75 ML/HR: 50; 4.5; 1.49 INJECTION, SOLUTION INTRAVENOUS at 16:36

## 2019-01-01 RX ADMIN — HYDROMORPHONE HYDROCHLORIDE 0.5 MG: 1 INJECTION, SOLUTION INTRAMUSCULAR; INTRAVENOUS; SUBCUTANEOUS at 19:39

## 2019-01-01 RX ADMIN — AMPICILLIN SODIUM AND SULBACTAM SODIUM 3 G: 2; 1 INJECTION, POWDER, FOR SOLUTION INTRAMUSCULAR; INTRAVENOUS at 13:04

## 2019-01-01 RX ADMIN — HYDROMORPHONE HYDROCHLORIDE 0.5 MG: 2 INJECTION, SOLUTION INTRAMUSCULAR; INTRAVENOUS; SUBCUTANEOUS at 04:18

## 2019-01-01 RX ADMIN — SODIUM CHLORIDE 25 ML/HR: 900 INJECTION, SOLUTION INTRAVENOUS at 09:24

## 2019-01-01 RX ADMIN — Medication 10 ML: at 05:10

## 2019-01-01 RX ADMIN — DEXTROSE MONOHYDRATE, SODIUM CHLORIDE, AND POTASSIUM CHLORIDE 150 ML/HR: 50; 4.5; 1.49 INJECTION, SOLUTION INTRAVENOUS at 07:31

## 2019-01-01 RX ADMIN — HYDROMORPHONE HYDROCHLORIDE 1 MG: 1 INJECTION, SOLUTION INTRAMUSCULAR; INTRAVENOUS; SUBCUTANEOUS at 08:56

## 2019-01-01 RX ADMIN — AMITRIPTYLINE HYDROCHLORIDE 25 MG: 50 TABLET, FILM COATED ORAL at 22:01

## 2019-01-01 RX ADMIN — MEGESTROL ACETATE 40 MG: 40 TABLET ORAL at 07:56

## 2019-01-01 RX ADMIN — PROCHLORPERAZINE EDISYLATE 10 MG: 5 INJECTION INTRAMUSCULAR; INTRAVENOUS at 11:30

## 2019-01-01 RX ADMIN — FAMOTIDINE 20 MG: 20 TABLET ORAL at 08:54

## 2019-01-01 RX ADMIN — Medication: at 02:38

## 2019-01-01 RX ADMIN — Medication: at 05:04

## 2019-01-01 RX ADMIN — SODIUM CHLORIDE, SODIUM LACTATE, POTASSIUM CHLORIDE, CALCIUM CHLORIDE: 600; 310; 30; 20 INJECTION, SOLUTION INTRAVENOUS at 20:51

## 2019-01-01 RX ADMIN — GABAPENTIN 600 MG: 300 CAPSULE ORAL at 21:33

## 2019-01-01 RX ADMIN — PIPERACILLIN AND TAZOBACTAM 3.38 G: 3; .375 INJECTION, POWDER, FOR SOLUTION INTRAVENOUS at 11:46

## 2019-01-01 RX ADMIN — AMPICILLIN SODIUM AND SULBACTAM SODIUM 3 G: 2; 1 INJECTION, POWDER, FOR SOLUTION INTRAMUSCULAR; INTRAVENOUS at 06:25

## 2019-01-01 RX ADMIN — ESTRADIOL 1 MG: 1 TABLET ORAL at 08:56

## 2019-01-01 RX ADMIN — SALINE NASAL SPRAY 2 SPRAY: 1.5 SOLUTION NASAL at 22:13

## 2019-01-01 RX ADMIN — HYDROMORPHONE HYDROCHLORIDE 1 MG: 1 INJECTION, SOLUTION INTRAMUSCULAR; INTRAVENOUS; SUBCUTANEOUS at 15:49

## 2019-01-01 RX ADMIN — SODIUM CHLORIDE: 900 INJECTION, SOLUTION INTRAVENOUS at 09:38

## 2019-01-01 RX ADMIN — Medication 10 ML: at 21:30

## 2019-01-01 RX ADMIN — HYDROMORPHONE HYDROCHLORIDE 0.5 MG: 1 INJECTION, SOLUTION INTRAMUSCULAR; INTRAVENOUS; SUBCUTANEOUS at 22:06

## 2019-01-01 RX ADMIN — Medication 10 ML: at 21:43

## 2019-01-01 RX ADMIN — SODIUM CHLORIDE 10 ML: 9 INJECTION, SOLUTION INTRAMUSCULAR; INTRAVENOUS; SUBCUTANEOUS at 05:48

## 2019-01-01 RX ADMIN — MIDAZOLAM HYDROCHLORIDE 1 MG: 1 INJECTION, SOLUTION INTRAMUSCULAR; INTRAVENOUS at 10:54

## 2019-01-01 RX ADMIN — I.V. FAT EMULSION 500 ML: 20 EMULSION INTRAVENOUS at 20:37

## 2019-01-01 RX ADMIN — MEGESTROL ACETATE 40 MG: 40 TABLET ORAL at 17:26

## 2019-01-01 RX ADMIN — POTASSIUM CHLORIDE 10 MEQ: 10 INJECTION, SOLUTION INTRAVENOUS at 09:07

## 2019-01-01 RX ADMIN — HYDROMORPHONE HYDROCHLORIDE 0.5 MG: 1 INJECTION, SOLUTION INTRAMUSCULAR; INTRAVENOUS; SUBCUTANEOUS at 04:51

## 2019-01-01 RX ADMIN — PIPERACILLIN AND TAZOBACTAM 3.38 G: 3; .375 INJECTION, POWDER, FOR SOLUTION INTRAVENOUS at 03:56

## 2019-01-01 RX ADMIN — QUETIAPINE FUMARATE 25 MG: 25 TABLET ORAL at 21:37

## 2019-01-01 RX ADMIN — LOPERAMIDE HYDROCHLORIDE 2 MG: 2 CAPSULE ORAL at 09:49

## 2019-01-01 RX ADMIN — PROCHLORPERAZINE MALEATE 5 MG: 5 TABLET, FILM COATED ORAL at 12:00

## 2019-01-01 RX ADMIN — HYDROMORPHONE HYDROCHLORIDE 0.5 MG: 1 INJECTION, SOLUTION INTRAMUSCULAR; INTRAVENOUS; SUBCUTANEOUS at 04:50

## 2019-01-01 RX ADMIN — Medication 10 ML: at 21:59

## 2019-01-01 RX ADMIN — LOPERAMIDE HYDROCHLORIDE 2 MG: 2 CAPSULE ORAL at 08:59

## 2019-01-01 RX ADMIN — HYDROMORPHONE HYDROCHLORIDE 0.5 MG: 1 INJECTION, SOLUTION INTRAMUSCULAR; INTRAVENOUS; SUBCUTANEOUS at 18:11

## 2019-01-01 RX ADMIN — FENTANYL CITRATE 25 MCG: 50 INJECTION, SOLUTION INTRAMUSCULAR; INTRAVENOUS at 02:18

## 2019-01-01 RX ADMIN — ACETAMINOPHEN 650 MG: 650 SUPPOSITORY RECTAL at 12:20

## 2019-01-01 RX ADMIN — Medication 10 ML: at 22:02

## 2019-01-01 RX ADMIN — HYDROMORPHONE HYDROCHLORIDE 0.5 MG: 1 INJECTION, SOLUTION INTRAMUSCULAR; INTRAVENOUS; SUBCUTANEOUS at 10:28

## 2019-01-01 RX ADMIN — PIPERACILLIN AND TAZOBACTAM 3.38 G: 3; .375 INJECTION, POWDER, FOR SOLUTION INTRAVENOUS at 12:22

## 2019-01-01 RX ADMIN — FAMOTIDINE 20 MG: 20 TABLET ORAL at 17:03

## 2019-01-01 RX ADMIN — Medication 10 ML: at 21:35

## 2019-01-01 RX ADMIN — AMPICILLIN SODIUM AND SULBACTAM SODIUM 3 G: 2; 1 INJECTION, POWDER, FOR SOLUTION INTRAMUSCULAR; INTRAVENOUS at 12:33

## 2019-01-01 RX ADMIN — HYDROMORPHONE HYDROCHLORIDE 0.5 MG: 1 INJECTION, SOLUTION INTRAMUSCULAR; INTRAVENOUS; SUBCUTANEOUS at 10:18

## 2019-01-01 RX ADMIN — HYDROCODONE BITARTRATE AND ACETAMINOPHEN 2 TABLET: 5; 325 TABLET ORAL at 21:06

## 2019-01-01 RX ADMIN — LIDOCAINE HYDROCHLORIDE 10 MG: 10 INJECTION, SOLUTION EPIDURAL; INFILTRATION; INTRACAUDAL; PERINEURAL at 11:00

## 2019-01-01 RX ADMIN — HYDROCODONE BITARTRATE AND ACETAMINOPHEN 2 TABLET: 5; 325 TABLET ORAL at 22:38

## 2019-01-01 RX ADMIN — PANTOPRAZOLE SODIUM 40 MG: 40 TABLET, DELAYED RELEASE ORAL at 08:27

## 2019-01-01 RX ADMIN — Medication 10 ML: at 21:17

## 2019-01-01 RX ADMIN — Medication 40 ML: at 19:10

## 2019-01-01 RX ADMIN — PIPERACILLIN SODIUM,TAZOBACTAM SODIUM 3.38 G: 3; .375 INJECTION, POWDER, FOR SOLUTION INTRAVENOUS at 22:21

## 2019-01-01 RX ADMIN — ROCURONIUM BROMIDE 5 MG: 10 INJECTION, SOLUTION INTRAVENOUS at 21:59

## 2019-01-01 RX ADMIN — OXYCODONE HYDROCHLORIDE 5 MG: 5 TABLET ORAL at 06:13

## 2019-01-01 RX ADMIN — HEPARIN SODIUM 5000 UNITS: 5000 INJECTION INTRAVENOUS; SUBCUTANEOUS at 17:09

## 2019-01-01 RX ADMIN — ONDANSETRON 4 MG: 2 INJECTION INTRAMUSCULAR; INTRAVENOUS at 08:55

## 2019-01-01 RX ADMIN — FENTANYL CITRATE 25 MCG: 50 INJECTION, SOLUTION INTRAMUSCULAR; INTRAVENOUS at 09:48

## 2019-01-01 RX ADMIN — DEXTROSE MONOHYDRATE, SODIUM CHLORIDE, AND POTASSIUM CHLORIDE 75 ML/HR: 50; 4.5; 1.49 INJECTION, SOLUTION INTRAVENOUS at 04:49

## 2019-01-01 RX ADMIN — HYDROMORPHONE HYDROCHLORIDE 0.5 MG: 1 INJECTION, SOLUTION INTRAMUSCULAR; INTRAVENOUS; SUBCUTANEOUS at 20:38

## 2019-01-01 RX ADMIN — Medication 10 ML: at 23:33

## 2019-01-01 RX ADMIN — SODIUM CHLORIDE 10 ML: 9 INJECTION, SOLUTION INTRAMUSCULAR; INTRAVENOUS; SUBCUTANEOUS at 21:22

## 2019-01-01 RX ADMIN — HYDROMORPHONE HYDROCHLORIDE 0.5 MG: 2 INJECTION, SOLUTION INTRAMUSCULAR; INTRAVENOUS; SUBCUTANEOUS at 22:12

## 2019-01-01 RX ADMIN — FENTANYL CITRATE 25 MCG: 50 INJECTION, SOLUTION INTRAMUSCULAR; INTRAVENOUS at 16:25

## 2019-01-01 RX ADMIN — ASCORBIC ACID, VITAMIN A PALMITATE, CHOLECALCIFEROL, THIAMINE HYDROCHLORIDE, RIBOFLAVIN-5 PHOSPHATE SODIUM, PYRIDOXINE HYDROCHLORIDE, NIACINAMIDE, DEXPANTHENOL, ALPHA-TOCOPHEROL ACETATE, VITAMIN K1, FOLIC ACID, BIOTIN, CYANOCOBALAMIN: 200; 3300; 200; 6; 3.6; 6; 40; 15; 10; 150; 600; 60; 5 INJECTION, SOLUTION INTRAVENOUS at 17:23

## 2019-01-01 RX ADMIN — HYDROMORPHONE HYDROCHLORIDE 0.5 MG: 1 INJECTION, SOLUTION INTRAMUSCULAR; INTRAVENOUS; SUBCUTANEOUS at 09:23

## 2019-01-01 RX ADMIN — SODIUM CHLORIDE 10 ML: 9 INJECTION, SOLUTION INTRAMUSCULAR; INTRAVENOUS; SUBCUTANEOUS at 05:45

## 2019-01-01 RX ADMIN — PANTOPRAZOLE SODIUM 40 MG: 40 TABLET, DELAYED RELEASE ORAL at 08:08

## 2019-01-01 RX ADMIN — PROPOFOL 150 MG: 10 INJECTION, EMULSION INTRAVENOUS at 20:58

## 2019-01-01 RX ADMIN — PROPOFOL 260 MG: 10 INJECTION, EMULSION INTRAVENOUS at 12:57

## 2019-01-01 RX ADMIN — HYDROMORPHONE HYDROCHLORIDE 0.5 MG: 1 INJECTION, SOLUTION INTRAMUSCULAR; INTRAVENOUS; SUBCUTANEOUS at 15:56

## 2019-01-01 RX ADMIN — SODIUM CHLORIDE 10 ML: 9 INJECTION, SOLUTION INTRAMUSCULAR; INTRAVENOUS; SUBCUTANEOUS at 05:13

## 2019-01-01 RX ADMIN — PANTOPRAZOLE SODIUM 40 MG: 40 TABLET, DELAYED RELEASE ORAL at 08:32

## 2019-01-01 RX ADMIN — Medication 10 ML: at 15:14

## 2019-01-01 RX ADMIN — AMPICILLIN SODIUM AND SULBACTAM SODIUM 3 G: 2; 1 INJECTION, POWDER, FOR SOLUTION INTRAMUSCULAR; INTRAVENOUS at 13:38

## 2019-01-01 RX ADMIN — QUETIAPINE FUMARATE 25 MG: 25 TABLET ORAL at 21:17

## 2019-01-01 RX ADMIN — POTASSIUM CHLORIDE 20 MEQ: 20 TABLET, EXTENDED RELEASE ORAL at 17:28

## 2019-01-01 RX ADMIN — HEPARIN SODIUM 5000 UNITS: 5000 INJECTION INTRAVENOUS; SUBCUTANEOUS at 06:25

## 2019-01-01 RX ADMIN — ONDANSETRON 4 MG: 2 INJECTION INTRAMUSCULAR; INTRAVENOUS at 22:34

## 2019-01-01 RX ADMIN — HYDROMORPHONE HYDROCHLORIDE 1 MG: 2 TABLET ORAL at 17:20

## 2019-01-01 RX ADMIN — MORPHINE SULFATE 5 MG: 20 SOLUTION ORAL at 08:08

## 2019-01-01 RX ADMIN — OXYCODONE HYDROCHLORIDE 5 MG: 5 TABLET ORAL at 11:54

## 2019-01-01 RX ADMIN — ONDANSETRON 4 MG: 2 INJECTION INTRAMUSCULAR; INTRAVENOUS at 22:54

## 2019-01-01 RX ADMIN — DAKIN'S SOLUTION 0.125% (QUARTER STRENGTH): 0.12 SOLUTION at 13:05

## 2019-01-01 RX ADMIN — PIPERACILLIN SODIUM,TAZOBACTAM SODIUM 3.38 G: 3; .375 INJECTION, POWDER, FOR SOLUTION INTRAVENOUS at 05:46

## 2019-01-01 RX ADMIN — ONDANSETRON HYDROCHLORIDE 4 MG: 2 INJECTION, SOLUTION INTRAMUSCULAR; INTRAVENOUS at 04:14

## 2019-01-01 RX ADMIN — SODIUM CHLORIDE 75 ML/HR: 900 INJECTION, SOLUTION INTRAVENOUS at 12:04

## 2019-01-01 RX ADMIN — SODIUM CHLORIDE 30 ML: 9 INJECTION, SOLUTION INTRAMUSCULAR; INTRAVENOUS; SUBCUTANEOUS at 17:40

## 2019-01-01 RX ADMIN — ONDANSETRON HYDROCHLORIDE 4 MG: 2 INJECTION, SOLUTION INTRAMUSCULAR; INTRAVENOUS at 18:01

## 2019-01-01 RX ADMIN — MORPHINE SULFATE 5 MG: 20 SOLUTION ORAL at 08:41

## 2019-01-01 RX ADMIN — SODIUM CHLORIDE 500 ML: 900 INJECTION, SOLUTION INTRAVENOUS at 04:36

## 2019-01-01 RX ADMIN — QUETIAPINE FUMARATE 25 MG: 25 TABLET ORAL at 21:34

## 2019-01-01 RX ADMIN — LOPERAMIDE HYDROCHLORIDE 2 MG: 2 CAPSULE ORAL at 17:00

## 2019-01-01 RX ADMIN — OXYCODONE HYDROCHLORIDE 5 MG: 5 TABLET ORAL at 10:31

## 2019-01-01 RX ADMIN — LIDOCAINE HYDROCHLORIDE 80 MG: 20 INJECTION, SOLUTION EPIDURAL; INFILTRATION; INTRACAUDAL; PERINEURAL at 12:47

## 2019-01-01 RX ADMIN — ENOXAPARIN SODIUM 30 MG: 30 INJECTION SUBCUTANEOUS at 08:32

## 2019-01-01 RX ADMIN — QUETIAPINE FUMARATE 25 MG: 25 TABLET ORAL at 21:35

## 2019-01-01 RX ADMIN — DEXTROSE MONOHYDRATE AND SODIUM CHLORIDE 125 ML/HR: 5; .9 INJECTION, SOLUTION INTRAVENOUS at 03:30

## 2019-01-01 RX ADMIN — MEGESTROL ACETATE 40 MG: 40 TABLET ORAL at 12:11

## 2019-01-01 RX ADMIN — HYDROMORPHONE HYDROCHLORIDE 1 MG: 2 TABLET ORAL at 20:00

## 2019-01-01 RX ADMIN — FAMOTIDINE 20 MG: 20 TABLET ORAL at 18:20

## 2019-01-01 RX ADMIN — HYDROMORPHONE HYDROCHLORIDE 0.5 MG: 2 INJECTION, SOLUTION INTRAMUSCULAR; INTRAVENOUS; SUBCUTANEOUS at 00:24

## 2019-01-01 RX ADMIN — HYDROMORPHONE HYDROCHLORIDE 0.5 MG: 2 INJECTION, SOLUTION INTRAMUSCULAR; INTRAVENOUS; SUBCUTANEOUS at 21:42

## 2019-01-01 RX ADMIN — AMPICILLIN SODIUM AND SULBACTAM SODIUM 3 G: 2; 1 INJECTION, POWDER, FOR SOLUTION INTRAMUSCULAR; INTRAVENOUS at 23:46

## 2019-01-01 RX ADMIN — ASCORBIC ACID, VITAMIN A PALMITATE, CHOLECALCIFEROL, THIAMINE HYDROCHLORIDE, RIBOFLAVIN-5 PHOSPHATE SODIUM, PYRIDOXINE HYDROCHLORIDE, NIACINAMIDE, DEXPANTHENOL, ALPHA-TOCOPHEROL ACETATE, VITAMIN K1, FOLIC ACID, BIOTIN, CYANOCOBALAMIN: 200; 3300; 200; 6; 3.6; 6; 40; 15; 10; 150; 600; 60; 5 INJECTION, SOLUTION INTRAVENOUS at 19:23

## 2019-01-01 RX ADMIN — Medication 10 ML: at 00:22

## 2019-01-01 RX ADMIN — Medication 20 ML: at 10:04

## 2019-01-01 RX ADMIN — NYSTATIN: 100000 POWDER TOPICAL at 10:25

## 2019-01-01 RX ADMIN — FAMOTIDINE 20 MG: 20 TABLET ORAL at 18:06

## 2019-01-01 RX ADMIN — HYDROMORPHONE HYDROCHLORIDE 0.5 MG: 2 INJECTION, SOLUTION INTRAMUSCULAR; INTRAVENOUS; SUBCUTANEOUS at 21:16

## 2019-01-01 RX ADMIN — SODIUM CHLORIDE 125 ML/HR: 900 INJECTION, SOLUTION INTRAVENOUS at 05:17

## 2019-01-01 RX ADMIN — HYDROMORPHONE HYDROCHLORIDE 1 MG: 5 LIQUID ORAL at 13:31

## 2019-01-01 RX ADMIN — Medication 10 ML: at 04:55

## 2019-01-01 RX ADMIN — MEGESTROL ACETATE 40 MG: 40 TABLET ORAL at 21:28

## 2019-01-01 RX ADMIN — HYDROMORPHONE HYDROCHLORIDE 0.5 MG: 1 INJECTION, SOLUTION INTRAMUSCULAR; INTRAVENOUS; SUBCUTANEOUS at 05:10

## 2019-01-01 RX ADMIN — HYDROMORPHONE HYDROCHLORIDE 0.5 MG: 1 INJECTION, SOLUTION INTRAMUSCULAR; INTRAVENOUS; SUBCUTANEOUS at 13:32

## 2019-01-01 RX ADMIN — GABAPENTIN 300 MG: 300 CAPSULE ORAL at 17:34

## 2019-01-01 RX ADMIN — NYSTATIN: 100000 POWDER TOPICAL at 12:43

## 2019-01-01 RX ADMIN — NEOSTIGMINE METHYLSULFATE 3 MG: 1 INJECTION INTRAVENOUS at 22:39

## 2019-01-01 RX ADMIN — DIPHENHYDRAMINE HYDROCHLORIDE 50 MG: 50 INJECTION, SOLUTION INTRAMUSCULAR; INTRAVENOUS at 13:34

## 2019-01-01 RX ADMIN — AMITRIPTYLINE HYDROCHLORIDE 25 MG: 50 TABLET, FILM COATED ORAL at 21:37

## 2019-01-01 RX ADMIN — FENTANYL CITRATE 25 MCG: 50 INJECTION, SOLUTION INTRAMUSCULAR; INTRAVENOUS at 23:14

## 2019-01-01 RX ADMIN — FAMOTIDINE 20 MG: 20 TABLET ORAL at 07:48

## 2019-01-01 RX ADMIN — ONDANSETRON HYDROCHLORIDE 4 MG: 2 INJECTION, SOLUTION INTRAMUSCULAR; INTRAVENOUS at 10:03

## 2019-01-01 RX ADMIN — SODIUM CHLORIDE 25 ML/HR: 450 INJECTION, SOLUTION INTRAVENOUS at 17:28

## 2019-01-01 RX ADMIN — MEGESTROL ACETATE 40 MG: 40 TABLET ORAL at 17:41

## 2019-01-01 RX ADMIN — SODIUM CHLORIDE 10 ML: 9 INJECTION, SOLUTION INTRAMUSCULAR; INTRAVENOUS; SUBCUTANEOUS at 13:49

## 2019-01-01 RX ADMIN — Medication 10 ML: at 08:09

## 2019-01-01 RX ADMIN — Medication 10 ML: at 05:51

## 2019-01-01 RX ADMIN — ONDANSETRON 4 MG: 2 INJECTION INTRAMUSCULAR; INTRAVENOUS at 03:52

## 2019-01-01 RX ADMIN — FENTANYL CITRATE 25 MCG: 50 INJECTION, SOLUTION INTRAMUSCULAR; INTRAVENOUS at 23:28

## 2019-01-01 RX ADMIN — GABAPENTIN 300 MG: 300 CAPSULE ORAL at 08:58

## 2019-01-01 RX ADMIN — ONDANSETRON 4 MG: 2 INJECTION INTRAMUSCULAR; INTRAVENOUS at 02:25

## 2019-01-01 RX ADMIN — PIPERACILLIN AND TAZOBACTAM 3.38 G: 3; .375 INJECTION, POWDER, FOR SOLUTION INTRAVENOUS at 20:07

## 2019-01-01 RX ADMIN — HYDROMORPHONE HYDROCHLORIDE 0.5 MG: 2 INJECTION, SOLUTION INTRAMUSCULAR; INTRAVENOUS; SUBCUTANEOUS at 04:38

## 2019-01-01 RX ADMIN — SODIUM CHLORIDE 40 MG: 9 INJECTION, SOLUTION INTRAMUSCULAR; INTRAVENOUS; SUBCUTANEOUS at 08:14

## 2019-01-01 RX ADMIN — SODIUM CHLORIDE 1000 ML: 900 INJECTION, SOLUTION INTRAVENOUS at 04:25

## 2019-01-01 RX ADMIN — MORPHINE SULFATE 10 MG: 100 SOLUTION ORAL at 16:08

## 2019-01-01 RX ADMIN — CALCIUM GLUCONATE 2 G: 98 INJECTION, SOLUTION INTRAVENOUS at 16:19

## 2019-01-01 RX ADMIN — HYDROMORPHONE HYDROCHLORIDE 0.5 MG: 2 INJECTION, SOLUTION INTRAMUSCULAR; INTRAVENOUS; SUBCUTANEOUS at 00:33

## 2019-01-01 RX ADMIN — LIDOCAINE HYDROCHLORIDE 50 MG: 20 INJECTION, SOLUTION EPIDURAL; INFILTRATION; INTRACAUDAL; PERINEURAL at 20:58

## 2019-01-01 RX ADMIN — HYDROMORPHONE HYDROCHLORIDE 0.5 MG: 1 INJECTION, SOLUTION INTRAMUSCULAR; INTRAVENOUS; SUBCUTANEOUS at 06:10

## 2019-01-01 RX ADMIN — OXYCODONE HYDROCHLORIDE 5 MG: 5 SOLUTION ORAL at 12:34

## 2019-01-01 RX ADMIN — ENOXAPARIN SODIUM 40 MG: 40 INJECTION SUBCUTANEOUS at 08:14

## 2019-01-01 RX ADMIN — ONDANSETRON HYDROCHLORIDE 4 MG: 2 INJECTION, SOLUTION INTRAMUSCULAR; INTRAVENOUS at 22:18

## 2019-01-01 RX ADMIN — AMOXICILLIN AND CLAVULANATE POTASSIUM 1 TABLET: 875; 125 TABLET, FILM COATED ORAL at 12:36

## 2019-01-01 RX ADMIN — ONDANSETRON 4 MG: 2 INJECTION INTRAMUSCULAR; INTRAVENOUS at 11:56

## 2019-01-01 RX ADMIN — HYDROMORPHONE HYDROCHLORIDE 0.5 MG: 2 INJECTION, SOLUTION INTRAMUSCULAR; INTRAVENOUS; SUBCUTANEOUS at 17:07

## 2019-01-01 RX ADMIN — DEXTROSE MONOHYDRATE, SODIUM CHLORIDE, AND POTASSIUM CHLORIDE 150 ML/HR: 50; 4.5; 1.49 INJECTION, SOLUTION INTRAVENOUS at 18:12

## 2019-01-01 RX ADMIN — DIAZEPAM 5 MG: 5 TABLET ORAL at 23:04

## 2019-01-01 RX ADMIN — Medication 10 ML: at 13:03

## 2019-01-01 RX ADMIN — DOCUSATE SODIUM 100 MG: 100 CAPSULE, LIQUID FILLED ORAL at 08:20

## 2019-01-01 RX ADMIN — ONDANSETRON 4 MG: 2 INJECTION INTRAMUSCULAR; INTRAVENOUS at 00:22

## 2019-01-01 RX ADMIN — DEXTROSE MONOHYDRATE, SODIUM CHLORIDE, AND POTASSIUM CHLORIDE 100 ML/HR: 50; 4.5; 1.49 INJECTION, SOLUTION INTRAVENOUS at 09:29

## 2019-01-01 RX ADMIN — HYDROMORPHONE HYDROCHLORIDE 0.5 MG: 1 INJECTION, SOLUTION INTRAMUSCULAR; INTRAVENOUS; SUBCUTANEOUS at 00:01

## 2019-01-01 RX ADMIN — NYSTATIN: 100000 POWDER TOPICAL at 08:46

## 2019-01-01 RX ADMIN — PIPERACILLIN SODIUM,TAZOBACTAM SODIUM 3.38 G: 3; .375 INJECTION, POWDER, FOR SOLUTION INTRAVENOUS at 05:57

## 2019-01-01 RX ADMIN — PIPERACILLIN AND TAZOBACTAM 3.38 G: 3; .375 INJECTION, POWDER, FOR SOLUTION INTRAVENOUS at 20:12

## 2019-01-01 RX ADMIN — DAKIN'S SOLUTION 0.125% (QUARTER STRENGTH): 0.12 SOLUTION at 09:00

## 2019-01-01 RX ADMIN — HEPARIN SODIUM 5000 UNITS: 5000 INJECTION INTRAVENOUS; SUBCUTANEOUS at 18:12

## 2019-01-01 RX ADMIN — HYDROMORPHONE HYDROCHLORIDE 0.5 MG: 1 INJECTION, SOLUTION INTRAMUSCULAR; INTRAVENOUS; SUBCUTANEOUS at 20:18

## 2019-01-01 RX ADMIN — PROPOFOL 320 MG: 10 INJECTION, EMULSION INTRAVENOUS at 13:16

## 2019-01-01 RX ADMIN — POTASSIUM CHLORIDE 20 MEQ: 400 INJECTION, SOLUTION INTRAVENOUS at 08:57

## 2019-01-01 RX ADMIN — ENOXAPARIN SODIUM 40 MG: 40 INJECTION SUBCUTANEOUS at 09:15

## 2019-01-01 RX ADMIN — MEGESTROL ACETATE 40 MG: 40 TABLET ORAL at 08:42

## 2019-01-01 RX ADMIN — FENTANYL CITRATE 25 MCG: 50 INJECTION, SOLUTION INTRAMUSCULAR; INTRAVENOUS at 23:11

## 2019-01-01 RX ADMIN — HYDROMORPHONE HYDROCHLORIDE 0.5 MG: 1 INJECTION, SOLUTION INTRAMUSCULAR; INTRAVENOUS; SUBCUTANEOUS at 08:52

## 2019-01-01 RX ADMIN — SODIUM CHLORIDE 20 ML: 9 INJECTION, SOLUTION INTRAMUSCULAR; INTRAVENOUS; SUBCUTANEOUS at 14:43

## 2019-01-01 RX ADMIN — HYDROMORPHONE HYDROCHLORIDE 1 MG: 1 INJECTION, SOLUTION INTRAMUSCULAR; INTRAVENOUS; SUBCUTANEOUS at 04:26

## 2019-01-01 RX ADMIN — Medication 10 ML: at 13:47

## 2019-01-01 RX ADMIN — AMPICILLIN SODIUM AND SULBACTAM SODIUM 3 G: 2; 1 INJECTION, POWDER, FOR SOLUTION INTRAMUSCULAR; INTRAVENOUS at 05:05

## 2019-01-01 RX ADMIN — PIPERACILLIN SODIUM,TAZOBACTAM SODIUM 3.38 G: 3; .375 INJECTION, POWDER, FOR SOLUTION INTRAVENOUS at 15:12

## 2019-01-01 RX ADMIN — SODIUM CHLORIDE 10 ML: 9 INJECTION, SOLUTION INTRAMUSCULAR; INTRAVENOUS; SUBCUTANEOUS at 06:10

## 2019-01-01 RX ADMIN — POLYETHYLENE GLYCOL 3350 17 G: 17 POWDER, FOR SOLUTION ORAL at 08:21

## 2019-01-01 RX ADMIN — MEGESTROL ACETATE 40 MG: 40 TABLET ORAL at 08:10

## 2019-01-01 RX ADMIN — FENTANYL CITRATE 25 MCG: 50 INJECTION, SOLUTION INTRAMUSCULAR; INTRAVENOUS at 16:54

## 2019-01-01 RX ADMIN — FAMOTIDINE 20 MG: 20 TABLET ORAL at 09:55

## 2019-01-01 RX ADMIN — Medication 10 ML: at 23:05

## 2019-01-01 RX ADMIN — DEXTROSE MONOHYDRATE, SODIUM CHLORIDE, AND POTASSIUM CHLORIDE 75 ML/HR: 50; 4.5; 1.49 INJECTION, SOLUTION INTRAVENOUS at 17:07

## 2019-01-01 RX ADMIN — ONDANSETRON HYDROCHLORIDE 4 MG: 2 INJECTION, SOLUTION INTRAMUSCULAR; INTRAVENOUS at 09:02

## 2019-01-01 RX ADMIN — IOPAMIDOL 100 ML: 755 INJECTION, SOLUTION INTRAVENOUS at 11:50

## 2019-01-01 RX ADMIN — DEXTROSE MONOHYDRATE, SODIUM CHLORIDE, AND POTASSIUM CHLORIDE 100 ML/HR: 50; 4.5; 1.49 INJECTION, SOLUTION INTRAVENOUS at 22:31

## 2019-01-01 RX ADMIN — PIPERACILLIN SODIUM,TAZOBACTAM SODIUM 3.38 G: 3; .375 INJECTION, POWDER, FOR SOLUTION INTRAVENOUS at 16:04

## 2019-01-01 RX ADMIN — SODIUM CHLORIDE 40 MG: 9 INJECTION, SOLUTION INTRAMUSCULAR; INTRAVENOUS; SUBCUTANEOUS at 08:32

## 2019-01-01 RX ADMIN — ONDANSETRON 4 MG: 2 INJECTION INTRAMUSCULAR; INTRAVENOUS at 18:08

## 2019-01-01 RX ADMIN — Medication 10 ML: at 13:37

## 2019-01-01 RX ADMIN — HYDROMORPHONE HYDROCHLORIDE 1 MG: 1 INJECTION, SOLUTION INTRAMUSCULAR; INTRAVENOUS; SUBCUTANEOUS at 03:46

## 2019-01-01 RX ADMIN — GABAPENTIN 300 MG: 300 CAPSULE ORAL at 21:59

## 2019-01-01 RX ADMIN — SODIUM CHLORIDE 10 ML: 9 INJECTION, SOLUTION INTRAMUSCULAR; INTRAVENOUS; SUBCUTANEOUS at 23:13

## 2019-01-01 RX ADMIN — GABAPENTIN 600 MG: 300 CAPSULE ORAL at 21:05

## 2019-01-01 RX ADMIN — Medication: at 00:44

## 2019-01-01 RX ADMIN — LORAZEPAM 2 MG: 2 SOLUTION, CONCENTRATE ORAL at 12:44

## 2019-01-01 RX ADMIN — HYDROMORPHONE HYDROCHLORIDE 0.5 MG: 1 INJECTION, SOLUTION INTRAMUSCULAR; INTRAVENOUS; SUBCUTANEOUS at 16:23

## 2019-01-01 RX ADMIN — FENTANYL CITRATE 25 MCG: 50 INJECTION, SOLUTION INTRAMUSCULAR; INTRAVENOUS at 19:40

## 2019-01-01 RX ADMIN — FAMOTIDINE 20 MG: 20 TABLET ORAL at 08:52

## 2019-01-01 RX ADMIN — AMITRIPTYLINE HYDROCHLORIDE 25 MG: 50 TABLET, FILM COATED ORAL at 21:06

## 2019-01-01 RX ADMIN — QUETIAPINE FUMARATE 25 MG: 25 TABLET ORAL at 22:01

## 2019-01-01 RX ADMIN — HYDROMORPHONE HYDROCHLORIDE 0.5 MG: 2 INJECTION, SOLUTION INTRAMUSCULAR; INTRAVENOUS; SUBCUTANEOUS at 11:44

## 2019-01-01 RX ADMIN — NYSTATIN: 100000 POWDER TOPICAL at 17:54

## 2019-01-01 RX ADMIN — HYDROMORPHONE HYDROCHLORIDE 0.5 MG: 1 INJECTION, SOLUTION INTRAMUSCULAR; INTRAVENOUS; SUBCUTANEOUS at 18:53

## 2019-01-01 RX ADMIN — HYDROMORPHONE HYDROCHLORIDE 0.5 MG: 2 INJECTION, SOLUTION INTRAMUSCULAR; INTRAVENOUS; SUBCUTANEOUS at 02:39

## 2019-01-01 RX ADMIN — FAMOTIDINE 20 MG: 20 TABLET ORAL at 08:59

## 2019-01-01 RX ADMIN — FAMOTIDINE 20 MG: 20 TABLET ORAL at 19:37

## 2019-01-01 RX ADMIN — Medication 10 ML: at 20:09

## 2019-01-01 RX ADMIN — FAMOTIDINE 20 MG: 20 TABLET ORAL at 08:10

## 2019-01-01 RX ADMIN — FAMOTIDINE 20 MG: 20 TABLET ORAL at 17:52

## 2019-01-01 RX ADMIN — HYDROMORPHONE HYDROCHLORIDE 0.5 MG: 2 INJECTION, SOLUTION INTRAMUSCULAR; INTRAVENOUS; SUBCUTANEOUS at 19:52

## 2019-01-01 RX ADMIN — ENOXAPARIN SODIUM 30 MG: 30 INJECTION SUBCUTANEOUS at 08:44

## 2019-01-01 RX ADMIN — SODIUM CHLORIDE 10 ML: 9 INJECTION, SOLUTION INTRAMUSCULAR; INTRAVENOUS; SUBCUTANEOUS at 21:45

## 2019-01-01 RX ADMIN — HYDROMORPHONE HYDROCHLORIDE 0.2 MG: 2 INJECTION, SOLUTION INTRAMUSCULAR; INTRAVENOUS; SUBCUTANEOUS at 17:20

## 2019-01-01 RX ADMIN — SODIUM CHLORIDE, SODIUM LACTATE, POTASSIUM CHLORIDE, CALCIUM CHLORIDE: 600; 310; 30; 20 INJECTION, SOLUTION INTRAVENOUS at 16:33

## 2019-01-01 RX ADMIN — HYDROMORPHONE HYDROCHLORIDE 0.5 MG: 1 INJECTION, SOLUTION INTRAMUSCULAR; INTRAVENOUS; SUBCUTANEOUS at 23:52

## 2019-01-01 RX ADMIN — DEXTROSE MONOHYDRATE, SODIUM CHLORIDE, AND POTASSIUM CHLORIDE 75 ML/HR: 50; 4.5; 1.49 INJECTION, SOLUTION INTRAVENOUS at 03:42

## 2019-01-01 RX ADMIN — POTASSIUM CHLORIDE 10 MEQ: 10 INJECTION, SOLUTION INTRAVENOUS at 12:59

## 2019-01-01 RX ADMIN — SODIUM CHLORIDE 10 ML: 9 INJECTION, SOLUTION INTRAMUSCULAR; INTRAVENOUS; SUBCUTANEOUS at 05:06

## 2019-01-01 RX ADMIN — MEGESTROL ACETATE 40 MG: 40 TABLET ORAL at 18:06

## 2019-01-01 RX ADMIN — QUETIAPINE FUMARATE 25 MG: 25 TABLET ORAL at 08:10

## 2019-01-01 RX ADMIN — PIPERACILLIN AND TAZOBACTAM 3.38 G: 3; .375 INJECTION, POWDER, FOR SOLUTION INTRAVENOUS at 12:32

## 2019-01-01 RX ADMIN — Medication 10 ML: at 15:58

## 2019-01-01 RX ADMIN — PIPERACILLIN AND TAZOBACTAM 3.38 G: 3; .375 INJECTION, POWDER, FOR SOLUTION INTRAVENOUS at 13:35

## 2019-01-01 RX ADMIN — HYDROMORPHONE HYDROCHLORIDE 0.5 MG: 1 INJECTION, SOLUTION INTRAMUSCULAR; INTRAVENOUS; SUBCUTANEOUS at 19:43

## 2019-01-01 RX ADMIN — Medication 10 ML: at 16:19

## 2019-01-01 RX ADMIN — OXYCODONE HYDROCHLORIDE 5 MG: 5 SOLUTION ORAL at 06:09

## 2019-01-01 RX ADMIN — IOPAMIDOL 100 ML: 755 INJECTION, SOLUTION INTRAVENOUS at 15:23

## 2019-01-01 RX ADMIN — SODIUM CHLORIDE 10 ML: 9 INJECTION, SOLUTION INTRAMUSCULAR; INTRAVENOUS; SUBCUTANEOUS at 22:01

## 2019-01-01 RX ADMIN — GABAPENTIN 300 MG: 300 CAPSULE ORAL at 08:20

## 2019-01-01 RX ADMIN — Medication 10 ML: at 21:06

## 2019-01-01 RX ADMIN — SODIUM CHLORIDE 125 ML/HR: 900 INJECTION, SOLUTION INTRAVENOUS at 17:33

## 2019-01-01 RX ADMIN — HYDROMORPHONE HYDROCHLORIDE 1 MG: 2 TABLET ORAL at 20:59

## 2019-01-01 RX ADMIN — FAMOTIDINE 20 MG: 20 TABLET ORAL at 17:53

## 2019-01-01 RX ADMIN — PIPERACILLIN AND TAZOBACTAM 3.38 G: 3; .375 INJECTION, POWDER, FOR SOLUTION INTRAVENOUS at 20:58

## 2019-01-01 RX ADMIN — DIAZEPAM 5 MG: 5 TABLET ORAL at 22:09

## 2019-01-01 RX ADMIN — QUETIAPINE FUMARATE 25 MG: 25 TABLET ORAL at 08:57

## 2019-01-01 RX ADMIN — PIPERACILLIN SODIUM,TAZOBACTAM SODIUM 3.38 G: 3; .375 INJECTION, POWDER, FOR SOLUTION INTRAVENOUS at 22:23

## 2019-01-01 RX ADMIN — OXYCODONE HYDROCHLORIDE 5 MG: 5 TABLET ORAL at 23:05

## 2019-01-01 RX ADMIN — MIDAZOLAM HYDROCHLORIDE 0.5 MG: 1 INJECTION, SOLUTION INTRAMUSCULAR; INTRAVENOUS at 16:48

## 2019-01-01 RX ADMIN — FENTANYL CITRATE 25 MCG: 50 INJECTION, SOLUTION INTRAMUSCULAR; INTRAVENOUS at 13:28

## 2019-01-01 RX ADMIN — AMPICILLIN SODIUM AND SULBACTAM SODIUM 3 G: 2; 1 INJECTION, POWDER, FOR SOLUTION INTRAMUSCULAR; INTRAVENOUS at 17:26

## 2019-01-01 RX ADMIN — DEXTROSE MONOHYDRATE, SODIUM CHLORIDE, AND POTASSIUM CHLORIDE 150 ML/HR: 50; 4.5; 1.49 INJECTION, SOLUTION INTRAVENOUS at 01:00

## 2019-01-01 RX ADMIN — HEPARIN SODIUM 5000 UNITS: 5000 INJECTION INTRAVENOUS; SUBCUTANEOUS at 05:45

## 2019-01-01 RX ADMIN — AMITRIPTYLINE HYDROCHLORIDE 25 MG: 50 TABLET, FILM COATED ORAL at 21:28

## 2019-01-01 RX ADMIN — HYDROMORPHONE HYDROCHLORIDE 0.5 MG: 2 INJECTION, SOLUTION INTRAMUSCULAR; INTRAVENOUS; SUBCUTANEOUS at 15:56

## 2019-01-01 RX ADMIN — HYDROMORPHONE HYDROCHLORIDE 1 MG: 2 TABLET ORAL at 13:24

## 2019-01-01 RX ADMIN — HYDROMORPHONE HYDROCHLORIDE 0.5 MG: 1 INJECTION, SOLUTION INTRAMUSCULAR; INTRAVENOUS; SUBCUTANEOUS at 00:42

## 2019-01-01 RX ADMIN — AMPICILLIN SODIUM AND SULBACTAM SODIUM 3 G: 2; 1 INJECTION, POWDER, FOR SOLUTION INTRAMUSCULAR; INTRAVENOUS at 17:40

## 2019-01-01 RX ADMIN — FAMOTIDINE 20 MG: 20 TABLET ORAL at 08:58

## 2019-01-01 RX ADMIN — AMOXICILLIN AND CLAVULANATE POTASSIUM 1 TABLET: 875; 125 TABLET, FILM COATED ORAL at 08:32

## 2019-01-01 RX ADMIN — MIDAZOLAM HYDROCHLORIDE 2 MG: 1 INJECTION, SOLUTION INTRAMUSCULAR; INTRAVENOUS at 20:56

## 2019-01-01 RX ADMIN — HYDROMORPHONE HYDROCHLORIDE 0.5 MG: 1 INJECTION, SOLUTION INTRAMUSCULAR; INTRAVENOUS; SUBCUTANEOUS at 22:28

## 2019-01-01 RX ADMIN — BUTALBITAL, ACETAMINOPHEN AND CAFFEINE 1 TABLET: 50; 325; 40 TABLET ORAL at 00:21

## 2019-01-01 RX ADMIN — FAMOTIDINE 20 MG: 20 TABLET ORAL at 17:57

## 2019-01-01 RX ADMIN — GABAPENTIN 600 MG: 300 CAPSULE ORAL at 22:04

## 2019-01-01 RX ADMIN — HYDROMORPHONE HYDROCHLORIDE 1 MG: 5 LIQUID ORAL at 01:31

## 2019-01-01 RX ADMIN — HYDROMORPHONE HYDROCHLORIDE 0.5 MG: 1 INJECTION, SOLUTION INTRAMUSCULAR; INTRAVENOUS; SUBCUTANEOUS at 23:40

## 2019-01-01 RX ADMIN — LORAZEPAM 2 MG: 2 SOLUTION, CONCENTRATE ORAL at 11:43

## 2019-01-01 RX ADMIN — DEXTROSE MONOHYDRATE, SODIUM CHLORIDE, AND POTASSIUM CHLORIDE 75 ML/HR: 50; 4.5; 1.49 INJECTION, SOLUTION INTRAVENOUS at 10:50

## 2019-01-01 RX ADMIN — HYDROCODONE BITARTRATE AND ACETAMINOPHEN 2 TABLET: 5; 325 TABLET ORAL at 03:32

## 2019-01-01 RX ADMIN — HYDROMORPHONE HYDROCHLORIDE 0.5 MG: 2 INJECTION, SOLUTION INTRAMUSCULAR; INTRAVENOUS; SUBCUTANEOUS at 10:18

## 2019-01-01 RX ADMIN — ACETAMINOPHEN 650 MG: 325 TABLET ORAL at 10:13

## 2019-01-01 RX ADMIN — GABAPENTIN 600 MG: 300 CAPSULE ORAL at 21:06

## 2019-01-01 RX ADMIN — HYDROMORPHONE HYDROCHLORIDE 0.5 MG: 1 INJECTION, SOLUTION INTRAMUSCULAR; INTRAVENOUS; SUBCUTANEOUS at 14:29

## 2019-01-01 RX ADMIN — ONDANSETRON 4 MG: 2 INJECTION INTRAMUSCULAR; INTRAVENOUS at 12:12

## 2019-01-01 RX ADMIN — ONDANSETRON 4 MG: 4 TABLET, ORALLY DISINTEGRATING ORAL at 23:52

## 2019-01-01 RX ADMIN — DIAZEPAM 5 MG: 5 TABLET ORAL at 23:26

## 2019-01-01 RX ADMIN — PANTOPRAZOLE SODIUM 40 MG: 40 TABLET, DELAYED RELEASE ORAL at 09:24

## 2019-01-01 RX ADMIN — HYDROMORPHONE HYDROCHLORIDE 0.4 MG: 2 INJECTION, SOLUTION INTRAMUSCULAR; INTRAVENOUS; SUBCUTANEOUS at 18:51

## 2019-01-01 RX ADMIN — MUPIROCIN: 20 OINTMENT TOPICAL at 11:54

## 2019-01-01 RX ADMIN — LOPERAMIDE HYDROCHLORIDE 2 MG: 2 CAPSULE ORAL at 21:35

## 2019-01-01 RX ADMIN — MEGESTROL ACETATE 40 MG: 40 TABLET ORAL at 12:37

## 2019-01-01 RX ADMIN — HYDROMORPHONE HYDROCHLORIDE 0.5 MG: 2 INJECTION, SOLUTION INTRAMUSCULAR; INTRAVENOUS; SUBCUTANEOUS at 16:43

## 2019-01-01 RX ADMIN — DEXTROSE MONOHYDRATE, SODIUM CHLORIDE, AND POTASSIUM CHLORIDE 100 ML/HR: 50; 4.5; 1.49 INJECTION, SOLUTION INTRAVENOUS at 19:51

## 2019-01-01 RX ADMIN — HYDROMORPHONE HYDROCHLORIDE 0.5 MG: 2 INJECTION, SOLUTION INTRAMUSCULAR; INTRAVENOUS; SUBCUTANEOUS at 06:29

## 2019-01-01 RX ADMIN — HYDROMORPHONE HYDROCHLORIDE 0.5 MG: 2 INJECTION, SOLUTION INTRAMUSCULAR; INTRAVENOUS; SUBCUTANEOUS at 17:22

## 2019-01-01 RX ADMIN — Medication 10 ML: at 15:28

## 2019-01-01 RX ADMIN — DEXTROSE MONOHYDRATE, SODIUM CHLORIDE, AND POTASSIUM CHLORIDE 100 ML/HR: 50; 4.5; 1.49 INJECTION, SOLUTION INTRAVENOUS at 14:46

## 2019-01-01 RX ADMIN — Medication 10 ML: at 15:23

## 2019-01-01 RX ADMIN — ONDANSETRON HYDROCHLORIDE 4 MG: 2 INJECTION, SOLUTION INTRAMUSCULAR; INTRAVENOUS at 06:25

## 2019-01-01 RX ADMIN — DIAZEPAM 5 MG: 5 TABLET ORAL at 09:15

## 2019-01-01 RX ADMIN — AMITRIPTYLINE HYDROCHLORIDE 25 MG: 50 TABLET, FILM COATED ORAL at 22:04

## 2019-01-01 RX ADMIN — ONDANSETRON 4 MG: 2 INJECTION INTRAMUSCULAR; INTRAVENOUS at 17:28

## 2019-01-01 RX ADMIN — ONDANSETRON 4 MG: 4 TABLET, ORALLY DISINTEGRATING ORAL at 06:51

## 2019-01-01 RX ADMIN — SODIUM CHLORIDE 10 ML: 9 INJECTION, SOLUTION INTRAMUSCULAR; INTRAVENOUS; SUBCUTANEOUS at 00:01

## 2019-01-01 RX ADMIN — LORAZEPAM 2 MG: 2 SOLUTION, CONCENTRATE ORAL at 06:29

## 2019-01-01 RX ADMIN — PIPERACILLIN SODIUM,TAZOBACTAM SODIUM 3.38 G: 3; .375 INJECTION, POWDER, FOR SOLUTION INTRAVENOUS at 21:32

## 2019-01-01 RX ADMIN — AMPICILLIN SODIUM AND SULBACTAM SODIUM 3 G: 2; 1 INJECTION, POWDER, FOR SOLUTION INTRAMUSCULAR; INTRAVENOUS at 00:01

## 2019-01-01 RX ADMIN — Medication 10 ML: at 11:58

## 2019-01-01 RX ADMIN — FENTANYL CITRATE 25 MCG: 50 INJECTION, SOLUTION INTRAMUSCULAR; INTRAVENOUS at 10:41

## 2019-01-01 RX ADMIN — CHLORASEPTIC 1 SPRAY: 1.5 LIQUID ORAL at 21:00

## 2019-01-01 RX ADMIN — SODIUM CHLORIDE 75 ML/HR: 450 INJECTION, SOLUTION INTRAVENOUS at 09:56

## 2019-01-01 RX ADMIN — MUPIROCIN: 20 OINTMENT TOPICAL at 07:38

## 2019-01-01 RX ADMIN — FAMOTIDINE 20 MG: 20 TABLET ORAL at 17:44

## 2019-01-01 RX ADMIN — AMPICILLIN SODIUM AND SULBACTAM SODIUM 3 G: 2; 1 INJECTION, POWDER, FOR SOLUTION INTRAMUSCULAR; INTRAVENOUS at 12:39

## 2019-01-01 RX ADMIN — OXYCODONE HYDROCHLORIDE 5 MG: 5 TABLET ORAL at 07:42

## 2019-01-01 RX ADMIN — FENTANYL CITRATE 25 MCG: 50 INJECTION, SOLUTION INTRAMUSCULAR; INTRAVENOUS at 19:27

## 2019-01-01 RX ADMIN — I.V. FAT EMULSION 500 ML: 20 EMULSION INTRAVENOUS at 21:26

## 2019-01-01 RX ADMIN — NITROGLYCERIN: 0.4 TABLET, ORALLY DISINTEGRATING SUBLINGUAL at 15:44

## 2019-01-01 RX ADMIN — ACETAMINOPHEN 650 MG: 325 TABLET ORAL at 20:08

## 2019-01-01 RX ADMIN — AMPICILLIN SODIUM AND SULBACTAM SODIUM 3 G: 2; 1 INJECTION, POWDER, FOR SOLUTION INTRAMUSCULAR; INTRAVENOUS at 18:06

## 2019-01-01 RX ADMIN — LORAZEPAM 2 MG: 2 SOLUTION, CONCENTRATE ORAL at 12:12

## 2019-01-01 RX ADMIN — HYDROMORPHONE HYDROCHLORIDE 0.5 MG: 2 INJECTION, SOLUTION INTRAMUSCULAR; INTRAVENOUS; SUBCUTANEOUS at 08:53

## 2019-01-01 RX ADMIN — PROCHLORPERAZINE EDISYLATE 10 MG: 5 INJECTION INTRAMUSCULAR; INTRAVENOUS at 19:35

## 2019-01-01 RX ADMIN — HYDROMORPHONE HYDROCHLORIDE 0.2 MG: 1 INJECTION, SOLUTION INTRAMUSCULAR; INTRAVENOUS; SUBCUTANEOUS at 23:19

## 2019-01-01 RX ADMIN — ONDANSETRON 4 MG: 2 INJECTION INTRAMUSCULAR; INTRAVENOUS at 23:46

## 2019-01-01 RX ADMIN — FAMOTIDINE 20 MG: 20 TABLET ORAL at 17:42

## 2019-01-01 RX ADMIN — HYDROCODONE BITARTRATE AND ACETAMINOPHEN 1 TABLET: 5; 325 TABLET ORAL at 11:45

## 2019-01-01 RX ADMIN — PIPERACILLIN SODIUM,TAZOBACTAM SODIUM 3.38 G: 3; .375 INJECTION, POWDER, FOR SOLUTION INTRAVENOUS at 06:39

## 2019-01-01 RX ADMIN — HYDROMORPHONE HYDROCHLORIDE 0.5 MG: 2 INJECTION, SOLUTION INTRAMUSCULAR; INTRAVENOUS; SUBCUTANEOUS at 02:29

## 2019-01-01 RX ADMIN — GLYCOPYRROLATE 0.5 MG: 0.2 INJECTION INTRAMUSCULAR; INTRAVENOUS at 18:46

## 2019-01-01 RX ADMIN — SODIUM CHLORIDE 10 ML: 9 INJECTION, SOLUTION INTRAMUSCULAR; INTRAVENOUS; SUBCUTANEOUS at 06:26

## 2019-01-01 RX ADMIN — HYDROMORPHONE HYDROCHLORIDE 0.5 MG: 1 INJECTION, SOLUTION INTRAMUSCULAR; INTRAVENOUS; SUBCUTANEOUS at 20:08

## 2019-01-01 RX ADMIN — OXYCODONE HYDROCHLORIDE 5 MG: 5 TABLET ORAL at 23:39

## 2019-01-01 RX ADMIN — MORPHINE SULFATE 10 MG: 100 SOLUTION ORAL at 04:52

## 2019-01-01 RX ADMIN — PIPERACILLIN SODIUM,TAZOBACTAM SODIUM 3.38 G: 3; .375 INJECTION, POWDER, FOR SOLUTION INTRAVENOUS at 14:23

## 2019-01-01 RX ADMIN — FAMOTIDINE 20 MG: 20 TABLET ORAL at 08:38

## 2019-01-01 RX ADMIN — Medication 10 ML: at 06:42

## 2019-01-01 RX ADMIN — ONDANSETRON 4 MG: 2 INJECTION INTRAMUSCULAR; INTRAVENOUS at 18:46

## 2019-01-01 RX ADMIN — HYDROMORPHONE HYDROCHLORIDE 0.5 MG: 1 INJECTION, SOLUTION INTRAMUSCULAR; INTRAVENOUS; SUBCUTANEOUS at 04:05

## 2019-01-01 RX ADMIN — DIAZEPAM 5 MG: 5 TABLET ORAL at 21:15

## 2019-01-01 RX ADMIN — Medication 10 ML: at 03:52

## 2019-01-01 RX ADMIN — MORPHINE SULFATE 10 MG: 100 SOLUTION ORAL at 13:44

## 2019-01-01 RX ADMIN — HYDROMORPHONE HYDROCHLORIDE 0.5 MG: 1 INJECTION, SOLUTION INTRAMUSCULAR; INTRAVENOUS; SUBCUTANEOUS at 17:48

## 2019-01-01 RX ADMIN — SALINE NASAL SPRAY 2 SPRAY: 1.5 SOLUTION NASAL at 00:27

## 2019-01-01 RX ADMIN — ROCURONIUM BROMIDE 5 MG: 10 INJECTION, SOLUTION INTRAVENOUS at 20:58

## 2019-01-01 RX ADMIN — ONDANSETRON HYDROCHLORIDE 4 MG: 2 INJECTION, SOLUTION INTRAMUSCULAR; INTRAVENOUS at 15:07

## 2019-01-01 RX ADMIN — PIPERACILLIN SODIUM,TAZOBACTAM SODIUM 3.38 G: 3; .375 INJECTION, POWDER, FOR SOLUTION INTRAVENOUS at 15:17

## 2019-01-01 RX ADMIN — AMITRIPTYLINE HYDROCHLORIDE 25 MG: 50 TABLET, FILM COATED ORAL at 21:34

## 2019-01-01 RX ADMIN — SODIUM CHLORIDE 10 ML: 9 INJECTION, SOLUTION INTRAMUSCULAR; INTRAVENOUS; SUBCUTANEOUS at 06:14

## 2019-01-01 RX ADMIN — HYDROMORPHONE HYDROCHLORIDE 1 MG: 2 TABLET ORAL at 09:24

## 2019-01-01 RX ADMIN — HYDROMORPHONE HYDROCHLORIDE 0.5 MG: 2 INJECTION, SOLUTION INTRAMUSCULAR; INTRAVENOUS; SUBCUTANEOUS at 13:02

## 2019-01-01 RX ADMIN — MEGESTROL ACETATE 40 MG: 40 TABLET ORAL at 12:01

## 2019-01-01 RX ADMIN — DIATRIZOATE MEGLUMINE AND DIATRIZOATE SODIUM 30 ML: 660; 100 LIQUID ORAL; RECTAL at 09:47

## 2019-01-01 RX ADMIN — HEPARIN SODIUM 5000 UNITS: 5000 INJECTION INTRAVENOUS; SUBCUTANEOUS at 17:26

## 2019-01-01 RX ADMIN — ONDANSETRON 4 MG: 2 INJECTION INTRAMUSCULAR; INTRAVENOUS at 04:23

## 2019-01-01 RX ADMIN — DEXAMETHASONE SODIUM PHOSPHATE 4 MG: 4 INJECTION, SOLUTION INTRA-ARTICULAR; INTRALESIONAL; INTRAMUSCULAR; INTRAVENOUS; SOFT TISSUE at 17:20

## 2019-01-01 RX ADMIN — MUPIROCIN: 20 OINTMENT TOPICAL at 18:58

## 2019-01-01 RX ADMIN — LORAZEPAM 2 MG: 2 SOLUTION, CONCENTRATE ORAL at 00:15

## 2019-01-01 RX ADMIN — DIAZEPAM 5 MG: 5 TABLET ORAL at 09:54

## 2019-01-01 RX ADMIN — ONDANSETRON HYDROCHLORIDE 4 MG: 2 INJECTION, SOLUTION INTRAMUSCULAR; INTRAVENOUS at 11:56

## 2019-01-01 RX ADMIN — AMPICILLIN SODIUM AND SULBACTAM SODIUM 3 G: 2; 1 INJECTION, POWDER, FOR SOLUTION INTRAMUSCULAR; INTRAVENOUS at 00:44

## 2019-01-01 RX ADMIN — AMPICILLIN SODIUM AND SULBACTAM SODIUM 3 G: 2; 1 INJECTION, POWDER, FOR SOLUTION INTRAMUSCULAR; INTRAVENOUS at 06:07

## 2019-01-01 RX ADMIN — OXYCODONE HYDROCHLORIDE 5 MG: 5 TABLET ORAL at 12:34

## 2019-01-01 RX ADMIN — ONDANSETRON 4 MG: 2 INJECTION INTRAMUSCULAR; INTRAVENOUS at 18:01

## 2019-01-01 RX ADMIN — Medication 20 ML: at 09:37

## 2019-01-01 RX ADMIN — AMPICILLIN SODIUM AND SULBACTAM SODIUM 3 G: 2; 1 INJECTION, POWDER, FOR SOLUTION INTRAMUSCULAR; INTRAVENOUS at 11:44

## 2019-01-01 RX ADMIN — Medication 10 ML: at 22:11

## 2019-01-01 RX ADMIN — PANTOPRAZOLE SODIUM 40 MG: 40 TABLET, DELAYED RELEASE ORAL at 08:46

## 2019-01-01 RX ADMIN — AMOXICILLIN AND CLAVULANATE POTASSIUM 1 TABLET: 875; 125 TABLET, FILM COATED ORAL at 08:58

## 2019-01-01 RX ADMIN — Medication: at 03:35

## 2019-01-01 RX ADMIN — FENTANYL CITRATE 25 MCG: 50 INJECTION, SOLUTION INTRAMUSCULAR; INTRAVENOUS at 20:07

## 2019-01-01 RX ADMIN — OXYCODONE HYDROCHLORIDE 5 MG: 5 SOLUTION ORAL at 16:22

## 2019-01-01 RX ADMIN — HYDROMORPHONE HYDROCHLORIDE 0.5 MG: 1 INJECTION, SOLUTION INTRAMUSCULAR; INTRAVENOUS; SUBCUTANEOUS at 06:13

## 2019-01-01 RX ADMIN — ONDANSETRON 4 MG: 2 INJECTION INTRAMUSCULAR; INTRAVENOUS at 06:29

## 2019-01-01 RX ADMIN — SODIUM CHLORIDE 1000 ML: 900 INJECTION, SOLUTION INTRAVENOUS at 14:37

## 2019-01-01 RX ADMIN — SODIUM CHLORIDE 10 ML: 9 INJECTION, SOLUTION INTRAMUSCULAR; INTRAVENOUS; SUBCUTANEOUS at 14:25

## 2019-01-01 RX ADMIN — PIPERACILLIN AND TAZOBACTAM 3.38 G: 3; .375 INJECTION, POWDER, FOR SOLUTION INTRAVENOUS at 04:45

## 2019-01-01 RX ADMIN — ONDANSETRON 4 MG: 2 INJECTION INTRAMUSCULAR; INTRAVENOUS at 13:32

## 2019-01-01 RX ADMIN — MUPIROCIN: 20 OINTMENT TOPICAL at 10:18

## 2019-01-01 RX ADMIN — MORPHINE SULFATE 15 MG: 100 SOLUTION ORAL at 11:43

## 2019-01-01 RX ADMIN — MIDAZOLAM HYDROCHLORIDE 1 MG: 1 INJECTION, SOLUTION INTRAMUSCULAR; INTRAVENOUS at 09:44

## 2019-01-01 RX ADMIN — PROCHLORPERAZINE EDISYLATE 10 MG: 5 INJECTION INTRAMUSCULAR; INTRAVENOUS at 15:39

## 2019-01-01 RX ADMIN — NYSTATIN: 100000 POWDER TOPICAL at 08:27

## 2019-01-01 RX ADMIN — SODIUM CHLORIDE 150 ML/HR: 900 INJECTION, SOLUTION INTRAVENOUS at 19:32

## 2019-01-01 RX ADMIN — PROCHLORPERAZINE MALEATE 5 MG: 5 TABLET, FILM COATED ORAL at 13:31

## 2019-01-01 RX ADMIN — DIAZEPAM 5 MG: 5 TABLET ORAL at 11:54

## 2019-01-01 RX ADMIN — DEXTROSE MONOHYDRATE, SODIUM CHLORIDE, AND POTASSIUM CHLORIDE 150 ML/HR: 50; 4.5; 1.49 INJECTION, SOLUTION INTRAVENOUS at 11:52

## 2019-01-01 RX ADMIN — ONDANSETRON HYDROCHLORIDE 4 MG: 2 INJECTION, SOLUTION INTRAMUSCULAR; INTRAVENOUS at 13:07

## 2019-01-01 RX ADMIN — MEGESTROL ACETATE 40 MG: 40 TABLET ORAL at 18:14

## 2019-01-01 RX ADMIN — KETOROLAC TROMETHAMINE 30 MG: 30 INJECTION, SOLUTION INTRAMUSCULAR; INTRAVENOUS at 10:27

## 2019-01-01 RX ADMIN — HYDROMORPHONE HYDROCHLORIDE 0.5 MG: 1 INJECTION, SOLUTION INTRAMUSCULAR; INTRAVENOUS; SUBCUTANEOUS at 21:21

## 2019-01-01 RX ADMIN — POLYETHYLENE GLYCOL 3350 17 G: 17 POWDER, FOR SOLUTION ORAL at 08:58

## 2019-01-01 RX ADMIN — NYSTATIN: 100000 POWDER TOPICAL at 08:22

## 2019-01-01 RX ADMIN — MEGESTROL ACETATE 40 MG: 40 TABLET ORAL at 08:58

## 2019-01-01 RX ADMIN — HYDROMORPHONE HYDROCHLORIDE 0.5 MG: 2 INJECTION, SOLUTION INTRAMUSCULAR; INTRAVENOUS; SUBCUTANEOUS at 22:22

## 2019-01-01 RX ADMIN — ONDANSETRON 4 MG: 2 INJECTION INTRAMUSCULAR; INTRAVENOUS at 14:44

## 2019-01-01 RX ADMIN — PIPERACILLIN AND TAZOBACTAM 3.38 G: 3; .375 INJECTION, POWDER, FOR SOLUTION INTRAVENOUS at 03:42

## 2019-01-01 RX ADMIN — SODIUM CHLORIDE 10 ML: 9 INJECTION, SOLUTION INTRAMUSCULAR; INTRAVENOUS; SUBCUTANEOUS at 13:44

## 2019-01-01 RX ADMIN — ASCORBIC ACID, VITAMIN A PALMITATE, CHOLECALCIFEROL, THIAMINE HYDROCHLORIDE, RIBOFLAVIN-5 PHOSPHATE SODIUM, PYRIDOXINE HYDROCHLORIDE, NIACINAMIDE, DEXPANTHENOL, ALPHA-TOCOPHEROL ACETATE, VITAMIN K1, FOLIC ACID, BIOTIN, CYANOCOBALAMIN: 200; 3300; 200; 6; 3.6; 6; 40; 15; 10; 150; 600; 60; 5 INJECTION, SOLUTION INTRAVENOUS at 18:18

## 2019-01-01 RX ADMIN — Medication 20 ML: at 18:07

## 2019-01-01 RX ADMIN — DEXTROSE MONOHYDRATE, SODIUM CHLORIDE, AND POTASSIUM CHLORIDE 50 ML/HR: 50; 4.5; 1.49 INJECTION, SOLUTION INTRAVENOUS at 17:34

## 2019-01-01 RX ADMIN — Medication: at 01:00

## 2019-01-01 RX ADMIN — NYSTATIN: 100000 POWDER TOPICAL at 12:01

## 2019-01-01 RX ADMIN — IOPAMIDOL 100 ML: 755 INJECTION, SOLUTION INTRAVENOUS at 04:55

## 2019-01-01 RX ADMIN — GABAPENTIN 300 MG: 300 CAPSULE ORAL at 21:38

## 2019-01-01 RX ADMIN — HYDROMORPHONE HYDROCHLORIDE 0.5 MG: 2 INJECTION, SOLUTION INTRAMUSCULAR; INTRAVENOUS; SUBCUTANEOUS at 22:10

## 2019-01-01 RX ADMIN — PANTOPRAZOLE SODIUM 40 MG: 40 TABLET, DELAYED RELEASE ORAL at 08:55

## 2019-01-01 RX ADMIN — HYDROMORPHONE HYDROCHLORIDE 0.5 MG: 1 INJECTION, SOLUTION INTRAMUSCULAR; INTRAVENOUS; SUBCUTANEOUS at 08:54

## 2019-01-01 RX ADMIN — ENOXAPARIN SODIUM 40 MG: 40 INJECTION SUBCUTANEOUS at 08:53

## 2019-01-01 RX ADMIN — DAKIN'S SOLUTION 0.125% (QUARTER STRENGTH): 0.12 SOLUTION at 22:58

## 2019-01-01 RX ADMIN — HEPARIN SODIUM 5000 UNITS: 5000 INJECTION INTRAVENOUS; SUBCUTANEOUS at 17:00

## 2019-01-01 RX ADMIN — ONDANSETRON HYDROCHLORIDE 4 MG: 2 INJECTION, SOLUTION INTRAMUSCULAR; INTRAVENOUS at 08:43

## 2019-01-01 RX ADMIN — Medication 10 ML: at 21:37

## 2019-01-01 RX ADMIN — HYDROMORPHONE HYDROCHLORIDE 0.5 MG: 2 INJECTION, SOLUTION INTRAMUSCULAR; INTRAVENOUS; SUBCUTANEOUS at 04:13

## 2019-01-01 RX ADMIN — LIDOCAINE HYDROCHLORIDE 80 MG: 20 INJECTION, SOLUTION EPIDURAL; INFILTRATION; INTRACAUDAL; PERINEURAL at 16:48

## 2019-01-01 RX ADMIN — OXYCODONE HYDROCHLORIDE 5 MG: 5 SOLUTION ORAL at 20:09

## 2019-01-01 RX ADMIN — ONDANSETRON 4 MG: 2 INJECTION INTRAMUSCULAR; INTRAVENOUS at 01:09

## 2019-01-01 RX ADMIN — LORAZEPAM 2 MG: 2 SOLUTION, CONCENTRATE ORAL at 23:43

## 2019-01-01 RX ADMIN — HEPARIN SODIUM 5000 UNITS: 5000 INJECTION INTRAVENOUS; SUBCUTANEOUS at 18:05

## 2019-01-01 RX ADMIN — MEGESTROL ACETATE 40 MG: 40 TABLET ORAL at 17:06

## 2019-01-01 RX ADMIN — ONDANSETRON HYDROCHLORIDE 4 MG: 2 INJECTION, SOLUTION INTRAMUSCULAR; INTRAVENOUS at 03:24

## 2019-01-01 RX ADMIN — HYDROCODONE BITARTRATE AND ACETAMINOPHEN 2 TABLET: 5; 325 TABLET ORAL at 22:14

## 2019-01-01 RX ADMIN — CHLORASEPTIC 1 SPRAY: 1.5 LIQUID ORAL at 07:46

## 2019-01-01 RX ADMIN — IOPAMIDOL 80 ML: 755 INJECTION, SOLUTION INTRAVENOUS at 16:29

## 2019-01-01 RX ADMIN — HYDROMORPHONE HYDROCHLORIDE 0.5 MG: 1 INJECTION, SOLUTION INTRAMUSCULAR; INTRAVENOUS; SUBCUTANEOUS at 03:25

## 2019-01-01 RX ADMIN — Medication 10 ML: at 14:16

## 2019-01-01 RX ADMIN — Medication 10 ML: at 13:02

## 2019-01-01 RX ADMIN — POLYETHYLENE GLYCOL 3350 17 G: 17 POWDER, FOR SOLUTION ORAL at 08:20

## 2019-01-01 RX ADMIN — AMOXICILLIN AND CLAVULANATE POTASSIUM 1 TABLET: 875; 125 TABLET, FILM COATED ORAL at 08:30

## 2019-01-01 RX ADMIN — ACETAMINOPHEN 1000 MG: 10 INJECTION, SOLUTION INTRAVENOUS at 18:08

## 2019-01-01 RX ADMIN — SUCCINYLCHOLINE CHLORIDE 120 MG: 20 INJECTION INTRAMUSCULAR; INTRAVENOUS at 20:58

## 2019-01-01 RX ADMIN — OXYCODONE HYDROCHLORIDE 5 MG: 5 SOLUTION ORAL at 20:14

## 2019-01-01 RX ADMIN — PIPERACILLIN AND TAZOBACTAM 3.38 G: 3; .375 INJECTION, POWDER, FOR SOLUTION INTRAVENOUS at 11:55

## 2019-01-01 RX ADMIN — AMPICILLIN SODIUM AND SULBACTAM SODIUM 3 G: 2; 1 INJECTION, POWDER, FOR SOLUTION INTRAMUSCULAR; INTRAVENOUS at 11:00

## 2019-01-01 RX ADMIN — DEXTROSE MONOHYDRATE, SODIUM CHLORIDE, AND POTASSIUM CHLORIDE 75 ML/HR: 50; 4.5; 1.49 INJECTION, SOLUTION INTRAVENOUS at 23:42

## 2019-01-01 RX ADMIN — AMPICILLIN SODIUM AND SULBACTAM SODIUM 3 G: 2; 1 INJECTION, POWDER, FOR SOLUTION INTRAMUSCULAR; INTRAVENOUS at 11:53

## 2019-01-01 RX ADMIN — NYSTATIN 500000 UNITS: 100000 SUSPENSION ORAL at 17:53

## 2019-01-01 RX ADMIN — MEGESTROL ACETATE 40 MG: 40 TABLET ORAL at 11:42

## 2019-01-01 RX ADMIN — FENTANYL CITRATE 50 MCG: 50 INJECTION, SOLUTION INTRAMUSCULAR; INTRAVENOUS at 22:57

## 2019-01-01 RX ADMIN — PANTOPRAZOLE SODIUM 40 MG: 40 TABLET, DELAYED RELEASE ORAL at 08:29

## 2019-01-01 RX ADMIN — MEGESTROL ACETATE 40 MG: 40 TABLET ORAL at 11:44

## 2019-01-01 RX ADMIN — AMPICILLIN SODIUM AND SULBACTAM SODIUM 3 G: 2; 1 INJECTION, POWDER, FOR SOLUTION INTRAMUSCULAR; INTRAVENOUS at 18:33

## 2019-01-01 RX ADMIN — ESTRADIOL 1 MG: 1 TABLET ORAL at 08:41

## 2019-01-01 RX ADMIN — FENTANYL CITRATE 25 MCG: 50 INJECTION, SOLUTION INTRAMUSCULAR; INTRAVENOUS at 08:10

## 2019-01-01 RX ADMIN — DEXTROSE MONOHYDRATE, SODIUM CHLORIDE, AND POTASSIUM CHLORIDE 100 ML/HR: 50; 4.5; 1.49 INJECTION, SOLUTION INTRAVENOUS at 00:03

## 2019-01-01 RX ADMIN — LIDOCAINE HYDROCHLORIDE 20 MG: 20 INJECTION, SOLUTION EPIDURAL; INFILTRATION; INTRACAUDAL; PERINEURAL at 12:40

## 2019-01-01 RX ADMIN — HYDROMORPHONE HYDROCHLORIDE 1 MG: 2 TABLET ORAL at 06:01

## 2019-01-01 RX ADMIN — PIPERACILLIN SODIUM,TAZOBACTAM SODIUM 3.38 G: 3; .375 INJECTION, POWDER, FOR SOLUTION INTRAVENOUS at 15:09

## 2019-01-01 RX ADMIN — SODIUM CHLORIDE 10 ML: 9 INJECTION, SOLUTION INTRAMUSCULAR; INTRAVENOUS; SUBCUTANEOUS at 05:04

## 2019-01-01 RX ADMIN — AMPICILLIN SODIUM AND SULBACTAM SODIUM 3 G: 2; 1 INJECTION, POWDER, FOR SOLUTION INTRAMUSCULAR; INTRAVENOUS at 23:42

## 2019-01-01 RX ADMIN — NYSTATIN: 100000 POWDER TOPICAL at 17:26

## 2019-01-01 RX ADMIN — MEGESTROL ACETATE 40 MG: 40 TABLET ORAL at 13:34

## 2019-01-01 RX ADMIN — HEPARIN SODIUM 5000 UNITS: 5000 INJECTION INTRAVENOUS; SUBCUTANEOUS at 18:01

## 2019-01-01 RX ADMIN — HYDROMORPHONE HYDROCHLORIDE 0.5 MG: 2 INJECTION, SOLUTION INTRAMUSCULAR; INTRAVENOUS; SUBCUTANEOUS at 12:34

## 2019-01-01 RX ADMIN — SODIUM CHLORIDE 40 MG: 9 INJECTION, SOLUTION INTRAMUSCULAR; INTRAVENOUS; SUBCUTANEOUS at 09:37

## 2019-03-18 NOTE — ED PROVIDER NOTES
EMERGENCY DEPARTMENT HISTORY AND PHYSICAL EXAM      Date: 3/18/2019  Patient Name: Tricia Nicole    History of Presenting Illness     Chief Complaint   Patient presents with    Abdominal Pain     Pt reports LLQ pain x several months with hx of polyps. History Provided By: Patient    HPI: Tricia Nicole, 71 y.o. female with PMHx significant for GERD, presents walkin to the ED with cc of LLQ abd pain. Patient is a 21-year-old female with a history of diverticulosis presenting for abdominal pain. States that it has been going on for several months and has been followed by GI but the pain has gotten worse in the last couple of days. Associated with some nausea, but no vomiting. Denies any diarrhea or constipation or urinary symptoms. Denies fever. States the pain is a 8/10    There are no other complaints, changes, or physical findings at this time. PCP: Hellen Gimenez MD    No current facility-administered medications on file prior to encounter. Current Outpatient Medications on File Prior to Encounter   Medication Sig Dispense Refill    docusate sodium (COLACE) 100 mg capsule Take 100 mg by mouth two (2) times a day.  butalbital-acetaminophen-caff (FIORICET) -40 mg per capsule Take 1 Cap by mouth every six (6) hours as needed for Headache. 15 Cap 0    diazePAM (VALIUM) 5 mg tablet Take 5 mg by mouth every eight (8) hours as needed for Anxiety.  pantoprazole (PROTONIX) 40 mg tablet Take 40 mg by mouth two (2) times daily as needed (reflux).  gabapentin (NEURONTIN) 300 mg capsule Take 300 mg by mouth three (3) times daily.  zolpidem CR (AMBIEN CR) 12.5 mg tablet Take 12.5 mg by mouth nightly as needed for Sleep.  promethazine (PHENERGAN) 25 mg tablet Take 25 mg by mouth every six (6) hours as needed for Nausea.  multivit-min-FA-lycopen-lutein (CENTRUM SILVER) 0.4-300-250 mg-mcg-mcg tab Take 1 Tab by mouth daily.       ondansetron (ZOFRAN ODT) 4 mg disintegrating tablet Take 1 Tab by mouth every eight (8) hours as needed for Nausea. 10 Tab 0    estradiol (ESTRACE) 1 mg tablet Take 1 mg by mouth daily.  aspirin 81 mg tablet Take 81 mg by mouth. Past History     Past Medical History:  Past Medical History:   Diagnosis Date    Acid reflux     GERD (gastroesophageal reflux disease)     Hypertension     Other ill-defined conditions(799.89)     high cholesterol    Psychiatric disorder     depression    Thyroid disease     hyperthyroidism       Past Surgical History:  Past Surgical History:   Procedure Laterality Date    COLONOSCOPY N/A 11/30/2017    COLONOSCOPY performed by Ronda Newton MD at \[facility]\"" ENDOSCOPY    HX CHOLECYSTECTOMY      HX GYN      hysterectomy    HX HEENT      thyroid       Family History:  Family History   Problem Relation Age of Onset    Cancer Mother     Colon Cancer Father     Cancer Father        Social History:  Social History     Tobacco Use    Smoking status: Never Smoker    Smokeless tobacco: Never Used   Substance Use Topics    Alcohol use: Yes     Alcohol/week: 0.6 oz     Types: 1 Cans of beer per week     Comment: Socially.  Drug use: No       Allergies: Allergies   Allergen Reactions    Codeine Itching    Lisinopril Angioedema         Review of Systems   Review of Systems   Constitutional: Negative for chills and fever. Respiratory: Negative for cough and shortness of breath. Cardiovascular: Negative for chest pain. Gastrointestinal: Positive for abdominal pain and nausea. Negative for constipation, diarrhea and vomiting. Neurological: Negative for weakness and numbness. All other systems reviewed and are negative. Physical Exam   Physical Exam   Constitutional: She is oriented to person, place, and time. She appears well-developed and well-nourished. HENT:   Head: Normocephalic and atraumatic. Eyes: Conjunctivae and EOM are normal.   Neck: Normal range of motion. Neck supple.    Cardiovascular: Normal rate and regular rhythm. Pulmonary/Chest: Effort normal and breath sounds normal. No respiratory distress. Abdominal: Soft. She exhibits no distension. There is tenderness (LLQ). Musculoskeletal: Normal range of motion. Neurological: She is alert and oriented to person, place, and time. Skin: Skin is warm and dry. Psychiatric: She has a normal mood and affect. Nursing note and vitals reviewed. Diagnostic Study Results     Labs -   No results found for this or any previous visit (from the past 12 hour(s)). Radiologic Studies -   CT ABD PELV W CONT   Final Result   IMPRESSION:   Acute inflammation in the lower abdomen, predominantly in the right lower   quadrant, involving the cecum, terminal ileum, and appendix. I favor that the   appendix is secondarily involved, with the primary etiology being   infectious/inflammatory colitis. There is no evidence of bowel obstruction or   perforation. CT Results  (Last 48 hours)               03/18/19 1245  CT ABD PELV W CONT Final result    Impression:  IMPRESSION:   Acute inflammation in the lower abdomen, predominantly in the right lower   quadrant, involving the cecum, terminal ileum, and appendix. I favor that the   appendix is secondarily involved, with the primary etiology being   infectious/inflammatory colitis. There is no evidence of bowel obstruction or   perforation. Narrative:  EXAM: CT ABD PELV W CONT       INDICATION: Abdominal pain with left lower quadrant pain for one year, worse   today       COMPARISON: CT 5/8/2018        CONTRAST: 100 mL of Isovue-370. TECHNIQUE:    Following the uneventful intravenous administration of contrast, thin axial   images were obtained through the abdomen and pelvis. Coronal and sagittal   reconstructions were generated. Oral contrast was not administered.  CT dose   reduction was achieved through use of a standardized protocol tailored for this   examination and automatic exposure control for dose modulation. FINDINGS:    LUNG BASES: Clear. INCIDENTALLY IMAGED HEART AND MEDIASTINUM: Unremarkable. LIVER: No mass or biliary dilatation. GALLBLADDER: Status post cholecystectomy. SPLEEN: No mass. PANCREAS: No mass or ductal dilatation. ADRENALS: Unremarkable. KIDNEYS: There is a 1 7 m right upper pole renal cyst. There is a subcentimeter   left renal cyst. The kidneys are otherwise normal.   STOMACH: Unremarkable. SMALL BOWEL: No dilatation or wall thickening. COLON: There is diverticulosis of the colon. There is inflammation in the right   lower quadrant of the abdomen. This involves the cecum, terminal ileum, and   appendix. There are no focal fluid collections. 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 PERITONEUM: There is no pneumoperitoneum. There is trace abdominal and pelvic   ascites without focal fluid collection. RETROPERITONEUM: No lymphadenopathy or aortic aneurysm. REPRODUCTIVE ORGANS: Status post cholecystectomy. URINARY BLADDER: No mass or calculus. BONES: No destructive bone lesion. ADDITIONAL COMMENTS: N/A               CXR Results  (Last 48 hours)    None            Medical Decision Making   I am the first provider for this patient. I reviewed the vital signs, available nursing notes, past medical history, past surgical history, family history and social history. Vital Signs-Reviewed the patient's vital signs. No data found. Records Reviewed: Nursing Notes and Old Medical Records    Provider Notes (Medical Decision Making):   Patient presents with abdominal pain. DDx: Gastroenteritis, SBO, appendicitis, colitis, IBD, diverticulitis, mesenteric ischemia, AAA or descending dissection, ACS, ureteral stone. Will get labs and CT Abdomen.       - CT shows colitis with secondary inflammation of appendicitis.  Given pain is LLQ and her wbc is normal with no signs of sepsis, will give cipro and flagyl for 10d.       ED Course:   Initial assessment performed. The patients presenting problems have been discussed, and they are in agreement with the care plan formulated and outlined with them. I have encouraged them to ask questions as they arise throughout their visit. Critical Care Time:   0    Disposition:  Discharge Note:  The patient has been re-evaluated and is ready for discharge. Reviewed available results with patient. Counseled patient on diagnosis and care plan. Patient has expressed understanding, and all questions have been answered. Patient agrees with plan and agrees to follow up as recommended, or to return to the ED if their symptoms worsen. Discharge instructions have been provided and explained to the patient, along with reasons to return to the ED. PLAN:  1. Discharge Medication List as of 3/18/2019  2:45 PM      START taking these medications    Details   HYDROcodone-acetaminophen (NORCO) 5-325 mg per tablet Take 1 Tab by mouth every four (4) hours as needed for Pain for up to 3 days. Max Daily Amount: 6 Tabs., Print, Disp-18 Tab, R-0      ciprofloxacin HCl (CIPRO) 500 mg tablet Take 1 Tab by mouth two (2) times a day for 10 days. , Normal, Disp-20 Tab, R-0      metroNIDAZOLE (FLAGYL) 500 mg tablet Take 1 Tab by mouth two (2) times a day for 10 days. , Normal, Disp-20 Tab, R-0         CONTINUE these medications which have NOT CHANGED    Details   docusate sodium (COLACE) 100 mg capsule Take 100 mg by mouth two (2) times a day., Historical Med      butalbital-acetaminophen-caff (FIORICET) -40 mg per capsule Take 1 Cap by mouth every six (6) hours as needed for Headache., Normal, Disp-15 Cap, R-0      diazePAM (VALIUM) 5 mg tablet Take 5 mg by mouth every eight (8) hours as needed for Anxiety. , Historical Med      pantoprazole (PROTONIX) 40 mg tablet Take 40 mg by mouth two (2) times daily as needed (reflux). , Historical Med      gabapentin (NEURONTIN) 300 mg capsule Take 300 mg by mouth three (3) times daily. , Historical Med      zolpidem CR (AMBIEN CR) 12.5 mg tablet Take 12.5 mg by mouth nightly as needed for Sleep., Historical Med      promethazine (PHENERGAN) 25 mg tablet Take 25 mg by mouth every six (6) hours as needed for Nausea., Historical Med      multivit-min-FA-lycopen-lutein (CENTRUM SILVER) 0.4-300-250 mg-mcg-mcg tab Take 1 Tab by mouth daily. , Historical Med      ondansetron (ZOFRAN ODT) 4 mg disintegrating tablet Take 1 Tab by mouth every eight (8) hours as needed for Nausea., Normal, Disp-10 Tab, R-0      estradiol (ESTRACE) 1 mg tablet Take 1 mg by mouth daily. , Historical Med      aspirin 81 mg tablet Take 81 mg by mouth., Historical Med           2. Follow-up Information     Follow up With Specialties Details Why Contact Info    Christiano Miller MD Gastroenterology Schedule an appointment as soon as possible for a visit  [address]  [phone]          Return to ED if worse     Diagnosis     Clinical Impression:   1.  Colitis        Attestations:    Cornelius Richardson M.D.

## 2019-03-18 NOTE — DISCHARGE INSTRUCTIONS
Patient Education     Colitis: Care Instructions  Your Care Instructions  Colitis is the medical term for swelling (inflammation) of the intestine. It can be caused by different things, such as an infection or loss of blood flow in the intestine. Other causes are problems like Crohn's disease or ulcerative colitis. Symptoms may include fever, diarrhea that may be bloody, or belly pain. Sometimes symptoms go away without treatment. But you may need treatment or more tests, such as blood tests or a stool test. Or you may need imaging tests like a CT scan or a colonoscopy. In some cases, the doctor may want to test a sample of tissue from the intestine. This test is called a biopsy. The doctor has checked you carefully, but problems can develop later. If you notice any problems or new symptoms, get medical treatment right away. Follow-up care is a key part of your treatment and safety. Be sure to make and go to all appointments, and call your doctor if you are having problems. It's also a good idea to know your test results and keep a list of the medicines you take. How can you care for yourself at home? · Rest until you feel better. · Your doctor may recommend that you eat bland foods. These include rice, dry toast or crackers, bananas, and applesauce. · To prevent dehydration, drink plenty of fluids. Choose water and other caffeine-free clear liquids until you feel better. If you have kidney, heart, or liver disease and have to limit fluids, talk with your doctor before you increase the amount of fluids you drink. · Be safe with medicines. Take your medicines exactly as prescribed. Call your doctor if you think you are having a problem with your medicine. You will get more details on the specific medicines your doctor prescribes. When should you call for help? Call 911 anytime you think you may need emergency care. For example, call if:  · You passed out (lost consciousness).   · You vomit blood or what looks like coffee grounds. · Your stools are maroon or very bloody. Call your doctor now or seek immediate medical care if:  · You have new or worse pain. · You have a new or higher fever. · You have new or worse symptoms. · You cannot keep fluids or medicines down. Watch closely for changes in your health, and be sure to contact your doctor if:  · You do not get better as expected. Where can you learn more? Go to [employer]  Enter [id number] in the search box to learn more about \"Colitis: Care Instructions. \"   © 3549-1022 [employer]. Care instructions adapted under license by [facility] (which disclaims liability or warranty for this information). This care instruction is for use with your licensed healthcare professional. If you have questions about a medical condition or this instruction, always ask your healthcare professional. [place] [address] any warranty or liability for your use of this information.   Content Version: 57.8.644705; Current as of: November 20, 2015

## 2019-03-20 NOTE — PROGRESS NOTES
[address] March 20, 2019 RE: Jannetta Felty To Whom It May Concern, This is to certify that Jannetta Felty  On Mar 20 had outpatient colonoscopy and upper endoscopy. She was diagnosed with ischemic colitis and is in severe pain and cannot appear in court on Mar 21,2019 per  Dr. Virginia Velasquez her Gastroenterogist. 
She will be having further test within next week. Please feel free to contact  Dr. Virginia Velasquez at his office [phone]. Sincerely, 
Yue Alvarado, MIRELLA Discharge nurse

## 2019-03-20 NOTE — ROUTINE PROCESS
Mindy Clark 1955 
[id number] Situation: 
Verbal report received from: ARAMIS Karimi RN Procedure: Procedure(s): ESOPHAGOGASTRODUODENOSCOPY (EGD) COLONOSCOPY 
ESOPHAGOGASTRODUODENAL (EGD) BIOPSY 
ESOPHAGEAL DILATION 
SIGMOIDOSCOPY FLEXIBLE Background: 
 
Preoperative diagnosis: DYSPHAGIA, GERARD'S ESOPHAGUS,NAUSEA, LLQ PAIN, GASTRITIS Postoperative diagnosis: EGD: Gastritis, hiatal hernia Flex-sig: colitis :  Dr. Sandy Hernández 
Assistant(s): Endoscopy Technician-1: Ava Minor Endoscopy RN-1: Milo Zamora, RN; Ingrid Gallo RN Specimens:  
ID Type Source Tests Collected by Time Destination 1 : Gastric bx Preservative Gastric  Carlos Smallwood MD 3/20/2019 1250 Pathology 2 : GE junction bx Preservative   Carlos Smallwood MD 3/20/2019 1252 Pathology H. Pylori  no Assessment: I Anesthesia gave intra-procedure sedation and medications, see anesthesia flow sheet Intravenous fluids: NS@ [place] Vital signs stable Abdominal assessment: round and soft Recommendation: 
Discharge patient per MD order. Family or Friend Permission to share finding with family or friend yes

## 2019-03-20 NOTE — PROGRESS NOTES
CRE balloon dilatation of the esophagus 18 mm Balloon inflated to 3 ATMs and held for 60 seconds. 19 mm Balloon inflated to 4.5 ATMs and held for 60 seconds. 20 mm Balloon inflated to 6 ATMs and held for 60 seconds. No subcutaneous crepitus of the chest or cervical region was noted post dilatation.

## 2019-03-20 NOTE — PROCEDURES
Colonoscopy Procedure Note [facility] 1955 
[id number] Indications:  Please see below. Pre-operative Diagnosis: Colitis Post-operative Diagnosis: see below : Axel Monteiro MD 
 
Referring Provider: Foster Loza MD 
 
Sedation:  MAC anesthesia Propofol Procedure Details: After detailed informed consent was obtained with all risks and benefits of procedure explained and preoperative exam completed, the patient was taken to the endoscopy suite and placed in the left lateral decubitus position. Upon sequential sedation as per above, a digital rectal exam was performed  And was normal.  The Olympus videocolonoscope  was inserted in the rectum and carefully advanced to the a distance of 25 cm. The quality of preparation was good. The colonoscope was slowly withdrawn with careful evaluation between folds. Retroflexion in the rectum was performed. Findings: · The scope can only be passed to 25 cm where a very acute bend is noted. · The scope shoaib in this area and further movement is not possible. · There was abdominal distension noted from air trapping. · I had the nurses apply pelvic/sigmoid pressure with out much help. · I then had her placed on her back and tried passing the scope above this area. · I was not able to safely pass it proximal to 25 cm · (She has a hx of ischemic colitis in this area in this past: Colonoscopy in 2017 showedPatchy colitis NOS but likely ischemic in nature is noted starting at 25 cm from the anus interspersed with normal mucosal areas. It becomes moderate in the descending colon and again mild at the splenic flexure. Multiple biopsies are taken. Rest of the colon and rectum appears normal.\") · Small internal hemorrhoids Therapies:  none Specimen:  none Complications: None were encountered during the procedure. EBL:  None. Recommendations: 
 
-Treat as ischemic colitis. -Suggest CT angiogram to r.o large vessel disease. 
-Liquid diet. -Refer to hematology for hypercoagulable work up. Juan Kenney MD 
3/20/2019  1:16 PM

## 2019-03-20 NOTE — PROGRESS NOTES
Anesthesia reports 320mg Propofol, 80mg Lidocaine and 750mL NS given during procedure. Received report from anesthesia staff on vital signs and status of patient.  
 
Endoscope was pre-cleaned at the bedside immediately following procedure by Ubaldo Jimenez RN

## 2019-03-20 NOTE — ANESTHESIA POSTPROCEDURE EVALUATION
Procedure(s): ESOPHAGOGASTRODUODENOSCOPY (EGD) COLONOSCOPY 
ESOPHAGOGASTRODUODENAL (EGD) BIOPSY 
ESOPHAGEAL DILATION 
SIGMOIDOSCOPY FLEXIBLE. total IV anesthesia Anesthesia Post Evaluation Patient location during evaluation: PACU Note status: Adequate. Level of consciousness: responsive to verbal stimuli and sleepy but conscious Pain management: satisfactory to patient Airway patency: patent Anesthetic complications: no 
Cardiovascular status: acceptable Respiratory status: acceptable Hydration status: acceptable Comments: +Post-Anesthesia Evaluation and Assessment Patient: Jannetta Felty MRN: [id number]  SSN: [id number] YOB: 1955  Age: 71 y.o. Sex: female Cardiovascular Function/Vital Signs /87   Pulse (!) 54   Temp 36.4 °C (97.6 °F)   Resp 16   Ht 5' 4\" (1.626 m)   Wt 60.8 kg (134 lb)   SpO2 98%   Breastfeeding? No   BMI 23.00 kg/m² Patient is status post Procedure(s): ESOPHAGOGASTRODUODENOSCOPY (EGD) COLONOSCOPY 
ESOPHAGOGASTRODUODENAL (EGD) BIOPSY 
ESOPHAGEAL DILATION 
SIGMOIDOSCOPY FLEXIBLE. Nausea/Vomiting: Controlled. Postoperative hydration reviewed and adequate. Pain: 
Pain Scale 1: Visual (03/20/19 1356) Pain Intensity 1: 9 (03/20/19 1346) Managed. Neurological Status: At baseline. Mental Status and Level of Consciousness: Arousable. Pulmonary Status:  
O2 Device: Room air (03/20/19 1356) Adequate oxygenation and airway patent. Complications related to anesthesia: None Post-anesthesia assessment completed. No concerns. Signed By: Geovanna Clinton DO  
 3/20/2019 Post anesthesia nausea and vomiting:  controlled Vitals Value Taken Time /79 3/20/2019  1:58 PM  
Temp  3/20/2019  2:49 PM  
Pulse 53 3/20/2019  2:00 PM  
Resp 15 3/20/2019  2:00 PM  
SpO2 98 % 3/20/2019  2:00 PM  
Vitals shown include unvalidated device data.

## 2019-03-20 NOTE — DISCHARGE INSTRUCTIONS
[place] Office: [phone]    Melissa Carey  [id number]  1955    EGD/COLONOSCOPY DISCHARGE INSTRUCTIONS  Discomfort:  Sore throat- throat lozenges or warm salt water gargle  redness at IV site- apply warm compress to area; if redness or soreness persist- contact your physician  Gaseous discomfort- walking, belching will help relieve any discomfort  You may not operate a vehicle for 12 hours  You may not engage in an occupation involving machinery or appliances for rest of today. You may not drink alcoholic beverages for at least 12 hours  Avoid making any critical decisions for at least 24 hour  DIET  CLEAR LIQUIDS, ENSURE, BOOST, JELLO, BROTH ETC  MEDICATIONS   Regarding Aspirin or Nonsteroidal medications specifically, please see below. ACTIVITY  You may resume your normal daily activities. Spend the remainder of the day resting -  avoid any strenuous activity. CALL M.D. ANY SIGN OF   Increasing pain, nausea, vomiting  Abdominal distension (swelling)  New increased bleeding (oral or rectal)  Fever (chills)  Pain in chest area  Bloody discharge from nose or mouth  Shortness of breath    You may not take any Advil, Aspirin, Ibuprofen, Motrin, Aleve, or Goodys for 7 days, ONLY  Tylenol as needed for pain. Follow-up Instructions:   Call  Juan Vaca MD for any questions or concerns   Telephone # [phone]      Follow-up Information    None

## 2019-03-20 NOTE — PROCEDURES
[place] Office: [phone] Esophagogastroduodenoscopy Procedure Note Margaux Schulte 1955 
[id number] Indication: dysphagia; epigastric pain : Patricia Evans MD 
 
Referring Provider:  Slade Green MD 
 
Sedation:  MAC anesthesia Propofol Procedure Details: After detailed informed consent was obtained for the procedure, with all risks and benefits of procedure explained the patient was taken to the endoscopy suite and placed in the left lateral decubitus position. Following sequential administration of sedation as per above, the endoscope was inserted into the mouth and advanced under direct vision to second portion of the duodenum. A careful inspection was made as the gastroscope was withdrawn, including a retroflexed view of the proximal stomach; findings and interventions are described below. Findings:  
 
Esophagus: The esophageal mucosa in the proximal (Inlet patch noted), mid and distal esophagus is normal.  
There is easy spasm. Empiric TTS CRE 18-20 mm dilation was performed, each station over 60 seconds. The squamo-columnar junction is at 38 cm where the Z-line was noted. Biopsies taken (GE Junction and lower esophagus). A small hiatal hernia is noted 
  
Stomach: The gastric mucosa has linear erythema in body and antrum: Biopsies taken. The fundus was found to be normal with no lesions noted on retroflexion. The angularis is normal as well. 
  
Duodenum:  
The bulb and post bulbar mucosa is normal in appearance. The duodenal folds are normal. 
 
 
Therapies:  esophageal dilation with 18-20 mm sized balloon 
biopsy of esophagus 
biopsy of stomach body, antrum Specimen:  Specimens were collected as described and send to the laboratory. Complications:   None were encountered during the procedure. EBL:  None. Recommendations:  
 
-Continue acid suppression. , 
 -Await pathology. ,  
-See colonoscopy report. -Consider EMOT if symptom do not get better Juan Lares MD 
3/20/2019  12:58 PM

## 2019-03-20 NOTE — ANESTHESIA PREPROCEDURE EVALUATION
Anesthetic History No history of anesthetic complications Review of Systems / Medical History Patient summary reviewed, nursing notes reviewed and pertinent labs reviewed Pulmonary Within defined limits Neuro/Psych Within defined limits Cardiovascular Within defined limits Hypertension: well controlled Hyperlipidemia Exercise tolerance: >4 METS 
  
GI/Hepatic/Renal 
Within defined limits GERD: poorly controlled Endo/Other Within defined limits Hypothyroidism: well controlled Anemia Other Findings Physical Exam 
 
Airway Mallampati: II 
TM Distance: > 6 cm Neck ROM: normal range of motion Mouth opening: Normal 
 
 Cardiovascular Regular rate and rhythm,  S1 and S2 normal,  no murmur, click, rub, or gallop Rhythm: regular Rate: normal 
 
 
 
 Dental 
 
Dentition: Full upper dentures Pulmonary Breath sounds clear to auscultation Abdominal 
GI exam deferred Other Findings Anesthetic Plan ASA: 2 Anesthesia type: total IV anesthesia and general 
 
 
 
 
Induction: Intravenous Anesthetic plan and risks discussed with: Patient Propofol MAC

## 2019-03-20 NOTE — H&P
Pre-endoscopy H and P The patient was seen and examined in the room/pre-op holding area. The airway was assessed and documented. The problem list, past medical history, and medications were reviewed. Patient Active Problem List  
Diagnosis Code  Colitis K52.9 Social History Socioeconomic History  Marital status:  Spouse name: Not on file  Number of children: Not on file  Years of education: Not on file  Highest education level: Not on file Occupational History  Not on file Social Needs  Financial resource strain: Not on file  Food insecurity:  
  Worry: Not on file Inability: Not on file  Transportation needs:  
  Medical: Not on file Non-medical: Not on file Tobacco Use  Smoking status: Never Smoker  Smokeless tobacco: Never Used Substance and Sexual Activity  Alcohol use: Yes Alcohol/week: 0.6 oz Types: 1 Cans of beer per week Comment: Socially.  Drug use: No  
 Sexual activity: Not on file Lifestyle  Physical activity:  
  Days per week: Not on file Minutes per session: Not on file  Stress: Not on file Relationships  Social connections:  
  Talks on phone: Not on file Gets together: Not on file Attends Spiritism service: Not on file Active member of club or organization: Not on file Attends meetings of clubs or organizations: Not on file Relationship status: Not on file  Intimate partner violence:  
  Fear of current or ex partner: Not on file Emotionally abused: Not on file Physically abused: Not on file Forced sexual activity: Not on file Other Topics Concern  Not on file Social History Narrative  Not on file Past Medical History:  
Diagnosis Date  Acid reflux  GERD (gastroesophageal reflux disease)  Hypertension  Other ill-defined conditions(989.89)   
 high cholesterol  Psychiatric disorder   
 depression  Thyroid disease hyperthyroidism Prior to Admission Medications Prescriptions Last Dose Informant Patient Reported? Taking? HYDROcodone-acetaminophen (NORCO) 5-325 mg per tablet 3/19/2019 at Unknown time  No Yes Sig: Take 1 Tab by mouth every four (4) hours as needed for Pain for up to 3 days. Max Daily Amount: 6 Tabs. aspirin 81 mg tablet 3/19/2019 at Unknown time Self Yes Yes Sig: Take 81 mg by mouth. butalbital-acetaminophen-caff (FIORICET) -40 mg per capsule 3/19/2019 at Unknown time  No Yes Sig: Take 1 Cap by mouth every six (6) hours as needed for Headache. ciprofloxacin HCl (CIPRO) 500 mg tablet 3/19/2019 at Unknown time  No Yes Sig: Take 1 Tab by mouth two (2) times a day for 10 days. diazePAM (VALIUM) 5 mg tablet 3/19/2019 at Unknown time Other Yes Yes Sig: Take 5 mg by mouth every eight (8) hours as needed for Anxiety. docusate sodium (COLACE) 100 mg capsule 3/19/2019 at Unknown time  Yes Yes Sig: Take 100 mg by mouth two (2) times a day. estradiol (ESTRACE) 1 mg tablet 3/19/2019 at Unknown time Other Yes Yes Sig: Take 1 mg by mouth daily. gabapentin (NEURONTIN) 300 mg capsule 3/19/2019 at Unknown time Other Yes Yes Sig: Take 300 mg by mouth three (3) times daily. metroNIDAZOLE (FLAGYL) 500 mg tablet 3/19/2019 at Unknown time  No Yes Sig: Take 1 Tab by mouth two (2) times a day for 10 days. multivit-min-FA-lycopen-lutein (CENTRUM SILVER) 0.4-300-250 mg-mcg-mcg tab 3/19/2019 at Unknown time Self Yes Yes Sig: Take 1 Tab by mouth daily. ondansetron (ZOFRAN ODT) 4 mg disintegrating tablet Not Taking at Unknown time Other No No  
Sig: Take 1 Tab by mouth every eight (8) hours as needed for Nausea. pantoprazole (PROTONIX) 40 mg tablet 3/19/2019 at Unknown time Other Yes Yes Sig: Take 40 mg by mouth two (2) times daily as needed (reflux).   
promethazine (PHENERGAN) 25 mg tablet Not Taking at Unknown time Other Yes No  
 Sig: Take 25 mg by mouth every six (6) hours as needed for Nausea. zolpidem CR (AMBIEN CR) 12.5 mg tablet 3/19/2019 at Unknown time Other Yes Yes Sig: Take 12.5 mg by mouth nightly as needed for Sleep. Facility-Administered Medications: None The review of systems is:  Negative  for shortness of breath or chest pain The heart, lungs, and mental status were satisfactory for the administration of deep sedation and for the procedure. I discussed with the patient the objectives, risks, consequences and alternatives to the procedure.    
 
Cristofer Martin MD 
3/20/2019 
12:41 PM

## 2019-03-27 NOTE — ANESTHESIA PREPROCEDURE EVALUATION
Anesthetic History No history of anesthetic complications Review of Systems / Medical History Patient summary reviewed, nursing notes reviewed and pertinent labs reviewed Pulmonary Within defined limits Neuro/Psych Psychiatric history Cardiovascular Within defined limits Hypertension: well controlled Hyperlipidemia Exercise tolerance: >4 METS 
  
GI/Hepatic/Renal 
Within defined limits GERD: poorly controlled Comments: Abnormal CT Scan Colitis Endo/Other Hyperthyroidism: well controlled Anemia Pertinent negatives: No hypothyroidism Other Findings Physical Exam 
 
Airway Mallampati: II 
TM Distance: > 6 cm Neck ROM: normal range of motion Mouth opening: Normal 
 
 Cardiovascular Regular rate and rhythm,  S1 and S2 normal,  no murmur, click, rub, or gallop Rhythm: regular Rate: normal 
 
 
 
 Dental 
 
Dentition: Full upper dentures Pulmonary Breath sounds clear to auscultation Abdominal 
GI exam deferred Other Findings Anesthetic Plan ASA: 2 Anesthesia type: total IV anesthesia and general 
 
 
 
 
Induction: Intravenous Anesthetic plan and risks discussed with: Patient Propofol MAC

## 2019-03-28 PROBLEM — D49.0 NEOPLASM OF APPENDIX: Status: ACTIVE | Noted: 2019-01-01

## 2019-03-28 PROBLEM — K65.9 PERITONITIS (HCC): Status: ACTIVE | Noted: 2019-01-01

## 2019-03-28 NOTE — DISCHARGE INSTRUCTIONS
[place] Office: [phone]    Salazar Chase  [id number]  1955    EGD/COLONOSCOPY DISCHARGE INSTRUCTIONS  Discomfort:    redness at IV site- apply warm compress to area; if redness or soreness persist- contact your physician  Gaseous discomfort- walking, belching will help relieve any discomfort  You may not operate a vehicle for 12 hours  You may not engage in an occupation involving machinery or appliances for rest of today. You may not drink alcoholic beverages for at least 12 hours  Avoid making any critical decisions for at least 24 hour  DIET  SOFT  MEDICATIONS   Regarding Aspirin or Nonsteroidal medications specifically, please see below. ACTIVITY  You may resume your normal daily activities. Spend the remainder of the day resting -  avoid any strenuous activity. CALL M.D. ANY SIGN OF   Increasing pain, nausea, vomiting  Abdominal distension (swelling)  New increased bleeding (oral or rectal)  Fever (chills)  Pain in chest area  Bloody discharge from nose or mouth  Shortness of breath    You may not take any Advil, Aspirin, Ibuprofen, Motrin, Aleve, or Goodys for 7 days, ONLY  Tylenol as needed for pain. Follow-up Instructions:   Call  Juan Brown MD for any questions or concerns  Results of procedure / biopsy in 7 days   Telephone # [phone]      Follow-up Information    None

## 2019-03-28 NOTE — ROUTINE PROCESS
Dr Villa Trevino would like patient transferred to ED now for surgical evaluation. Patient may go to ED room 32 now

## 2019-03-28 NOTE — CONSULTS
Gastroenterology Consult Note NAME: Nathanael Henning : 1955 MRN: [id number] ATTG: [unfilled] PCP: Neal Newsome MD 
Date/Time:  3/28/2019 4:47 PM 
Subjective:  
REASON FOR CONSULT:     
Georgi Newsome is a 71 y.o.  female who I was asked to see after she was referred to the ED after a colonoscopy. She has had a nagging, stabbing abd pain for days to weeks. CT initially showed on 3.18.19 showed Acute inflammation in the lower abdomen, predominantly in the right lower 
quadrant, involving the cecum, terminal ileum, and appendix. I favor that the 
appendix is secondarily involved, with the primary etiology being 
infectious/inflammatory colitis. There is no evidence of bowel obstruction or perforation. I attempted a colonoscopy on 3.20.19 with: 
· The scope can only be passed to 25 cm where a very acute bend is noted. · The scope shoaib in this area and further movement is not possible. · There was abdominal distension noted from air trapping. · I had the nurses apply pelvic/sigmoid pressure with out much help. · I then had her placed on her back and tried passing the scope above this area. · I was not able to safely pass it proximal to 25 cm · (She has a hx of ischemic colitis in this area in this past: Colonoscopy in 2017 showedPatchy colitis NOS but likely ischemic in nature is noted starting at 25 cm from the anus interspersed with normal mucosal areas. It becomes moderate in the descending colon and again mild at the splenic flexure. Multiple biopsies are taken. Rest of the colon and rectum appears normal.\") · Small internal hemorrhoids She also has a hx of remote diverticulitis per pt. Followed by a CTA on 3.26.19 showing: 
Increased severity of right lower quadrant/pericecal inflammatory changes 
without cecal wall thickening. No evidence of ischemic colitis as the mesenteric 
vasculature is widely patent without atherosclerotic disease. Blind-ending tubular structure in the center of the inflammatory process could 
represent the appendix, and measures 13 mm in diameter. Surgery was curb side consulted and they suggested a repeat colonoscopy attempt. Colonoscopy today showed: · There is resistance to scope passage at 25 cm where diverticulosis is noted. I was able to get across this area with some manipulation. · The nikolai-appendiceal orifice area shows possible ulceration. Views are difficult as there is edema. First biopsy resulted in bleeding which made viewing even more challenging. I took further biopsies of this site. · The TI is normal to 5 cm: biopsies taken. · Rest of the colon is normal appearing. · There is erythema of rectum(artifact from scope vs proctitis): biopsies taken. · Internal hemorrhoids noted. I spoke with Dr Alysa Gallo who suggested that she be sent to the ED. Repeat CT (today in ER) showed continued inflmmation in RLQ, possibly related to appendicitis. Past Medical History:  
Diagnosis Date  Acid reflux  GERD (gastroesophageal reflux disease)  Hypertension  Other ill-defined conditions(799.89)   
 high cholesterol  Psychiatric disorder   
 depression  Thyroid disease   
 hyperthyroidism Past Surgical History:  
Procedure Laterality Date  COLONOSCOPY N/A 11/30/2017 COLONOSCOPY performed by Abdullahi José MD at \[facility]\"" ENDOSCOPY  COLONOSCOPY N/A 3/20/2019 COLONOSCOPY performed by Na Reyna MD at \[facility]\"" ENDOSCOPY  COLONOSCOPY N/A 3/28/2019 COLONOSCOPY performed by Na Reyna MD at \[facility]\"" ENDOSCOPY 2021 Bibi Soni N/A 3/20/2019 SIGMOIDOSCOPY FLEXIBLE performed by Na Reyna MD at \[facility]\"" ENDOSCOPY  
 HX CHOLECYSTECTOMY  HX GYN    
 hysterectomy  HX HEENT    
 thyroid Social History Tobacco Use  Smoking status: Never Smoker  Smokeless tobacco: Never Used Substance Use Topics  Alcohol use:  Yes  
 Alcohol/week: 0.6 oz Types: 1 Cans of beer per week Comment: Socially. Family History Problem Relation Age of Onset  Cancer Mother  Colon Cancer Father  Cancer Father Allergies Allergen Reactions  Codeine Itching  Lisinopril Angioedema Home Medications: 
Prior to Admission Medications Prescriptions Last Dose Informant Patient Reported? Taking?  
aspirin 81 mg tablet  Self Yes No  
Sig: Take 81 mg by mouth. butalbital-acetaminophen-caff (FIORICET) -40 mg per capsule   No No  
Sig: Take 1 Cap by mouth every six (6) hours as needed for Headache. ciprofloxacin HCl (CIPRO) 500 mg tablet   No No  
Sig: Take 1 Tab by mouth two (2) times a day for 10 days. diazePAM (VALIUM) 5 mg tablet  Other Yes No  
Sig: Take 5 mg by mouth every eight (8) hours as needed for Anxiety. docusate sodium (COLACE) 100 mg capsule   Yes No  
Sig: Take 100 mg by mouth two (2) times a day. estradiol (ESTRACE) 1 mg tablet  Other Yes No  
Sig: Take 1 mg by mouth daily. gabapentin (NEURONTIN) 300 mg capsule  Other Yes No  
Sig: Take 300 mg by mouth three (3) times daily. metroNIDAZOLE (FLAGYL) 500 mg tablet   No No  
Sig: Take 1 Tab by mouth two (2) times a day for 10 days. oxyCODONE-acetaminophen (PERCOCET) 5-325 mg per tablet   No No  
Sig: Take 1 Tab by mouth every six (6) hours as needed for Pain for up to 5 days. Max Daily Amount: 4 Tabs. pantoprazole (PROTONIX) 40 mg tablet  Other Yes No  
Sig: Take 40 mg by mouth two (2) times daily as needed (reflux). vitamin E (AQUA GEMS) 400 unit capsule   Yes No  
Sig: Take  by mouth daily. zolpidem CR (AMBIEN CR) 12.5 mg tablet  Other Yes No  
Sig: Take 12.5 mg by mouth nightly as needed for Sleep. Facility-Administered Medications: None Hospital medications: 
Current Facility-Administered Medications Medication Dose Route Frequency  morphine injection 2 mg  2 mg IntraVENous NOW Current Outpatient Medications Medication Sig  
 vitamin E (AQUA GEMS) 400 unit capsule Take  by mouth daily.  oxyCODONE-acetaminophen (PERCOCET) 5-325 mg per tablet Take 1 Tab by mouth every six (6) hours as needed for Pain for up to 5 days. Max Daily Amount: 4 Tabs.  ciprofloxacin HCl (CIPRO) 500 mg tablet Take 1 Tab by mouth two (2) times a day for 10 days.  metroNIDAZOLE (FLAGYL) 500 mg tablet Take 1 Tab by mouth two (2) times a day for 10 days.  docusate sodium (COLACE) 100 mg capsule Take 100 mg by mouth two (2) times a day.  butalbital-acetaminophen-caff (FIORICET) -40 mg per capsule Take 1 Cap by mouth every six (6) hours as needed for Headache.  diazePAM (VALIUM) 5 mg tablet Take 5 mg by mouth every eight (8) hours as needed for Anxiety.  pantoprazole (PROTONIX) 40 mg tablet Take 40 mg by mouth two (2) times daily as needed (reflux).  gabapentin (NEURONTIN) 300 mg capsule Take 300 mg by mouth three (3) times daily.  zolpidem CR (AMBIEN CR) 12.5 mg tablet Take 12.5 mg by mouth nightly as needed for Sleep.  estradiol (ESTRACE) 1 mg tablet Take 1 mg by mouth daily.  aspirin 81 mg tablet Take 81 mg by mouth. REVIEW OF SYSTEMS:   
 []     Unable to obtain  ROS due to  []    mental status change  []    sedated   []    intubated 
 [x]    Total of 11 systems reviewed as follows: 
Const:  negative fever, negative chills, negative weight loss Eyes:   negative diplopia or visual changes, negative eye pain ENT:   negative coryza, negative sore throat Resp:   negative cough, hemoptysis, dyspnea Cards:  negative for chest pain, palpitations, lower extremity edema :  negative for frequency, dysuria and hematuria Skin:   negative for rash and pruritus Heme:  negative for easy bruising and gum/nose bleeding MS:  negative for myalgias, arthralgias, back pain and muscle weakness Neurolo:  negative for headaches, dizziness, vertigo, memory problems Psych:  negative for feelings of anxiety, depression Pertinent Positives include :as above Objective: VITALS:   
Visit Vitals /74 Pulse 64 Temp 97.7 °F (36.5 °C) Resp 18 SpO2 99% Temp (24hrs), Av.5 °F (36.4 °C), Min:97.4 °F (36.3 °C), Max:97.7 °F (36.5 °C) PHYSICAL EXAM:  
 
General: In distress Eyes: No icterus; extraocular movements intact,  
ENMT: Lips, teeth, gums, oropharynx unremarkable. Chest:  breath sounds are normal  
Heart: Heart sounds normal, S1,S2 Abdomen: Diffuse tenderness bowel sounds decreased. Some rebound Lymphatic: No anterior cervical,  lymphadenopathy Neurologic: Alert and oriented Psyc: Affect is appropriate Extremities: No edema LAB DATA REVIEWED:   
Recent Results (from the past 48 hour(s)) SAMPLES BEING HELD Collection Time: 19  2:46 PM  
Result Value Ref Range SAMPLES BEING HELD URINE,GREEN,LAV,BLUE,PNIK   
 COMMENT Add-on orders for these samples will be processed based on acceptable specimen integrity and analyte stability, which may vary by analyte. CBC WITH AUTOMATED DIFF Collection Time: 19  2:46 PM  
Result Value Ref Range WBC 4.6 3.6 - 11.0 K/uL  
 RBC 3.77 (L) 3.80 - 5.20 M/uL  
 HGB 11.3 (L) 11.5 - 16.0 g/dL HCT 33.7 (L) 35.0 - 47.0 % MCV 89.4 80.0 - 99.0 FL  
 MCH 30.0 26.0 - 34.0 PG  
 MCHC 33.5 30.0 - 36.5 g/dL  
 RDW 14.7 (H) 11.5 - 14.5 % PLATELET 728 245 - 039 K/uL MPV 10.0 8.9 - 12.9 FL  
 NRBC 0.0 0  WBC ABSOLUTE NRBC 0.00 0.00 - 0.01 K/uL NEUTROPHILS 55 32 - 75 % LYMPHOCYTES 34 12 - 49 % MONOCYTES 8 5 - 13 % EOSINOPHILS 2 0 - 7 % BASOPHILS 1 0 - 1 % IMMATURE GRANULOCYTES 0 0.0 - 0.5 % ABS. NEUTROPHILS 2.5 1.8 - 8.0 K/UL  
 ABS. LYMPHOCYTES 1.6 0.8 - 3.5 K/UL  
 ABS. MONOCYTES 0.4 0.0 - 1.0 K/UL  
 ABS. EOSINOPHILS 0.1 0.0 - 0.4 K/UL  
 ABS. BASOPHILS 0.1 0.0 - 0.1 K/UL  
 ABS. IMM. GRANS. 0.0 0.00 - 0.04 K/UL  
 DF AUTOMATED METABOLIC PANEL, COMPREHENSIVE Collection Time: 03/28/19  2:46 PM  
Result Value Ref Range Sodium 133 (L) 136 - 145 mmol/L Potassium 3.8 3.5 - 5.1 mmol/L Chloride 102 97 - 108 mmol/L  
 CO2 23 21 - 32 mmol/L Anion gap 8 5 - 15 mmol/L Glucose 69 65 - 100 mg/dL BUN 5 (L) 6 - 20 MG/DL Creatinine 0.48 (L) 0.55 - 1.02 MG/DL  
 BUN/Creatinine ratio 10 (L) 12 - 20 GFR est AA >60 >60 ml/min/1.73m2 GFR est non-AA >60 >60 ml/min/1.73m2 Calcium 7.6 (L) 8.5 - 10.1 MG/DL Bilirubin, total 0.2 0.2 - 1.0 MG/DL  
 ALT (SGPT) 24 12 - 78 U/L  
 AST (SGOT) 27 15 - 37 U/L Alk. phosphatase 64 45 - 117 U/L Protein, total 6.6 6.4 - 8.2 g/dL Albumin 3.2 (L) 3.5 - 5.0 g/dL Globulin 3.4 2.0 - 4.0 g/dL A-G Ratio 0.9 (L) 1.1 - 2.2 IMAGING RESULTS: 
 []      I have personally reviewed the actual   []    CXR  []    CT  []     US Recommendations/Plan: Abnormal CT as above R/o appendicitis (vs mucocele rupture vs ob origin) Cecal ulceration as above Abdominal pain Hx of ischemic colitis Hx of diverticulitis 
___________________________________________________ RECOMMENDATIONS:   
 
I spoke with Dr Francis Patel and LISA GARRISON. 
She needs a surgical consult. C/w IVF, IV antibiotics(was in PO Cipro/Flagyl as OP)  and keep her NPO until surgery sees her. Supportive care Thank you for entrusting me with this patient's care. Please do not hesitate to contact me with any questions or if I can be of assistance with this patient or any of your other patients' GI needs. Discussed Code Status:    [x]    Full Code      []    DNR   
___________________________________________________ Care Plan discussed with: 
  [x]    Patient   [x]    Family   []    Nursing   [x]    Attending 
 
 ___________________________________________________ GI: LUIS ALBERTO Benito MD

## 2019-03-28 NOTE — ED TRIAGE NOTES
Patient arrived to the ED via endoscopy after Dr. Arnulfo Jean found what he thinks is an ulcer bleeding and possible problems with her appendix. Patient reports abdominal pain x1 year.

## 2019-03-28 NOTE — H&P
Surgery History and Physcial 
 
Subjective:  
  
Dolores Johnson is a 71 y.o. female s/p colonoscopy today with Dr. Dayton Mortimer, sent to ED with c/o 10/10 abdominal pain. She has been having pain for the past 2 years and has had abnormal CTs since Nov 2017 showing left colitis and a moderately plump appendix with possible tip mucocele. She had a CT 10 days ago which showed complete resolution of the left colitis, but now shows a diffuse inflammatory appearance in the low abdomen/pelvis with some dependent pelvic fluid. She still has a prominent appendix. She is s/p hysterectomy but has both ovaries, and these appear normal on CT but are adjacent to the inflammatory process, as is the appendix. She has a normal appetite and is currently hungry. She has been having fevers. She states that the pain is now so bad that she cannot do normal activities. She denies any nausea or vomiting, and has been having normal bowel function. Patient Active Problem List  
 Diagnosis Date Noted  Peritonitis ([address] 03/28/2019  Colitis 11/29/2017 Past Medical History:  
Diagnosis Date  Acid reflux  GERD (gastroesophageal reflux disease)  Hypertension  Other ill-defined conditions(799.89)   
 high cholesterol  Psychiatric disorder   
 depression  Thyroid disease   
 hyperthyroidism Past Surgical History:  
Procedure Laterality Date  COLONOSCOPY N/A 11/30/2017 COLONOSCOPY performed by Alee Sena MD at [facility] ENDOSCOPY  COLONOSCOPY N/A 3/20/2019 COLONOSCOPY performed by Kehinde Montemayor MD at [facility] ENDOSCOPY  COLONOSCOPY N/A 3/28/2019 COLONOSCOPY performed by Kehinde Montemayor MD at [facility] ENDOSCOPY 2021 Bibi Soni N/A 3/20/2019 SIGMOIDOSCOPY FLEXIBLE performed by Kehinde Montemayor MD at [facility] ENDOSCOPY  
 HX CHOLECYSTECTOMY  HX GYN    
 hysterectomy  HX HEENT    
 thyroid Social History Tobacco Use  Smoking status: Never Smoker  Smokeless tobacco: Never Used Substance Use Topics  Alcohol use: Yes Alcohol/week: 0.6 oz Types: 1 Cans of beer per week Comment: Socially. Family History Problem Relation Age of Onset  Cancer Mother  Colon Cancer Father  Cancer Father Prior to Admission medications Medication Sig Start Date End Date Taking? Authorizing Provider  
vitamin E (AQUA GEMS) 400 unit capsule Take  by mouth daily. Provider, Historical  
oxyCODONE-acetaminophen (PERCOCET) 5-325 mg per tablet Take 1 Tab by mouth every six (6) hours as needed for Pain for up to 5 days. Max Daily Amount: 4 Tabs. 3/28/19 4/2/19  Clayton SELF MD  
ciprofloxacin HCl (CIPRO) 500 mg tablet Take 1 Tab by mouth two (2) times a day for 10 days. 3/18/19 3/28/19  Susana Walters MD  
metroNIDAZOLE (FLAGYL) 500 mg tablet Take 1 Tab by mouth two (2) times a day for 10 days. 3/18/19 3/28/19  Susana Walters MD  
docusate sodium (COLACE) 100 mg capsule Take 100 mg by mouth two (2) times a day. Provider, Historical  
butalbital-acetaminophen-caff (FIORICET) -40 mg per capsule Take 1 Cap by mouth every six (6) hours as needed for Headache. 5/3/18   Farheen Hansen MD  
diazePAM (VALIUM) 5 mg tablet Take 5 mg by mouth every eight (8) hours as needed for Anxiety. Provider, Historical  
pantoprazole (PROTONIX) 40 mg tablet Take 40 mg by mouth two (2) times daily as needed (reflux). Provider, Historical  
gabapentin (NEURONTIN) 300 mg capsule Take 300 mg by mouth three (3) times daily. Provider, Historical  
zolpidem CR (AMBIEN CR) 12.5 mg tablet Take 12.5 mg by mouth nightly as needed for Sleep. Provider, Historical  
estradiol (ESTRACE) 1 mg tablet Take 1 mg by mouth daily. Oli Saavedra MD  
aspirin 81 mg tablet Take 81 mg by mouth. Oli Saavedra MD  
 
Allergies Allergen Reactions  Codeine Itching  Lisinopril Angioedema Review of Systems Constitutional: Positive for fever. Negative for chills and diaphoresis. Respiratory: Negative for shortness of breath and wheezing. Cardiovascular: Negative for chest pain and palpitations. Gastrointestinal: Positive for abdominal pain. Negative for diarrhea, nausea and vomiting. Musculoskeletal: Negative for myalgias. Hematological: Does not bruise/bleed easily. Objective:  
 
Visit Vitals /74 Pulse 64 Temp 97.7 °F (36.5 °C) Resp 18 SpO2 99% Physical Exam  
Constitutional: She appears well-developed and well-nourished. No distress. HENT:  
Head: Normocephalic and atraumatic. Cardiovascular: Normal rate, regular rhythm, normal heart sounds and intact distal pulses. Pulmonary/Chest: Breath sounds normal. She has no wheezes. She has no rales. Abdominal: Soft. Bowel sounds are normal. She exhibits no distension, no ascites and no mass. There is no hepatosplenomegaly. There is generalized tenderness. There is no rigidity, no rebound, no guarding, no tenderness at McBurney's point and negative Tesfaye's sign. No hernia. - Rovsing's 
- heel tap 
- hip shake - psoas sign Musculoskeletal: Normal range of motion. Lymphadenopathy:  
  She has no cervical adenopathy. Imaging:  images and reports reviewed Lab Review:   
Recent Results (from the past 24 hour(s)) SAMPLES BEING HELD Collection Time: 03/28/19  2:46 PM  
Result Value Ref Range SAMPLES BEING HELD URINE,GREEN,LAV,BLUE,PNIK   
 COMMENT Add-on orders for these samples will be processed based on acceptable specimen integrity and analyte stability, which may vary by analyte. CBC WITH AUTOMATED DIFF Collection Time: 03/28/19  2:46 PM  
Result Value Ref Range WBC 4.6 3.6 - 11.0 K/uL  
 RBC 3.77 (L) 3.80 - 5.20 M/uL  
 HGB 11.3 (L) 11.5 - 16.0 g/dL HCT 33.7 (L) 35.0 - 47.0 % MCV 89.4 80.0 - 99.0 FL  
 MCH 30.0 26.0 - 34.0 PG  
 MCHC 33.5 30.0 - 36.5 g/dL RDW 14.7 (H) 11.5 - 14.5 % PLATELET 627 990 - 239 K/uL MPV 10.0 8.9 - 12.9 FL  
 NRBC 0.0 0  WBC ABSOLUTE NRBC 0.00 0.00 - 0.01 K/uL NEUTROPHILS 55 32 - 75 % LYMPHOCYTES 34 12 - 49 % MONOCYTES 8 5 - 13 % EOSINOPHILS 2 0 - 7 % BASOPHILS 1 0 - 1 % IMMATURE GRANULOCYTES 0 0.0 - 0.5 % ABS. NEUTROPHILS 2.5 1.8 - 8.0 K/UL  
 ABS. LYMPHOCYTES 1.6 0.8 - 3.5 K/UL  
 ABS. MONOCYTES 0.4 0.0 - 1.0 K/UL  
 ABS. EOSINOPHILS 0.1 0.0 - 0.4 K/UL  
 ABS. BASOPHILS 0.1 0.0 - 0.1 K/UL  
 ABS. IMM. GRANS. 0.0 0.00 - 0.04 K/UL  
 DF AUTOMATED METABOLIC PANEL, COMPREHENSIVE Collection Time: 03/28/19  2:46 PM  
Result Value Ref Range Sodium 133 (L) 136 - 145 mmol/L Potassium 3.8 3.5 - 5.1 mmol/L Chloride 102 97 - 108 mmol/L  
 CO2 23 21 - 32 mmol/L Anion gap 8 5 - 15 mmol/L Glucose 69 65 - 100 mg/dL BUN 5 (L) 6 - 20 MG/DL Creatinine 0.48 (L) 0.55 - 1.02 MG/DL  
 BUN/Creatinine ratio 10 (L) 12 - 20 GFR est AA >60 >60 ml/min/1.73m2 GFR est non-AA >60 >60 ml/min/1.73m2 Calcium 7.6 (L) 8.5 - 10.1 MG/DL Bilirubin, total 0.2 0.2 - 1.0 MG/DL  
 ALT (SGPT) 24 12 - 78 U/L  
 AST (SGOT) 27 15 - 37 U/L Alk. phosphatase 64 45 - 117 U/L Protein, total 6.6 6.4 - 8.2 g/dL Albumin 3.2 (L) 3.5 - 5.0 g/dL Globulin 3.4 2.0 - 4.0 g/dL A-G Ratio 0.9 (L) 1.1 - 2.2 Assessment:  
 
Abdominal pain, diffuse lower abdominal inflammatory process of unclear etiology. Differential diagnosis includes complicated appendicitis, ruptured mucocele, mucinous carcinoma, or other infiltrating malignant process. Plan: 1. I recommend proceeding with diagnostic laparoscopy, possible laparotomy, possible appendectomy, colectomy or small bowel resection, ostomy.  
 
2. Discussed aspects of surgical intervention, methods, risks including by not limited to infection, bleeding, hematoma, and perforation of the intestines or solid organs, possible inability to surgically correct the problem, and the risks of general anesthetic. The patient understands the risks; any and all questions were answered to the patient's satisfaction. Signed By: Rayna Naqvi MD, [address] [place] [address]. [facility] March 28, 2019

## 2019-03-28 NOTE — ROUTINE PROCESS
Neal Keen 1955 
[id number] Situation: 
Verbal report received from: Norris Velazco RN Procedure: Procedure(s): 
COLONOSCOPY 
COLON BIOPSY Background: 
 
Preoperative diagnosis: ABNORMAL CT SCAN Postoperative diagnosis: Diverticulosis, Redundant Colon, Colitis, Internal Hemorrhoids :  Dr. Magali Cano 
Assistant(s): Endoscopy Technician-1: Luis Estrella Endoscopy RN-1: Avinash Cabral Specimens:  
ID Type Source Tests Collected by Time Destination 1 : Periappendiceal Orifice Biopsy Preservative   Natan Cage MD 3/28/2019 1248 Pathology 2 : Terminal Ileum Biopsy Presluis armandoative   Natan Cage MD 3/28/2019 1252 Pathology 3 : Rectum Biopsy Preservative Rectum  Natan Cage MD 3/28/2019 1300 Pathology H. Pylori  no Assessment: 
Intra-procedure medications Anesthesia gave intra-procedure sedation and medications, see anesthesia flow sheet Intravenous fluids: NS@ [facility] Vital signs stable Abdominal assessment: round and soft Recommendation: 
Discharge patient per MD orde Family or Friend Permission to share finding with family or friend yes

## 2019-03-28 NOTE — ED PROVIDER NOTES
EMERGENCY DEPARTMENT HISTORY AND PHYSICAL EXAM 
 
 
Date: 3/28/2019 Patient Name: Shyann Hull History of Presenting Illness Chief Complaint Patient presents with  Abdominal Pain  
  possible appendicitis coming from endoscopy/bleeding ulcer History Provided By: Patient HPI: Shyann Hull, 71 y.o. female with PMHx significant for gerd, htn, depression, presents from endoscopy to the ED with cc of abdominal pain. Patient has had ongoing abdominal pain for months-years, but it has gotten progressively worse. She was seen on 3/18 and diagnosed with colitis, discharged on abx. Pain is not improved since that time. She saw her GI doctor initially had an attempt at colonoscopy on Monday, however this was aborted due to inability to pass the scope. Had repeat colonoscopy today and there was some concern for ulceration appendicitis. Due to patient's ongoing abdominal pain she was sent to the emergency department for evaluation. Patient notes that she has been having intermittent fevers as high as 101. Her pain is worse after eating. Pain is primarily located in the left lower quadrant. She has a history of previous hysterectomy. PCP: Andre Lehman MD 
 
There are no other complaints, changes, or physical findings at this time. Current Facility-Administered Medications Medication Dose Route Frequency Provider Last Rate Last Dose  
 0.9% sodium chloride infusion  150 mL/hr IntraVENous CONTINUOUS Leodan Pineda MD      
 cefOXitin (MEFOXIN) 2 g in 0.9% sodium chloride (MBP/ADV) 50 mL  2 g IntraVENous ON CALL TO OR Leodan Pineda MD      
 
Current Outpatient Medications Medication Sig Dispense Refill  vitamin E (AQUA GEMS) 400 unit capsule Take  by mouth daily.  oxyCODONE-acetaminophen (PERCOCET) 5-325 mg per tablet Take 1 Tab by mouth every six (6) hours as needed for Pain for up to 5 days. Max Daily Amount: 4 Tabs.  14 Tab 0  
  ciprofloxacin HCl (CIPRO) 500 mg tablet Take 1 Tab by mouth two (2) times a day for 10 days. 20 Tab 0  
 metroNIDAZOLE (FLAGYL) 500 mg tablet Take 1 Tab by mouth two (2) times a day for 10 days. 20 Tab 0  
 docusate sodium (COLACE) 100 mg capsule Take 100 mg by mouth two (2) times a day.  butalbital-acetaminophen-caff (FIORICET) -40 mg per capsule Take 1 Cap by mouth every six (6) hours as needed for Headache. 15 Cap 0  
 diazePAM (VALIUM) 5 mg tablet Take 5 mg by mouth every eight (8) hours as needed for Anxiety.  pantoprazole (PROTONIX) 40 mg tablet Take 40 mg by mouth two (2) times daily as needed (reflux).  gabapentin (NEURONTIN) 300 mg capsule Take 300 mg by mouth three (3) times daily.  zolpidem CR (AMBIEN CR) 12.5 mg tablet Take 12.5 mg by mouth nightly as needed for Sleep.  estradiol (ESTRACE) 1 mg tablet Take 1 mg by mouth daily.  aspirin 81 mg tablet Take 81 mg by mouth. Past History Past Medical History: 
Past Medical History:  
Diagnosis Date  Acid reflux  GERD (gastroesophageal reflux disease)  Hypertension  Other ill-defined conditions(799.89)   
 high cholesterol  Psychiatric disorder   
 depression  Thyroid disease   
 hyperthyroidism Past Surgical History: 
Past Surgical History:  
Procedure Laterality Date  COLONOSCOPY N/A 11/30/2017 COLONOSCOPY performed by Anita English MD at [facility] ENDOSCOPY  COLONOSCOPY N/A 3/20/2019 COLONOSCOPY performed by Carmen Leon MD at [facility] ENDOSCOPY  COLONOSCOPY N/A 3/28/2019 COLONOSCOPY performed by Carmen Leon MD at [facility] ENDOSCOPY 2021 Bibi Soni N/A 3/20/2019 SIGMOIDOSCOPY FLEXIBLE performed by Carmen Leon MD at [facility] ENDOSCOPY  
 HX CHOLECYSTECTOMY  HX GYN    
 hysterectomy  HX HEENT    
 thyroid Family History: 
Family History Problem Relation Age of Onset  Cancer Mother  Colon Cancer Father  Cancer Father Social History: 
Social History Tobacco Use  Smoking status: Never Smoker  Smokeless tobacco: Never Used Substance Use Topics  Alcohol use: Yes Alcohol/week: 0.6 oz Types: 1 Cans of beer per week Comment: Socially.  Drug use: No  
 
Allergies: Allergies Allergen Reactions  Codeine Itching  Lisinopril Angioedema Review of Systems Review of Systems Constitutional: Negative for chills and fever. HENT: Negative for congestion, rhinorrhea and sore throat. Respiratory: Negative for cough and shortness of breath. Cardiovascular: Negative for chest pain. Gastrointestinal: Positive for abdominal pain. Negative for nausea and vomiting. Genitourinary: Negative for dysuria and urgency. Skin: Negative for rash. Neurological: Negative for dizziness, light-headedness and headaches. All other systems reviewed and are negative. Physical Exam  
Physical Exam  
Constitutional: She is oriented to person, place, and time. She appears well-developed and well-nourished. No distress. HENT:  
Head: Normocephalic and atraumatic. Eyes: Pupils are equal, round, and reactive to light. Conjunctivae and EOM are normal.  
Neck: Normal range of motion. Cardiovascular: Normal rate, regular rhythm and intact distal pulses. Pulmonary/Chest: Effort normal and breath sounds normal. No stridor. No respiratory distress. Abdominal: Soft. Bowel sounds are normal. She exhibits no distension. There is tenderness in the right lower quadrant, suprapubic area and left lower quadrant. Musculoskeletal: Normal range of motion. Neurological: She is alert and oriented to person, place, and time. Skin: Skin is warm and dry. Psychiatric: She has a normal mood and affect. Nursing note and vitals reviewed. Diagnostic Study Results Labs - Recent Results (from the past 12 hour(s)) SAMPLES BEING HELD Collection Time: 03/28/19  2:46 PM  
Result Value Ref Range SAMPLES BEING HELD URINE,GREEN,LAV,BLUE,PNIK   
 COMMENT Add-on orders for these samples will be processed based on acceptable specimen integrity and analyte stability, which may vary by analyte. CBC WITH AUTOMATED DIFF Collection Time: 03/28/19  2:46 PM  
Result Value Ref Range WBC 4.6 3.6 - 11.0 K/uL  
 RBC 3.77 (L) 3.80 - 5.20 M/uL  
 HGB 11.3 (L) 11.5 - 16.0 g/dL HCT 33.7 (L) 35.0 - 47.0 % MCV 89.4 80.0 - 99.0 FL  
 MCH 30.0 26.0 - 34.0 PG  
 MCHC 33.5 30.0 - 36.5 g/dL  
 RDW 14.7 (H) 11.5 - 14.5 % PLATELET 169 867 - 379 K/uL MPV 10.0 8.9 - 12.9 FL  
 NRBC 0.0 0  WBC ABSOLUTE NRBC 0.00 0.00 - 0.01 K/uL NEUTROPHILS 55 32 - 75 % LYMPHOCYTES 34 12 - 49 % MONOCYTES 8 5 - 13 % EOSINOPHILS 2 0 - 7 % BASOPHILS 1 0 - 1 % IMMATURE GRANULOCYTES 0 0.0 - 0.5 % ABS. NEUTROPHILS 2.5 1.8 - 8.0 K/UL  
 ABS. LYMPHOCYTES 1.6 0.8 - 3.5 K/UL  
 ABS. MONOCYTES 0.4 0.0 - 1.0 K/UL  
 ABS. EOSINOPHILS 0.1 0.0 - 0.4 K/UL  
 ABS. BASOPHILS 0.1 0.0 - 0.1 K/UL  
 ABS. IMM. GRANS. 0.0 0.00 - 0.04 K/UL  
 DF AUTOMATED METABOLIC PANEL, COMPREHENSIVE Collection Time: 03/28/19  2:46 PM  
Result Value Ref Range Sodium 133 (L) 136 - 145 mmol/L Potassium 3.8 3.5 - 5.1 mmol/L Chloride 102 97 - 108 mmol/L  
 CO2 23 21 - 32 mmol/L Anion gap 8 5 - 15 mmol/L Glucose 69 65 - 100 mg/dL BUN 5 (L) 6 - 20 MG/DL Creatinine 0.48 (L) 0.55 - 1.02 MG/DL  
 BUN/Creatinine ratio 10 (L) 12 - 20 GFR est AA >60 >60 ml/min/1.73m2 GFR est non-AA >60 >60 ml/min/1.73m2 Calcium 7.6 (L) 8.5 - 10.1 MG/DL Bilirubin, total 0.2 0.2 - 1.0 MG/DL  
 ALT (SGPT) 24 12 - 78 U/L  
 AST (SGOT) 27 15 - 37 U/L Alk. phosphatase 64 45 - 117 U/L Protein, total 6.6 6.4 - 8.2 g/dL Albumin 3.2 (L) 3.5 - 5.0 g/dL Globulin 3.4 2.0 - 4.0 g/dL A-G Ratio 0.9 (L) 1.1 - 2.2 Radiologic Studies -  
CT ABD PELV W CONT Final Result IMPRESSION: Unchanged right lower quadrant inflammation, possible appendicitis. Ct Abd Pelv W Cont Result Date: 3/28/2019 IMPRESSION: Unchanged right lower quadrant inflammation, possible appendicitis. Medical Decision Making I am the first provider for this patient. I reviewed the vital signs, available nursing notes, past medical history, past surgical history, family history and social history. Vital Signs-Reviewed the patient's vital signs. Patient Vitals for the past 12 hrs: 
 Temp Pulse Resp BP SpO2  
03/28/19 1640  64 18 152/74 99 % 03/28/19 1530    127/76   
03/28/19 1500    143/58   
03/28/19 1439 97.7 °F (36.5 °C) (!) 58 20 160/68 100 % Pulse Oximetry Analysis - 100% on RA Records Reviewed: Nursing Notes, Old Medical Records and previous CT scans Provider Notes (Medical Decision Making):  
Ddx: appendicitis, diverticulitis, chronic abdominal pain Reviewed prior abdominal imaging. Significant inflammation in RLQ. Will d/w Dr. Musa Domingo, who performed colo today to get more information. Will repeat imaging, basic labs. ED Course:  
Initial assessment performed. The patients presenting problems have been discussed, and they are in agreement with the care plan formulated and outlined with them. I have encouraged them to ask questions as they arise throughout their visit. Progress Note:  
3:22 PM 
Spoke with Dr. Musa Domingo - his concern was for a surgical abdomen. He spoke with Dr. Grant Krabbe over the phone who recommended sending pt to the ED based on pain and previous imaging 5:31 PM 
Spoke with Dr. Karen Fatima, gen surg, will see the patient 5:55 PM 
Dr. Karen Fatima plans to take pt to OR for laparoscopy possible laparotomy Disposition: OR with Dr. Karen Fatima Diagnosis Clinical Impression: 1. Peritonitis ([facility] [address] This note will not be viewable in [address]

## 2019-03-28 NOTE — ANESTHESIA PREPROCEDURE EVALUATION
Anesthetic History No history of anesthetic complications Review of Systems / Medical History Patient summary reviewed, nursing notes reviewed and pertinent labs reviewed Pulmonary Within defined limits Neuro/Psych Psychiatric history Cardiovascular Within defined limits Hypertension: well controlled Hyperlipidemia Exercise tolerance: >4 METS 
  
GI/Hepatic/Renal 
Within defined limits GERD: poorly controlled Comments: Abnormal CT Scan Colitis Endo/Other Hyperthyroidism: well controlled Anemia Pertinent negatives: No hypothyroidism Other Findings Physical Exam 
 
Airway Mallampati: I 
TM Distance: > 6 cm Neck ROM: normal range of motion Mouth opening: Normal 
 
 Cardiovascular Regular rate and rhythm,  S1 and S2 normal,  no murmur, click, rub, or gallop Rhythm: regular Rate: normal 
 
 
 
 Dental 
 
Dentition: Full upper dentures Pulmonary Breath sounds clear to auscultation Abdominal 
GI exam deferred Other Findings Anesthetic Plan ASA: 2, emergent Anesthesia type: general 
 
 
 
 
Induction: Intravenous Anesthetic plan and risks discussed with: Patient and Family

## 2019-03-28 NOTE — ANESTHESIA POSTPROCEDURE EVALUATION
Procedure(s): 
COLONOSCOPY 
COLON BIOPSY. total IV anesthesia Anesthesia Post Evaluation Patient location during evaluation: PACU Note status: Adequate. Level of consciousness: responsive to verbal stimuli and sleepy but conscious Pain management: satisfactory to patient Airway patency: patent Anesthetic complications: no 
Cardiovascular status: acceptable Respiratory status: acceptable Hydration status: acceptable Comments: +Post-Anesthesia Evaluation and Assessment Patient: Melissa Chairez MRN: [id number]  SSN: [id number] YOB: 1955  Age: 71 y.o. Sex: female Cardiovascular Function/Vital Signs /87   Pulse (!) 56   Temp 36.3 °C (97.4 °F)   Resp 23   Ht 5' 4\" (1.626 m)   Wt 61.2 kg (135 lb)   SpO2 100%   Breastfeeding? No   BMI 23.17 kg/m² Patient is status post Procedure(s): 
COLONOSCOPY 
COLON BIOPSY. Nausea/Vomiting: Controlled. Postoperative hydration reviewed and adequate. Pain: 
Pain Scale 1: Visual (03/28/19 1315) Pain Intensity 1: 9 (03/28/19 1312) Managed. Neurological Status: At baseline. Mental Status and Level of Consciousness: Arousable. Pulmonary Status:  
O2 Device: Room air (03/28/19 1315) Adequate oxygenation and airway patent. Complications related to anesthesia: None Post-anesthesia assessment completed. No concerns. Signed By: Jhonatan Rayo DO  
 3/28/2019 Post anesthesia nausea and vomiting:  controlled Vitals Value Taken Time /76 3/28/2019  1:27 PM  
Temp 36.3 °C (97.4 °F) 3/28/2019  1:12 PM  
Pulse 57 3/28/2019  1:27 PM  
Resp 21 3/28/2019  1:27 PM  
SpO2 100 % 3/28/2019  1:27 PM  
Vitals shown include unvalidated device data.

## 2019-03-28 NOTE — H&P
Pre-endoscopy H and P The patient was seen and examined in the room/pre-op holding area. The airway was assessed and documented. The problem list, past medical history, and medications were reviewed. Patient Active Problem List  
Diagnosis Code  Colitis K52.9 Social History Socioeconomic History  Marital status:  Spouse name: Not on file  Number of children: Not on file  Years of education: Not on file  Highest education level: Not on file Occupational History  Not on file Social Needs  Financial resource strain: Not on file  Food insecurity:  
  Worry: Not on file Inability: Not on file  Transportation needs:  
  Medical: Not on file Non-medical: Not on file Tobacco Use  Smoking status: Never Smoker  Smokeless tobacco: Never Used Substance and Sexual Activity  Alcohol use: Yes Alcohol/week: 0.6 oz Types: 1 Cans of beer per week Comment: Socially.  Drug use: No  
 Sexual activity: Not on file Lifestyle  Physical activity:  
  Days per week: Not on file Minutes per session: Not on file  Stress: Not on file Relationships  Social connections:  
  Talks on phone: Not on file Gets together: Not on file Attends [employer] service: Not on file Active member of club or organization: Not on file Attends meetings of clubs or organizations: Not on file Relationship status: Not on file  Intimate partner violence:  
  Fear of current or ex partner: Not on file Emotionally abused: Not on file Physically abused: Not on file Forced sexual activity: Not on file Other Topics Concern  Not on file Social History Narrative  Not on file Past Medical History:  
Diagnosis Date  Acid reflux  GERD (gastroesophageal reflux disease)  Hypertension  Other ill-defined conditions(329.89)   
 high cholesterol  Psychiatric disorder   
 depression  Thyroid disease hyperthyroidism Prior to Admission Medications Prescriptions Last Dose Informant Patient Reported? Taking?  
aspirin 81 mg tablet 3/27/2019 at Unknown time Self Yes Yes Sig: Take 81 mg by mouth. butalbital-acetaminophen-caff (FIORICET) -40 mg per capsule 3/27/2019 at Unknown time  No Yes Sig: Take 1 Cap by mouth every six (6) hours as needed for Headache. ciprofloxacin HCl (CIPRO) 500 mg tablet 3/27/2019 at Unknown time  No Yes Sig: Take 1 Tab by mouth two (2) times a day for 10 days. diazePAM (VALIUM) 5 mg tablet 3/27/2019 at Unknown time Other Yes Yes Sig: Take 5 mg by mouth every eight (8) hours as needed for Anxiety. docusate sodium (COLACE) 100 mg capsule 3/27/2019 at Unknown time  Yes Yes Sig: Take 100 mg by mouth two (2) times a day. estradiol (ESTRACE) 1 mg tablet 3/27/2019 at Unknown time Other Yes Yes Sig: Take 1 mg by mouth daily. gabapentin (NEURONTIN) 300 mg capsule 3/27/2019 at Unknown time Other Yes Yes Sig: Take 300 mg by mouth three (3) times daily. metroNIDAZOLE (FLAGYL) 500 mg tablet 3/27/2019 at Unknown time  No Yes Sig: Take 1 Tab by mouth two (2) times a day for 10 days. pantoprazole (PROTONIX) 40 mg tablet 3/27/2019 at Unknown time Other Yes Yes Sig: Take 40 mg by mouth two (2) times daily as needed (reflux). vitamin E (AQUA GEMS) 400 unit capsule 3/27/2019 at Unknown time  Yes Yes Sig: Take  by mouth daily. zolpidem CR (AMBIEN CR) 12.5 mg tablet 3/27/2019 at Unknown time Other Yes Yes Sig: Take 12.5 mg by mouth nightly as needed for Sleep. Facility-Administered Medications: None The review of systems is:  Negative  for shortness of breath or chest pain The heart, lungs, and mental status were satisfactory for the administration of deep sedation and for the procedure. I discussed with the patient the objectives, risks, consequences and alternatives to the procedure. Lauryn Flowers MD 
3/28/2019 12:34 PM

## 2019-03-28 NOTE — PROCEDURES
Colonoscopy Procedure Note Jean Paul Quiroz 1955 
[id number] Indications:  Please see below. Pre-operative Diagnosis: ABNORMAL CT SCAN Post-operative Diagnosis: cecal ulcer, redundant colon,internal hemorrhoids, diverticulosis : Shara Connell. Juno Salomon MD 
 
Referring Provider: Abril Posadas MD 
 
Sedation:  MAC anesthesia Propofol Procedure Details: After detailed informed consent was obtained with all risks and benefits of procedure explained and preoperative exam completed, the patient was taken to the endoscopy suite and placed in the left lateral decubitus position. Upon sequential sedation as per above, a digital rectal exam was performed  And was normal.  The Olympus videocolonoscope  was inserted in the rectum and carefully advanced to the cecum, which was identified by the ileocecal valve. The quality of preparation was good. The colonoscope was slowly withdrawn with careful evaluation between folds. Retroflexion in the rectum was performed. Findings: · There is resistance to scope passage at 25 cm where diverticulosis is noted. I was able to get across this area with some manipulation. · The nikolai-appendiceal orifice area shows possible ulceration. Views are difficult as there is edema. First biopsy resulted in bleeding which made viewing even more challenging. I took further biopsies of this site. · The TI is normal to 5 cm: biopsies taken. · Rest of the colon is normal appearing. · There is erythema of rectum(artifact from scope vs proctitis): biopsies taken. · Internal hemorrhoids noted. Therapies:  biopsy of colon cecum (nikolai appendiceal orifice), terminal ileum and rectum Specimen:  Specimens were collected as described above and sent to pathology. Complications: None were encountered during the procedure. EBL:  None. Recommendations: 
 
 -Await pathology. 
-Soft diet. 
-Pain control. -PO abx until biopsies are back. Juan Henning MD 
3/28/2019  12:56 PM

## 2019-03-28 NOTE — ROUTINE PROCESS
Dr Fernanda Herndon consulting Dr Jen Piper about this patient. Will wait for eval of patient prior to discharge

## 2019-03-29 PROBLEM — C18.1 APPENDIX CARCINOMA (HCC): Status: ACTIVE | Noted: 2019-01-01

## 2019-03-29 PROBLEM — C78.6 PERITONEAL CARCINOMATOSIS (HCC): Status: ACTIVE | Noted: 2019-01-01

## 2019-03-29 NOTE — PROGRESS NOTES
Reason for Admission:  Neoplasm of appendix RRAT Score:  8 Low risk Plan for utilizing home health:  No     
                 
Current Advanced Directive/Advance Care Plan: No DNR on file but pt is full code status Likelihood of Readmission:  Low based on RRAT score Transition of Care Plan:   Home w/ f/u appt. CM met with pt's and pt's friend, Mamie Uribe to discuss d/c plan. Pt wanted to include her friend in her d/c planning needs. CM verified pt's demographics, insurance and PCP. 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 Pt's PCP is Dr. Azar Mcneil. Pt sees PCP every 3 months. Pt used [facility] in [place]. Pt resides by herself in a one level home with two steps to enter. Pt does drive. Pt has supportive family/friends and can transport when necessary. Pt completes ADL's and IADL's with no assistance. Pt has no DME. Pt has not had home health care or rehab in the past. Pt has not been in a skilled nursing facility in the past. Pt does not have DNR on file but is full code status. Pt's friend will transport at d/c. CM will continue to follow pt for d/c planning needs. Care Management Interventions PCP Verified by CM: Yes(Pt's PCP is Dr. Azar Mcneil. Pt sees PCP every three months. ) Mode of Transport at Discharge: Other (see comment)(Pt's friend will transport at d/c.) Transition of Care Consult (CM Consult): Discharge Planning Discharge Durable Medical Equipment: No(No DME) Physical Therapy Consult: No 
Occupational Therapy Consult: No 
Speech Therapy Consult: No 
Current Support Network: Own Home(Pt resides by herself in a one level home with two steps to enter. ) Confirm Follow Up Transport: Self(Pt does drive. Pt has supportive family/friends and can transport when necesary. ) Discharge Location Discharge Placement: [place], [place] 
 [phone]

## 2019-03-29 NOTE — PROGRESS NOTES
Spoke with pt's daughter Carrie April) via telephone, [place]. Patient would like us to give Mustapha Passer information/updates whenever she calls, stating \"hold nothing back. \"

## 2019-03-29 NOTE — PROGRESS NOTES
PCU SHIFT NURSING NOTE Shift Summary:  
65- Patient arrived to PCU via stretcher. Patient is awake and alert. Oriented x3. SANTIAGO. Abdominal incision clean dry and intact. VSS. Admission database completed. Patient complaining of 10/10 pain and Nausea. PRN medications administered as prescribed, Will continue to monitor. 0600- Patient complains of 10/10 pain. Requesting pain medication as often as possible ( PRN q2 hours) Patient falls asleep after medication administered but does not stay comfortable for more than 1 hour. Tried repositioning and environmental changes. Will continue to monitor. 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  
[]Case Management  
  
[] Palliative Cardiac Monitoring Order []Yes []No  
 
IV drips []Yes Drip:                            Dose: 
Drip:                            Dose: 
Drip:                            Dose:  
[]No  
 
GI Prophylaxis []Yes []No  
 
 
 
DVT Prophylaxis SCDs:  Sequential Compression Device: Bilateral  
     
 Nam stockings:     
  
[] Medication []Contraindicated []None Activity Level Activity Level: Up with Assistance Activity Assistance: Partial (one person) Purposeful Rounding every 1-2 hour? []Yes Lehman Score  Total Score: 1 Bed Alarm (If score 3 or >) []Yes  
[] Refused (See signed refusal form in chart) Pankaj Score  Pankaj Score: 18 Pankaj Score (if score 14 or less) []PMT consult  
[]Wound Care consult []Specialty bed  
[] Nutrition consult Needs prior to discharge:  
Home O2 required:   
[]Yes []No  
 If yes, how much O2 required? Other:  
 Last Bowel Movement: Last Bowel Movement Date: 03/28/19 Influenza Vaccine Received Flu Vaccine for Current Season (usually Sept-March): No  
 Patient/Guardian Refused (Notify MD): Yes Pneumonia Vaccine Diet Active Orders Diet DIET NPO  
  
LDAs Peripheral IV 03/28/19 Right Wrist (Active) Site Assessment Clean, dry, & intact 3/29/2019 12:10 AM  
Phlebitis Assessment 0 3/29/2019 12:10 AM  
Infiltration Assessment 0 3/29/2019 12:10 AM  
Dressing Status Clean, dry, & intact 3/29/2019 12:10 AM  
Dressing Type Transparent 3/29/2019 12:10 AM  
Hub Color/Line Status Infusing 3/29/2019 12:10 AM  
Action Taken Other (comment) 3/29/2019 12:10 AM  
Alcohol Cap Used Yes 3/29/2019 12:10 AM  
                  
Urinary Catheter Urinary Catheter 03/28/19-Indications for Use: Surgery Intake & Output Date 03/28/19 0700 - 03/29/19 0659 03/29/19 0700 - 03/30/19 8599 Shift [id number] 0868-8197 24 Hour Total 6342-7063 0749-9313 24 Hour Total  
INTAKE  
I.V.  1000 1000 Volume (lactated Ringers infusion)  1000 1000 Shift Total(mL/kg)  1000(17) 1000(17)     
OUTPUT Urine  200 200 Urine Output  200 200 Blood  150 150 Estimated Blood Loss  150 150 Shift Total(mL/kg)  350(6) 350(6) NET  650 650 Weight (kg)  58.7 58.7 58.7 58.7 58.7 Readmission Risk Assessment Tool Score Low Risk 8 Total Score 3 Has Seen PCP in Last 6 Months (Yes=3, No=0)  
 5 Pt. Coverage (Medicare=5 , Medicaid, or Self-Pay=4) Criteria that do not apply:  
 . Living with Significant Other. Assisted Living. LTAC. SNF. or  
Rehab Patient Length of Stay (>5 days = 3) IP Visits Last 12 Months (1-3=4, 4=9, >4=11) Charlson Comorbidity Score (Age + Comorbid Conditions) Expected Length of Stay - - - Actual Length of Stay 1

## 2019-03-29 NOTE — PERIOP NOTES
CELL PHONE FOR [facility] -[phone]. Spoke with him and notified of assigned room number. Will be by tomorrow mid-morning.

## 2019-03-29 NOTE — PROGRESS NOTES
Gastroenterology Progress Note 3/29/2019 Admit Date: 3/28/2019 Subjective: Follow up for: abnormal CT RLQ Had lap to open surgery done with findings of appendiceal mass with carcinomatosis Thirsty and wants to eat. Delightful as ever. Patient was seen in rounds by me today. Current Facility-Administered Medications Medication Dose Route Frequency  sodium chloride (NS) flush 5-40 mL  5-40 mL IntraVENous Q8H  
 sodium chloride (NS) flush 5-40 mL  5-40 mL IntraVENous PRN  
 cefOXitin (MEFOXIN) 2 g in 0.9% sodium chloride (MBP/ADV) 50 mL  2 g IntraVENous ON CALL TO OR  
 bupivacaine (PF) (MARCAINE) 0.5 % (5 mg/mL) injection    PRN  
 0.9% sodium chloride infusion  125 mL/hr IntraVENous CONTINUOUS  
 HYDROmorphone (PF) (DILAUDID) injection 0.5 mg  0.5 mg IntraVENous Q2H PRN  
 ondansetron (ZOFRAN) injection 4 mg  4 mg IntraVENous Q4H PRN  pantoprazole (PROTONIX) 40 mg in sodium chloride 0.9% 10 mL injection  40 mg IntraVENous DAILY  enoxaparin (LOVENOX) injection 40 mg  40 mg SubCUTAneous DAILY Objective:  
 
Blood pressure 132/81, pulse 70, temperature 97.5 °F (36.4 °C), resp. rate 14, height 5' 4\" (1.626 m), weight 58.7 kg (129 lb 6.4 oz), SpO2 100 %, not currently breastfeeding. No intake/output data recorded. 03/27 1901 - 03/29 0700 In: 1637.5 [I.V.:1637.5] Out: 800 [Urine:650] Physical Examination:  
 
 
General:AAO x 3, HEENT:  EOMI Heart: S1, S2, RRR 
GI: Soft, incision with bandage noted , - bowel sounds CNS: CNs grossly normal. 
 
Data Review Recent Results (from the past 24 hour(s)) SAMPLES BEING HELD Collection Time: 03/28/19  2:46 PM  
Result Value Ref Range SAMPLES BEING HELD URINE,GREEN,LAV,BLUE,PNIK   
 COMMENT Add-on orders for these samples will be processed based on acceptable specimen integrity and analyte stability, which may vary by analyte. CBC WITH AUTOMATED DIFF  Collection Time: 03/28/19  2:46 PM  
 Result Value Ref Range WBC 4.6 3.6 - 11.0 K/uL  
 RBC 3.77 (L) 3.80 - 5.20 M/uL  
 HGB 11.3 (L) 11.5 - 16.0 g/dL HCT 33.7 (L) 35.0 - 47.0 % MCV 89.4 80.0 - 99.0 FL  
 MCH 30.0 26.0 - 34.0 PG  
 MCHC 33.5 30.0 - 36.5 g/dL  
 RDW 14.7 (H) 11.5 - 14.5 % PLATELET 669 348 - 428 K/uL MPV 10.0 8.9 - 12.9 FL  
 NRBC 0.0 0  WBC ABSOLUTE NRBC 0.00 0.00 - 0.01 K/uL NEUTROPHILS 55 32 - 75 % LYMPHOCYTES 34 12 - 49 % MONOCYTES 8 5 - 13 % EOSINOPHILS 2 0 - 7 % BASOPHILS 1 0 - 1 % IMMATURE GRANULOCYTES 0 0.0 - 0.5 % ABS. NEUTROPHILS 2.5 1.8 - 8.0 K/UL  
 ABS. LYMPHOCYTES 1.6 0.8 - 3.5 K/UL  
 ABS. MONOCYTES 0.4 0.0 - 1.0 K/UL  
 ABS. EOSINOPHILS 0.1 0.0 - 0.4 K/UL  
 ABS. BASOPHILS 0.1 0.0 - 0.1 K/UL  
 ABS. IMM. GRANS. 0.0 0.00 - 0.04 K/UL  
 DF AUTOMATED METABOLIC PANEL, COMPREHENSIVE Collection Time: 03/28/19  2:46 PM  
Result Value Ref Range Sodium 133 (L) 136 - 145 mmol/L Potassium 3.8 3.5 - 5.1 mmol/L Chloride 102 97 - 108 mmol/L  
 CO2 23 21 - 32 mmol/L Anion gap 8 5 - 15 mmol/L Glucose 69 65 - 100 mg/dL BUN 5 (L) 6 - 20 MG/DL Creatinine 0.48 (L) 0.55 - 1.02 MG/DL  
 BUN/Creatinine ratio 10 (L) 12 - 20 GFR est AA >60 >60 ml/min/1.73m2 GFR est non-AA >60 >60 ml/min/1.73m2 Calcium 7.6 (L) 8.5 - 10.1 MG/DL Bilirubin, total 0.2 0.2 - 1.0 MG/DL  
 ALT (SGPT) 24 12 - 78 U/L  
 AST (SGOT) 27 15 - 37 U/L Alk. phosphatase 64 45 - 117 U/L Protein, total 6.6 6.4 - 8.2 g/dL Albumin 3.2 (L) 3.5 - 5.0 g/dL Globulin 3.4 2.0 - 4.0 g/dL A-G Ratio 0.9 (L) 1.1 - 2.2 GLUCOSE, POC Collection Time: 03/28/19 11:46 PM  
Result Value Ref Range Glucose (POC) 92 65 - 100 mg/dL Performed by [facility] METABOLIC PANEL, BASIC Collection Time: 03/29/19  4:14 AM  
Result Value Ref Range Sodium 140 136 - 145 mmol/L Potassium 4.0 3.5 - 5.1 mmol/L  Chloride 110 (H) 97 - 108 mmol/L  
 CO2 23 21 - 32 mmol/L  
 Anion gap 7 5 - 15 mmol/L Glucose 101 (H) 65 - 100 mg/dL BUN 7 6 - 20 MG/DL Creatinine 0.49 (L) 0.55 - 1.02 MG/DL  
 BUN/Creatinine ratio 14 12 - 20 GFR est AA >60 >60 ml/min/1.73m2 GFR est non-AA >60 >60 ml/min/1.73m2 Calcium 7.9 (L) 8.5 - 10.1 MG/DL  
CBC WITH AUTOMATED DIFF Collection Time: 03/29/19  4:14 AM  
Result Value Ref Range WBC 11.7 (H) 3.6 - 11.0 K/uL  
 RBC 3.86 3.80 - 5.20 M/uL  
 HGB 11.7 11.5 - 16.0 g/dL HCT 35.8 35.0 - 47.0 % MCV 92.7 80.0 - 99.0 FL  
 MCH 30.3 26.0 - 34.0 PG  
 MCHC 32.7 30.0 - 36.5 g/dL  
 RDW 15.1 (H) 11.5 - 14.5 % PLATELET 347 833 - 826 K/uL MPV 9.6 8.9 - 12.9 FL  
 NRBC 0.0 0  WBC ABSOLUTE NRBC 0.00 0.00 - 0.01 K/uL NEUTROPHILS 91 (H) 32 - 75 % LYMPHOCYTES 3 (L) 12 - 49 % MONOCYTES 6 5 - 13 % EOSINOPHILS 0 0 - 7 % BASOPHILS 0 0 - 1 % IMMATURE GRANULOCYTES 0 0.0 - 0.5 % ABS. NEUTROPHILS 10.6 (H) 1.8 - 8.0 K/UL  
 ABS. LYMPHOCYTES 0.4 (L) 0.8 - 3.5 K/UL  
 ABS. MONOCYTES 0.7 0.0 - 1.0 K/UL  
 ABS. EOSINOPHILS 0.0 0.0 - 0.4 K/UL  
 ABS. BASOPHILS 0.0 0.0 - 0.1 K/UL  
 ABS. IMM. GRANS. 0.0 0.00 - 0.04 K/UL  
 DF AUTOMATED    
 RBC COMMENTS NORMOCYTIC, NORMOCHROMIC Recent Labs  
  03/29/19 
0414 03/28/19 
1446 WBC 11.7* 4.6 HGB 11.7 11.3* HCT 35.8 33.7*  
 285 Recent Labs  
  03/29/19 
0414 03/28/19 
1446  133* K 4.0 3.8 * 102 CO2 23 23 BUN 7 5* CREA 0.49* 0.48* * 69  
CA 7.9* 7.6* Recent Labs  
  03/28/19 
1446 SGOT 27 AP 64  
TP 6.6 ALB 3.2*  
GLOB 3.4 No results for input(s): INR, PTP, APTT in the last 72 hours. No lab exists for component: INREXT No results for input(s): FE, TIBC, PSAT, FERR in the last 72 hours. No results found for: FOL, RBCF No results for input(s): PH, PCO2, PO2 in the last 72 hours. No results for input(s): CPK, CKNDX, TROIQ in the last 72 hours. No lab exists for component: CPKMB No results found for: CHOL, CHOLX, CHLST, CHOLV, HDL, LDL, LDLC, DLDLP, TGLX, TRIGL, TRIGP, CHHD, CHHDX No components found for: [place] Lab Results Component Value Date/Time Color YELLOW/STRAW 03/18/2019 11:41 AM  
 Appearance CLEAR 03/18/2019 11:41 AM  
 Specific gravity <1.005 03/18/2019 11:41 AM  
 pH (UA) 6.5 03/18/2019 11:41 AM  
 Protein NEGATIVE  03/18/2019 11:41 AM  
 Glucose NEGATIVE  03/18/2019 11:41 AM  
 Ketone NEGATIVE  03/18/2019 11:41 AM  
 Bilirubin NEGATIVE  03/18/2019 11:41 AM  
 Urobilinogen 0.2 03/18/2019 11:41 AM  
 Nitrites NEGATIVE  03/18/2019 11:41 AM  
 Leukocyte Esterase MODERATE (A) 03/18/2019 11:41 AM  
 Epithelial cells FEW 03/18/2019 11:41 AM  
 Bacteria NEGATIVE  03/18/2019 11:41 AM  
 WBC 10-20 03/18/2019 11:41 AM  
 RBC 0-5 03/18/2019 11:41 AM  
 
  
ROS: -CP, SOB, Dysuria, palpitations, cough. Assessment: 
 
Principal Problem: 
  Peritonitis ([address] (3/28/2019) Active Problems: 
  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 Await final pathology and oncology evaluation. 2. Diet as per Dr Ondina Duque. I discussed with him via phone. 3. Sips of liquid for now. 4. Pain control. 5. Call partners to follow over the weekend. Signed By: Josh Faith.  Carlito Alegria MD 
 
3/29/2019  8:55 AM

## 2019-03-29 NOTE — PERIOP NOTES
TRANSFER - OUT REPORT: 
 
Verbal report given to [address] [place] [address] Rd RN (name) on Shyann Hull  being transferred to [facility] [id number] (unit) for routine progression of care Report consisted of patients Situation, Background, Assessment and  
Recommendations(SBAR). Information from the following report(s) SBAR, OR Summary, Intake/Output, MAR, Recent Results and Cardiac Rhythm NSR was reviewed with the receiving nurse. Opportunity for questions and clarification was provided. Patient transported with: 
 O2 @ 3 liters Registered Nurse

## 2019-03-29 NOTE — PROGRESS NOTES
Admit Date: 3/28/2019 POD 1 Day Post-Op Procedure:  Procedure(s): LAPAROSCOPY GENERAL DIAGNOSTIC CONVERTED TO EXPLORATORY LAPAROTOMY; OMENTECTOMY; EXTENDED RIGHT COLECTOMY Subjective:  
 
Patient has complaints of pain. Objective:  
 
Blood pressure 133/65, pulse 87, temperature 97.3 °F (36.3 °C), resp. rate 16, height 5' 4\" (1.626 m), weight 129 lb 6.4 oz (58.7 kg), SpO2 100 %, not currently breastfeeding. Temp (24hrs), Av.6 °F (36.4 °C), Min:97.3 °F (36.3 °C), Max:97.9 °F (36.6 °C) Physical Exam:  GENERAL: alert, cooperative, no distress, appears stated age, LUNG: clear to auscultation bilaterally, HEART: regular rate and rhythm, ABDOMEN: soft, non-tender. Wound c/d/i, EXTREMITIES:  extremities normal, atraumatic, no cyanosis or edema Labs:  
Recent Results (from the past 24 hour(s)) SAMPLES BEING HELD Collection Time: 19  2:46 PM  
Result Value Ref Range SAMPLES BEING HELD URINE,GREEN,LAV,BLUE,PNIK   
 COMMENT Add-on orders for these samples will be processed based on acceptable specimen integrity and analyte stability, which may vary by analyte. CBC WITH AUTOMATED DIFF Collection Time: 19  2:46 PM  
Result Value Ref Range WBC 4.6 3.6 - 11.0 K/uL  
 RBC 3.77 (L) 3.80 - 5.20 M/uL  
 HGB 11.3 (L) 11.5 - 16.0 g/dL HCT 33.7 (L) 35.0 - 47.0 % MCV 89.4 80.0 - 99.0 FL  
 MCH 30.0 26.0 - 34.0 PG  
 MCHC 33.5 30.0 - 36.5 g/dL  
 RDW 14.7 (H) 11.5 - 14.5 % PLATELET 308 090 - 565 K/uL MPV 10.0 8.9 - 12.9 FL  
 NRBC 0.0 0  WBC ABSOLUTE NRBC 0.00 0.00 - 0.01 K/uL NEUTROPHILS 55 32 - 75 % LYMPHOCYTES 34 12 - 49 % MONOCYTES 8 5 - 13 % EOSINOPHILS 2 0 - 7 % BASOPHILS 1 0 - 1 % IMMATURE GRANULOCYTES 0 0.0 - 0.5 % ABS. NEUTROPHILS 2.5 1.8 - 8.0 K/UL  
 ABS. LYMPHOCYTES 1.6 0.8 - 3.5 K/UL  
 ABS. MONOCYTES 0.4 0.0 - 1.0 K/UL  
 ABS. EOSINOPHILS 0.1 0.0 - 0.4 K/UL  
 ABS. BASOPHILS 0.1 0.0 - 0.1 K/UL ABS. IMM. GRANS. 0.0 0.00 - 0.04 K/UL  
 DF AUTOMATED METABOLIC PANEL, COMPREHENSIVE Collection Time: 03/28/19  2:46 PM  
Result Value Ref Range Sodium 133 (L) 136 - 145 mmol/L Potassium 3.8 3.5 - 5.1 mmol/L Chloride 102 97 - 108 mmol/L  
 CO2 23 21 - 32 mmol/L Anion gap 8 5 - 15 mmol/L Glucose 69 65 - 100 mg/dL BUN 5 (L) 6 - 20 MG/DL Creatinine 0.48 (L) 0.55 - 1.02 MG/DL  
 BUN/Creatinine ratio 10 (L) 12 - 20 GFR est AA >60 >60 ml/min/1.73m2 GFR est non-AA >60 >60 ml/min/1.73m2 Calcium 7.6 (L) 8.5 - 10.1 MG/DL Bilirubin, total 0.2 0.2 - 1.0 MG/DL  
 ALT (SGPT) 24 12 - 78 U/L  
 AST (SGOT) 27 15 - 37 U/L Alk. phosphatase 64 45 - 117 U/L Protein, total 6.6 6.4 - 8.2 g/dL Albumin 3.2 (L) 3.5 - 5.0 g/dL Globulin 3.4 2.0 - 4.0 g/dL A-G Ratio 0.9 (L) 1.1 - 2.2 GLUCOSE, POC Collection Time: 03/28/19 11:46 PM  
Result Value Ref Range Glucose (POC) 92 65 - 100 mg/dL Performed by [facility] METABOLIC PANEL, BASIC Collection Time: 03/29/19  4:14 AM  
Result Value Ref Range Sodium 140 136 - 145 mmol/L Potassium 4.0 3.5 - 5.1 mmol/L Chloride 110 (H) 97 - 108 mmol/L  
 CO2 23 21 - 32 mmol/L Anion gap 7 5 - 15 mmol/L Glucose 101 (H) 65 - 100 mg/dL BUN 7 6 - 20 MG/DL Creatinine 0.49 (L) 0.55 - 1.02 MG/DL  
 BUN/Creatinine ratio 14 12 - 20 GFR est AA >60 >60 ml/min/1.73m2 GFR est non-AA >60 >60 ml/min/1.73m2 Calcium 7.9 (L) 8.5 - 10.1 MG/DL  
CBC WITH AUTOMATED DIFF Collection Time: 03/29/19  4:14 AM  
Result Value Ref Range WBC 11.7 (H) 3.6 - 11.0 K/uL  
 RBC 3.86 3.80 - 5.20 M/uL  
 HGB 11.7 11.5 - 16.0 g/dL HCT 35.8 35.0 - 47.0 % MCV 92.7 80.0 - 99.0 FL  
 MCH 30.3 26.0 - 34.0 PG  
 MCHC 32.7 30.0 - 36.5 g/dL  
 RDW 15.1 (H) 11.5 - 14.5 % PLATELET 066 648 - 022 K/uL MPV 9.6 8.9 - 12.9 FL  
 NRBC 0.0 0  WBC ABSOLUTE NRBC 0.00 0.00 - 0.01 K/uL NEUTROPHILS 91 (H) 32 - 75 % LYMPHOCYTES 3 (L) 12 - 49 % MONOCYTES 6 5 - 13 % EOSINOPHILS 0 0 - 7 % BASOPHILS 0 0 - 1 % IMMATURE GRANULOCYTES 0 0.0 - 0.5 % ABS. NEUTROPHILS 10.6 (H) 1.8 - 8.0 K/UL  
 ABS. LYMPHOCYTES 0.4 (L) 0.8 - 3.5 K/UL  
 ABS. MONOCYTES 0.7 0.0 - 1.0 K/UL  
 ABS. EOSINOPHILS 0.0 0.0 - 0.4 K/UL  
 ABS. BASOPHILS 0.0 0.0 - 0.1 K/UL  
 ABS. IMM. GRANS. 0.0 0.00 - 0.04 K/UL  
 DF AUTOMATED    
 RBC COMMENTS NORMOCYTIC, NORMOCHROMIC Data Review images and reports reviewed Assessment:  
 
Principal Problem: 
  Peritonitis ([address] (3/28/2019) Active Problems: 
  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  
 
Continue present treatment Strict npo 
oob with assist 
Incentive spirometry Dvt, stress ulcer prophylaxis Awaiting path 
cea pending Consult placed for Dr Rosas 48 Concerned about possible obstruction in the future from rectal involvement. May require stent at some point. Xena Moctezuma. Ayesha Metcalf MD, [facility] [address] [facility]

## 2019-03-29 NOTE — CONSULTS
[address] [facility] 
at [place] [address], [facility], [address] 
[phone] Hematology/ Oncology Progress Note Reason for consult:  
 
Ms. Triston Flood is a 71year old female who we have been asked to see by Dr. Constantine Irene for carcinomatosis. Subjective:  
 
Triston Flood is a 71 y.o. female who presented to the ED on 3/28 by Dr. Denise Larkin for 10/10 pain after a colonoscopy. Per the EMR, she has had ongoing abdominal pain for months to years but has gotten progressively worse over the past few weeks. She was diagnosed with colitis on 3/18 and sent home with antibiotics. A second attempt of a colonoscopy was attempted yesterday, but there was concern for ulcerated appendix. She was sent to the ED for evaluation at that time. Dr. Constantine Irene was consulted and took her to the OR of a diagnostic diagnostic converted to exploratory laparotomy. She was found to have an infiltrative mass involving appendix with extensive involvement of inferior omentum, rectum and ovaries and diffuse scattered peritoneal implants. Pathology is pending. She continues to have ongoing abdominal pain and is currently NPO. Review of Systems: A comprehensive review of systems was negative except for that written in the History of Present Illness. Past Medical History:  
Diagnosis Date  Acid reflux  GERD (gastroesophageal reflux disease)  Hypertension  Other ill-defined conditions(799.89)   
 high cholesterol  Psychiatric disorder   
 depression  Thyroid disease   
 hyperthyroidism Past Surgical History:  
Procedure Laterality Date  COLONOSCOPY N/A 11/30/2017 COLONOSCOPY performed by Cortes Magallon MD at [facility] ENDOSCOPY  COLONOSCOPY N/A 3/20/2019 COLONOSCOPY performed by Delia Bridges MD at [facility] ENDOSCOPY  COLONOSCOPY N/A 3/28/2019 COLONOSCOPY performed by Delia Bridges MD at [facility] ENDOSCOPY 2021 Bibi Soni N/A 3/20/2019 SIGMOIDOSCOPY FLEXIBLE performed by Isha Mtz MD at [facility] ENDOSCOPY  
 HX CHOLECYSTECTOMY  HX GYN    
 hysterectomy  HX HEENT    
 thyroid Family History Problem Relation Age of Onset  Cancer Mother  Colon Cancer Father  Cancer Father Social History Tobacco Use  Smoking status: Never Smoker  Smokeless tobacco: Never Used Substance Use Topics  Alcohol use: Yes Alcohol/week: 0.6 oz Types: 1 Cans of beer per week Comment: Socially. Current Facility-Administered Medications Medication Dose Route Frequency Provider Last Rate Last Dose  sodium chloride (NS) flush 5-40 mL  5-40 mL IntraVENous Q8H Gagan Palmer MD   10 mL at 03/29/19 1623  sodium chloride (NS) flush 5-40 mL  5-40 mL IntraVENous PRN Gagan Palmer MD      
 cefOXitin (MEFOXIN) 2 g in 0.9% sodium chloride (MBP/ADV) 50 mL  2 g IntraVENous ON CALL TO OR James Lee MD      
 bupivacaine (PF) (MARCAINE) 0.5 % (5 mg/mL) injection    PRN James Lee MD   5 mL at 03/28/19 2219  
 0.9% sodium chloride infusion  125 mL/hr IntraVENous CONTINUOUS James Lee  mL/hr at 03/29/19 0517 125 mL/hr at 03/29/19 0517  
 HYDROmorphone (PF) (DILAUDID) injection 0.5 mg  0.5 mg IntraVENous Q2H PRN James Lee MD   0.5 mg at 03/29/19 1144  ondansetron (ZOFRAN) injection 4 mg  4 mg IntraVENous Q4H PRN James Lee MD   4 mg at 03/29/19 7364  pantoprazole (PROTONIX) 40 mg in sodium chloride 0.9% 10 mL injection  40 mg IntraVENous DAILY James Lee MD   40 mg at 03/29/19 8748  enoxaparin (LOVENOX) injection 40 mg  40 mg SubCUTAneous DAILY James Lee MD   40 mg at 03/29/19 8494 Allergies Allergen Reactions  Codeine Itching  Lisinopril Angioedema Objective:  
 
Patient Vitals for the past 8 hrs: 
 BP Temp Pulse Resp SpO2  
03/29/19 1104 133/65 97.3 °F (36.3 °C) 87 16 100 % 03/29/19 0752 110/72 97.9 °F (36.6 °C) 86 16 98 % 03/29/19 0751   87  96 % Lab Results Component Value Date/Time WBC 11.7 (H) 03/29/2019 04:14 AM  
 HGB 11.7 03/29/2019 04:14 AM  
 HCT 35.8 03/29/2019 04:14 AM  
 PLATELET 714 94/88/2748 04:14 AM  
 MCV 92.7 03/29/2019 04:14 AM  
 
 
Physical Exam:  
General appearance: alert, cooperative, no distress, appears stated age Head: Normocephalic, without obvious abnormality, atraumatic Lungs: clear to auscultation bilaterally Heart: regular rate and rhythm Abdomen: abdominal tenderness. Dressing from Ex lap in place. Extremities: extremities normal, atraumatic, no cyanosis or edema Skin: Skin color, texture, turgor normal. No rashes or lesions Lymph nodes: Cervical, supraclavicular, and axillary nodes normal. 
Neurologic: Grossly normal 
 
 
CT Results (most recent): 
Results from Hospital Encounter encounter on 03/28/19 CT ABD PELV W CONT Narrative INDICATION: Abdominal pain, RLQ  
 
EXAM: CT Abdomen and Pelvis is performed with 100 mL Isovue 370 contrast IV 
without oral,. CT dose reduction was achieved through use of a standardized 
protocol tailored for this examination and automatic exposure control for dose 
modulation. COMPARISON: 3/26/2019. FINDINGS: 
There is no significant change of right lower quadrant inflammation centered on 
the obscured appendix, with contiguous peritonitis appear and contiguous small 
nonencapsulated free fluid. There is no defined abscess and there is no 
pneumoperitoneum. There is no significant adenopathy. Liver shows no significant finding. Bile 
ducts are not enlarged. Pancreas shows no mass or inflammation. Spleen is 
unremarkable. Adrenal glands are normal in size. Kidneys show no mass or 
hydronephrosis. Aorta is without aneurysm. The bladder is not distended. The distal ureters are not dilated. There is no 
apparent pelvic mass. Impression IMPRESSION: Unchanged right lower quadrant inflammation, possible appendicitis. Assessment: 1. Carcinomatosis -  
 
S/p diagnostic diagnostic converted to exploratory laparotomy. She was found to have an infiltrative mass involving appendix with extensive involvement of inferior omentum, rectum and ovaries and diffuse scattered peritoneal implants - 3/29/2019 - Dr. Constantine Irene Pathology is pending CEA - in process Will discuss plan of care once pathology has resulted. Will complete staging scans outpatient Plan: 1. Await pathology results 2. Will see outpatient and discuss treatment plan. ( If pathology returns prior to discharge, we will see her inpatient)

## 2019-03-29 NOTE — PROGRESS NOTES
Rapid Response Team: 
 
Responded to Rapid Response Chest Pain @ 1537 in PCU 2241 for HUDSON Narayanan a 72 yo female admitted d/t neoplasm of appendix. Upon arrival rapid assessment completed. General:  Patient sitting up in recliner, appears in mild distress, reporting left sided chest pain, no associated radiation, dizziness, shortness of breath, vomiting, or diaphoresis. +nausea. Initial vital signs obtained as documented and patient SR on bedside tele monitor. Cardiac: S1/S2 no skip/rub/murmur/gallop or S3/S4 heard on auscultation. Peripheral pulses easily palpable RRR. Unable to obtain cap refill due to presence of artificial nails. Skin warm and dry. No edema noted. Resp: CTA with even and nonlabored symmetrical movement of chest wall. Abd:  Tender d/t recent surgery. Dr. Judy Johnson at bedside at [phone]. EKG obtained at 1545 and compared to prior EKG no ST elevation noted. Nitro 0.4mg sublingual given, rating pain 7/10 dull with no change in severity or characteristic post-nitro administration. Ordered labwork drawn and sent at 1600 with 20g PIV placed to [facility] for CTA of chest, flushes and draws back well. Placed by Sourav Rm RN. Patient transported to CT by RRT and Sourav Rm RN @ 2958, tolerated CT well and returned to room at 1630. Rapid response ended with patient remaining in room at this time. PAU JohnsonN, RN, CCRN, ASHLEY, CPEN Rapid Response RN

## 2019-03-29 NOTE — BRIEF OP NOTE
BRIEF OPERATIVE NOTE Date of Procedure: 3/28/2019 Preoperative Diagnosis: Intraabdominal Inflammatory process Postoperative Diagnosis: Appendiceal mass with carcinomatosis Procedure(s): LAPAROSCOPY GENERAL DIAGNOSTIC CONVERTED TO EXPLORATORY LAPAROTOMY; OMENTECTOMY, EXTENDED RIGHT COLECTOMY Surgeon(s) and Role: Filemon Barboza MD - Primary Surgical Assistant: none Surgical Staff: 
Circ-1: Venu Haro RN Scrub Tech-1: Orville Vásquez Scrub Tech-Relief: Napoleon Alegria Surg Asst-1: Viola Almeida Event Time In Time Out Incision Start 2112 Incision Close Anesthesia: General  
Estimated Blood Loss: 150 cc Specimens:  
ID Type Source Tests Collected by Time Destination 1 : omentum Preservative Omentum  Gamal Sky MD 3/28/2019 2138 Pathology 2 : extended right colectomy  Preservative Colon  Gamal Sky MD 3/28/2019 2207 Pathology Findings: infiltrative mass involving appendix and base of appendix, with extensive involvement of inferior omentum, rectum and ovaries, and diffuse scattered peritoneal implants Complications: none Implants: * No implants in log *

## 2019-03-29 NOTE — ANESTHESIA POSTPROCEDURE EVALUATION
Procedure(s): LAPAROSCOPY GENERAL DIAGNOSTIC CONVERTED TO EXPLORATORY LAPAROTOMY; OMENTECTOMY; EXTENDED RIGHT COLECTOMY. general 
 
Anesthesia Post Evaluation Patient location during evaluation: PACU Note status: Adequate. Level of consciousness: responsive to verbal stimuli and sleepy but conscious Pain management: satisfactory to patient Airway patency: patent Anesthetic complications: no 
Cardiovascular status: acceptable Respiratory status: acceptable Hydration status: acceptable Comments: +Post-Anesthesia Evaluation and Assessment Patient: Diana Herrera MRN: [id number]  SSN: [id number] YOB: 1955  Age: 71 y.o. Sex: female Cardiovascular Function/Vital Signs /59 (BP 1 Location: Left arm, BP Patient Position: At rest)   Pulse (!) 59   Temp 36.4 °C (97.5 °F)   Resp 14   Ht 5' 4\" (1.626 m)   Wt 61 kg (134 lb 7.7 oz)   SpO2 97%   BMI 23.08 kg/m² Patient is status post Procedure(s): LAPAROSCOPY GENERAL DIAGNOSTIC CONVERTED TO EXPLORATORY LAPAROTOMY; OMENTECTOMY; EXTENDED RIGHT COLECTOMY. Nausea/Vomiting: Controlled. Postoperative hydration reviewed and adequate. Pain: 
Pain Scale 1: FLACC (03/28/19 2330) Pain Intensity 1: 9 (03/28/19 7397) Managed. less at signout Neurological Status:  
Neuro (WDL): Exceptions to WDL (03/28/19 2253) At baseline. Mental Status and Level of Consciousness: Arousable. Pulmonary Status:  
O2 Device: Nasal cannula (03/28/19 2330) Adequate oxygenation and airway patent. Complications related to anesthesia: None Post-anesthesia assessment completed. No concerns. Signed By: Milli Au MD  
 3/28/2019 Post anesthesia nausea and vomiting:  controlled Vitals Value Taken Time /59 3/28/2019 11:30 PM  
Temp 36.4 °C (97.5 °F) 3/28/2019 11:30 PM  
Pulse 61 3/28/2019 11:38 PM  
Resp 14 3/28/2019 11:38 PM  
SpO2 98 % 3/28/2019 11:38 PM  
Vitals shown include unvalidated device data.

## 2019-03-29 NOTE — PROGRESS NOTES
Spiritual Care Assessment/Progress Note [address] 
 
 
NAME: Shantelle Healy      MRN: [id number] AGE: 71 y.o. SEX: female Yazidi Affiliation: Cheondoism  
Language: English  
 
3/29/2019     Total Time (in minutes): 6 Spiritual Assessment begun in [facility] 2 PROGRESSIVE CARE through conversation with: 
  
    []Patient        [] Family    [] Friend(s) Reason for Consult: Rapid response team  
 
Spiritual beliefs: (Please include comment if needed) 
   [] Identifies with a valerie tradition:     
   [] Supported by a valerie community:        
   [] Claims no spiritual orientation:       
   [] Seeking spiritual identity:            
   [] Adheres to an individual form of spirituality:       
   [x] Not able to assess:                   
 
    
Identified resources for coping:  
   [] Prayer                           
   [] Music                  [] Guided Imagery 
   [] Family/friends                 [] Pet visits [] Devotional reading                         [x] Unknown 
   [] Other:                                         
 
 
Interventions offered during this visit: (See comments for more details) Patient Interventions: Crisis Plan of Care: 
 
 [] Support spiritual and/or cultural needs  
 [] Support AMD and/or advance care planning process    
 [] Support grieving process 
 [] Coordinate Rites and/or Rituals  
 [] Coordination with community clergy [] No spiritual needs identified at this time 
 [] Detailed Plan of Care below (See Comments)  [] Make referral to Music Therapy 
[] Make referral to Pet Therapy    
[] Make referral to Addiction services 
[] Make referral to [facility] 
[] Make referral to Spiritual Care Partner 
[] No future visits requested       
[x] Follow up visits as needed Comments: Responded to Code RRT in Progressive Care. No family present with patient at time of code. [phone] [facility]  Paging Service [phone])

## 2019-03-29 NOTE — PERIOP NOTES
Handoff Report from Operating Room to PACU Report received from BERNARD Mc and Morgan Ortiz CRNA regarding Byron Mathews. Surgeon(s): 
Bianca Link MD  And Procedure(s) (LRB): 
LAPAROSCOPY GENERAL DIAGNOSTIC CONVERTED TO EXPLORATORY LAPAROTOMY; OMENTECTOMY (N/A)  confirmed  
with allergies, drains and dressings discussed. Anesthesia type, drugs, patient history, complications, estimated blood loss, vital signs, intake and output, and last pain medication, lines, reversal medications and temperature were reviewed.

## 2019-03-29 NOTE — PROGRESS NOTES
TRANSFER - IN REPORT: 
 
Verbal report received from Jaren Lambert RN(name) on Sacha Swiss  being received from OR(unit) for routine progression of care Report consisted of patients Situation, Background, Assessment and  
Recommendations(SBAR). Information from the following report(s) SBAR, Kardex, [facility] and Recent Results was reviewed with the receiving nurse. Opportunity for questions and clarification was provided. Assessment will be completed upon patients arrival to unit and care assumed.

## 2019-03-29 NOTE — PROGRESS NOTES
Documented on 3/29/19 Spiritual Care Partner Volunteer visited patient in PCU on 3/29/2019. Documented by: 
Kenia Brooke MDiv Pager: [phone]

## 2019-03-29 NOTE — CONSULTS
Hospitalist Consultation NoteNAME:  Nedra Johns :   1955 MRN:   [id number] ATTENDING: Mallory Turner MD 
PCP:  Sasha Thomas MD 
 
Date/Time:  3/29/2019 5:02 PM 
 
 
Recommendations/Plan:  
 
 
Principal Problem: 
  Peritonitis ([address] (3/28/2019) Active Problems: 
  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 
Check CT angiogram of chest to rule out pulmonary embolism Consider cardiology consultation based on above results for stress test 
Follow results of CT angiogram of chest 
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  
REQUESTING PHYSICIAN: Dr Rebecca Manzo REASON FOR CONSULT:    Chest pain This is a 26-year-old female with past medical history of anxiety, hypertension, acid reflux presented to hospital with chief complaints of abdominal pain.  She reported having abdominal pain for close to 2 years and had multiple CTs which showed evidence of colitis and was treated. Kim Mak recently had a CT which showed inflammatory appearance in the lower abdomen pelvis and the prominent appendix for which she was admitted to the hospital and recommended diagnostic laparoscopy.  Subsequently patient underwent a diagnostic laparoscopy which was converted to exploratory laparotomy and was noted to have appendicular mass with carcinomatosis for which she underwent omentectomy with extended right hemicolectomy and waiting on pathology results. Rapid response team was called on this patient secondary to chest pain.  Patient was seen and examined right away.  Patient reported chest pain in the precordial region, dull and intermittently sharp, 3 x 10, radiating to the back and also the left shoulder.  She did not report any shortness of breath, cough or phlegm.  She still reports abdominal pain. On examination her vital signs were within normal limits.  She received 0.4 mg of nitroglycerin with no improvement of her chest pain.  EKG was done which showed normal sinus rhythm with mild T wave inversions in lead V3 V4 which are new but otherwise normal. 
 
 
 
 
Past Medical History:  
Diagnosis Date  Acid reflux  GERD (gastroesophageal reflux disease)  Hypertension  Other ill-defined conditions(799.89)   
 high cholesterol  Psychiatric disorder   
 depression  Thyroid disease   
 hyperthyroidism Past Surgical History:  
Procedure Laterality Date  COLONOSCOPY N/A 11/30/2017 COLONOSCOPY performed by Hilda Sylvester MD at [facility] ENDOSCOPY  COLONOSCOPY N/A 3/20/2019 COLONOSCOPY performed by Lesa Gonzalez MD at [facility] ENDOSCOPY  COLONOSCOPY N/A 3/28/2019 COLONOSCOPY performed by Lesa Gonzalez MD at [facility] ENDOSCOPY 2021 Bibi Soni N/A 3/20/2019 SIGMOIDOSCOPY FLEXIBLE performed by Lesa Gonzalez MD at [facility] ENDOSCOPY  
 HX CHOLECYSTECTOMY  HX GYN    
 hysterectomy  HX HEENT    
 thyroid Social History Tobacco Use  Smoking status: Never Smoker  Smokeless tobacco: Never Used Substance Use Topics  Alcohol use: Yes Alcohol/week: 0.6 oz Types: 1 Cans of beer per week Comment: Socially. Family History Problem Relation Age of Onset  Cancer Mother  Colon Cancer Father  Cancer Father Allergies Allergen Reactions  Codeine Itching  Lisinopril Angioedema Prior to Admission medications Medication Sig Start Date End Date Taking? Authorizing Provider  
vitamin E (AQUA GEMS) 400 unit capsule Take  by mouth daily. Provider, Historical  
oxyCODONE-acetaminophen (PERCOCET) 5-325 mg per tablet Take 1 Tab by mouth every six (6) hours as needed for Pain for up to 5 days. Max Daily Amount: 4 Tabs. 3/28/19 4/2/19  Don SELF MD  
ciprofloxacin HCl (CIPRO) 500 mg tablet Take 1 Tab by mouth two (2) times a day for 10 days. 3/18/19 3/28/19  Vargas Blevins MD  
metroNIDAZOLE (FLAGYL) 500 mg tablet Take 1 Tab by mouth two (2) times a day for 10 days. 3/18/19 3/28/19  Vargas Blevins MD  
docusate sodium (COLACE) 100 mg capsule Take 100 mg by mouth two (2) times a day. Provider, Historical  
butalbital-acetaminophen-caff (FIORICET) -40 mg per capsule Take 1 Cap by mouth every six (6) hours as needed for Headache. 5/3/18   Ronel Padilla MD  
diazePAM (VALIUM) 5 mg tablet Take 5 mg by mouth every eight (8) hours as needed for Anxiety. Provider, Historical  
pantoprazole (PROTONIX) 40 mg tablet Take 40 mg by mouth two (2) times daily as needed (reflux). Provider, Historical  
gabapentin (NEURONTIN) 300 mg capsule Take 300 mg by mouth three (3) times daily. Provider, Historical  
zolpidem CR (AMBIEN CR) 12.5 mg tablet Take 12.5 mg by mouth nightly as needed for Sleep. Provider, Historical  
estradiol (ESTRACE) 1 mg tablet Take 1 mg by mouth daily. Oli Saavedra MD  
aspirin 81 mg tablet Take 81 mg by mouth. Oli Saavedra MD  
 
 
REVIEW OF SYSTEMS:    
Total of 12 systems reviewed as follows:  
I am not able to complete the review of systems because: The patient is intubated and sedated The patient has altered mental status due to his acute medical problems The patient has baseline aphasia from prior stroke(s) The patient has baseline dementia and is not reliable historian POSITIVE= underlined text  Negative = text not underlined General:  fever, chills, sweats, generalized weakness, weight loss/gain,  
   loss of appetite Eyes:    blurred vision, eye pain, loss of vision, double vision ENT:    rhinorrhea, pharyngitis Respiratory:   cough, sputum production, SOB, wheezing, WREN, pleuritic pain  
Cardiology:   chest pain, palpitations, orthopnea, PND, edema, syncope Gastrointestinal:  abdominal pain , N/V, dysphagia, diarrhea, constipation, bleeding Genitourinary:  frequency, urgency, dysuria, hematuria, incontinence Muskuloskeletal :  arthralgia, myalgia Hematology:  easy bruising, nose or gum bleeding, lymphadenopathy Dermatological: rash, ulceration, pruritis Endocrine:   hot flashes or polydipsia Neurological:  headache, dizziness, confusion, focal weakness, paresthesia, Speech difficulties, memory loss, gait disturbance Psychological: Feelings of anxiety, depression, agitation Objective: VITALS:   
Visit Vitals /63 Pulse 97 Temp 97.6 °F (36.4 °C) Resp 16 Ht 5' 4\" (1.626 m) Wt 58.7 kg (129 lb 6.4 oz) SpO2 98% Breastfeeding? No  
BMI 22.21 kg/m² Temp (24hrs), Av.6 °F (36.4 °C), Min:97.3 °F (36.3 °C), Max:97.9 °F (36.6 °C) PHYSICAL EXAM:  
General:    Alert, cooperative, no distress, appears stated age. HEENT: Atraumatic, anicteric sclerae, pink conjunctivae No oral ulcers, mucosa moist, throat clear Neck:  Supple, symmetrical,  thyroid: non tender Lungs:   Clear to auscultation bilaterally. No Wheezing or Rhonchi. No rales. Chest wall:  No tenderness  No Accessory muscle use. Heart:   Regular  rhythm,  No  murmur   No edema Abdomen:   Soft, incision is c,d,i 
Extremities: No cyanosis. No clubbing Skin:     Not pale. Not Jaundiced  No rashes Psych:   Not depressed. Not anxious or agitated. Neurologic: EOMs intact. No facial asymmetry. No aphasia or slurred speech. Symmetrical strength, Alert and oriented X 4.  
 
_______________________________________________________________________ Care Plan discussed with: 
  Comments Patient y Family RN y   
Care Manager Consultant:     
____________________________________________________________________ TOTAL TIME:     60  mins Comments  
 y Reviewed previous records  
>50% of visit spent in counseling and coordination of care y Discussion with patient and/or family and questions answered Critical Care Provided     Minutes non procedure based 
________________________________________________________________________ Signed: Lawyer Nate MD 
 
 
Procedures: see electronic medical records for all procedures/Xrays and details which were not copied into this note but were reviewed prior to creation of Plan. LAB DATA REVIEWED:   
Recent Results (from the past 24 hour(s)) GLUCOSE, POC Collection Time: 03/28/19 11:46 PM  
Result Value Ref Range Glucose (POC) 92 65 - 100 mg/dL Performed by [facility] METABOLIC PANEL, BASIC Collection Time: 03/29/19  4:14 AM  
Result Value Ref Range Sodium 140 136 - 145 mmol/L Potassium 4.0 3.5 - 5.1 mmol/L Chloride 110 (H) 97 - 108 mmol/L  
 CO2 23 21 - 32 mmol/L Anion gap 7 5 - 15 mmol/L Glucose 101 (H) 65 - 100 mg/dL BUN 7 6 - 20 MG/DL Creatinine 0.49 (L) 0.55 - 1.02 MG/DL  
 BUN/Creatinine ratio 14 12 - 20 GFR est AA >60 >60 ml/min/1.73m2 GFR est non-AA >60 >60 ml/min/1.73m2 Calcium 7.9 (L) 8.5 - 10.1 MG/DL  
CBC WITH AUTOMATED DIFF Collection Time: 03/29/19  4:14 AM  
Result Value Ref Range WBC 11.7 (H) 3.6 - 11.0 K/uL  
 RBC 3.86 3.80 - 5.20 M/uL  
 HGB 11.7 11.5 - 16.0 g/dL HCT 35.8 35.0 - 47.0 %  MCV 92.7 80.0 - 99.0 FL  
 MCH 30.3 26.0 - 34.0 PG  
 MCHC 32.7 30.0 - 36.5 g/dL  
 RDW 15.1 (H) 11.5 - 14.5 % PLATELET 067 793 - 380 K/uL MPV 9.6 8.9 - 12.9 FL  
 NRBC 0.0 0  WBC ABSOLUTE NRBC 0.00 0.00 - 0.01 K/uL NEUTROPHILS 91 (H) 32 - 75 % LYMPHOCYTES 3 (L) 12 - 49 % MONOCYTES 6 5 - 13 % EOSINOPHILS 0 0 - 7 % BASOPHILS 0 0 - 1 % IMMATURE GRANULOCYTES 0 0.0 - 0.5 % ABS. NEUTROPHILS 10.6 (H) 1.8 - 8.0 K/UL  
 ABS. LYMPHOCYTES 0.4 (L) 0.8 - 3.5 K/UL  
 ABS. MONOCYTES 0.7 0.0 - 1.0 K/UL  
 ABS. EOSINOPHILS 0.0 0.0 - 0.4 K/UL  
 ABS. BASOPHILS 0.0 0.0 - 0.1 K/UL  
 ABS. IMM. GRANS. 0.0 0.00 - 0.04 K/UL  
 DF AUTOMATED    
 RBC COMMENTS NORMOCYTIC, NORMOCHROMIC    
GLUCOSE, POC Collection Time: 03/29/19  3:42 PM  
Result Value Ref Range Glucose (POC) 184 (H) 65 - 100 mg/dL Performed by [facility]   
TROPONIN I Collection Time: 03/29/19  4:00 PM  
Result Value Ref Range Troponin-I, Qt. <0.05 <0.05 ng/mL CK W/ CKMB & INDEX Collection Time: 03/29/19  4:00 PM  
Result Value Ref Range CK 95 26 - 192 U/L  
 CK - MB 1.1 <3.6 NG/ML  
 CK-MB Index 1.2 0.0 - 2.5    
CBC W/O DIFF Collection Time: 03/29/19  4:00 PM  
Result Value Ref Range WBC 11.6 (H) 3.6 - 11.0 K/uL  
 RBC 3.14 (L) 3.80 - 5.20 M/uL HGB 9.5 (L) 11.5 - 16.0 g/dL HCT 28.6 (L) 35.0 - 47.0 % MCV 91.1 80.0 - 99.0 FL  
 MCH 30.3 26.0 - 34.0 PG  
 MCHC 33.2 30.0 - 36.5 g/dL  
 RDW 15.1 (H) 11.5 - 14.5 % PLATELET 816 915 - 197 K/uL MPV 9.8 8.9 - 12.9 FL  
 NRBC 0.0 0  WBC ABSOLUTE NRBC 0.00 0.00 - 0.01 K/uL METABOLIC PANEL, BASIC Collection Time: 03/29/19  4:00 PM  
Result Value Ref Range Sodium 137 136 - 145 mmol/L Potassium 3.9 3.5 - 5.1 mmol/L Chloride 107 97 - 108 mmol/L  
 CO2 27 21 - 32 mmol/L Anion gap 3 (L) 5 - 15 mmol/L Glucose 177 (H) 65 - 100 mg/dL BUN 6 6 - 20 MG/DL Creatinine 0.61 0.55 - 1.02 MG/DL  
 BUN/Creatinine ratio 10 (L) 12 - 20 GFR est AA >60 >60 ml/min/1.73m2 GFR est non-AA >60 >60 ml/min/1.73m2 Calcium 7.4 (L) 8.5 - 10.1 MG/DL MAGNESIUM Collection Time: 03/29/19  4:00 PM  
Result Value Ref Range Magnesium 1.9 1.6 - 2.4 mg/dL SAMPLES BEING HELD Collection Time: 03/29/19  4:00 PM  
Result Value Ref Range SAMPLES BEING HELD 1BLUE   
 COMMENT Add-on orders for these samples will be processed based on acceptable specimen integrity and analyte stability, which may vary by analyte.  
 
 
_____________________________ Hospitalist: Lux Putnam MD

## 2019-03-29 NOTE — PROGRESS NOTES
Problem: Falls - Risk of 
Goal: *Absence of Falls Description Document Eduardo Boyer Fall Risk and appropriate interventions in the flowsheet. Outcome: Progressing Towards Goal 
  
Problem: Patient Education: Go to Patient Education Activity Goal: Patient/Family Education Outcome: Progressing Towards Goal 
  
Problem: Pressure Injury - Risk of 
Goal: *Prevention of pressure injury Description Document Pankaj Scale and appropriate interventions in the flowsheet. Outcome: Progressing Towards Goal 
  
Problem: Patient Education: Go to Patient Education Activity Goal: Patient/Family Education Outcome: Progressing Towards Goal 
  
Problem: Surgical Wound Care Goal: *Non-infected Wound: Absence of infection signs and symptoms Description Infection control procedures (eg: clean dressings, clean gloves, hand washing, precautions to isolate wound from contamination, sterile instruments used for wound debridement) should be implemented. Outcome: Progressing Towards Goal 
Goal: *Infected Wound: Prevention of further infection and promotion of healing Description Consider the use of systemic antibiotics in patients with cellulitis, osteomyelitis, bacteremia, or sepsis if there are no contraindications. Outcome: Progressing Towards Goal 
Goal: *Improvement of existing wound and maintenance of skin integrity Outcome: Progressing Towards Goal 
  
Problem: Patient Education: Go to Patient Education Activity Goal: Patient/Family Education Outcome: Progressing Towards Goal 
  
Problem: Pain Goal: *Control of Pain Outcome: Progressing Towards Goal 
Goal: *PALLIATIVE CARE:  Alleviation of Pain Outcome: Progressing Towards Goal 
  
Problem: Patient Education: Go to Patient Education Activity Goal: Patient/Family Education Outcome: Progressing Towards Goal

## 2019-03-30 NOTE — PROGRESS NOTES
Hospitalist Progress Note NAME: Enriqueta Gomez :  1955 MRN:  [id number] Assessment / Plan: 
 
Principal Problem: 
  Peritonitis ([address] (3/28/2019) 
  Active Problems: 
  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 
  
 Chest pain rule out acute coronary syndrome and PE due to suspected malignancy Chest pain description is mixed as she reports it is both dull and sharp No prior coronary artery disease Due to suspected malignancy, cta negative for pe but with air under diaphragm. 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 Code status: Full Prophylaxis: Lovenox Recommended Disposition: unclear Subjective: Chief Complaint / Reason for Physician Visit \"persistnet abd pain. No nuasea or vomiting\". Discussed with RN events overnight. Review of Systems: 
Symptom Y/N Comments  Symptom Y/N Comments Fever/Chills    Chest Pain Poor Appetite    Edema Cough    Abdominal Pain Sputum    Joint Pain SOB/WREN    Pruritis/Rash Nausea/vomit    Tolerating PT/OT Diarrhea    Tolerating Diet Constipation    Other Could NOT obtain due to:   
 
Objective: VITALS:  
Last 24hrs VS reviewed since prior progress note. Most recent are: 
Patient Vitals for the past 24 hrs: 
 Temp Pulse Resp BP SpO2  
19 1058 98 °F (36.7 °C) 76 18 105/58 100 % 19 0717 97.9 °F (36.6 °C) 81 18 96/52 100 % 19 0318 98 °F (36.7 °C) 84 18 94/58 100 % 19 2236 98.1 °F (36.7 °C) 88 16 92/58 100 % 03/29/19 1939 97.9 °F (36.6 °C) 88 16 101/56 98 % 03/29/19 1632 97.9 °F (36.6 °C) 97 16 128/63 98 % 03/29/19 1604  93 18 102/57 91 %  
03/29/19 1548  (!) 101 16 110/64 93 % 03/29/19 1543  97 18 111/64 96 % 03/29/19 1539 97.6 °F (36.4 °C) 86 18 112/60 96 % 03/29/19 1536  93  113/55 98 % Intake/Output Summary (Last 24 hours) at 3/30/2019 1159 Last data filed at 3/30/2019 5402 Gross per 24 hour Intake 1406.25 ml Output 500 ml Net 906.25 ml PHYSICAL EXAM: 
General: WD, WN. Alert, cooperative, no acute distress   
EENT:  EOMI. Anicteric sclerae. MMM Resp:  CTA bilaterally, no wheezing or rales. No accessory muscle use CV:  Regular  rhythm,  No edema GI:  Soft, Non distended, Non tender.  +Bowel sounds Neurologic:  Alert and oriented X 3, normal speech, Psych:   Good insight. Not anxious nor agitated Skin:  No rashes. No jaundice Reviewed most current lab test results and cultures  YES Reviewed most current radiology test results   YES Review and summation of old records today    NO Reviewed patient's current orders and MAR    YES 
PMH/SH reviewed - no change compared to H&P 
________________________________________________________________________ Care Plan discussed with: 
  Comments Patient Family RN Care Manager Consultant Multidiciplinary team rounds were held today with , nursing, pharmacist and clinical coordinator. Patient's plan of care was discussed; medications were reviewed and discharge planning was addressed. ________________________________________________________________________ Total NON critical care TIME:  30   Minutes Total CRITICAL CARE TIME Spent:   Minutes non procedure based Comments >50% of visit spent in counseling and coordination of care    
________________________________________________________________________ Mynor Hayward DO  
 
 Procedures: see electronic medical records for all procedures/Xrays and details which were not copied into this note but were reviewed prior to creation of Plan. LABS: 
I reviewed today's most current labs and imaging studies. Pertinent labs include: 
Recent Labs  
  03/30/19 
0358 03/29/19 
1600 03/29/19 
0414 WBC 8.4 11.6* 11.7* HGB 8.5* 9.5* 11.7 HCT 26.4* 28.6* 35.8  304 308 Recent Labs  
  03/30/19 
0358 03/29/19 
1600 03/29/19 
0414 03/28/19 
1446  137 140 133* K 3.8 3.9 4.0 3.8 * 107 110* 102 CO2 28 27 23 23 * 177* 101* 69  
BUN 6 6 7 5* CREA 0.44* 0.61 0.49* 0.48* CA 7.5* 7.4* 7.9* 7.6*  
MG  --  1.9  --   --   
ALB  --   --   --  3.2* TBILI  --   --   --  0.2 SGOT  --   --   --  27 ALT  --   --   --  24 Signed: Agueda Sow DO

## 2019-03-30 NOTE — PROGRESS NOTES
Rapid Response Team: Attempted to round on patient this afternoon, patient resting with eyes closed, resps even and nonlabored, vss. Spoke with America Whitney RN who informed this RN that patient hadn't slept well over night and was fatigued and seemed to be emotionally drained today. Reviewed VS, labs, chart. No RRT interventions indicated. Zac MAIN, RN, CCRN, ASHLEY, CPEN Rapid Response RN

## 2019-03-30 NOTE — PROGRESS NOTES
Admit Date: 3/28/2019 POD 2 Day Post-Op Procedure:  Procedure(s): LAPAROSCOPY GENERAL DIAGNOSTIC CONVERTED TO EXPLORATORY LAPAROTOMY; OMENTECTOMY; EXTENDED RIGHT COLECTOMY Subjective:  
 
Patient had rapid response for chest pain last night. Feeling better today. CTA negative for PE 
 
Objective:  
 
Blood pressure 96/52, pulse 81, temperature 97.9 °F (36.6 °C), resp. rate 18, height 5' 4\" (1.626 m), weight 129 lb 6.4 oz (58.7 kg), SpO2 100 %, not currently breastfeeding. Temp (24hrs), Av.8 °F (36.6 °C), Min:97.3 °F (36.3 °C), Max:98.1 °F (36.7 °C) Physical Exam:  GENERAL: alert, cooperative, no distress, appears stated age, LUNG: clear to auscultation bilaterally, HEART: regular rate and rhythm, ABDOMEN: soft, non-tender. Wound c/d/i, EXTREMITIES:  extremities normal, atraumatic, no cyanosis or edema Labs:  
Recent Results (from the past 24 hour(s)) GLUCOSE, POC Collection Time: 19  3:42 PM  
Result Value Ref Range Glucose (POC) 184 (H) 65 - 100 mg/dL Performed by Hiwot Pizano   
CEA Collection Time: 19  4:00 PM  
Result Value Ref Range CEA 0.4 ng/mL TROPONIN I Collection Time: 19  4:00 PM  
Result Value Ref Range Troponin-I, Qt. <0.05 <0.05 ng/mL CK W/ CKMB & INDEX Collection Time: 19  4:00 PM  
Result Value Ref Range CK 95 26 - 192 U/L  
 CK - MB 1.1 <3.6 NG/ML  
 CK-MB Index 1.2 0.0 - 2.5    
CBC W/O DIFF Collection Time: 19  4:00 PM  
Result Value Ref Range WBC 11.6 (H) 3.6 - 11.0 K/uL  
 RBC 3.14 (L) 3.80 - 5.20 M/uL HGB 9.5 (L) 11.5 - 16.0 g/dL HCT 28.6 (L) 35.0 - 47.0 % MCV 91.1 80.0 - 99.0 FL  
 MCH 30.3 26.0 - 34.0 PG  
 MCHC 33.2 30.0 - 36.5 g/dL  
 RDW 15.1 (H) 11.5 - 14.5 % PLATELET 373 701 - 405 K/uL MPV 9.8 8.9 - 12.9 FL  
 NRBC 0.0 0  WBC ABSOLUTE NRBC 0.00 0.00 - 0.01 K/uL METABOLIC PANEL, BASIC Collection Time: 19  4:00 PM  
Result Value Ref Range Sodium 137 136 - 145 mmol/L Potassium 3.9 3.5 - 5.1 mmol/L Chloride 107 97 - 108 mmol/L  
 CO2 27 21 - 32 mmol/L Anion gap 3 (L) 5 - 15 mmol/L Glucose 177 (H) 65 - 100 mg/dL BUN 6 6 - 20 MG/DL Creatinine 0.61 0.55 - 1.02 MG/DL  
 BUN/Creatinine ratio 10 (L) 12 - 20 GFR est AA >60 >60 ml/min/1.73m2 GFR est non-AA >60 >60 ml/min/1.73m2 Calcium 7.4 (L) 8.5 - 10.1 MG/DL MAGNESIUM Collection Time: 03/29/19  4:00 PM  
Result Value Ref Range Magnesium 1.9 1.6 - 2.4 mg/dL SAMPLES BEING HELD Collection Time: 03/29/19  4:00 PM  
Result Value Ref Range SAMPLES BEING HELD 1BLUE   
 COMMENT Add-on orders for these samples will be processed based on acceptable specimen integrity and analyte stability, which may vary by analyte. GLUCOSE, POC Collection Time: 03/30/19  1:37 AM  
Result Value Ref Range Glucose (POC) 112 (H) 65 - 100 mg/dL Performed by [email or web address] CBC WITH AUTOMATED DIFF Collection Time: 03/30/19  3:58 AM  
Result Value Ref Range WBC 8.4 3.6 - 11.0 K/uL  
 RBC 2.84 (L) 3.80 - 5.20 M/uL HGB 8.5 (L) 11.5 - 16.0 g/dL HCT 26.4 (L) 35.0 - 47.0 % MCV 93.0 80.0 - 99.0 FL  
 MCH 29.9 26.0 - 34.0 PG  
 MCHC 32.2 30.0 - 36.5 g/dL  
 RDW 15.2 (H) 11.5 - 14.5 % PLATELET 483 434 - 211 K/uL MPV 9.9 8.9 - 12.9 FL  
 NRBC 0.0 0  WBC ABSOLUTE NRBC 0.00 0.00 - 0.01 K/uL NEUTROPHILS 76 (H) 32 - 75 % LYMPHOCYTES 13 12 - 49 % MONOCYTES 10 5 - 13 % EOSINOPHILS 0 0 - 7 % BASOPHILS 0 0 - 1 % IMMATURE GRANULOCYTES 1 (H) 0.0 - 0.5 % ABS. NEUTROPHILS 6.3 1.8 - 8.0 K/UL  
 ABS. LYMPHOCYTES 1.1 0.8 - 3.5 K/UL  
 ABS. MONOCYTES 0.9 0.0 - 1.0 K/UL  
 ABS. EOSINOPHILS 0.0 0.0 - 0.4 K/UL  
 ABS. BASOPHILS 0.0 0.0 - 0.1 K/UL  
 ABS. IMM. GRANS. 0.0 0.00 - 0.04 K/UL  
 DF AUTOMATED METABOLIC PANEL, BASIC Collection Time: 03/30/19  3:58 AM  
Result Value Ref Range  Sodium 139 136 - 145 mmol/L  
 Potassium 3.8 3.5 - 5.1 mmol/L Chloride 109 (H) 97 - 108 mmol/L  
 CO2 28 21 - 32 mmol/L Anion gap 2 (L) 5 - 15 mmol/L Glucose 102 (H) 65 - 100 mg/dL BUN 6 6 - 20 MG/DL Creatinine 0.44 (L) 0.55 - 1.02 MG/DL  
 BUN/Creatinine ratio 14 12 - 20 GFR est AA >60 >60 ml/min/1.73m2 GFR est non-AA >60 >60 ml/min/1.73m2 Calcium 7.5 (L) 8.5 - 10.1 MG/DL  
GLUCOSE, POC Collection Time: 03/30/19  5:48 AM  
Result Value Ref Range Glucose (POC) 87 65 - 100 mg/dL Performed by [employer] Data Review images and reports reviewed Assessment:  
 
Principal Problem: 
  Peritonitis ([address] (3/28/2019) Active Problems: 
  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  
 
Continue present treatment Strict npo 
oob with assist 
Incentive spirometry Dvt, stress ulcer prophylaxis Awaiting path 
cea pending Consult placed for Dr Michelle Castro Concerned about possible obstruction in the future from rectal involvement. May require stent at some point. Kylee Hankins. Fany Barton MD, [facility] [address] [facility]

## 2019-03-30 NOTE — PROGRESS NOTES
Bedside and Verbal shift change report given to [address] (oncoming nurse) by Jess German (offgoing nurse). Report included the following information SBAR, Kardex, Intake/Output, MAR, Recent Results and Cardiac Rhythm NSR.

## 2019-03-30 NOTE — PROGRESS NOTES
Gastroenterology Daily Progress Note (Dr. Becka Morocho for Dr. Monalisa Ann) [facility] Admit Date: 3/28/2019 Subjective:   
 Patient with pain with movement. Asking about her prognosis. No flatus. Would like liquids but ok for now on swabs. Current Facility-Administered Medications Medication Dose Route Frequency  ketorolac (TORADOL) injection 30 mg  30 mg IntraVENous Q6H PRN  
 sodium chloride (NS) flush 5-40 mL  5-40 mL IntraVENous Q8H  
 sodium chloride (NS) flush 5-40 mL  5-40 mL IntraVENous PRN  
 bupivacaine (PF) (MARCAINE) 0.5 % (5 mg/mL) injection    PRN  
 0.9% sodium chloride infusion  125 mL/hr IntraVENous CONTINUOUS  
 HYDROmorphone (PF) (DILAUDID) injection 0.5 mg  0.5 mg IntraVENous Q2H PRN  
 ondansetron (ZOFRAN) injection 4 mg  4 mg IntraVENous Q4H PRN  pantoprazole (PROTONIX) 40 mg in sodium chloride 0.9% 10 mL injection  40 mg IntraVENous DAILY  enoxaparin (LOVENOX) injection 40 mg  40 mg SubCUTAneous DAILY Objective:  
 
Visit Vitals /58 Pulse 76 Temp 98 °F (36.7 °C) Resp 18 Ht 5' 4\" (1.626 m) Wt 58.7 kg (129 lb 6.4 oz) SpO2 100% Breastfeeding? No  
BMI 22.21 kg/m² Blood pressure 105/58, pulse 76, temperature 98 °F (36.7 °C), resp. rate 18, height 5' 4\" (1.626 m), weight 58.7 kg (129 lb 6.4 oz), SpO2 100 %, not currently breastfeeding. No intake/output data recorded. 03/28 1901 - 03/30 0700 In: 3043.8 [I.V.:3043.8] Out: 1750 [[facility]:0401] Intake/Output Summary (Last 24 hours) at 3/30/2019 1217 Last data filed at 3/30/2019 3528 Gross per 24 hour Intake 1406.25 ml Output 500 ml Net 906.25 ml Physical Exam:  
 
General: awake and alert WF in NAD Chest:  CTA, No rhonchi, rales or rubs. Heart: S1, S2, RRR 
GI: Soft, mild incisional tenderness, nondistended, decreased BS Labs:  
 
 
Recent Results (from the past 24 hour(s)) GLUCOSE, POC  Collection Time: 03/29/19  3:42 PM  
 Result Value Ref Range Glucose (POC) 184 (H) 65 - 100 mg/dL Performed by Arben Ferrell   
CEA Collection Time: 03/29/19  4:00 PM  
Result Value Ref Range CEA 0.4 ng/mL TROPONIN I Collection Time: 03/29/19  4:00 PM  
Result Value Ref Range Troponin-I, Qt. <0.05 <0.05 ng/mL CK W/ CKMB & INDEX Collection Time: 03/29/19  4:00 PM  
Result Value Ref Range CK 95 26 - 192 U/L  
 CK - MB 1.1 <3.6 NG/ML  
 CK-MB Index 1.2 0.0 - 2.5    
CBC W/O DIFF Collection Time: 03/29/19  4:00 PM  
Result Value Ref Range WBC 11.6 (H) 3.6 - 11.0 K/uL  
 RBC 3.14 (L) 3.80 - 5.20 M/uL HGB 9.5 (L) 11.5 - 16.0 g/dL HCT 28.6 (L) 35.0 - 47.0 % MCV 91.1 80.0 - 99.0 FL  
 MCH 30.3 26.0 - 34.0 PG  
 MCHC 33.2 30.0 - 36.5 g/dL  
 RDW 15.1 (H) 11.5 - 14.5 % PLATELET 289 841 - 062 K/uL MPV 9.8 8.9 - 12.9 FL  
 NRBC 0.0 0  WBC ABSOLUTE NRBC 0.00 0.00 - 0.01 K/uL METABOLIC PANEL, BASIC Collection Time: 03/29/19  4:00 PM  
Result Value Ref Range Sodium 137 136 - 145 mmol/L Potassium 3.9 3.5 - 5.1 mmol/L Chloride 107 97 - 108 mmol/L  
 CO2 27 21 - 32 mmol/L Anion gap 3 (L) 5 - 15 mmol/L Glucose 177 (H) 65 - 100 mg/dL BUN 6 6 - 20 MG/DL Creatinine 0.61 0.55 - 1.02 MG/DL  
 BUN/Creatinine ratio 10 (L) 12 - 20 GFR est AA >60 >60 ml/min/1.73m2 GFR est non-AA >60 >60 ml/min/1.73m2 Calcium 7.4 (L) 8.5 - 10.1 MG/DL MAGNESIUM Collection Time: 03/29/19  4:00 PM  
Result Value Ref Range Magnesium 1.9 1.6 - 2.4 mg/dL SAMPLES BEING HELD Collection Time: 03/29/19  4:00 PM  
Result Value Ref Range SAMPLES BEING HELD 1BLUE   
 COMMENT Add-on orders for these samples will be processed based on acceptable specimen integrity and analyte stability, which may vary by analyte. GLUCOSE, POC Collection Time: 03/30/19  1:37 AM  
Result Value Ref Range Glucose (POC) 112 (H) 65 - 100 mg/dL Performed by [facility] CBC WITH AUTOMATED DIFF  
 Collection Time: 03/30/19  3:58 AM  
Result Value Ref Range WBC 8.4 3.6 - 11.0 K/uL  
 RBC 2.84 (L) 3.80 - 5.20 M/uL HGB 8.5 (L) 11.5 - 16.0 g/dL HCT 26.4 (L) 35.0 - 47.0 % MCV 93.0 80.0 - 99.0 FL  
 MCH 29.9 26.0 - 34.0 PG  
 MCHC 32.2 30.0 - 36.5 g/dL  
 RDW 15.2 (H) 11.5 - 14.5 % PLATELET 125 520 - 598 K/uL MPV 9.9 8.9 - 12.9 FL  
 NRBC 0.0 0  WBC ABSOLUTE NRBC 0.00 0.00 - 0.01 K/uL NEUTROPHILS 76 (H) 32 - 75 % LYMPHOCYTES 13 12 - 49 % MONOCYTES 10 5 - 13 % EOSINOPHILS 0 0 - 7 % BASOPHILS 0 0 - 1 % IMMATURE GRANULOCYTES 1 (H) 0.0 - 0.5 % ABS. NEUTROPHILS 6.3 1.8 - 8.0 K/UL  
 ABS. LYMPHOCYTES 1.1 0.8 - 3.5 K/UL  
 ABS. MONOCYTES 0.9 0.0 - 1.0 K/UL  
 ABS. EOSINOPHILS 0.0 0.0 - 0.4 K/UL  
 ABS. BASOPHILS 0.0 0.0 - 0.1 K/UL  
 ABS. IMM. GRANS. 0.0 0.00 - 0.04 K/UL  
 DF AUTOMATED METABOLIC PANEL, BASIC Collection Time: 03/30/19  3:58 AM  
Result Value Ref Range Sodium 139 136 - 145 mmol/L Potassium 3.8 3.5 - 5.1 mmol/L Chloride 109 (H) 97 - 108 mmol/L  
 CO2 28 21 - 32 mmol/L Anion gap 2 (L) 5 - 15 mmol/L Glucose 102 (H) 65 - 100 mg/dL BUN 6 6 - 20 MG/DL Creatinine 0.44 (L) 0.55 - 1.02 MG/DL  
 BUN/Creatinine ratio 14 12 - 20 GFR est AA >60 >60 ml/min/1.73m2 GFR est non-AA >60 >60 ml/min/1.73m2 Calcium 7.5 (L) 8.5 - 10.1 MG/DL  
GLUCOSE, POC Collection Time: 03/30/19  5:48 AM  
Result Value Ref Range Glucose (POC) 87 65 - 100 mg/dL Performed by [facility] Recent Labs  
  03/30/19 
0358 03/29/19 
1600 03/29/19 
0414  137 140  
K 3.8 3.9 4.0  
* 107 110* CO2 28 27 23 BUN 6 6 7 CREA 0.44* 0.61 0.49* * 177* 101* CA 7.5* 7.4* 7.9*  
MG  --  1.9  --   
 
Recent Labs  
  03/28/19 
1446 SGOT 27 AP 64  
TP 6.6 ALB 3.2*  
GLOB 3.4 Impression/Plan: 
Appendiceal neoplasm with peritoneal carcinomatosis -  
  
 S/p diagnostic diagnostic converted to exploratory laparotomy. She was found to have an infiltrative mass involving appendix with extensive involvement of inferior omentum, rectum and ovaries and diffuse scattered peritoneal implants - 3/29/2019 - Dr. Katt Hirsch 
  
Pathology is pending CEA -0.4 
-post op care per Dr. Katt Hirsch 
-will f/u again on Monday, call with questions Mariah Stone MD 
 
3/30/2019 [address] [place] [address], [phone] Loc: [phone]

## 2019-03-30 NOTE — PROGRESS NOTES
1007 - Pt crying and expressing how she has been in pain all night and that the dilaudid only helps a little bit, but requests prior to the next dose being able to be administered d/t 8/10 pain. Paged Dr. Ashlyn Davidson regarding pain management. 1016 - Dr. Ashlyn Davidson notified of the above; received order for toradol 30 mg every 6 hours as needed for pain. 1020 - Paged oncall  as pt expresses sadness and fear about her current situation. She states \"All I know, its just really bad. \" Attempted to provide therapeutic touch; pt states \"Thank you for being so kind. I'm just worried b/c I don't know what is going to happen. \" Waiting for return call. 200 - Daughter called; requesting update. Asked pt what information she wanted discussed. Patient states, \"You can tell her anything; I want her to know what's going on. \" Spoke with daughter and informed her of her main goal of pain management today and getting up to chair. [address] [facility]  made aware of situation and pt status. 1058 - Pt stating she feels a lot better; assisted tech with CHG bath and assisted pt to recliner per MD order. Pt agrees to sit in recliner for at least an hour. 1300 - Pt assisted back to bed d/t increased pain; will administer prn pain medication and continue to monitor. 1630- Pt c/o itching all over and skin dryness; no noted rash. Question allergy to CHG wipes. Provided soap and water bath; pt no longer itching. Added CHG allergy to pt's chart.

## 2019-03-30 NOTE — PROGRESS NOTES
made visit to patients room on the request staff nurse, [address] [place].  visited with patient in her room and she was sitting in the chair. Patient shared that she is having a little bit of a hard time because they doctors have not been able to answer all her questions. They think she has cancer, but waiting for biopsy to come back before they know what to do. Patient said she has a son and daughter, but they both out of state. Daughter is in [place] and in prison and son is in [place]. She doesn't want to tell them anything until she knows more, but daughter did call this morning. She said she has a [employer] home, [employer] and she does attend and  offered to contact them for her, but patient declined that offer. Patient is in pain and wants it to get better.  consulted with nurse after visit and also one of her doctors after he had visited with her.  provided pastoral care and support to the patient. Spiritual Care will follow up as needed and able. Conrad Shah Pager: [phone]

## 2019-03-30 NOTE — PROGRESS NOTES
PCU SHIFT NURSING NOTE 
 
1900: The bedside  shift change report given to Loki (oncoming nurse) by Payal CONNER  (offgoing nurse). Report included the following information SBAR, Kardex, Intake/Output, MAR, Recent Results, Med Rec Status, Cardiac Rhythm NSR and Alarm Parameters . 12:47 AM 
The patient resting in bed. She has been receiving hydromorphone Q2 for abdomonial pain. No sign of respiratory distress noted. All meds administered as per schedule. The  in the room with the patient. 6:53 AM 
The patient reported  Abdominal pain most of the night. Medicated with hydromorphone 5mg Q2 as per orders. Administered Zofran X2 for nausea. Patient reported some relief after administration of nausea and pain meds. Lab works done. No critical values reported. 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 
IP CONSULT TO HOSPITALIST Consults []PT []OT []Speech  
[]Case Management  
  
[] Palliative Cardiac Monitoring Order []Yes []No  
 
IV drips []Yes Drip:                            Dose: 
Drip:                            Dose: 
Drip:                            Dose:  
[]No  
 
GI Prophylaxis []Yes []No  
 
 
 
DVT Prophylaxis SCDs:  Sequential Compression Device: Bilateral  
     
 Nam stockings:     
  
[] Medication []Contraindicated []None Activity Level Activity Level: Up with Assistance Activity Assistance: Partial (one person) Purposeful Rounding every 1-2 hour? []Yes Lehman Score  Total Score: 2 Bed Alarm (If score 3 or >) []Yes  
[] Refused (See signed refusal form in chart) Pankaj Score  Pankaj Score: 17 Pankaj Score (if score 14 or less) []PMT consult  
[]Wound Care consult []Specialty bed  
[] Nutrition consult Needs prior to discharge:  
Home O2 required:   
[]Yes []No  
 If yes, how much O2 required? Other: Last Bowel Movement: Last Bowel Movement Date: 03/28/19 Influenza Vaccine Received Flu Vaccine for Current Season (usually Sept-March): No  
 Patient/Guardian Refused (Notify MD): Yes Pneumonia Vaccine Diet Active Orders Diet DIET NPO With Cherelle and Tank Valle Peripheral IV 03/28/19 Right Wrist (Active) Site Assessment Clean, dry, & intact 3/29/2019  7:39 PM  
Phlebitis Assessment 0 3/29/2019  7:39 PM  
Infiltration Assessment 0 3/29/2019  7:39 PM  
Dressing Status Clean, dry, & intact 3/29/2019  7:39 PM  
Dressing Type Tape;Transparent 3/29/2019  7:39 PM  
Hub Color/Line Status Infusing 3/29/2019  7:39 PM  
Action Taken Open ports on tubing capped 3/29/2019  7:39 PM  
Alcohol Cap Used Yes 3/29/2019  7:39 PM  
   
Peripheral IV 03/29/19 Left Antecubital (Active) Site Assessment Clean, dry, & intact 3/29/2019  7:39 PM  
Phlebitis Assessment 0 3/29/2019  7:39 PM  
Infiltration Assessment 0 3/29/2019  7:39 PM  
Dressing Status Clean, dry, & intact 3/29/2019  7:39 PM  
Dressing Type Tape;Transparent 3/29/2019  7:39 PM  
Hub Color/Line Status Pink;End cap changed 3/29/2019  7:39 PM  
Action Taken Open ports on tubing capped 3/29/2019  7:39 PM  
Alcohol Cap Used Yes 3/29/2019  7:39 PM  
                  
Urinary Catheter Urinary Catheter 03/28/19-Indications for Use: Surgery Intake & Output Date 03/29/19 0700 - 03/30/19 1731 03/30/19 0700 - 03/31/19 5266 Shift 1776-3543 4670-7199 24 Hour Total 1039-7485 3455-3342 24 Hour Total  
INTAKE  
I.V.(mL/kg/hr) 1406.3(2)  1406.3 Volume (0.9% sodium chloride infusion) 1406.3  1406.3 Shift Total(mL/kg) [id number])  [id number]) OUTPUT Urine(mL/kg/hr) 450(0.6)  450 Urine Voided 450  450 Shift Total(mL/kg) 450(7.7)  450(7.7) .3  956. 3 Weight (kg) 58.7 58.7 58.7 58.7 58.7 58.7 Readmission Risk Assessment Tool Score Low Risk 8 Total Score 3 Has Seen PCP in Last 6 Months (Yes=3, No=0)  
 5 Pt. Coverage (Medicare=5 , Medicaid, or Self-Pay=4) Criteria that do not apply:  
 . Living with Significant Other. Assisted Living. LTAC. SNF. or  
Rehab Patient Length of Stay (>5 days = 3) IP Visits Last 12 Months (1-3=4, 4=9, >4=11) Charlson Comorbidity Score (Age + Comorbid Conditions) Expected Length of Stay 3d 16h Actual Length of Stay 2

## 2019-03-31 NOTE — PROGRESS NOTES
PCU SHIFT NURSING NOTE Bedside and Verbal shift change report given to Seb Barton RN (oncoming nurse) by Michael Meza RN (offgoing nurse). Report included the following information SBAR, Kardex, Intake/Output, MAR, Recent Results and Cardiac Rhythm NSR. Shift Summary:  
 
 
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 
IP CONSULT TO HOSPITALIST Consults []PT []OT []Speech  
[]Case Management  
  
[] Palliative Cardiac Monitoring Order []Yes []No  
 
IV drips []Yes Drip:                            Dose: 
Drip:                            Dose: 
Drip:                            Dose:  
[]No  
 
GI Prophylaxis []Yes []No  
 
 
 
DVT Prophylaxis SCDs:  Sequential Compression Device: Bilateral  
  Patient Refused VTE Prophylaxis: Yes Nam stockings:     
  
[] Medication []Contraindicated []None Activity Level Activity Level: Bed Rest   
 Activity Assistance: Partial (one person) Purposeful Rounding every 1-2 hour? []Yes Lehman Score  Total Score: 3 Bed Alarm (If score 3 or >) []Yes  
[] Refused (See signed refusal form in chart) Pankaj Score  Pankaj Score: 17 Pankaj Score (if score 14 or less) []PMT consult  
[]Wound Care consult []Specialty bed  
[] Nutrition consult Needs prior to discharge:  
Home O2 required:   
[]Yes []No  
 If yes, how much O2 required? Other:  
 Last Bowel Movement: Last Bowel Movement Date: 03/28/19 Influenza Vaccine Received Flu Vaccine for Current Season (usually Sept-March): No  
 Patient/Guardian Refused (Notify MD): Yes Pneumonia Vaccine Diet Active Orders Diet DIET CLEAR LIQUID  
  
LDAs Peripheral IV 03/28/19 Right Wrist (Active) Site Assessment Clean, dry, & intact 3/31/2019  4:02 PM  
Phlebitis Assessment 0 3/31/2019  4:02 PM  
Infiltration Assessment 0 3/31/2019  4:02 PM  
 Dressing Status Clean, dry, & intact 3/31/2019  4:02 PM  
Dressing Type Tape;Transparent 3/31/2019  4:02 PM  
Hub Color/Line Status Pink; Infusing 3/31/2019  4:02 PM  
Action Taken Open ports on tubing capped 3/31/2019  3:28 AM  
Alcohol Cap Used Yes 3/31/2019  3:28 AM  
   
Peripheral IV 03/29/19 Left Antecubital (Active) Site Assessment Clean, dry, & intact 3/31/2019  4:02 PM  
Phlebitis Assessment 0 3/31/2019  4:02 PM  
Infiltration Assessment 0 3/31/2019  4:02 PM  
Dressing Status Clean, dry, & intact 3/31/2019  4:02 PM  
Dressing Type Tape;Transparent 3/31/2019  4:02 PM  
Hub Color/Line Status Pink;Capped 3/31/2019  4:02 PM  
Action Taken Open ports on tubing capped 3/31/2019  3:28 AM  
Alcohol Cap Used Yes 3/31/2019  3:28 AM  
                  
Urinary Catheter [REMOVED] Urinary Catheter 03/28/19-Indications for Use: Surgery Intake & Output Date 03/30/19 1900 - 03/31/19 0759 03/31/19 0700 - 04/01/19 2354 Shift 3456-3574 24 Hour Total 4430-2327 9251-5160 24 Hour Total  
INTAKE  
P.O.   240  240  
  P. O.   240  240  
I. V.(mL/kg/hr) 735.4 735.4 1135.4(1.5)  1135.4 Volume (dextrose 5% and 0.9% NaCl infusion) 735.4 735.4 1135.4  1135.4 Shift Total(mL/kg) 735. 4(11.4) 735. 4(11.4) 1375.4(21.4)  1375.4(21.4) OUTPUT Urine(mL/kg/hr) (2.3)  1750 Urine Voided   150  150 Urine Output (mL) ([REMOVED] Urinary Catheter 03/28/19)   1600 Shift Total(mL/kg) 500(7.8) [id number]) [id number])  [id number]) .4 -114.6 -374.6  -374.6 Weight (kg) 64.4 64.4 64.4 64.4 64.4 Readmission Risk Assessment Tool Score Medium Risk 12 Total Score 3 Has Seen PCP in Last 6 Months (Yes=3, No=0)  
 5 Pt. Coverage (Medicare=5 , Medicaid, or Self-Pay=4) 8 Charlson Comorbidity Score (Age + Comorbid Conditions) Criteria that do not apply:  
 . Living with Significant Other. Assisted Living. LTAC. SNF. or  
Rehab Patient Length of Stay (>5 days = 3) IP Visits Last 12 Months (1-3=4, 4=9, >4=11) Expected Length of Stay 3d 16h Actual Length of Stay 3

## 2019-03-31 NOTE — PROGRESS NOTES
PCU SHIFT NURSING NOTE 
 
1900: The bedside  shift change report given to Loki  (oncoming nurse) by Anai Carrillo  (offgoing nurse). Report included the following information SBAR, Kardex, Intake/Output, MAR, Recent Results, Med Rec Status, Cardiac Rhythm NSR/Sinus Tach and Alarm Parameters . 8:51 PM 
The patient resting in bed with eyes open. O2 sat dropped to 80's on room air. She was connected to 2l humidified oxygen via nasal canula. 02 sat improved to 98% after that. Reported abdominal pain at 8/10 and hydromorphone 0.5mg administered. To continue to monitor 9:51 PM 
The daughter called and she wanted an update on how she is doing and plans in place. She said she will call again tomorrow after doctors rounds 01:17 AM 
The blood sugar was 75. Pagerosalinda GARRISON for fluid infusion change from NS to NS in  5% Dextrose. Orders received and implemented. The patient received Hydromorphine for pain as per PRN order. 6:44 AM 
The patient resting in bed. Administered pain meds. Nausea meds administered. 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 
IP CONSULT TO HOSPITALIST Consults []PT []OT []Speech  
[]Case Management  
  
[] Palliative Cardiac Monitoring Order []Yes []No  
 
IV drips []Yes Drip:                            Dose: 
Drip:                            Dose: 
Drip:                            Dose:  
[]No  
 
GI Prophylaxis []Yes []No  
 
 
 
DVT Prophylaxis SCDs:  Sequential Compression Device: Bilateral  
  Patient Refused VTE Prophylaxis: Yes Nam stockings:     
  
[] Medication []Contraindicated []None Activity Level Activity Level: Chair Activity Assistance: Partial (one person) Purposeful Rounding every 1-2 hour? []Yes Lehman Score  Total Score: 3 Bed Alarm (If score 3 or >) []Yes  
[] Refused (See signed refusal form in chart) Pankaj Score  Pankaj Score: 16 Pankaj Score (if score 14 or less) []PMT consult  
[]Wound Care consult []Specialty bed  
[] Nutrition consult Needs prior to discharge:  
Home O2 required:   
[]Yes []No  
 If yes, how much O2 required? Other:  
 Last Bowel Movement: Last Bowel Movement Date: 03/28/19 Influenza Vaccine Received Flu Vaccine for Current Season (usually Sept-March): No  
 Patient/Guardian Refused (Notify MD): Yes Pneumonia Vaccine Diet Active Orders Diet DIET NPO With Cherelle and Tank LDAs Peripheral IV 03/28/19 Right Wrist (Active) Site Assessment Clean, dry, & intact 3/30/2019  7:18 PM  
Phlebitis Assessment 0 3/30/2019  7:18 PM  
Infiltration Assessment 0 3/30/2019  7:18 PM  
Dressing Status Clean, dry, & intact 3/30/2019  7:18 PM  
Dressing Type Transparent;Tape 3/30/2019  7:18 PM  
Hub Color/Line Status Pink;End cap changed 3/30/2019  7:18 PM  
Action Taken Open ports on tubing capped 3/30/2019  7:18 PM  
Alcohol Cap Used Yes 3/30/2019  7:18 PM  
   
Peripheral IV 03/29/19 Left Antecubital (Active) Site Assessment Clean, dry, & intact 3/30/2019  7:18 PM  
Phlebitis Assessment 0 3/30/2019  7:18 PM  
Infiltration Assessment 0 3/30/2019  7:18 PM  
Dressing Status Clean, dry, & intact 3/30/2019  7:18 PM  
Dressing Type Tape;Transparent 3/30/2019  7:18 PM  
Hub Color/Line Status Pink;End cap changed 3/30/2019  7:18 PM  
Action Taken Open ports on tubing capped 3/30/2019  7:18 PM  
Alcohol Cap Used Yes 3/30/2019  7:18 PM  
                  
Urinary Catheter Urinary Catheter 03/28/19-Indications for Use: Surgery Intake & Output Date 03/29/19 1900 - 03/30/19 7061 03/30/19 0700 - 03/31/19 2135 Shift 3850-7569 24 Hour Total 6850-3828 4901-5957 24 Hour Total  
INTAKE  
I.V.(mL/kg/hr)  1406.3 Volume (0.9% sodium chloride infusion)  1406.3 Shift Total(mL/kg)  [id number]) OUTPUT Urine(mL/kg/hr) 500 798 350(0.5) 500 665 Urine Voided  450 Urine Output (mL) (Urinary Catheter 03/28/19) 500 500 350 500 850 Shift Total(mL/kg) 500(8.5) 950(16.2) 350(6) 500(8.5) 850(14.5) NET -500 456.3 -350 -500 -850 Weight (kg) 58.7 58.7 58.7 58.7 58.7 Readmission Risk Assessment Tool Score Medium Risk 12 Total Score 3 Has Seen PCP in Last 6 Months (Yes=3, No=0)  
 5 Pt. Coverage (Medicare=5 , Medicaid, or Self-Pay=4) 8 Charlson Comorbidity Score (Age + Comorbid Conditions) Criteria that do not apply:  
 . Living with Significant Other. Assisted Living. LTAC. SNF. or  
Rehab Patient Length of Stay (>5 days = 3) IP Visits Last 12 Months (1-3=4, 4=9, >4=11) Expected Length of Stay 3d 16h Actual Length of Stay 2

## 2019-03-31 NOTE — PROGRESS NOTES
Bedside and Verbal shift change report given to [facility] RN (oncoming nurse) by Matt Stevens RN (offgoing nurse). Report included the following information SBAR, Kardex, Intake/Output, MAR, Recent Results and Cardiac Rhythm NSR.

## 2019-03-31 NOTE — PROGRESS NOTES
Hospitalist Progress Note NAME: Dolores Johnson :  1955 MRN:  [id number] Assessment / Plan: 
Chest pain rule out acute coronary syndrome and PE due to suspected malignancy Chest pain description is mixed as she reports it is both dull and sharp No prior coronary artery disease Due to suspected malignancy, cta negative for pe but with air under diaphragm. Await echo result. Symptoms most likely to recent surgery. 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 Path pending. Hypertension Currently blood pressure is controlled and she is not on any medications at home Generalized anxiety disorder Gastroesophageal reflux disease Home Protonix changed to Protonix IV Home Valium has been held 40 or above Morbid obesity / Body mass index is 24.37 kg/m². Code status: Full Prophylaxis: Lovenox Subjective: Chief Complaint / Reason for Physician Visit \"mild throat discomforta no nausea or vomiting. \". Discussed with RN events overnight. Review of Systems: 
Symptom Y/N Comments  Symptom Y/N Comments Fever/Chills    Chest Pain Poor Appetite    Edema Cough    Abdominal Pain Sputum    Joint Pain SOB/WREN    Pruritis/Rash Nausea/vomit    Tolerating PT/OT Diarrhea    Tolerating Diet Constipation    Other Could NOT obtain due to:   
 
Objective: VITALS:  
Last 24hrs VS reviewed since prior progress note. Most recent are: 
Patient Vitals for the past 24 hrs: 
 Temp Pulse Resp BP SpO2  
19 1058  85  156/66   
19 0714 97.5 °F (36.4 °C) 80 18 154/70 100 % 19 0328 97.8 °F (36.6 °C) 92 16 146/74 97 % 19 2354 98.2 °F (36.8 °C) 88 16 122/71 94 % 19 1918 98.1 °F (36.7 °C) 85 18 144/70 91 % 19 1513 97.6 °F (36.4 °C) 87 18 143/59 96 % 19 1510    143/59  Intake/Output Summary (Last 24 hours) at 3/31/2019 1130 Last data filed at 3/30/2019 1918 Gross per 24 hour Intake  Output 850 ml Net -850 ml PHYSICAL EXAM: 
General: WD, WN. Alert, cooperative, no acute distress   
EENT:  EOMI. Anicteric sclerae. MMM Resp:  CTA bilaterally, no wheezing or rales. No accessory muscle use CV:  Regular  rhythm,  No edema GI:  Soft, Non distended, Non tender.  +Bowel sounds Neurologic:  Alert and oriented X 3, normal speech, Psych:   Good insight. Not anxious nor agitated Skin:  No rashes. No jaundice Reviewed most current lab test results and cultures  YES Reviewed most current radiology test results   YES Review and summation of old records today    NO Reviewed patient's current orders and MAR    YES 
PMH/SH reviewed - no change compared to H&P 
________________________________________________________________________ Care Plan discussed with: 
  Comments Patient Family RN Care Manager Consultant Multidiciplinary team rounds were held today with , nursing, pharmacist and clinical coordinator. Patient's plan of care was discussed; medications were reviewed and discharge planning was addressed. ________________________________________________________________________ Total NON critical care TIME:  30   Minutes Total CRITICAL CARE TIME Spent:   Minutes non procedure based Comments >50% of visit spent in counseling and coordination of care    
________________________________________________________________________ Megan Abelino, DO  
 
Procedures: see electronic medical records for all procedures/Xrays and details which were not copied into this note but were reviewed prior to creation of Plan. LABS: 
I reviewed today's most current labs and imaging studies. Pertinent labs include: 
Recent Labs  
  03/30/19 
0358 03/29/19 
1600 03/29/19 
0414 WBC 8.4 11.6* 11.7*  
 HGB 8.5* 9.5* 11.7 HCT 26.4* 28.6* 35.8  304 308 Recent Labs  
  03/30/19 
0358 03/29/19 
1600 03/29/19 
0414 03/28/19 
1446  137 140 133* K 3.8 3.9 4.0 3.8 * 107 110* 102 CO2 28 27 23 23 * 177* 101* 69  
BUN 6 6 7 5* CREA 0.44* 0.61 0.49* 0.48* CA 7.5* 7.4* 7.9* 7.6*  
MG  --  1.9  --   --   
ALB  --   --   --  3.2* TBILI  --   --   --  0.2 SGOT  --   --   --  27 ALT  --   --   --  24 Signed: Agueda Sow DO

## 2019-03-31 NOTE — PROGRESS NOTES
SURGERY PROGRESS NOTE Admit Date: 3/28/2019 POD 3 Days Post-Op Procedure: Procedure(s): LAPAROSCOPY GENERAL DIAGNOSTIC CONVERTED TO EXPLORATORY LAPAROTOMY; OMENTECTOMY; EXTENDED RIGHT COLECTOMY Subjective:  
 
Patient complains of some nausea and pain. No flatus. Objective:  
 
Visit Vitals /70 (BP 1 Location: Right arm, BP Patient Position: At rest) Pulse 72 Temp 97.8 °F (36.6 °C) Resp 16 Ht 5' 4\" (1.626 m) Wt 64.4 kg (141 lb 15.6 oz) SpO2 99% Breastfeeding? No  
BMI 24.37 kg/m² Temp (24hrs), Av.8 °F (36.6 °C), Min:97.5 °F (36.4 °C), Max:98.2 °F (36.8 °C) 
 
 
701 - 1900 In: -  
Out: 1407 [DO:[id number]] 1901 - 700 In: -  
Out: 1350 [[id number]] Physical Exam:   
General:  alert, cooperative, no distress, appears stated age Abdomen: soft, bowel sounds active, non-tender Incision:   healing well, no drainage, no erythema, no hernia, no seroma, no swelling, no dehiscence, incision well approximated Lab Results Component Value Date/Time WBC 8.4 2019 03:58 AM  
 HGB 8.5 (L) 2019 03:58 AM  
 HCT 26.4 (L) 2019 03:58 AM  
 PLATELET 330  03:58 AM  
 MCV 93.0 2019 03:58 AM  
 
Lab Results Component Value Date/Time GFR est non-AA >60 2019 03:58 AM  
 GFR est AA >60 2019 03:58 AM  
 Creatinine 0.44 (L) 2019 03:58 AM  
 BUN 6 2019 03:58 AM  
 Sodium 139 2019 03:58 AM  
 Potassium 3.8 2019 03:58 AM  
 Chloride 109 (H) 2019 03:58 AM  
 CO2 28 2019 03:58 AM  
 Magnesium 1.9 2019 04:00 PM  
 
 
Assessment:  
 
Principal Problem: 
  Peritonitis ([address] (3/28/2019) Active Problems: 
  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 Plan:  
 
 
Rigoberto JESUSOB

## 2019-03-31 NOTE — OP NOTES
[address] 
OPERATIVE REPORT Name:  Jorje Nicole 
MR#:  [id number] :  1955 ACCOUNT #:  [de-identified] DATE OF SERVICE:  2019 PREOPERATIVE DIAGNOSIS:  Intraabdominal inflammatory process. POSTOPERATIVE DIAGNOSIS:  Appendiceal mass with evidence of diffuse carcinomatosis. PROCEDURE PERFORMED:  Laparoscopy converted to exploratory laparotomy, omentectomy and extended right colectomy. SURGEON:  Kae Holder. Ana María Mazariegos MD 
 
ASSISTANT:  Juan Avery. ANESTHESIA:  General endotracheal anesthesia. COMPLICATIONS:  There were no operative complications. SPECIMENS REMOVED:  Greater omentum and extended right colectomy specimen with terminal ileum, colon, appendix and associated mesentery. IMPLANTS:  There were no implants. ESTIMATED BLOOD LOSS:  150 mL. FINDINGS:  Infiltrative mass involving the appendix and base of the appendix with extensive involvement of the peritoneal implants in the inferior omentum, rectum, ovaries and diffuse scattered peritoneal implants throughout the abdomen. DESCRIPTION OF OPERATION IN DETAIL:  After appropriate consent was obtained, the patient who was competent was brought to the operating room, made comfortable in supine position, administered general endotracheal anesthesia. A Franco catheter was placed, and the patient was prepped and draped in standard fashion. Time-out was completed. At this time, a 0.5% plain Marcaine solution was used to infiltrate the skin at the umbilicus. A 15 blade was used to incise the skin. This was extended down through the midline fascia. A Jocelynn trocar was placed, and the abdomen was insufflated with CO2 gas. The abdomen was explored and a dense, obviously malignant appearing process was noted involving the greater omentum which was densely adherent in the pelvis. At this point, it was elected to convert to open exploration.   The Jocelynn trocar was removed, and the infraumbilical incision was extended inferiorly down to the pubis. Fascia was opened and a wound protector was placed, and the abdominal cavity was explored. The laparoscopic findings were confirmed. The patient had a dense firm infiltrative process involving the entire inferior greater omentum where it was rock hard and thickened and densely adherent to the peritoneal surface of the bladder and the pelvic wall. A cautious dissection was performed to free the omentum from where it was adherent to the peritoneum. This was also densely adherent to the sigmoid colon. The right ovary was partially visualized and appeared to be unremarkable. The left ovary was not able to be visualized. Ultimately, the omentum was freed, and the remainder of the abdomen was able to be explored. The patient was found to have an obviously pathologic process involving the appendix which was now 1.5 cm in size and also rock hard, completely white in appearance and this firmness and induration extended down onto the appendiceal base in the cecum. 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 At this time, it was elected to perform an extended right colectomy. The right colon was mobilized along the white line of Toldt with the LigaSure and mobilized medially. The transverse colon was divided with a linear stapler using a 60 mm purple load on the Signia stapler. The terminal ileum was divided with a 60 mm gold load staple cartridge also on the Signia stapler, and then the mesentery was taken down with the LigaSure and with Stormy clamps and 2-0 silk ties. Once the specimen was freed, it was forwarded to Pathology for examination. An ileocolic anastomosis was then performed between the terminal ileum and the transverse colon.   Interrupted 3-0 silk stay sutures were placed first, and then additional firing of the 60 mm purple stapler load was fired creating a side-to-side stapled anastomosis. The remaining intestinal defect was closed with a TA 60 stapler. The mesenteric defect was closed with a 3-0 silk running stitch. Hemostasis was confirmed. Pertinent findings in the abdomen: There were other areas of focal peritoneal tumor implants along the small bowel and peritoneal surfaces. There was no obvious hepatic metastatic disease noted. The rectosigmoid was markedly inflamed and involved with these tumor implants, and there was consideration for resecting this as well and performing a colostomy; however, the patient had just had a colonoscopy today and the endoscope was able to be passed, and it was felt that if she had difficulty with bowel function after this, that this may be better managed with an endoscopic stent placement rather than with additional colectomy and colostomy in the setting of disseminated malignancy. At this time, the wound protector was removed, and the fascia was closed with a #1 PDS running suture x2. The skin incision was closed with skin staples. The wound was cleaned and dried and dressed with sterile dressings. The patient was awakened, extubated and transferred to the recovery room in stable condition. Leydi Villagomez MD 
 
 
[id number] 
D:  03/30/2019 14:56 T:  03/30/2019 22:38 JOB #:  [id number]

## 2019-04-01 NOTE — PROGRESS NOTES
Problem: Mobility Impaired (Adult and Pediatric) Goal: *Acute Goals and Plan of Care (Insert Text) Description Physical Therapy Goals Initiated 4/1/2019 1. Patient will move from supine to sit and sit to supine , scoot up and down and roll side to side in bed with independence within 7 day(s). 2.  Patient will transfer from bed to chair and chair to bed with independence using the least restrictive device within 7 day(s). 3.  Patient will perform sit to stand with independence within 7 day(s). 4.  Patient will ambulate with independence for 350 feet with the least restrictive device within 7 day(s). 5.  Patient will ascend/descend 4 stairs with 1 handrail(s) with modified independence within 7 day(s). Outcome: Progressing Towards Goal 
 PHYSICAL THERAPY EVALUATION Patient: Shyann Hull (68 y.o. 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 
LAPAROSCOPY GENERAL DIAGNOSTIC CONVERTED TO EXPLORATORY LAPAROTOMY; OMENTECTOMY; EXTENDED RIGHT COLECTOMY (N/A) 4 Days Post-Op Precautions:  Fall ASSESSMENT : 
Based on the objective data described below, the patient presents with generalized weakness, decreased balance and decreased mobility goals. She was sitting in recliner with visitor when PT arrived. Agreed to therapy and cleared by her nurse. PTA - she lives alone in 1 story home with 3 steps to enter with R sided rail. She was independent with all ADLs and mobility skills in her home and community. Currently demonstrates sba for sit to standing transfers. Gait tolerated for 85 feet with RW and sba. Gait speed was decreased with small step lengths and mild path deviations. Standing balance reactions were mildly delayed with ankle strategies.  Encouraged the patient to be out of bed for all meals, have nursing staff assist her to bathroom rather than bsc and ambulate to tolerance with nursing staff 2 more times today. She verbalized understanding. She was returned to recliner with all needs met when the session ended. Recommend  PT upon discharge at this time. Patient will benefit from skilled intervention to address the above impairments. Patient?s rehabilitation potential is considered to be Good Factors which may influence rehabilitation potential include:  
? None noted ? Mental ability/status ? Medical condition ? Home/family situation and support systems ? Safety awareness 
? Pain tolerance/management 
? Other: PLAN : 
Recommendations and Planned Interventions: 
?           Bed Mobility Training             ? Neuromuscular Re-Education ? Transfer Training                   ? Orthotic/Prosthetic Training 
? Gait Training                         ? Modalities ? Therapeutic Exercises           ? Edema Management/Control ? Therapeutic Activities            ? Patient and Family Training/Education ? Other (comment): Frequency/Duration: Patient will be followed by physical therapy  4 times a week to address goals. Discharge Recommendations: Home Health PT Further Equipment Recommendations for Discharge: none SUBJECTIVE:  
Patient stated ? I have been using the bsc with the nurses help. ? OBJECTIVE DATA SUMMARY:  
HISTORY:   
Past Medical History:  
Diagnosis Date Acid reflux GERD (gastroesophageal reflux disease) Hypertension Other ill-defined conditions(799.89)   
 high cholesterol Psychiatric disorder   
 depression Thyroid disease   
 hyperthyroidism Past Surgical History:  
Procedure Laterality Date COLONOSCOPY N/A 11/30/2017 COLONOSCOPY performed by Jasson Oliveros MD at hospitals ENDOSCOPY  
 COLONOSCOPY N/A 3/20/2019 COLONOSCOPY performed by Ronel Molina MD at hospitals ENDOSCOPY  
 COLONOSCOPY N/A 3/28/2019 COLONOSCOPY performed by Missy Archibald MD at [facility] ENDOSCOPY FLEXIBLE SIGMOIDOSCOPY N/A 3/20/2019 SIGMOIDOSCOPY FLEXIBLE performed by Missy Archibald MD at [facility] ENDOSCOPY  
 HX CHOLECYSTECTOMY HX GYN    
 hysterectomy HX HEENT    
 thyroid Prior Level of Function/Home Situation: she lives alone in 1 story home with 3 steps to enter with R sided rail. She was independent with all ADLs and mobility skills in her home and community. Personal factors and/or comorbidities impacting plan of care: None Home Situation Home Environment: Private residence # Steps to Enter: 3 Rails to Enter: Yes Hand Rails : Bilateral(wide) One/Two Story Residence: One story Living Alone: No 
Support Systems: Friends \ neighbors Patient Expects to be Discharged to[de-identified] Private residence Current DME Used/Available at Home: None Tub or Shower Type: Shower EXAMINATION/PRESENTATION/DECISION MAKING:  
Critical Behavior: 
Neurologic State: Alert, Eyes open spontaneously Orientation Level: Oriented X4 Cognition: Appropriate decision making, Appropriate for age attention/concentration, Appropriate safety awareness, Follows commands Hearing: Auditory Auditory Impairment: None Skin:   
Edema:  
Range Of Motion: 
AROM: Within functional limits PROM: Within functional limits Strength:   
Strength: Generally decreased, functional 
  
  
  
  
  
  
Tone & Sensation:  
Tone: Normal 
  
  
  
  
Sensation: Intact Coordination: 
Coordination: Within functional limits Vision:  
  
Functional Mobility: 
Bed Mobility: 
  
  
  
  
Transfers: 
Sit to Stand: Stand-by assistance Stand to Sit: Stand-by assistance Bed to Chair: Stand-by assistance; Adaptive equipment(RW) 
     
  
  
Balance:  
Sitting: Impaired Sitting - Static: Good (unsupported) Sitting - Dynamic: Fair (occasional) Standing: Impaired Standing - Static: Occassional;Good Standing - Dynamic : Fair Ambulation/Gait Training: 
Distance (ft): 85 Feet (ft) Assistive Device: Walker, rolling;Gait belt Ambulation - Level of Assistance: Stand-by assistance Gait Description (WDL): Exceptions to [facility] Gait Abnormalities: Path deviations Base of Support: Narrowed Speed/Soco: Pace decreased (<100 feet/min) Step Length: Right shortened;Left shortened Functional Measure: 
Barthel Index: 
Bathin Bladder: 10 Bowels: 10 
Groomin Dressin Feeding: 10 Mobility: 0 Stairs: 0 Toilet Use: 10 Transfer (Bed to Chair and Back): 10 Total: 60/100 Percentage of impairment  
0% 1-19% 20-39% 40-59% 60-79% 80-99% 100% Barthel Score 0-100 100 99-80 79-60 59-40 20-39 1-19 
 0 The Barthel ADL Index: Guidelines 1. The index should be used as a record of what a patient does, not as a record of what a patient could do. 2. The main aim is to establish degree of independence from any help, physical or verbal, however minor and for whatever reason. 3. The need for supervision renders the patient not independent. 4. A patient's performance should be established using the best available evidence. Asking the patient, friends/relatives and nurses are the usual sources, but direct observation and common sense are also important. However direct testing is not needed. 5. Usually the patient's performance over the preceding 24-48 hours is important, but occasionally longer periods will be relevant. 6. Middle categories imply that the patient supplies over 50 per cent of the effort. 7. Use of aids to be independent is allowed. Tena Cannon., Barthel, D.W. (0259). Functional evaluation: the Barthel Index. [address] [facility] (14)2. [facility] dmitry MELISSA Mcnamara, Rah Carlos Manuel., Rosanne [place]., Chaitanya, [address] ().  Measuring the change indisability after inpatient rehabilitation; comparison of the responsiveness of the Barthel Index and Functional Gilbert Measure. Journal of Neurology, Neurosurgery, and Psychiatry, 66(4), 293-870. CHEPE Pelletier, DICK Bliss, & Tamia Florez M.A. (2004.) Assessment of post-stroke quality of life in cost-effectiveness studies: The usefulness of the Barthel Index and the EuroQoL-5D. [facility], 13, 194-07 Physical Therapy Evaluation Charge Determination History Examination Presentation Decision-Making HIGH Complexity :3+ comorbidities / personal factors will impact the outcome/ POC  HIGH Complexity : 4+ Standardized tests and measures addressing body structure, function, activity limitation and / or participation in recreation  MEDIUM Complexity : Evolving with changing characteristics  Other outcome measures barthel  MEDIUM Based on the above components, the patient evaluation is determined to be of the following complexity level: MEDIUM Pain: 
Pain Scale 1: Numeric (0 - 10) Pain Intensity 1: 0 Pain Location 1: Head 
Pain Orientation 1: Anterior Pain Description 1: Aching Pain Intervention(s) 1: Medication (see MAR) Activity Tolerance:  
Fair Please refer to the flowsheet for vital signs taken during this treatment. After treatment:  
?         Patient left in no apparent distress sitting up in chair ? Patient left in no apparent distress in bed 
? Call bell left within reach ? Nursing notified ? Caregiver present ? Bed alarm activated COMMUNICATION/EDUCATION:  
The patient?s plan of care was discussed with: Occupational Therapist, Registered Nurse and . ?         Fall prevention education was provided and the patient/caregiver indicated understanding. ? Patient/family have participated as able in goal setting and plan of care. ?         Patient/family agree to work toward stated goals and plan of care.  
?         Patient understands intent and goals of therapy, but is neutral about his/her participation. ? Patient is unable to participate in goal setting and plan of care. Thank you for this referral. 
Jaron Still, PT Time Calculation: 26 mins

## 2019-04-01 NOTE — PROGRESS NOTES
PCU SHIFT NURSING NOTE Bedside and Verbal shift change report given to Caprice Peter RN (oncoming nurse) by Alyson Kerns RN (offgoing nurse). Report included the following information SBAR, ED Summary, Intake/Output, MAR and Recent Results. Shift Summary:  
Received report and assumed care of patient. /75 (BP 1 Location: Right arm, BP Patient Position: At rest)   Pulse 82   Temp 97.6 °F (36.4 °C)   Resp 16   Ht 5' 4\" (1.626 m)   Wt 66 kg (145 lb 8 oz)   SpO2 100%   Breastfeeding? No   BMI 24.98 kg/m²  
 
Chris Brice office to request tylenol for patient. She is having mild headache and does not have anything milder than IV pain meds to administer. 65 Dr. Dominick Friedman stated that the patient could be advanced to full liquid diet and it is also in his note. Changed patient's diet order to full liquid for dinner. Patient had some ice cream and started feeling nauseous. 1800 Patient had emetic episode. Zofran given per PRN order. [id number] Paged Dr Filemon Brice office because patient emesis and nausea not subsiding with zofran. Will request another antiemetic. Patient believes it is the Nhung she had earlier. Dr. Dominick Friedman gave telephone order for 25 mg Phenergan IV every 6 hours PRN for vomiting/nausea. 800 Evgen changed prescription to compazine 10 mg. 
 
1935 Compazine given for patient continuing to have nausea/vomiting. 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 
IP CONSULT TO HOSPITALIST Consults [x]PT [x]OT []Speech  
[]Case Management  
  
[] Palliative Cardiac Monitoring Order  
[x]Yes []No  
 
IV drips [x]Yes Drip:       D5 in NS                     Dose: 125 ml/hr Drip:                            Dose: 
Drip:                            Dose:  
[]No  
 
GI Prophylaxis [x]Yes []No  
 
 
 
DVT Prophylaxis SCDs:  Sequential Compression Device: Bilateral  
 Patient Refused VTE Prophylaxis: Yes Nam stockings:     
  
[x] Medication []Contraindicated []None Activity Level Activity Level: Chair, Bed Rest   
 Activity Assistance: Partial (one person) Purposeful Rounding every 1-2 hour? [x]Yes Lehman Score  Total Score: 3 Bed Alarm (If score 3 or >) []Yes  
[] Refused (See signed refusal form in chart) Pankaj Score  Pankaj Score: 16 Pankaj Score (if score 14 or less) []PMT consult  
[]Wound Care consult []Specialty bed  
[] Nutrition consult Needs prior to discharge:  
Home O2 required:   
[]Yes  
[x]No  
 If yes, how much O2 required? Other:  
 Last Bowel Movement: Last Bowel Movement Date: 03/28/19 Influenza Vaccine Received Flu Vaccine for Current Season (usually Sept-March): No  
 Patient/Guardian Refused (Notify MD): Yes Pneumonia Vaccine Diet Active Orders Diet DIET CLEAR LIQUID  
  
LDAs Peripheral IV 03/28/19 Right Wrist (Active) Site Assessment Clean, dry, & intact 4/1/2019  4:58 AM  
Phlebitis Assessment 0 4/1/2019  4:58 AM  
Infiltration Assessment 0 4/1/2019  4:58 AM  
Dressing Status Clean, dry, & intact 4/1/2019  4:58 AM  
Dressing Type Tape;Transparent 4/1/2019  4:58 AM  
Hub Color/Line Status Pink;End cap changed 4/1/2019  4:58 AM  
Action Taken Open ports on tubing capped 4/1/2019  4:58 AM  
Alcohol Cap Used Yes 4/1/2019  4:58 AM  
   
Peripheral IV 03/29/19 Left Antecubital (Active) Site Assessment Clean, dry, & intact 4/1/2019  4:58 AM  
Phlebitis Assessment 0 4/1/2019  4:58 AM  
Infiltration Assessment 0 4/1/2019  4:58 AM  
Dressing Status Clean, dry, & intact 4/1/2019  4:58 AM  
Dressing Type Tape;Transparent 4/1/2019  4:58 AM  
Hub Color/Line Status Pink;End cap changed 4/1/2019  4:58 AM  
Action Taken Open ports on tubing capped 4/1/2019  4:58 AM  
Alcohol Cap Used Yes 4/1/2019  4:58 AM  
                  
 Urinary Catheter [REMOVED] Urinary Catheter 03/28/19-Indications for Use: Surgery Intake & Output Date 03/31/19 0700 - 04/01/19 0659 04/01/19 0700 - 04/02/19 5896 Shift 4654-2591 4573-7935 24 Hour Total 1791-9675 0644-7472 24 Hour Total  
INTAKE  
P.O. 240 150 390     
  P.O. 240 150 390     
I. V.(mL/kg/hr) 1135.4(1.5)  1135.4(0.7) Volume (dextrose 5% and 0.9% NaCl infusion) 1135.4  1135.4 Shift Total(mL/kg) 1375.4(21.4) 150(2.3) 1525. 4(23.1) OUTPUT Urine(mL/kg/hr) 1750(2.3) 550(0.7) 2300(1.5) Urine Voided 150 550 700 Urine Output (mL) ([REMOVED] Urinary Catheter 03/28/19) 1600  1600 Shift Total(mL/kg) 4661(00.2) 550(8.3) [id number]) NET -374.6 -400 -774.6 Weight (kg) 64.4 66 66 66 66 66 Readmission Risk Assessment Tool Score Medium Risk 12 Total Score 3 Has Seen PCP in Last 6 Months (Yes=3, No=0)  
 5 Pt. Coverage (Medicare=5 , Medicaid, or Self-Pay=4) 8 Charlson Comorbidity Score (Age + Comorbid Conditions) Criteria that do not apply:  
 . Living with Significant Other. Assisted Living. LTAC. SNF. or  
Rehab Patient Length of Stay (>5 days = 3) IP Visits Last 12 Months (1-3=4, 4=9, >4=11) Expected Length of Stay 3d 16h Actual Length of Stay 4

## 2019-04-01 NOTE — PROGRESS NOTES
Orders received, chart reviewed and patient evaluated by Occupational Therapy. Pt is functioning below her independent baseline with performing her dynamic ADL/IADL routine. She is moving around well and anticipate she will return to her PLOF with increased functional ADL task performance OOB. Educated Pt to request assistance from nursing staff to walk to standard commode now instead of using BSC. Do not anticipate Pt will need further therapy services at discharge, pending progress in acute skilled OT.   
 
Full evaluation to follow.  
  
Darrell Connell OTR/L

## 2019-04-01 NOTE — PROGRESS NOTES
Hospitalist Progress Note NAME: Mariel Santiago :  1955 MRN:  [id number] Assessment / Plan: 
Chest pain rule out acute coronary syndrome and PE due to suspected malignancy Chest pain description is mixed as she reports it is both dull and sharp No prior coronary artery disease Due to suspected malignancy, cta negative for pe but with air under diaphragm (harjit postop changes). Await echo result. Symptoms most likely to recent surgery. 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 Path still pending. Colonoscopy negative. Hypertension Currently blood pressure is controlled and she is not on any medications at home Generalized anxiety disorder Gastroesophageal reflux disease Home Protonix changed to Protonix IV Home Valium has been held 25.0 - 29.9 Overweight / Body mass index is 24.98 kg/m². Code status: Full Prophylaxis: Lovenox and Hep SQ Subjective: Chief Complaint / Reason for Physician Visit \"mild abd discomfort. Requesting tylenol\". Discussed with RN events overnight. Review of Systems: 
Symptom Y/N Comments  Symptom Y/N Comments Fever/Chills    Chest Pain Poor Appetite    Edema Cough    Abdominal Pain Sputum    Joint Pain SOB/WREN    Pruritis/Rash Nausea/vomit    Tolerating PT/OT Diarrhea    Tolerating Diet Constipation    Other Could NOT obtain due to:   
 
Objective: VITALS:  
Last 24hrs VS reviewed since prior progress note. Most recent are: 
Patient Vitals for the past 24 hrs: 
 Temp Pulse Resp BP SpO2  
19 1053 97.5 °F (36.4 °C) 83 19 145/76 98 % 19 0725 97.6 °F (36.4 °C) 82 16 135/75 100 % 19 0418 98.6 °F (37 °C) 71 16 138/77 100 % 19 2312 98 °F (36.7 °C) 78 16 114/58 99 % 19 1913 97.8 °F (36.6 °C) 95 16 144/77 92 % 03/31/19 1530 97.9 °F (36.6 °C) 95 18 164/69 94 % 03/31/19 1133 97.8 °F (36.6 °C) 72 16 143/70 99 % Intake/Output Summary (Last 24 hours) at 4/1/2019 1105 Last data filed at 4/1/2019 1052 Gross per 24 hour Intake 1023.33 ml Output 2550 ml Net -1526.67 ml PHYSICAL EXAM: 
General: WD, WN. Alert, cooperative, no acute distress   
EENT:  EOMI. Anicteric sclerae. MMM Resp:  CTA bilaterally, no wheezing or rales. No accessory muscle use CV:  Regular  rhythm,  No edema GI:  Soft, Non distended, Non tender.  +Bowel sounds Neurologic:  Alert and oriented X 3, normal speech, Psych:   Good insight. Not anxious nor agitated Skin:  No rashes. No jaundice Reviewed most current lab test results and cultures  YES Reviewed most current radiology test results   YES Review and summation of old records today    NO Reviewed patient's current orders and MAR    YES 
PMH/SH reviewed - no change compared to H&P 
________________________________________________________________________ Care Plan discussed with: 
  Comments Patient Family RN Care Manager Consultant Multidiciplinary team rounds were held today with , nursing, pharmacist and clinical coordinator. Patient's plan of care was discussed; medications were reviewed and discharge planning was addressed. ________________________________________________________________________ Total NON critical care TIME:  30   Minutes Total CRITICAL CARE TIME Spent:   Minutes non procedure based Comments >50% of visit spent in counseling and coordination of care    
________________________________________________________________________ Amina Catalan,   
 
Procedures: see electronic medical records for all procedures/Xrays and details which were not copied into this note but were reviewed prior to creation of Plan. LABS: 
I reviewed today's most current labs and imaging studies. Pertinent labs include: 
Recent Labs 04/01/19 
0331 03/30/19 
0358 03/29/19 
1600 WBC 5.3 8.4 11.6* HGB 7.7* 8.5* 9.5* HCT 24.0* 26.4* 28.6*  
 240 304 Recent Labs 04/01/19 
0331 03/30/19 
0358 03/29/19 
1600  139 137  
K 2.9* 3.8 3.9  109* 107 CO2 30 28 27 * 102* 177* BUN 2* 6 6 CREA 0.37* 0.44* 0.61  
CA 7.5* 7.5* 7.4* MG 1.8  --  1.9 PHOS 0.9*  --   --   
ALB 2.3*  --   --   
TBILI 0.3  --   --   
SGOT 11*  --   --   
ALT 13  --   --   
 
 
Signed: Gabriela Fajardo DO

## 2019-04-01 NOTE — PROGRESS NOTES
CM met with pt about concerns with housing and restraining order. Pt informed CM that she currently has a restraining order against her , she is in fear that he would find out and change the locks while she is in the hospial. Pt expressed that she did not have any family in [place]. CM encouraged pt to reach out her friend to see if he can  a copy of restraining order and to reach out to her worker [facility] to assist her.  
 
3:00pm- CM met with pt and provided pt with phone numbers to [employer], [employer] and [address] [place] [address] Kobi and [facility]IL[facility]. Pt was evaluated for PT/OT and they recommended home health. CM discussed with pt about home health. CM provided pt with home health list. Pt selected [facility] as first choice and [facility] as second choice. Referral sent to [facility] via [address] [facility]. 4:30pm- Referral sent to [facility] [employer] via [employer]. CM will continue to follow pt for d/c planning needs. [facility], [address] [facility]

## 2019-04-01 NOTE — PROGRESS NOTES
PCU SHIFT NURSING NOTE 
 
1900: The bedside  shift change report given to Loki (oncoming nurse) by Marilee Monique (offgoing nurse). Report included the following information SBAR, Kardex, Intake/Output, MAR, Recent Results, Med Rec Status, Cardiac Rhythm NSR and Alarm Parameters . 10:31 PM 
The patient resting in bed with eyes open. No signs of distress noted. Hydromorphone 0.5mg administered for abdominal pain. All night meds administered. Periwick  set up put in place. The patient is now getting ready to sleep.   
 
0100: Hydromorphone administered for pain. Patient went back to sleep 5:06 AM 
Hydromorphone 0.5mg administered for abdominal pain. Lab works done. Phosphorus was 0.9. Tele hospitalist notified. 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 
IP CONSULT TO HOSPITALIST Consults []PT []OT []Speech  
[]Case Management  
  
[] Palliative Cardiac Monitoring Order []Yes []No  
 
IV drips []Yes Drip:                            Dose: 
Drip:                            Dose: 
Drip:                            Dose:  
[]No  
 
GI Prophylaxis []Yes []No  
 
 
 
DVT Prophylaxis SCDs:  Sequential Compression Device: Bilateral  
  Patient Refused VTE Prophylaxis: Yes Nam stockings:     
  
[] Medication []Contraindicated []None Activity Level Activity Level: Chair, Bed Rest   
 Activity Assistance: Partial (one person) Purposeful Rounding every 1-2 hour? []Yes Lehman Score  Total Score: 3 Bed Alarm (If score 3 or >) []Yes  
[] Refused (See signed refusal form in chart) Pankaj Score  Pankaj Score: 16 Pankaj Score (if score 14 or less) []PMT consult  
[]Wound Care consult []Specialty bed  
[] Nutrition consult Needs prior to discharge:  
Home O2 required:   
[]Yes []No  
 If yes, how much O2 required? Other: Last Bowel Movement: Last Bowel Movement Date: 03/28/19 Influenza Vaccine Received Flu Vaccine for Current Season (usually Sept-March): No  
 Patient/Guardian Refused (Notify MD): Yes Pneumonia Vaccine Diet Active Orders Diet DIET CLEAR LIQUID  
  
LDAs Peripheral IV 03/28/19 Right Wrist (Active) Site Assessment Clean, dry, & intact 3/31/2019  7:13 PM  
Phlebitis Assessment 0 3/31/2019  7:13 PM  
Infiltration Assessment 0 3/31/2019  7:13 PM  
Dressing Status Clean, dry, & intact 3/31/2019  7:13 PM  
Dressing Type Tape;Transparent 3/31/2019  7:13 PM  
Hub Color/Line Status Pink; Infusing 3/31/2019  7:13 PM  
Action Taken Open ports on tubing capped 3/31/2019  7:13 PM  
Alcohol Cap Used Yes 3/31/2019  7:13 PM  
   
Peripheral IV 03/29/19 Left Antecubital (Active) Site Assessment Clean, dry, & intact 3/31/2019  7:13 PM  
Phlebitis Assessment 0 3/31/2019  7:13 PM  
Infiltration Assessment 0 3/31/2019  7:13 PM  
Dressing Status Clean, dry, & intact 3/31/2019  7:13 PM  
Dressing Type Tape;Transparent 3/31/2019  7:13 PM  
Hub Color/Line Status Pink;End cap changed 3/31/2019  7:13 PM  
Action Taken Open ports on tubing capped 3/31/2019  7:13 PM  
Alcohol Cap Used Yes 3/31/2019  7:13 PM  
                  
Urinary Catheter [REMOVED] Urinary Catheter 03/28/19-Indications for Use: Surgery Intake & Output Date 03/30/19 1900 - 03/31/19 5106 03/31/19 0700 - 04/01/19 1050 Shift 5335-2829 24 Hour Total 1673-8949 2402-8678 24 Hour Total  
INTAKE  
P.O.   240  240  
  P. O.   240  240  
I. V.(mL/kg/hr) 735.4 735.4 1135.4(1.5)  1135.4 Volume (dextrose 5% and 0.9% NaCl infusion) 735.4 735.4 1135.4  1135.4 Shift Total(mL/kg) 735. 4(11.4) 735. 4(11.4) 1375.4(21.4)  1375.4(21.4) OUTPUT Urine(mL/kg/hr) (2.3)  1750 Urine Voided   150  150 Urine Output (mL) ([REMOVED] Urinary Catheter 03/28/19)   1600 Shift Total(mL/kg) 500(7.8) [id number]) [id number])  [id number]) .4 -114.6 -374.6  -374.6 Weight (kg) 64.4 64.4 64.4 64.4 64.4 Readmission Risk Assessment Tool Score Medium Risk 12 Total Score 3 Has Seen PCP in Last 6 Months (Yes=3, No=0)  
 5 Pt. Coverage (Medicare=5 , Medicaid, or Self-Pay=4) 8 Charlson Comorbidity Score (Age + Comorbid Conditions) Criteria that do not apply:  
 . Living with Significant Other. Assisted Living. LTAC. SNF. or  
Rehab Patient Length of Stay (>5 days = 3) IP Visits Last 12 Months (1-3=4, 4=9, >4=11) Expected Length of Stay 3d 16h Actual Length of Stay 3

## 2019-04-01 NOTE — PROGRESS NOTES
SURGERY PROGRESS NOTE Admit Date: 3/28/2019 POD 4 Days Post-Op Procedure: Procedure(s): LAPAROSCOPY GENERAL DIAGNOSTIC CONVERTED TO EXPLORATORY LAPAROTOMY; OMENTECTOMY; EXTENDED RIGHT COLECTOMY Subjective:  
 
Patient has no complaints Objective:  
 
Visit Vitals /76 (BP 1 Location: Right arm, BP Patient Position: Sitting) Pulse 83 Temp 97.5 °F (36.4 °C) Resp 19 Ht 5' 4\" (1.626 m) Wt 66 kg (145 lb 8 oz) SpO2 98% Breastfeeding? No  
BMI 24.98 kg/m² Temp (24hrs), Av.9 °F (36.6 °C), Min:97.5 °F (36.4 °C), Max:98.6 °F (37 °C) 
 
 
701 - 1900 In: 1050 [P.O.:300; I.V.:750] Out: 250 [Urine:250] 1901 -  0700 In: 2260.8 [P.O.:390; I.V.:1870.8] Out: 2800 [Urine:2800] Physical Exam:   
General:  alert, cooperative, no distress, appears stated age Abdomen: soft, bowel sounds active, non-tender Incision:   healing well, no drainage, no erythema, no hernia, no seroma, no swelling, no dehiscence, incision well approximated Lab Results Component Value Date/Time WBC 5.3 2019 03:31 AM  
 HGB 7.7 (L) 2019 03:31 AM  
 HCT 24.0 (L) 2019 03:31 AM  
 PLATELET 070  03:31 AM  
 MCV 91.6 2019 03:31 AM  
 
Lab Results Component Value Date/Time GFR est non-AA >60 2019 03:31 AM  
 GFR est AA >60 2019 03:31 AM  
 Creatinine 0.37 (L) 2019 03:31 AM  
 BUN 2 (L) 2019 03:31 AM  
 Sodium 141 2019 03:31 AM  
 Potassium 2.9 (L) 2019 03:31 AM  
 Chloride 107 2019 03:31 AM  
 CO2 30 2019 03:31 AM  
 Magnesium 1.8 2019 03:31 AM  
 Phosphorus 0.9 (LL) 2019 03:31 AM  
 
 
Assessment:  
 
Principal Problem: 
  Peritonitis ([facility] [address] (3/28/2019) Active Problems: 
  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 
 
improving Plan:  
 
 
Advance to full liquids

## 2019-04-01 NOTE — PROGRESS NOTES
Problem: Self Care Deficits Care Plan (Adult) Goal: *Acute Goals and Plan of Care (Insert Text) Description Occupational Therapy Goals Initiated 4/1/2019 1. Patient will perform toilet transfers with modified independence within 7 day(s). 2.  Patient will perform all aspects of toileting with modified independence within 7 day(s). 3.  Patient will perform LB bathing/dressing while seated with Mod I (AE PRN) within 7 day(s). 4.  Patient will perform safe item retrieval from high/low surfaces with Mod I in preparation for self-care/ADL tasks using least restrictive device within 7 days. Outcome: Progressing Towards Goal 
 OCCUPATIONAL THERAPY EVALUATION Patient: Justus Jewell (36 y.o. 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 
LAPAROSCOPY GENERAL DIAGNOSTIC CONVERTED TO EXPLORATORY LAPAROTOMY; OMENTECTOMY; EXTENDED RIGHT COLECTOMY (N/A) 4 Days Post-Op Precautions:  Fall; LATEX ALLERGY ASSESSMENT : 
Based on the objective data described below, the patient presents with generalized weakness and decreased functional activity tolerance s/p R colectomy this admission. Cleared for therapy by RN and agreeable to participate. Received sitting in chair with no c/o pain at onset of session. Vitals stable. Indep with ADL/IADL tasks at baseline- does not use AD for mobility. Pt performing UB ADLs independently and requires Max A with LB ADLs only d/t decreased trunk flexion d/t abdominal dressing following surgery. Pt will benefit from long handled bathing/dressing equipment to facilitate independence with LB ADLs until she is able to reach distal LB aspects safely and pain free at PLOF. Pt returned sitting in chair, all needs met and RN updated on session and BP readings in sitting/standing. Recommend skilled acute OT to maximize Pts return to PLOF with dynamic ADL task performance.  
 
Do not anticipate Pt will need further skilled acute OT services at discharge. Recommend Pt discharge home without further skilled OT services. Patient will benefit from skilled intervention to address the above impairments. Patient?s rehabilitation potential is considered to be Excellent Factors which may influence rehabilitation potential include: ? None noted ? Mental ability/status ? Medical condition ? Home/family situation and support systems ? Safety awareness ? Pain tolerance/management ? Other: PLAN : 
Recommendations and Planned Interventions: 
?               Self Care Training                  ? Therapeutic Activities ? Functional Mobility Training    ? Cognitive Retraining 
? Therapeutic Exercises           ? Endurance Activities ? Balance Training                   ? Neuromuscular Re-Education ? Visual/Perceptual Training     ? Home Safety Training 
? Patient Education                 ? Family Training/Education ? Other (comment): Frequency/Duration: Patient will be followed by occupational therapy 4 times a week to address goals. Discharge Recommendations: Home without further therapy services, pending progress in acute skilled OT Further Equipment Recommendations for Discharge: TBD; Long handled bathing/dressing equipment SUBJECTIVE:  
Patient stated ? I don't get help from nobody. . I do it myself\" OBJECTIVE DATA SUMMARY:  
HISTORY:  
Past Medical History:  
Diagnosis Date Acid reflux GERD (gastroesophageal reflux disease) Hypertension Other ill-defined conditions(799.89)   
 high cholesterol Psychiatric disorder   
 depression Thyroid disease   
 hyperthyroidism Past Surgical History:  
Procedure Laterality Date COLONOSCOPY N/A 11/30/2017 COLONOSCOPY performed by Lisset Santana MD at [facility] ENDOSCOPY COLONOSCOPY N/A 3/20/2019 COLONOSCOPY performed by Adrien Crisostomo MD at [facility] ENDOSCOPY  
 COLONOSCOPY N/A 3/28/2019 COLONOSCOPY performed by Adrien Crisostomo MD at [facility] ENDOSCOPY FLEXIBLE SIGMOIDOSCOPY N/A 3/20/2019 SIGMOIDOSCOPY FLEXIBLE performed by Adrien Crisostomo MD at [facility] ENDOSCOPY  
 HX CHOLECYSTECTOMY HX GYN    
 hysterectomy HX HEENT    
 thyroid Prior Level of Function/Environment/Context: Lives alone at home. Has two children; Son lives in [place] and Dtr is in intermediate in [place] (per Pt and admission notes). Independent with ADL/IADL tasks at baseline. Still drives. Retired. Enjoys doing housework and yard work. Uses riding . Home Situation Home Environment: Private residence # Steps to Enter: 3 Rails to Enter: Yes Hand Rails : Bilateral(wide) One/Two Story Residence: One story Living Alone: No 
Support Systems: Friends \ neighbors Patient Expects to be Discharged to[de-identified] Private residence Current DME Used/Available at Home: None Tub or Shower Type: Shower Hand dominance: Right EXAMINATION OF PERFORMANCE DEFICITS: 
Cognitive/Behavioral Status: 
Neurologic State: Alert;Eyes open spontaneously Orientation Level: Oriented X4 Cognition: Appropriate decision making; Appropriate for age attention/concentration; Appropriate safety awareness; Follows commands Skin: Intact Edema: None observed Hearing: Auditory Auditory Impairment: None Vision/Perceptual: WDL Range of Motion: 
AROM: Within functional limits PROM: Within functional limits Strength: 
Strength: Generally decreased, functional 
  
Coordination: 
Coordination: Within functional limits Tone & Sensation: 
Tone: Normal 
Sensation: Intact Balance: 
Sitting: Impaired Sitting - Static: Good (unsupported) Sitting - Dynamic: Fair (occasional) Standing: Impaired Standing - Static: Occassional;Good Standing - Dynamic : Fair Functional Mobility and Transfers for ADLs: 
 Transfers: 
Sit to Stand: Stand-by assistance Stand to Sit: Stand-by assistance Bed to Chair: Stand-by assistance; Adaptive equipment(RW) Bathroom Mobility: Stand-by assistance Toilet Transfer : Stand-by assistance Tub Transfer: Contact guard assistance ADL Assessment: 
Feeding: Independent Oral Facial Hygiene/Grooming: Independent Bathing: Maximum assistance(Indep w/ UB bathing; Max A with distal LB aspects) Upper Body Dressing: Independent Lower Body Dressing: Maximum assistance(decreased trunk flexion secondary to abdominal dressing) Toileting: Minimum assistance(d/t decreased ability to turn/reach distal nikolai areas) ADL Intervention and task modifications: 
Lower Body Dressing Assistance Socks: Maximum assistance Functional Measure: 
Barthel Index: 
Bathin Bladder: 10 Bowels: 10 
Groomin Dressin Feeding: 10 Mobility: 0 Stairs: 0 Toilet Use: 10 Transfer (Bed to Chair and Back): 10 Total: 60/100 Percentage of impairment  
0% 1-19% 20-39% 40-59% 60-79% 80-99% 100% Barthel Score 0-100 100 99-80 79-60 59-40 20-39 1-19 
 0 The Barthel ADL Index: Guidelines 1. The index should be used as a record of what a patient does, not as a record of what a patient could do. 2. The main aim is to establish degree of independence from any help, physical or verbal, however minor and for whatever reason. 3. The need for supervision renders the patient not independent. 4. A patient's performance should be established using the best available evidence. Asking the patient, friends/relatives and nurses are the usual sources, but direct observation and common sense are also important. However direct testing is not needed. 5. Usually the patient's performance over the preceding 24-48 hours is important, but occasionally longer periods will be relevant. 6. Middle categories imply that the patient supplies over 50 per cent of the effort. 7. Use of aids to be independent is allowed. Pranay Hernandez., BarthelCATARINO. (2758). Functional evaluation: the Barthel Index. [address] (14)2. MELISSA Stark, Lorena Johnson, Chacho Mackey., Chaitanya, [address] (1999). Measuring the change indisability after inpatient rehabilitation; comparison of the responsiveness of the Barthel Index and Functional Clinch Measure. Journal of Neurology, Neurosurgery, and Psychiatry, 66(4), 596-957. CHEPE Soria, DICK Bliss, & Cash Brice M.A. (2004.) Assessment of post-stroke quality of life in cost-effectiveness studies: The usefulness of the Barthel Index and the EuroQoL-5D. [facility], 64, 540-88 Occupational Therapy Evaluation Charge Determination History Examination Decision-Making LOW Complexity : Brief history review  LOW Complexity : 1-3 performance deficits relating to physical, cognitive , or psychosocial skils that result in activity limitations and / or participation restrictions  LOW Complexity : No comorbidities that affect functional and no verbal or physical assistance needed to complete eval tasks Based on the above components, the patient evaluation is determined to be of the following complexity level: LOW Activity Tolerance:  
Fair-Good. Just below Pts baseline level. Please refer to the flowsheet for vital signs taken during this treatment. After treatment:  
? Patient left in no apparent distress sitting up in chair ? Patient left in no apparent distress in bed 
? Call bell left within reach ? Nursing notified ? Caregiver present ? Chair alarm activated COMMUNICATION/EDUCATION:  
The patient?s plan of care was discussed with: Physical Therapist, Registered Nurse and Patient . ? Home safety education was provided and the patient/caregiver indicated understanding. ? Patient/family have participated as able in goal setting and plan of care. ? Patient/family agree to work toward stated goals and plan of care. ? Patient understands intent and goals of therapy, but is neutral about his/her participation. ? Patient is unable to participate in goal setting and plan of care. Thank you for this referral. 
Sixto Grayson, OTR/L Time Calculation: 29 mins

## 2019-04-02 NOTE — PROGRESS NOTES
[address] [facility] 
at [facility], [address], [address] 
[phone] Hematology/ Oncology Progress Note Reason for consult:  
 
Ms. Neal Keen is a 71year old female who we have been asked to see by Dr. Michelle Tomas for new diagnosis of appendiceal carcinomatosis with biopsy confirmation. Subjective:  
 
Neal Keen is a 71 y.o. female who presented to the ED on 3/28 by Dr. Magali Cano for 10/10 pain after a colonoscopy. Per the EMR, she has had ongoing abdominal pain for months to years but has gotten progressively worse over the past few weeks. She was diagnosed with colitis on 3/18 and sent home with antibiotics. A second attempt of a colonoscopy was attempted last week, but there was concern for ulcerated appendix. She was sent to the ED for evaluation at that time. Dr. Michelle Tomas was consulted and took her to the OR of a diagnostic diagnostic converted to exploratory laparotomy. She was found to have an infiltrative mass involving appendix with extensive involvement of inferior omentum, rectum and ovaries and diffuse scattered peritoneal implants. She has continued to have abdominal pain during her admission. General Surgery has advanced her diet, which she is tolerating. During this admission she c/o of CP on 3/29 for which a Rapid Response was called. Cardiac work up was negative as well CTA ruled out PE. Pain thought to be associated with appendiceal carcinomatosis. Her pain as been controlled with IV dilaudid. This morning she reports she is unsure of her discharge plan, reports having a friend who can check on her at times, when he is not working. According to chart review she has two children, one daughter who is incarcerated out of state and a son who lives in [address] [place] [address]. She has a complicated social history. Review of Systems: A comprehensive review of systems was negative except for that written in the History of Present Illness. Past Medical History:  
Diagnosis Date  Acid reflux  GERD (gastroesophageal reflux disease)  Hypertension  Other ill-defined conditions(799.89)   
 high cholesterol  Psychiatric disorder   
 depression  Thyroid disease   
 hyperthyroidism Past Surgical History:  
Procedure Laterality Date  COLONOSCOPY N/A 11/30/2017 COLONOSCOPY performed by Abdullahi José MD at [facility] ENDOSCOPY  COLONOSCOPY N/A 3/20/2019 COLONOSCOPY performed by Na Reyna MD at [facility] ENDOSCOPY  COLONOSCOPY N/A 3/28/2019 COLONOSCOPY performed by Na Reyna MD at [facility] ENDOSCOPY 2021 Bibi Ceballos Hwgonzalo N/A 3/20/2019 SIGMOIDOSCOPY FLEXIBLE performed by Na Reyna MD at [facility] ENDOSCOPY  
 HX CHOLECYSTECTOMY  HX GYN    
 hysterectomy  HX HEENT    
 thyroid Family History Problem Relation Age of Onset  Cancer Mother  Colon Cancer Father  Cancer Father Social History Tobacco Use  Smoking status: Never Smoker  Smokeless tobacco: Never Used Substance Use Topics  Alcohol use: Yes Alcohol/week: 0.6 oz Types: 1 Cans of beer per week Comment: Socially. Current Facility-Administered Medications Medication Dose Route Frequency Provider Last Rate Last Dose  potassium phosphate 15 mmol in 0.9% sodium chloride 250 mL infusion   IntraVENous ONCE Starr Mishra MD      
 potassium chloride 10 mEq in 100 ml IVPB  10 mEq IntraVENous Q1H Hannah Lopez  mL/hr at 04/02/19 1018 10 mEq at 04/02/19 1018  dextrose 5% - 0.45% NaCl with KCl 20 mEq/L infusion  50 mL/hr IntraVENous CONTINUOUS Verito Ross MD      
 potassium chloride (K-DUR, KLOR-CON) SR tablet 20 mEq  20 mEq Oral BID Verito Ross MD   Stopped at 04/02/19 1100  
 acetaminophen (TYLENOL) tablet 650 mg  650 mg Oral Q4H PRN Verito Ross MD   650 mg at 04/01/19 1013  oxyCODONE IR (ROXICODONE) tablet 5 mg  5 mg Oral Q4H PRN Peace Mejia MD   5 mg at 04/01/19 1527  
 prochlorperazine (COMPAZINE) with saline injection 10 mg  10 mg IntraVENous Q6H PRN Minoo Rodriguez MD   10 mg at 04/02/19 0342  ketorolac (TORADOL) injection 30 mg  30 mg IntraVENous Q6H PRN Minoo Rodriguez MD   30 mg at 03/30/19 1027  
 sodium chloride (OCEAN) 0.65 % nasal squeeze bottle 2 Spray  2 Spray Both Nostrils Q2H PRN Minoo Rodriguez MD   2 [place] at 03/31/19 0425  sodium chloride (NS) flush 5-40 mL  5-40 mL IntraVENous Q8H Pranay Suarez MD   10 mL at 04/02/19 0600  
 sodium chloride (NS) flush 5-40 mL  5-40 mL IntraVENous PRN Pranay Suarez MD   10 mL at 03/31/19 1525  
 bupivacaine (PF) (MARCAINE) 0.5 % (5 mg/mL) injection    PRN Minoo Rodriguez MD   5 mL at 03/28/19 2219  
 HYDROmorphone (PF) (DILAUDID) injection 0.5 mg  0.5 mg IntraVENous Q2H PRN Minoo Rodriguez MD   0.5 mg at 04/02/19 1018  ondansetron (ZOFRAN) injection 4 mg  4 mg IntraVENous Q4H PRN Minoo Rodriguez MD   4 mg at 04/02/19 0629  
 pantoprazole (PROTONIX) 40 mg in sodium chloride 0.9% 10 mL injection  40 mg IntraVENous DAILY Minoo Rodriguez MD   40 mg at 04/02/19 7196  enoxaparin (LOVENOX) injection 40 mg  40 mg SubCUTAneous DAILY Minoo Rodriguez MD   40 mg at 04/02/19 1722 Allergies Allergen Reactions  Chlorhexidine Towelette Itching Pt c/o itching and skin dryness; no rash noted.  Codeine Itching  Lisinopril Angioedema Objective:  
 
Patient Vitals for the past 8 hrs: 
 BP Temp Pulse Resp SpO2  
04/02/19 0803 135/72 97.9 °F (36.6 °C) 97 18 97 % Lab Results Component Value Date/Time WBC 5.3 04/01/2019 03:31 AM  
 HGB 7.7 (L) 04/01/2019 03:31 AM  
 HCT 24.0 (L) 04/01/2019 03:31 AM  
 PLATELET 633 35/69/9204 03:31 AM  
 MCV 91.6 04/01/2019 03:31 AM  
 
 
Physical Exam:  
General appearance: alert,  female, cooperative, no distress, appears stated age Head: Normocephalic, without obvious abnormality, atraumatic Lungs: clear to auscultation bilaterally Heart: regular rate and rhythm Abdomen: abdominal tenderness. Dressing from Ex lap in place. Extremities: extremities normal, atraumatic, no cyanosis or edema Skin: Skin color, texture, turgor normal. No rashes or lesions Lymph nodes: Cervical, supraclavicular, and axillary nodes normal. 
Neurologic: Grossly normal 
 
 
CT Results (most recent): 
Results from Hospital Encounter encounter on 03/28/19 CTA CHEST W OR W WO CONT Narrative EXAM:  CTA CHEST W OR W WO CONT INDICATION:  r/o PE. Additional history: COMPARISON: CT of the abdomen and pelvis, 3/28/2019. Candance Huger TECHNIQUE:  
Precontrast  images were obtained to localize the volume for acquisition. Multislice helical CT arteriography was performed from the diaphragm to the 
thoracic inlet during uneventful rapid bolus intravenous contrast 
administration. Lung and soft tissue windows were generated. Coronal and 
sagittal images were generated and 3D post processing consisting of coronal 
maximum intensity images was performed. CT dose reduction was achieved through use of a standardized protocol tailored 
for this examination and automatic exposure control for dose modulation. Candance Huger FINDINGS: 
CHEST: 
Chest wall/thoracic inlet: Within normal limits. Thyroid: Status post right thyroidectomy. Mediastinum/kamryn: Patulous esophagus. Heart/vessels: Within normal limits. Lungs/Pleura: Motion limited. Minimal bibasilar atelectasis. Candance Huger INCIDENTALLY IMAGED ABDOMEN: 
Pneumoperitoneum Candance Huger MSK:  
Within normal limits. .  
 Impression IMPRESSION:  
1. No visualized pulmonary embolus. 2. Pneumoperitoneum. Recommend clinical correlation for possible recent surgery. 3. Incidental findings as above.  
. 
Findings of this exam were discussed with Jann Frances, the nurse caring for this 
patient by Dr. Basurto Confer by telephone at approximately, 3/29/2019 4:48 PM. [phone] 
 
 
 
  
 
 
 
 
Assessment: 1. Appendiceal Carcinomatosis -  
 
S/p diagnostic diagnostic converted to exploratory laparotomy. She was found to have an infiltrative mass involving appendix with extensive involvement of inferior omentum, rectum and ovaries and diffuse scattered peritoneal implants - 3/29/2019 - Dr. Kj Esparza Final Biopsy Report :  
 
 FINAL PATHOLOGIC DIAGNOSIS 1. Omentum, omentectomy, up to 39 cm:  
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  
2. Extended right colectomy:  
Mixed goblet cell carcinoid-adenocarcinoma, 5 cm, poorly differentiated, with signet ring cells and extracellular mucin of appendix with invasion through serosa and innumerable serosal tumor implants of ileum and colon, mural implant of distal ascending colon, and tumor implants of small bowel mesentery Five of twenty regional lymph nodes positive for metastatic adenocarcinoma (5/20) Size of largest metastasis: 2 mm Extranodal extension: Present Complete staging scans as outpatient. Patient has difficult social situation, will need chemotherapy and further surgery. Biopsy report shows mixed globet cell and signet ring adenocarcinoma. Signet ring adenocarcinoma is the rarest and most aggressive form of appendix cancer. Overall, this makes her overall prognosis guarded. Plan: 1. Pathology report positive for mixed goblet cell carcinoid-adenocarcinoma, signet ring adenocarcinoma with carcinomatosis. Guarded prognosis. 2. Will need to discuss extensive treatment plan once she heals from recent surgery. Will need significant social support. 3. Treatment would include HIPEC. Discussed with patient she will need chemotherapy and may need referral to surgeon at [facility], will discuss in depth in the office. Signed By: Kolby Velazquez NP April 2, 2019

## 2019-04-02 NOTE — ROUTINE PROCESS
Bedside and Verbal shift change report given to Nikolai Braxton RN (oncoming nurse) by Hardy Finch RN (offgoing nurse). Report included the following information SBAR, ED Summary, Procedure Summary, Intake/Output, MAR and Recent Results.

## 2019-04-02 NOTE — PROGRESS NOTES
Hospitalist Progress Note NAME: Nathanael Henning :  1955 MRN:  [id number] Assessment / Plan: 
Appendiceal cancer with extensive carcinomatosis (new dx) s/p laparoscopy converted to exploratory laparotomy, omentectomy and extended right colectomy 3/28 with Dr. Olivo Court: abd pain x2 yrs and abnormal CTs since 2017 showing left colitis and a moderately plump appendix with possible tip mucocele. Pt continues to have nausea with vomiting, not tolerating liquids at this time. - CTA A/P 3/28 with increased severity of right lower quadrant/pericecal inflammatory changes without cecal wall thickening. No evidence of ischemic colitis as the mesenteric vasculature is widely patent without atherosclerotic disease. Blind-ending tubular structure in the center of the inflammatory process could represent the appendix, and measures 13 mm in diameter. 
- EGD 3/28 with Dr. Rodger Kerr demonstrating resistance to scope passage at 25 cm where diverticulosis is noted. Lisset-appendiceal orifice area shows possible ulceration. Views are difficult as there is edema. Rest of the colon is normal appearing. 
- pathology from omentum/R colon with metastatic appendiceal adenocarcinoma, innumerable tumor implants up to 13 cm in greatest individual. Right colectomy with mixed goblet cell carcinoid-adenocarcinoma, 5 cm, poorly differentiated, with signet ring cells and extracellular mucin of appendix with invasion through serosa and innumerable serosal tumor implants of ileum and colon, mural implant of distal ascending colon, and tumor implants of small bowel mesentery. Five of twenty regional lymph nodes positive for metastatic adenocarcinoma. Extranodal extension present. - appreciate general surgery and oncology assistance. Will need surgical referral to [facility] likely in addition to medical oncology - Dr. Zack Fuentes office to coordinate. - con't liquid diet for now, not ready to advance with nausea - con't IV fluids with poor po intake - palliative care consult placed Hypokalemia: 
- replacing 
- check K, mag in AM 
Generalized anxiety disorder: 
- restart home valium 
- restart home ambien at night Gastroesophageal reflux disease: con't PPI 
  
Code status: Full Prophylaxis: Lovenox Subjective: Chief Complaint / Reason for Physician Visit Complains of nausea, \"I threw up all night. \" Upset understandably about new cancer dx. Discussed with RN events overnight. Review of Systems: 
Symptom Y/N Comments  Symptom Y/N Comments Fever/Chills n   Chest Pain n   
Poor Appetite n   Edema n   
Cough n   Abdominal Pain y Sputum n   Joint Pain SOB/WREN n   Pruritis/Rash Nausea/vomit    Tolerating PT/OT Diarrhea    Tolerating Diet Constipation    Other Could NOT obtain due to:   
 
Objective: VITALS:  
Last 24hrs VS reviewed since prior progress note. Most recent are: 
Patient Vitals for the past 24 hrs: 
 Temp Pulse Resp BP SpO2  
04/02/19 1126 98.1 °F (36.7 °C) (!) 108 19 141/76 95 % 04/02/19 0803 97.9 °F (36.6 °C) 97 18 135/72 97 % 04/02/19 0220 98.2 °F (36.8 °C) 77 18 133/79 100 % 04/01/19 2259 98.2 °F (36.8 °C) 94 18 106/76 98 % 04/01/19 1931 97.9 °F (36.6 °C) 83 18 (!) 132/93 96 % 04/01/19 1451 98.1 °F (36.7 °C) 79 18 149/78 100 % Intake/Output Summary (Last 24 hours) at 4/2/2019 1155 Last data filed at 4/2/2019 8255 Gross per 24 hour Intake 1122.91 ml Output 1500 ml Net -377.09 ml PHYSICAL EXAM: 
General: WD, WN. Alert, cooperative, no acute distress   
EENT:  EOMI. Anicteric sclerae. MMM Resp:  CTA bilaterally, no wheezing or rales. No accessory muscle use CV:  Regular  rhythm,  No edema GI:  Soft, mildly distended, Non tender.  +Bowel sounds Neurologic:  Alert and oriented X 3, normal speech, Psych:   Fair insight. Not anxious nor agitated Skin:  No rashes. No jaundice Reviewed most current lab test results and cultures  YES 
 Reviewed most current radiology test results   YES Review and summation of old records today    NO Reviewed patient's current orders and MAR    YES 
PMH/SH reviewed - no change compared to H&P 
________________________________________________________________________ Care Plan discussed with: 
  Comments Patient x Family RN x Care Manager Consultant Multidiciplinary team rounds were held today with , nursing, pharmacist and clinical coordinator. Patient's plan of care was discussed; medications were reviewed and discharge planning was addressed. ________________________________________________________________________ Total NON critical care TIME:  35 Minutes Total CRITICAL CARE TIME Spent:   Minutes non procedure based Comments >50% of visit spent in counseling and coordination of care x   
________________________________________________________________________ Yefri Garcia MD  
 
Procedures: see electronic medical records for all procedures/Xrays and details which were not copied into this note but were reviewed prior to creation of Plan. LABS: 
I reviewed today's most current labs and imaging studies. Pertinent labs include: 
Recent Labs 04/01/19 
4079 WBC 5.3 HGB 7.7* HCT 24.0*  
 Recent Labs 04/02/19 
0220 04/01/19 
2982  141  
K 2.8* 2.9*  
 107 CO2 31 30 * 101* BUN 1* 2*  
CREA 0.42* 0.37* CA 7.2* 7.5* MG  --  1.8 PHOS 2.5* 0.9* ALB  --  2.3* TBILI  --  0.3 SGOT  --  11* ALT  --  13 Signed: Yefri Garcia MD

## 2019-04-02 NOTE — PROGRESS NOTES
Problem: Falls - Risk of 
Goal: *Absence of Falls Description Document Migregg Sharpekristen Fall Risk and appropriate interventions in the flowsheet. Outcome: Progressing Towards Goal 
  
Problem: Pressure Injury - Risk of 
Goal: *Prevention of pressure injury Description Document Pankaj Scale and appropriate interventions in the flowsheet.  
Outcome: Progressing Towards Goal

## 2019-04-02 NOTE — PROGRESS NOTES
PCU SHIFT NURSING NOTE Bedside and Verbal shift change report given to Darwin Palmer RN (oncoming nurse) by Milli Horvath RN (offgoing nurse). Report included the following information SBAR, Intake/Output, MAR and Recent Results. Shift Summary:  
Received report and assumed care of patient. /72 (BP 1 Location: Left arm, BP Patient Position: At rest)   Pulse 97   Temp 97.9 °F (36.6 °C)   Resp 18   Ht 5' 4\" (1.626 m)   Wt 66 kg (145 lb 8 oz)   SpO2 97%   Breastfeeding? No   BMI 24.98 kg/m² 0 Paged Dr. Edson Zabala office to question request for patient transfer to general surgical. 
 
65 Tiger texted Dr. Fany Barton and Dr. Emerson Ramey about concerns with patient being transferred. 12 [facility] Per [place] text Dr. Emerson Ramey discontinued transfer to surgical for today. If vomiting continues, may have to insert NG tube later today. Will continue to monitor and request NG tube if patient continues to vomit. 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 
IP CONSULT TO ONCOLOGY 
IP CONSULT TO HOSPITALIST Consults [x]PT [x]OT []Speech [x]Case Management  
  
[] Palliative Cardiac Monitoring Order  
[x]Yes []No  
 
IV drips [x]Yes Drip:                            Dose: 
Drip:                            Dose: 
Drip:                            Dose:  
[]No  
 
GI Prophylaxis [x]Yes []No  
 
 
 
DVT Prophylaxis SCDs:  Sequential Compression Device: Bilateral  
  Patient Refused VTE Prophylaxis: Yes Nam stockings:     
  
[x] Medication []Contraindicated []None Activity Level Activity Level: Chair, Up with Assistance Activity Assistance: Partial (one person) Purposeful Rounding every 1-2 hour? [x]Yes Lehman Score  Total Score: 3 Bed Alarm (If score 3 or >) []Yes  
[] Refused (See signed refusal form in chart) Pankaj Score  Pankaj Score: 20 Pankaj Score (if score 14 or less) []PMT consult []Wound Care consult []Specialty bed  
[] Nutrition consult Needs prior to discharge:  
Home O2 required:   
[]Yes  
[x]No  
 If yes, how much O2 required? Other:  
 Last Bowel Movement: Last Bowel Movement Date: 03/28/19 Influenza Vaccine Received Flu Vaccine for Current Season (usually Sept-March): No  
 Patient/Guardian Refused (Notify MD): Yes Pneumonia Vaccine Diet Active Orders Diet DIET FULL LIQUID  
  
LDAs Peripheral IV 04/01/19 Right Antecubital (Active) Site Assessment Clean, dry, & intact 4/2/2019  2:20 AM  
Phlebitis Assessment 0 4/2/2019  2:20 AM  
Infiltration Assessment 0 4/2/2019  2:20 AM  
Dressing Status Clean, dry, & intact 4/2/2019  2:20 AM  
Dressing Type Tape;Transparent 4/2/2019  2:20 AM  
Hub Color/Line Status Pink; Infusing;Flushed 4/2/2019  2:20 AM  
Action Taken Open ports on tubing capped 4/2/2019  2:20 AM  
Alcohol Cap Used Yes 4/2/2019  2:20 AM  
      
External Female Catheter 04/01/19 (Active) Site Assessment Clean, dry, & intact 4/2/2019  2:20 AM  
Repositioned Yes 4/2/2019  2:20 AM  
Perineal Care Yes 4/2/2019  2:20 AM  
Wick Changed Yes 4/2/2019  2:20 AM  
Suction Canister/Tubing Changed Yes 4/2/2019  2:20 AM  
Urine Output (mL) 400 ml 4/2/2019  2:20 AM  
            
Urinary Catheter [REMOVED] Urinary Catheter 03/28/19-Indications for Use: Surgery Intake & Output Date 04/01/19 0700 - 04/02/19 0659 04/02/19 0700 - 04/03/19 1419 Shift [id number] 1900-0659 24 Hour Total 0700-1859 1900-0659 24 Hour Total  
INTAKE  
P.O. 540 60 600     
  P. O. 540 60 600     
I. V.(mL/kg/hr) 1349.6(1.7) 223.3(0.3) 1572.9(1) Volume (dextrose 5% and 0.9% NaCl infusion) 849.6 223.3 1072.9 Volume (potassium phosphate 30 mmol in 0.9% sodium chloride 500 mL infusion) 500  500 Shift Total(mL/kg) [id number]. 6(28.6) 283. 3(4.3) 2172. 9(32.9) OUTPUT Urine(mL/kg/hr) 1150(1.5) 400(0.5) 1550(1) Urine Voided 1150  1150 Urine Output (mL) (External Female Catheter 04/01/19)  400 400 Emesis/NG output  200 200 Emesis  200 200 Shift Total(mL/kg) 1150(17.4) 600(9.1) 1750(26.5) .6 -316.7 422.9 Weight (kg) 66 66 66 66 66 66 Readmission Risk Assessment Tool Score Medium Risk 12 Total Score 3 Has Seen PCP in Last 6 Months (Yes=3, No=0)  
 5 Pt. Coverage (Medicare=5 , Medicaid, or Self-Pay=4) 8 Charlson Comorbidity Score (Age + Comorbid Conditions) Criteria that do not apply:  
 . Living with Significant Other. Assisted Living. LTAC. SNF. or  
Rehab Patient Length of Stay (>5 days = 3) IP Visits Last 12 Months (1-3=4, 4=9, >4=11) Expected Length of Stay 10d 19h Actual Length of Stay 5

## 2019-04-02 NOTE — PROGRESS NOTES
SURGERY PROGRESS NOTE Admit Date: 3/28/2019 POD 5 Days Post-Op Procedure: Procedure(s): LAPAROSCOPY GENERAL DIAGNOSTIC CONVERTED TO EXPLORATORY LAPAROTOMY; OMENTECTOMY; EXTENDED RIGHT COLECTOMY Subjective:  
 
Patient complains of nausea and bloating. Abdominal pain controlled. Objective:  
 
Visit Vitals /76 (BP 1 Location: Left arm, BP Patient Position: Sitting) Pulse (!) 108 Temp 98.1 °F (36.7 °C) Resp 19 Ht 5' 4\" (1.626 m) Wt 66 kg (145 lb 8 oz) SpO2 95% Breastfeeding? No  
BMI 24.98 kg/m² Temp (24hrs), Av.1 °F (36.7 °C), Min:97.9 °F (36.6 °C), Max:98.2 °F (36.8 °C) No intake/output data recorded.  1901 -  0700 In: 2322.9 [P.O.:750; I.V.:1572.9] Out: 2300 [[id number]] Physical Exam:   
General:  alert, cooperative, no distress, appears stated age Abdomen: soft, bowel sounds hypoactive, non-tender Incision:   healing well, no drainage, no erythema, no hernia, no seroma, no swelling, no dehiscence, incision well approximated Lab Results Component Value Date/Time WBC 5.3 2019 03:31 AM  
 HGB 7.7 (L) 2019 03:31 AM  
 HCT 24.0 (L) 2019 03:31 AM  
 PLATELET 312  03:31 AM  
 MCV 91.6 2019 03:31 AM  
 
Lab Results Component Value Date/Time GFR est non-AA >60 2019 02:20 AM  
 GFR est AA >60 2019 02:20 AM  
 Creatinine 0.42 (L) 2019 02:20 AM  
 BUN 1 (L) 2019 02:20 AM  
 Sodium 141 2019 02:20 AM  
 Potassium 2.8 (L) 2019 02:20 AM  
 Chloride 105 2019 02:20 AM  
 CO2 31 2019 02:20 AM  
 Magnesium 1.8 2019 03:31 AM  
 Phosphorus 2.5 (L) 2019 02:20 AM  
 
 
Assessment:  
 
Principal Problem: 
  Peritonitis ([address] (3/28/2019) Active Problems: 
  Neoplasm of appendix (3/28/2019) Peritoneal carcinomatosis ([address] (3/29/2019) Appendix carcinoma ([address] (3/29/2019) Patient with stage IV appendiceal cancer. Reviewed the pathology with patient including positive margin and the risk of possible leak and stricture. Hypokalemia -  Replete potassium Nausea -  Likely ileus from hypokalemia. Plan:  
 
Potassium replacement PO as tolerated Oncology consult

## 2019-04-02 NOTE — PROGRESS NOTES
Patient does not have a history of falls. Progressing towards care goals at this time. Pain management and decreasing nausea/vomiting are primary care goals.

## 2019-04-02 NOTE — PROGRESS NOTES
Gastroenterology Daily Progress Note Francine Kim Preisner for Dr. Cassidy Martinez) [facility] Admit Date: 3/28/2019 Subjective:   
 
Patient's pain is well controlled she is having some nausea with vomiting last night and x1 this morning. Denies any hematemesis. She has relief with nausea meds. No fevers or chills. Reports pain surrounding her surgical incision worse with vomiting. Is passing flatus but no BM just yet. On CLD currently. Oncology on the case with guarded prognosis of aggressive appendiceal cancer. Current Facility-Administered Medications Medication Dose Route Frequency  potassium phosphate 15 mmol in 0.9% sodium chloride 250 mL infusion   IntraVENous ONCE  potassium chloride 10 mEq in 100 ml IVPB  10 mEq IntraVENous Q1H  
 dextrose 5% - 0.45% NaCl with KCl 20 mEq/L infusion  50 mL/hr IntraVENous CONTINUOUS  
 potassium chloride (K-DUR, KLOR-CON) SR tablet 20 mEq  20 mEq Oral BID  acetaminophen (TYLENOL) tablet 650 mg  650 mg Oral Q4H PRN  
 oxyCODONE IR (ROXICODONE) tablet 5 mg  5 mg Oral Q4H PRN  prochlorperazine (COMPAZINE) with saline injection 10 mg  10 mg IntraVENous Q6H PRN  
 ketorolac (TORADOL) injection 30 mg  30 mg IntraVENous Q6H PRN  
 sodium chloride (OCEAN) 0.65 % nasal squeeze bottle 2 Spray  2 Spray Both Nostrils Q2H PRN  
 sodium chloride (NS) flush 5-40 mL  5-40 mL IntraVENous Q8H  
 sodium chloride (NS) flush 5-40 mL  5-40 mL IntraVENous PRN  
 bupivacaine (PF) (MARCAINE) 0.5 % (5 mg/mL) injection    PRN  
 HYDROmorphone (PF) (DILAUDID) injection 0.5 mg  0.5 mg IntraVENous Q2H PRN  
 ondansetron (ZOFRAN) injection 4 mg  4 mg IntraVENous Q4H PRN  pantoprazole (PROTONIX) 40 mg in sodium chloride 0.9% 10 mL injection  40 mg IntraVENous DAILY  enoxaparin (LOVENOX) injection 40 mg  40 mg SubCUTAneous DAILY Objective:  
 
Visit Vitals /76 (BP 1 Location: Left arm, BP Patient Position: Sitting) Pulse (!) 108 Temp 98.1 °F (36.7 °C) Resp 19 Ht 5' 4\" (1.626 m) Wt 66 kg (145 lb 8 oz) SpO2 95% Breastfeeding? No  
BMI 24.98 kg/m² Blood pressure 141/76, pulse (!) 108, temperature 98.1 °F (36.7 °C), resp. rate 19, height 5' 4\" (1.626 m), weight 66 kg (145 lb 8 oz), SpO2 95 %, not currently breastfeeding. 04/02 0701 - 04/02 1900 In: 450 [I.V.:450] Out: 175 [Urine:175] 03/31 1901 - 04/02 0700 In: 2322.9 [P.O.:750; I.V.:1572.9] Out: 2300 [KECAS:1260] Intake/Output Summary (Last 24 hours) at 4/2/2019 1338 Last data filed at 4/2/2019 1126 Gross per 24 hour Intake 1572.91 ml Output 1675 ml Net -102.09 ml Physical Exam:  
 
General: awake and alert WF in NAD Chest:  CTA, No rhonchi, rales or rubs. Heart: S1, S2, RRR 
GI: Soft, mild incisional tenderness otherwise no guarding or rebound, nondistended, decreased BS Ext: no edema Labs:  
 
 
Recent Results (from the past 24 hour(s)) GLUCOSE, POC Collection Time: 04/01/19  6:15 PM  
Result Value Ref Range Glucose (POC) 125 (H) 65 - 100 mg/dL Performed by Kacy Sol ([address] [facility])   
GLUCOSE, POC Collection Time: 04/02/19 12:11 AM  
Result Value Ref Range Glucose (POC) 129 (H) 65 - 100 mg/dL Performed by Robert Chadwick METABOLIC PANEL, BASIC Collection Time: 04/02/19  2:20 AM  
Result Value Ref Range Sodium 141 136 - 145 mmol/L Potassium 2.8 (L) 3.5 - 5.1 mmol/L Chloride 105 97 - 108 mmol/L  
 CO2 31 21 - 32 mmol/L Anion gap 5 5 - 15 mmol/L Glucose 114 (H) 65 - 100 mg/dL BUN 1 (L) 6 - 20 MG/DL Creatinine 0.42 (L) 0.55 - 1.02 MG/DL  
 BUN/Creatinine ratio 2 (L) 12 - 20 GFR est AA >60 >60 ml/min/1.73m2 GFR est non-AA >60 >60 ml/min/1.73m2 Calcium 7.2 (L) 8.5 - 10.1 MG/DL  
PHOSPHORUS Collection Time: 04/02/19  2:20 AM  
Result Value Ref Range Phosphorus 2.5 (L) 2.6 - 4.7 MG/DL  
GLUCOSE, POC Collection Time: 04/02/19  6:15 AM  
Result Value Ref Range Glucose (POC) 100 65 - 100 mg/dL Performed by Jing Leija GLUCOSE, POC Collection Time: 04/02/19 11:10 AM  
Result Value Ref Range Glucose (POC) 93 65 - 100 mg/dL Performed by Nichol Carrion ([facility]) Recent Labs 04/02/19 
0220 04/01/19 
4983  141  
K 2.8* 2.9*  
 107 CO2 31 30 BUN 1* 2*  
CREA 0.42* 0.37* * 101* CA 7.2* 7.5* MG  --  1.8 PHOS 2.5* 0.9* Recent Labs 04/01/19 
4791 SGOT 11* AP 43* TP 5.6* ALB 2.3*  
GLOB 3.3 Impression/Plan: 
Appendiceal neoplasm with peritoneal carcinomatosis -  
  
S/p diagnostic diagnostic converted to exploratory laparotomy. She was found to have an infiltrative mass involving appendix with extensive involvement of inferior omentum, rectum and ovaries and diffuse scattered peritoneal implants - 3/29/2019 - Dr. Willian Healy 1. Omentum, omentectomy, up to 39 cm:  
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  
2. Extended right colectomy:  
Mixed goblet cell carcinoid-adenocarcinoma, 5 cm, poorly differentiated, with signet ring cells and extracellular mucin of appendix with invasion through serosa and innumerable serosal tumor implants of ileum and colon, mural implant of distal ascending colon, and tumor implants of small bowel mesentery Five of twenty regional lymph nodes positive for metastatic adenocarcinoma (5/20) Size of largest metastasis: 2 mm Extranodal extension: Present Oncology on the case, planning for aggressive treatment with chemo and may need further surgery once recovered. Planning for further staging as an O/P. Agree with potassium repletion, potential ileus may be cause to nausea and vomiting. Continue supportive treatment including nausea meds and IVF. GI will sign off the case, if further consultation is required please contact us. VANESSA Mckinney 
 
4/2/2019 [address] [facility], [address] [address] Loc: [phone]

## 2019-04-02 NOTE — PROGRESS NOTES
[facility] accepted referral. Order is needed. PERCY champagne text Dr. Fely Hough for home health order. PERCY left a message in [facility] for Dr. Venu Plummer for home health order. Trent Mckay, [address] [facility]

## 2019-04-02 NOTE — PROGRESS NOTES
PCU SHIFT NURSING NOTE Bedside and Verbal shift change report given to Jake Gaspar 44 (oncoming nurse) by Merlinda Pries (offgoing nurse). Report included the following information SBAR, Kardex, ED Summary, Procedure Summary, Intake/Output, MAR, Accordion, Recent Results, Med Rec Status and Cardiac Rhythm NSR. Shift Summary:  
1940: Pt resting quietly in bed. VSS. HR NSR. D5NS infusing. Pt c/o abdominal pain 3/10 but is refusing any other pain medication other than dilaudid. Pt made aware that she is not due until 0022. Pt aware and refuses any other medication. No other complaints at this time. Will continue to monitor. 0030L: Pt c/o abdominal/incisional pain when vomiting 10/10. PRN dilaudid 0.5mg given. Wasted 1.5mg with Enrique Miranda RN. When pulling medicaiton from [facility], dropped first vial of dilaudid. Wasted entire 2mg vial with Candice Castaneda before pulling another. 0220: Pt c/o nausea and abdominal/incisional pain 8/10. PRN zofran given for nausea, and PRN dilaudid 0.5mg given. 1.5mg wasted with Adolph Nelson RN. 
1738 Pt continues to c/o nausea but no vomiting at this time. However, while pt is not vomiting she is refusing to move in fear of vomiting with movement. PRN compazine given. 9189: Paged MD on call due to pts K being 2.8 on AM labs. 4621: Pt c/o 7/10 abdominal pain. PRN 0.5mg dilaudid given. Wasted 1.5mg with Ashli VU. 0450: received orders by Dr. Shyanne Drake for PO K repletion. However, pt has been n/v randomly overnight. Repaged for IV K repletion. 0515: New order for IV K received. Cancelled PO order. 0630: Pt c/o ab pain 8/10. PRN dilaudid 0.5mg given. PRN zofran also given for nausea. Bedside and Verbal shift change report given to Jake Gaspar 44 (oncoming nurse) by Merlinda Pries (offgoing nurse). Report included the following information SBAR, Kardex, ED Summary, Procedure Summary, Intake/Output, MAR, Accordion, Recent Results, Med Rec Status and Cardiac Rhythm NSR. 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 
IP CONSULT TO ONCOLOGY 
IP CONSULT TO HOSPITALIST Consults []PT []OT []Speech  
[]Case Management  
  
[] Palliative Cardiac Monitoring Order []Yes []No  
 
IV drips []Yes Drip:                            Dose: 
Drip:                            Dose: 
Drip:                            Dose:  
[]No  
 
GI Prophylaxis []Yes []No  
 
 
 
DVT Prophylaxis SCDs:  Sequential Compression Device: Bilateral  
  Patient Refused VTE Prophylaxis: Yes Nam stockings:     
  
[] Medication []Contraindicated []None Activity Level Activity Level: Chair, Up with Assistance Activity Assistance: Partial (one person) Purposeful Rounding every 1-2 hour? []Yes Lehman Score  Total Score: 3 Bed Alarm (If score 3 or >) []Yes  
[] Refused (See signed refusal form in chart) Pankaj Score  Pankaj Score: 20 Pankaj Score (if score 14 or less) []PMT consult  
[]Wound Care consult []Specialty bed  
[] Nutrition consult Needs prior to discharge:  
Home O2 required:   
[]Yes []No  
 If yes, how much O2 required? Other:  
 Last Bowel Movement: Last Bowel Movement Date: 03/28/19 Influenza Vaccine Received Flu Vaccine for Current Season (usually Sept-March): No  
 Patient/Guardian Refused (Notify MD): Yes Pneumonia Vaccine Diet Active Orders Diet DIET FULL LIQUID  
  
LDAs Peripheral IV 04/01/19 Right Antecubital (Active) Site Assessment Clean, dry, & intact 4/1/2019  7:31 PM  
Phlebitis Assessment 0 4/1/2019  7:31 PM  
Infiltration Assessment 0 4/1/2019  7:31 PM  
Dressing Status Clean, dry, & intact 4/1/2019  7:31 PM  
Dressing Type Tape;Transparent 4/1/2019  7:31 PM  
Hub Color/Line Status Pink; Infusing;Flushed 4/1/2019  7:31 PM  
Action Taken Open ports on tubing capped 4/1/2019  7:31 PM  
Alcohol Cap Used Yes 4/1/2019  7:31 PM  
      
 External Female Catheter 04/01/19 (Active) Urinary Catheter [REMOVED] Urinary Catheter 03/28/19-Indications for Use: Surgery Intake & Output Date 03/31/19 1900 - 04/01/19 0659 04/01/19 0700 - 04/02/19 5176 Shift 6322-8250 24 Hour Total 4083-6155 0674-3980 24 Hour Total  
INTAKE  
P.O. 150 390 540  540  
  P. O. 150 390 540  540  
I. V.(mL/kg/hr)  1135.4 1349.6(1.7) 223.3 1572.9 Volume (dextrose 5% and 0.9% NaCl infusion)  1135.4 849.6 223.3 1072.9 Volume (potassium phosphate 30 mmol in 0.9% sodium chloride 500 mL infusion)   500  500 Shift Total(mL/kg) 150(2.3) 1525. 4(23.1) [id number]. 6(28.6) 223. 3(3.4) 2112.9(32)  
OUTPUT Urine(mL/kg/hr) 550 2300 1150(1.5)  1150 Urine Voided   1150 Urine Output (mL) ([REMOVED] Urinary Catheter 03/28/19)  1600 Emesis/NG output    200 200 Emesis    200 200 Shift Total(mL/kg) 550(8.3) 9030(98.7) 1150(17.4) 200(3) 1350(20.5) NET -400 -774.6 739.6 23.3 762.9 Weight (kg) 66 66 66 66 66 Readmission Risk Assessment Tool Score Medium Risk 12 Total Score 3 Has Seen PCP in Last 6 Months (Yes=3, No=0)  
 5 Pt. Coverage (Medicare=5 , Medicaid, or Self-Pay=4) 8 Charlson Comorbidity Score (Age + Comorbid Conditions) Criteria that do not apply:  
 . Living with Significant Other. Assisted Living. LTAC. SNF. or  
Rehab Patient Length of Stay (>5 days = 3) IP Visits Last 12 Months (1-3=4, 4=9, >4=11) Expected Length of Stay 10d 19h Actual Length of Stay 4

## 2019-04-03 NOTE — PROGRESS NOTES
SURGERY PROGRESS NOTE Admit Date: 3/28/2019 POD 6 Days Post-Op Procedure: Procedure(s): LAPAROSCOPY GENERAL DIAGNOSTIC CONVERTED TO EXPLORATORY LAPAROTOMY; OMENTECTOMY; EXTENDED RIGHT COLECTOMY Subjective:  
 
Patient feels much better today. Nausea resolved. Tolerating liquids. OOB ambulating. Objective:  
 
Visit Vitals /69 (BP 1 Location: Left arm) Pulse 75 Temp 97.7 °F (36.5 °C) Resp 15 Ht 5' 4\" (1.626 m) Wt 66 kg (145 lb 8 oz) SpO2 94% Breastfeeding? No  
BMI 24.98 kg/m² Temp (24hrs), Av.1 °F (36.7 °C), Min:97.4 °F (36.3 °C), Max:98.9 °F (37.2 °C) No intake/output data recorded.  1901 -  0700 In: 1586.7 [P.O.:170; I.V.:1416.7] Out: 1075 [Urine:875] Physical Exam:   
General:  alert, cooperative, no distress, appears stated age Abdomen: soft, bowel sounds active, non-tender Incision:   healing well, no drainage, no erythema, no hernia, no seroma, no swelling, no dehiscence, incision well approximated Assessment:  
 
Principal Problem: 
  Peritonitis ([address] (3/28/2019) Active Problems: 
  Neoplasm of appendix (3/28/2019) Peritoneal carcinomatosis ([address] (3/29/2019) Appendix carcinoma ([address] (3/29/2019) 
 
hypokalemia -  Improved. Will give 3 more runs of K Ileus - resolved. Will advance to general diet Plan:  
 
 
General diet Bowel regiment Transfer to the floor D/C planning for tomorrow

## 2019-04-03 NOTE — PROGRESS NOTES
1945: Bedside and Verbal shift change report given to Migdalia Haque Garry (oncoming nurse) by Ivelisse Rachel (offgoing nurse). Report included the following information SBAR. Patient in the bed, and agreeable to calling for assistance. 0405: Pst and lav drawn.

## 2019-04-03 NOTE — PROGRESS NOTES
Spiritual Care Assessment/Progress Note [address] 
 
 
NAME: Silvia Cerna      MRN: [id number] AGE: 71 y.o. SEX: female Yazidi Affiliation: Gordo Boss  
Language: [place] 4/3/2019     Total Time (in minutes): 30 Spiritual Assessment begun in [facility] PROGRESSIVE CARE through conversation with: 
  
    [x]Patient        [] Family    [] Friend(s) Reason for Consult: Palliative Care, Patient Care Conference, Palliative Care, Initial/Spiritual Assessment Spiritual beliefs: (Please include comment if needed) 
   [] Identifies with a valerie tradition:     
   [] Supported by a valerie community:        
   [] Claims no spiritual orientation:       
   [] Seeking spiritual identity:            
   [] Adheres to an individual form of spirituality:       
   [x] Not able to assess:                   
 
    
Identified resources for coping:  
   [] Prayer                           
   [] Music                  [] Guided Imagery [x] Family/friends                 [] Pet visits [] Devotional reading                         [] Unknown 
   [] Other:                                       
 
 
Interventions offered during this visit: (See comments for more details) Patient Interventions: Advance medical directive consult, Affirmation of emotions/emotional suffering, Iconic (affirming the presence of God/Higher Power) Plan of Care: 
 
 [x] Support spiritual and/or cultural needs  
 [] Support AMD and/or advance care planning process    
 [] Support grieving process 
 [] Coordinate Rites and/or Rituals  
 [] Coordination with community clergy 
 [x] No spiritual needs identified at this time 
 [] Detailed Plan of Care below (See Comments)  [] Make referral to Music Therapy 
[] Make referral to Pet Therapy    
[] Make referral to Addiction services 
[] Make referral to [facility] 
[] Make referral to Spiritual Care Partner 
[x] No future visits requested [] Follow up visits as needed Comments: The patient was resting in bed when Dr. Zenaida Torrez and I entered the patient's room. There was a nurse that administered a dose of medicine to the patient as the conference began. The conference was led by Dr. Zenaida Torrez. Pertinent information regarding the patient's medical issues was garnered by Dr. Zenaida Torrez. The patient provided some family and personal history information. The patient was introduced to the concept of an AMD. The patient was receptive but needs some time to speak with her daughter and son. The patient indicated that she is  from her spouse and did not want him involved in making medical decisions. . As we approached the conclusion of the conference, the patient complained of being too warm. The patient vomited. The discussion points not completed will be addressed after the patient has time to recover from her onset of not feeling well. Rev. Shane Albrecht EdD MDiv Palliative  Fellow For Casimiro Page [phone])

## 2019-04-03 NOTE — PROGRESS NOTES
Brief summary of visit , full note will be in system soon . Met patient with chaplain Riggs. - introduce Palliative care /extra layer of support . - she has understanding of her diagnosis and prognosis ,and side effects of chemo therapy . - Educated on Advance directives , will follow tomorrow. 
- visit was interrupted due to severe pain nausea , patient started throwing up, was given zofran. Suggest : 
 
D/c dilaudid makes her nauseous. Agree with change to liquid oxycodone , given difficulty swallowing pills . - given cancer pain , she micha need long acting basal medication , we will reassess her pain and place her on low dose fentanyl patch starting  with 12 mcg dose every 72 hrs . - we will follow , she is a good candidate for f/u in out patient palliative care for support /symtom management .

## 2019-04-03 NOTE — PROGRESS NOTES
Hospitalist Progress Note NAME: Christiano Gurrola :  1955 MRN:  [id number] Assessment / Plan: 
Appendiceal cancer with extensive carcinomatosis (new dx) s/p laparoscopy converted to exploratory laparotomy, omentectomy and extended right colectomy 3/28 with Dr. Nj Neto: abd pain x2 yrs and abnormal CTs since 2017 showing left colitis and a moderately plump appendix with possible tip mucocele. Some nausea today, but overall tolerating po better than she has been. - CTA A/P 3/28 with increased severity of right lower quadrant/pericecal inflammatory changes without cecal wall thickening. No evidence of ischemic colitis as the mesenteric vasculature is widely patent without atherosclerotic disease. Blind-ending tubular structure in the center of the inflammatory process could represent the appendix, and measures 13 mm in diameter. 
- EGD 3/28 with Dr. Carlito Alegria demonstrating resistance to scope passage at 25 cm where diverticulosis is noted. Lisset-appendiceal orifice area shows possible ulceration. Views are difficult as there is edema. Rest of the colon is normal appearing. 
- pathology from omentum/R colon with metastatic appendiceal adenocarcinoma, innumerable tumor implants up to 13 cm in greatest individual. Right colectomy with mixed goblet cell carcinoid-adenocarcinoma, 5 cm, poorly differentiated, with signet ring cells and extracellular mucin of appendix with invasion through serosa and innumerable serosal tumor implants of ileum and colon, mural implant of distal ascending colon, and tumor implants of small bowel mesentery. Five of twenty regional lymph nodes positive for metastatic adenocarcinoma. Extranodal extension present. - appreciate general surgery and oncology assistance. Will need surgical referral to [facility] likely in addition to medical oncology - Dr. Félix Manzo office to coordinate. 
- diet advanced to regular 
- will try off IV fluids - palliative care consult appreciated, d/w Pt and she understands need to designate a decision maker if needed Hypokalemia: still low 
- con't replacement 
- check K in AM 
Generalized anxiety disorder: 
- restarted home valium 
- restarted home ambien at night Gastroesophageal reflux disease: con't PPI 
  
Code status: Full Prophylaxis: Lovenox Subjective: Chief Complaint / Reason for Physician Visit Feeling a little better this aftenoon, felt nauseated this morning. Discussed with RN events overnight. Review of Systems: 
Symptom Y/N Comments  Symptom Y/N Comments Fever/Chills n   Chest Pain n   
Poor Appetite n   Edema n   
Cough n   Abdominal Pain n   
Sputum n   Joint Pain SOB/WREN n   Pruritis/Rash Nausea/vomit    Tolerating PT/OT Diarrhea    Tolerating Diet Constipation    Other Could NOT obtain due to:   
 
Objective: VITALS:  
Last 24hrs VS reviewed since prior progress note. Most recent are: 
Patient Vitals for the past 24 hrs: 
 Temp Pulse Resp BP SpO2  
04/03/19 1055 97.6 °F (36.4 °C) 89 14 164/79 100 % 04/03/19 0737 97.7 °F (36.5 °C) 75 15 150/69 94 % 04/03/19 0345 98.1 °F (36.7 °C) 75 15 146/69 100 % 04/02/19 2258 98.2 °F (36.8 °C) 86 16 141/72 100 % 04/02/19 1957 98.9 °F (37.2 °C) 97 19 123/77 100 % 04/02/19 1536 97.4 °F (36.3 °C) (!) 111 18 128/78 95 % Intake/Output Summary (Last 24 hours) at 4/3/2019 1136 Last data filed at 4/3/2019 4114 Gross per 24 hour Intake 853.34 ml Output 300 ml Net 553.34 ml PHYSICAL EXAM: 
General: WD, WN. Alert, cooperative, no acute distress   
EENT:  EOMI. Anicteric sclerae. MMM Resp:  CTA bilaterally, no wheezing or rales. No accessory muscle use CV:  Regular  rhythm,  No edema GI:  Soft, Non distended, Non tender.  +Bowel sounds. Midline staples intact with old blood, no erythema. Neurologic:  Alert and oriented X 3, normal speech, Psych:   Fair insight. Not anxious nor agitated Skin:  Pale, No rashes. No jaundice Reviewed most current lab test results and cultures  YES Reviewed most current radiology test results   YES Review and summation of old records today    NO Reviewed patient's current orders and MAR    YES 
PMH/SH reviewed - no change compared to H&P 
________________________________________________________________________ Care Plan discussed with: 
  Comments Patient x Family RN x Care Manager Consultant Multidiciplinary team rounds were held today with , nursing, pharmacist and clinical coordinator. Patient's plan of care was discussed; medications were reviewed and discharge planning was addressed. ________________________________________________________________________ Total NON critical care TIME:  25 Minutes Total CRITICAL CARE TIME Spent:   Minutes non procedure based Comments >50% of visit spent in counseling and coordination of care x   
________________________________________________________________________ Gertrude Stallings MD  
 
Procedures: see electronic medical records for all procedures/Xrays and details which were not copied into this note but were reviewed prior to creation of Plan. LABS: 
I reviewed today's most current labs and imaging studies. Pertinent labs include: 
Recent Labs 04/03/19 0403 04/01/19 
3349 WBC 4.7 5.3 HGB 7.6* 7.7* HCT 23.0* 24.0*  
 242 Recent Labs 04/03/19 
0403 04/02/19 
0220 04/01/19 
8922  141 141  
K 3.1* 2.8* 2.9*  
 105 107 CO2 28 31 30 GLU 98 114* 101* BUN 4* 1* 2*  
CREA 0.43* 0.42* 0.37* CA 7.0* 7.2* 7.5* MG 1.7  --  1.8 PHOS 3.2 2.5* 0.9* ALB  --   --  2.3* TBILI  --   --  0.3 SGOT  --   --  11* ALT  --   --  13 Signed: Gertrude Stallings MD

## 2019-04-03 NOTE — PROGRESS NOTES
[facility] accepted pt for home health and requested a social work order to be added for home health. Norris Montgomery, [address]

## 2019-04-03 NOTE — PROGRESS NOTES
Problem: Mobility Impaired (Adult and Pediatric) Goal: *Acute Goals and Plan of Care (Insert Text) Description Physical Therapy Goals Initiated 4/1/2019 1. Patient will move from supine to sit and sit to supine , scoot up and down and roll side to side in bed with independence within 7 day(s). 2.  Patient will transfer from bed to chair and chair to bed with independence using the least restrictive device within 7 day(s). 3.  Patient will perform sit to stand with independence within 7 day(s). 4.  Patient will ambulate with independence for 350 feet with the least restrictive device within 7 day(s). 5.  Patient will ascend/descend 4 stairs with 1 handrail(s) with modified independence within 7 day(s). Note: PHYSICAL THERAPY TREATMENT Patient: Manda Marcum (77 y.o. 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 
LAPAROSCOPY GENERAL DIAGNOSTIC CONVERTED TO EXPLORATORY LAPAROTOMY; OMENTECTOMY; EXTENDED RIGHT COLECTOMY (N/A) 6 Days Post-Op Precautions: Fall Chart, physical therapy assessment, plan of care and goals were reviewed. ASSESSMENT: pt tolerated tx well, no LOB or SOB, does well with transfers and ther-ex, good motivation, vc's for safety and proper RW use. Progression toward goals: 
?    Improving appropriately and progressing toward goals ? Improving slowly and progressing toward goals ? Not making progress toward goals and plan of care will be adjusted PLAN: 
Patient continues to benefit from skilled intervention to address the above impairments. Continue treatment per established plan of care. Discharge Recommendations:  Inpatient Rehab Further Equipment Recommendations for Discharge:  bedside commode and rolling walker OBJECTIVE DATA SUMMARY:  
Critical Behavior: 
Neurologic State: Alert Orientation Level: Oriented X4 Cognition: Appropriate decision making, Appropriate for age attention/concentration, Appropriate safety awareness, Follows commands Functional Mobility Training: 
Bed Mobility: Pt sitting in chair on arrival. 
 
Transfers: 
Sit to Stand: Supervision Stand to Sit: Supervision Interventions: Verbal cues Level of Assistance: Supervision Balance: 
Sitting: Intact; Without support Sitting - Static: Good (unsupported) Sitting - Dynamic: Not tested Standing: Intact; Without support Standing - Static: Good; Unsupported Standing - Dynamic : Good; Unsupported Ambulation/Gait Training: 
Distance (ft): 100 Feet (ft) Assistive Device: Gait belt Ambulation - Level of Assistance: Stand-by assistance;Contact guard assistance Gait Abnormalities: Decreased step clearance Right Side Weight Bearing: Full Left Side Weight Bearing: Full Base of Support: Narrowed Speed/Soco: Pace decreased (<100 feet/min) Step Length: Left shortened;Right shortened Therapeutic Exercises:  
sitting EXERCISE Sets Reps Active Active Assist  
Passive Comments Ankle pumps 1 10 ? ? ? bilat Heel raises 1 10 ? ? ? \" Toe tap 1 10 ? ? ? \" Knee ext 1 10 ? ? ? \" Hip flex 1 10 ? ? ? \"  
 
Pain: 
Pain Scale 1: Numeric (0 - 10) Pain Intensity 1: 0 Pain Location 1: Abdomen Pain Orientation 1: Mid 
Pain Description 1: Aching Pain Intervention(s) 1: Medication (see MAR) Activity Tolerance: fair After treatment:  
?    Patient left in no apparent distress sitting up in chair ? Patient left in no apparent distress in bed 
? Call bell left within reach ? Nursing notified ? Caregiver present ? Bed alarm activated COMMUNICATION/COLLABORATION:  
The patient?s plan of care was discussed with: Registered Nurse Titi Yadav PTA Time Calculation: 25 mins

## 2019-04-03 NOTE — PROGRESS NOTES
General Surgery End of Shift Nursing Note Bedside shift change report given to Fabi (oncoming nurse) by Elveria Apgar (offgoing nurse). Report included the following information SBAR, Kardex, OR Summary, Intake/Output, MAR and Recent Results. Shift worked:   7 am to 7 pm  
Summary of shift:    Patient admitted to the unit ~1655. Dual skin performed. Elevated BP because pt was in pain and had just finished ambulating in room; meds given as requested. Pt complained of nausea. Zofran given. Minimal oral intake of dinner tray. Issues for physician to address:   none Number times ambulated in hallway past shift: 0 Number of times OOB to chair past shift: 0 Pain Management: 
Current medication: roxicodone Patient states pain is manageable on current pain medication: YES 
 
GI: 
 
Current diet:  DIET REGULAR Tolerating current diet: YES Passing flatus: YES Last Bowel Movement: today Respiratory: 
 
Incentive Spirometer at bedside: YES Patient instructed on use: YES Patient Safety: 
 
Falls Score: 3 Bed Alarm On? No 
Sitter? No 
 
Polina Moore

## 2019-04-03 NOTE — PROGRESS NOTES
Problem: Self Care Deficits Care Plan (Adult) Goal: *Acute Goals and Plan of Care (Insert Text) Description Occupational Therapy Goals Initiated 4/1/2019 1. Patient will perform toilet transfers with modified independence within 7 day(s). 2.  Patient will perform all aspects of toileting with modified independence within 7 day(s). 3.  Patient will perform LB bathing/dressing while seated with Mod I (AE PRN) within 7 day(s). 4.  Patient will perform safe item retrieval from high/low surfaces with Mod I in preparation for self-care/ADL tasks using least restrictive device within 7 days. Outcome: Progressing Towards Goal 
 OCCUPATIONAL THERAPY TREATMENT Patient: Silvia Cerna (36 y.o. 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 
LAPAROSCOPY GENERAL DIAGNOSTIC CONVERTED TO EXPLORATORY LAPAROTOMY; OMENTECTOMY; EXTENDED RIGHT COLECTOMY (N/A) 6 Days Post-Op Precautions: Fall Chart, occupational therapy assessment, plan of care, and goals were reviewed. ASSESSMENT: Patient with improved pain and no nausea. Able to tailor sit to don/doff socks. States she walked a lot with PT earlier, bathed herself this morning and wanted to order her lunch. Educated on general safety and recommended shower chair to increase LB ADL independence has able to tailor sit and would not need to bend. Anticipate discharge home tomorrow. Progression toward goals: 
?       Improving appropriately and progressing toward goals ? Improving slowly and progressing toward goals ? Not making progress toward goals and plan of care will be adjusted PLAN: 
Patient continues to benefit from skilled intervention to address the above impairments. Continue treatment per established plan of care. Discharge Recommendations:  Home Health versus no OT pending continued progress Further Equipment Recommendations for Discharge:  shower chair SUBJECTIVE:  
 Patient stated ? I already did all my therapy today. ? OBJECTIVE DATA SUMMARY:  
Cognitive/Behavioral Status: 
Neurologic State: Alert ADL Intervention: Lower Body Dressing Assistance Socks: Modified independent Leg Crossed Method Used: Yes Position Performed: Long sitting on bed Recommended shower chair for ease of bathing has she has hip flexibility to cross legs. Pain: 
Pain Scale 1: Numeric (0 - 10) Pain Intensity 1: 0 Pain Location 1: Abdomen Pain Orientation 1: Mid 
Pain Description 1: Aching Pain Intervention(s) 1: Medication (see MAR) Activity Tolerance:  
Good Please refer to the flowsheet for vital signs taken during this treatment. After treatment:  
? Patient left in no apparent distress sitting up in chair ? Patient left in no apparent distress in bed 
? Call bell left within reach ? Nursing notified ? Caregiver present ? Bed alarm activated COMMUNICATION/COLLABORATION:  
The patient?s plan of care was discussed with: Registered Nurse Stephanie William Time Calculation: 8 mins

## 2019-04-03 NOTE — CONSULTS
Palliative Medicine Consult Natan: [phone] ([phone]) Patient Name: Jean Paul Quiroz YOB: 1955 Date of Initial Consult: 4/3/19 Reason for Consult: care decisions and endstage disease Requesting Provider: Ruddy Starks MD 
Primary Care Physician: Abril Posadas MD 
 
 SUMMARY:  
Jean Paul Quiroz is a 71 y.o. with a past history of chronic abdminal pain , abnormal CT scan showing colitis in 2017 , had recent CT scan done  10 days ago showed resolution of colitis,  who was admitted on 3/28/2019 sent from GI . s/p colonoscopy  with a diagnosis of severe abdominal pain and fever . and a concern for ulcerated appendix mass . 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 Oncology following , suggesting cyto reduction and HIPEC , and may need referral to surgeon to [facility] . Current medical issues leading to Palliative Medicine involvement include: care decision in the setting of end stage disease , appendiceal carcinoid, prognosis poor per oncology . socail history : lived independantly , , used to work as /,  has two children , daughter is incarcerated , son in [place] . She has poor social support system . PALLIATIVE DIAGNOSES:  
1. Advance medical directives / counseling and discussion 2. Cancer related  pain 3. Nausea 4. Psychosocial support /need for emotional support /poor support system . 5. New diagnosis of carcinoid of appendix with peritoneal carcinomatosis PLAN:  
1. Chart reviewed , case d/w Dr Juliet Donovan . 2. Met with patient and Jose Brower. 3. Introduce Re desouza of palliative care services. 4. Patient understand her diagnosis and prognosis and treatment plan and side effects of extensive surgery and chemotherapy . 5. ADvance directive : educated on importance , expalined , given she is  , not  , her  is legal next of kin ,  and it is important to appoint  Who she wants to be her medical power of  , to make medical decision on her behalf if she is very sick , not mentally clear to talk to her doctors, , she wants her son Georgi García to be her MPOA, encouraged to talk to her son , if he is willing to take the responsibility , we agreed to follow up tomorrow . 6. Pain : better on prn Roxicodone,  gabapentin and toradol . She will  need a long acting , fentanyl patch , given extensive cancer with pain , suggest to stop dilaudid , she is not able to tolerate due to nausea . 7. Nausea : is on and off due to cancer and also associated due to pain ,  on Zofran and compazine . 8. Visit was interrupted ,  due to severe abdominal pain and nausea , she started vomiting profusely , advised Rn to give her dose of Zofran . 9. We paln to follow to support , continue with goals  of care and Advance medical directives . 10. Psychosocial  support : poor , palliative care team LCW will follow . 11. Initial consult note routed to primary continuity provider and/or primary health care team members 12. Communicated plan of care with: Palliative Rosanna BLEVINS 192 Team 
 
 GOALS OF CARE / TREATMENT PREFERENCES:  
 
GOALS OF CARE: 
Patient/Health Care Proxy Stated Goals: (treatment of active medical issues may consider palliative oncological treatment surgery and chemotherapy) TREATMENT PREFERENCES:  
Code Status: Full Code Advance Care Planning: 
[] The [facility] Interdisciplinary Team has updated the ACP Navigator with Tamra and Patient Capacity Advance Care Planning 3/29/2019 Patient's Healthcare Decision Maker is: -  
Primary Decision Maker Name -  
Confirm Advance Directive None Patient Would Like to Complete Advance Directive No  
 
 
 Medical Interventions: Full interventions Other Instructions: Other: As far as possible, the palliative care team has discussed with patient / health care proxy about goals of care / treatment preferences for patient. HISTORY:  
 
History obtained from: patient and chart . CHIEF COMPLAINT: nausea and abdominal pain HPI/SUBJECTIVE: The patient is:  
[] Verbal and participatory: She has been dealing with abdominal pain , irregular bowel habits , diarrhea and constipation x 2 years , under the care of Dr Magali Cano. Currently c/o abdominal pain 8/10 , and nausea . Feels weak , spirits can be low on and off . Slept better with medication Current medication : 
 
fiorcet 40 mg adan 6 hrs as needed Diazepam 5 mg every 8 hrs as needed Docusate Gabapentin 300 mg tid Dilaudid 0.5 mg I/V q 2 hrs prn last dose 13 00 yesterday cannot tolerate due to nausea . toradol 30 mg I/v q 6 hrs as needed one dose today at 0300 Ondansetron 4 mg I/v q 4 hrs prn 0100, and 1200 
 roxicodone liquid 5 mg every 4 hrs as needed . Miralx Compazine 10 mg every 6 hrs prn 
 
 
 
 
 
Clinical Pain Assessment (nonverbal scale for severity on nonverbal patients):  
Clinical Pain Assessment Severity: 8 Location: mid abdomen Character: dull Duration: chronic , worse in alst few days Effect: emotions Factors: walking helps , oxycodone liquid helps, dilaudid makes her nauseous Frequency: coem and go Duration: for how long has pt been experiencing pain (e.g., 2 days, 1 month, years) Frequency: how often pain is an issue (e.g., several times per day, once every few days, constant) FUNCTIONAL ASSESSMENT:  
 
Palliative Performance Scale (PPS): PSYCHOSOCIAL/SPIRITUAL SCREENING:  
 
Palliative IDT has assessed this patient for cultural preferences / practices and a referral made as appropriate to needs (Cultural Services, Patient Advocacy, Ethics, etc.) Any spiritual / [employer]ology concerns: 
[] Yes /  [x] No 
 
Caregiver Burnout: 
[] Yes /  [x] No /  [] No Caregiver Present Anticipatory grief assessment:  
[x] Normal  / [] Maladaptive ESAS Anxiety: Anxiety: 0 
 
ESAS Depression: Depression: 0 REVIEW OF SYSTEMS:  
 
Positive and pertinent negative findings in ROS are noted above in HPI. The following systems were [x] reviewed / [] unable to be reviewed as noted in HPI Other findings are noted below. Systems: constitutional, ears/nose/mouth/throat, respiratory, gastrointestinal, genitourinary, musculoskeletal, integumentary, neurologic, psychiatric, endocrine. Positive findings noted below. Modified ESAS Completed by: provider Fatigue: 6 Drowsiness: 0 Depression: 0 Pain: 8 Anxiety: 0 Nausea: 5 Anorexia: 2 Dyspnea: 0 Constipation: No  
     
 
 
 PHYSICAL EXAM:  
 
From RN flowsheet: 
Wt Readings from Last 3 Encounters:  
04/01/19 145 lb 8 oz (66 kg) 03/28/19 135 lb (61.2 kg) 03/20/19 134 lb (60.8 kg) Blood pressure 164/79, pulse 89, temperature 97.6 °F (36.4 °C), resp. rate 14, height 5' 4\" (1.626 m), weight 145 lb 8 oz (66 kg), SpO2 100 %, not currently breastfeeding. Pain Scale 1: Numeric (0 - 10) Pain Intensity 1: 0 Pain Onset 1: acute Pain Location 1: Abdomen Pain Orientation 1: Mid 
Pain Description 1: Aching Pain Intervention(s) 1: Medication (see MAR) Last bowel movement, if known:  
 
Constitutional: weak , debilitated, cachectic  alert , oriented x 3. Eyes: pupils equal, anicteric ENMT: no nasal discharge, moist mucous membranes Cardiovascular: regular rhythm, distal pulses intact Respiratory: breathing not labored, symmetric Gastrointestinal: soft non-tender, +bowel sounds Musculoskeletal: no deformity, no tenderness to palpation Skin: warm, dry Neurologic: following commands, moving all extremities Psychiatric: full affect, no hallucinations Other: 
 
 
 HISTORY:  
 
Principal Problem: [address] [address] (3/28/2019) Active Problems: 
  Neoplasm of appendix (3/28/2019) Peritoneal carcinomatosis ([facility] [address] (3/29/2019) Appendix carcinoma ([facility] [address] (3/29/2019) Past Medical History:  
Diagnosis Date  Acid reflux  GERD (gastroesophageal reflux disease)  Hypertension  Other ill-defined conditions(799.89)   
 high cholesterol  Psychiatric disorder   
 depression  Thyroid disease   
 hyperthyroidism Past Surgical History:  
Procedure Laterality Date  COLONOSCOPY N/A 11/30/2017 COLONOSCOPY performed by Lizzy Adams MD at [facility] ENDOSCOPY  COLONOSCOPY N/A 3/20/2019 COLONOSCOPY performed by Jose Guadalupe Awan MD at [facility] ENDOSCOPY  COLONOSCOPY N/A 3/28/2019 COLONOSCOPY performed by Jose Guadalupe Awan MD at [facility] ENDOSCOPY 2021 [facility] N/A 3/20/2019 SIGMOIDOSCOPY FLEXIBLE performed by Jose Guadalupe Awan MD at [facility] ENDOSCOPY  
 HX CHOLECYSTECTOMY  HX GYN    
 hysterectomy  HX HEENT    
 thyroid Family History Problem Relation Age of Onset  Cancer Mother  Colon Cancer Father  Cancer Father History reviewed, no pertinent family history. Social History Tobacco Use  Smoking status: Never Smoker  Smokeless tobacco: Never Used Substance Use Topics  Alcohol use: Yes Alcohol/week: 0.6 oz Types: 1 Cans of beer per week Comment: Socially. Allergies Allergen Reactions  Chlorhexidine Towelette Itching Pt c/o itching and skin dryness; no rash noted.  Codeine Itching  Lisinopril Angioedema Current Facility-Administered Medications Medication Dose Route Frequency  polyethylene glycol (MIRALAX) packet 17 g  17 g Oral DAILY  dextrose 5% - 0.45% NaCl with KCl 20 mEq/L infusion  50 mL/hr IntraVENous CONTINUOUS  
 potassium chloride (K-DUR, KLOR-CON) SR tablet 20 mEq  20 mEq Oral BID  
 oxyCODONE (ROXICODONE) 5 mg/5 mL oral solution 5 mg  5 mg Oral Q4H PRN  
  butalbital-acetaminophen-caffeine (FIORICET, ESGIC) -40 mg per tablet 1 Tab  1 Tab Oral Q6H PRN  
 diazePAM (VALIUM) tablet 5 mg  5 mg Oral Q8H PRN  
 docusate sodium (COLACE) capsule 100 mg  100 mg Oral BID  
 gabapentin (NEURONTIN) capsule 300 mg  300 mg Oral TID  zolpidem (AMBIEN) tablet 5 mg  5 mg Oral QHS  polyethylene glycol (MIRALAX) packet 17 g  17 g Oral DAILY  famotidine (PEPCID) tablet 20 mg  20 mg Oral BID  acetaminophen (TYLENOL) tablet 650 mg  650 mg Oral Q4H PRN  prochlorperazine (COMPAZINE) with saline injection 10 mg  10 mg IntraVENous Q6H PRN  
 ketorolac (TORADOL) injection 30 mg  30 mg IntraVENous Q6H PRN  
 sodium chloride (OCEAN) 0.65 % nasal squeeze bottle 2 Spray  2 Spray Both Nostrils Q2H PRN  
 sodium chloride (NS) flush 5-40 mL  5-40 mL IntraVENous Q8H  
 sodium chloride (NS) flush 5-40 mL  5-40 mL IntraVENous PRN  
 bupivacaine (PF) (MARCAINE) 0.5 % (5 mg/mL) injection    PRN  
 HYDROmorphone (PF) (DILAUDID) injection 0.5 mg  0.5 mg IntraVENous Q2H PRN  
 ondansetron (ZOFRAN) injection 4 mg  4 mg IntraVENous Q4H PRN  
 enoxaparin (LOVENOX) injection 40 mg  40 mg SubCUTAneous DAILY  
 
 
 
 LAB AND IMAGING FINDINGS:  
 
Lab Results Component Value Date/Time WBC 4.7 04/03/2019 04:03 AM  
 HGB 7.6 (L) 04/03/2019 04:03 AM  
 PLATELET 396 05/12/1875 04:03 AM  
 
Lab Results Component Value Date/Time Sodium 140 04/03/2019 04:03 AM  
 Potassium 3.1 (L) 04/03/2019 04:03 AM  
 Chloride 104 04/03/2019 04:03 AM  
 CO2 28 04/03/2019 04:03 AM  
 BUN 4 (L) 04/03/2019 04:03 AM  
 Creatinine 0.43 (L) 04/03/2019 04:03 AM  
 Calcium 7.0 (L) 04/03/2019 04:03 AM  
 Magnesium 1.7 04/03/2019 04:03 AM  
 Phosphorus 3.2 04/03/2019 04:03 AM  
  
Lab Results Component Value Date/Time AST (SGOT) 11 (L) 04/01/2019 03:31 AM  
 Alk.  phosphatase 43 (L) 04/01/2019 03:31 AM  
 Protein, total 5.6 (L) 04/01/2019 03:31 AM  
 Albumin 2.3 (L) 04/01/2019 03:31 AM  
 Globulin 3.3 04/01/2019 03:31 AM  
 
Lab Results Component Value Date/Time INR 1.0 11/29/2017 03:56 AM  
 Prothrombin time 10.2 11/29/2017 03:56 AM  
 aPTT 24.6 11/29/2017 03:56 AM  
  
No results found for: IRON, FE, TIBC, IBCT, PSAT, FERR No results found for: PH, PCO2, PO2 No components found for: [place] Lab Results Component Value Date/Time CK 95 03/29/2019 04:00 PM  
 CK - MB 1.1 03/29/2019 04:00 PM  
  
 
 
   
 
Total time:  
Counseling / coordination time, spent as noted above:  
> 50% counseling / coordination?:  
 
Prolonged service was provided for  []30 min   []75 min in face to face time in the presence of the patient, spent as noted above. Time Start:  
Time End:  
Note: this can only be billed with 10289 (initial) or [id number] (follow up). If multiple start / stop times, list each separately.

## 2019-04-03 NOTE — ROUTINE PROCESS
Bedside and Verbal shift change report given to Grzegorz Mccoy RN (oncoming nurse) by Danielito Chavarria RN (offgoing nurse). Report included the following information SBAR, ED Summary, Procedure Summary, Intake/Output, MAR, Accordion and Cardiac Rhythm NSR to sinus tach.

## 2019-04-03 NOTE — PROGRESS NOTES
Pt admitted to unit. Mews 4 due to elevated blood pressure. Pt just finish returning to bed and has a lot of pain. Pain meds given. Will continue to monitor.

## 2019-04-04 NOTE — DISCHARGE INSTRUCTIONS
HOSPITALIST DISCHARGE INSTRUCTIONS    NAME: Quita Sutton   :  1955   MRN:  [id number]     Date/Time:  2019 10:25 AM    ADMIT DATE: 3/28/2019   DISCHARGE DATE: 2019     Attending Physician: Shivani Fajardo MD    DISCHARGE DIAGNOSIS:  Appendiceal cancer with extensive carcinomatosis (new dx) s/p laparoscopy converted to exploratory laparotomy, omentectomy and extended right colectomy 3/28 with Dr. Taurus Tran:  See above    · It is important that you take the medication exactly as they are prescribed. · Keep your medication in the bottles provided by the pharmacist and keep a list of the medication names, dosages, and times to be taken in your wallet. · Do not take other medications without consulting your doctor. Pain Management: per above medications    What to do at [address]:  [address] for the next week (see instructions below), then resume your normal diet as able    Recommended activity: Activity as tolerated. Do not drive if you are taking your pain medications. If you have questions regarding the hospital related prescriptions or hospital related issues please call [facility] at . You can always direct your questions to your primary care doctor if you are unable to reach your hospital physician; your PCP works as an extension of your hospital doctor just like your hospital doctor is an extension of your PCP for your time at [facility].     If you experience any of the following symptoms then please call your primary care physician or return to the emergency room if you cannot get hold of your doctor:  Fever, chills, nausea, vomiting, diarrhea, change in mentation, falling, bleeding, shortness of breath    Additional Instructions:    Please call [phone] to make a follow up appointment with Dr. Carline Joshi in 1 week for staple removal    [facility] of [address], [address], [address]  [phone]  Fax [phone]    Please call [phone] to make a follow up appointment with Dr. Anthony Martinez for your cancer in 2 weeks    [address] at [facility] [address], [address] [facility] [address], [address] [facility]    [phone]      Bring these papers with you to your follow up appointments. The papers will help your doctors be sure to continue the care plan from the hospital.          Information obtained by :  I understand that if any problems occur once I am at home I am to contact my physician. I understand and acknowledge receipt of the instructions indicated above. [de-identified] or R.N.'s Signature                                                                  Date/Time                                                                                                                                              Patient or Representative Signature                                                          Date/Time    Open Bowel Resection: What to Expect at [address] are likely to have pain that comes and goes for the next few days after bowel surgery. You may have bowel cramps, and your cut (incision) may hurt. You may also feel like you have the flu. You may have a low fever and feel tired and nauseated. This is common. You should feel better after a week and will probably be back to normal in 2 to 3 weeks. This care sheet gives you a general idea about how long it will take for you to recover. But each person recovers at a different pace. Follow the steps below to get better as quickly as possible. How can you care for yourself at home? Activity    · Rest when you feel tired. Getting enough sleep will help you recover.     · Try to walk each day.  Start by walking a little more than you did the day before. Bit by bit, increase the amount you walk. Walking boosts blood flow and helps prevent pneumonia and constipation.     · Avoid strenuous activities, such as biking, jogging, weight lifting, or aerobic exercise, until your doctor says it is okay.     · Ask your doctor when you can drive again.     · You will probably need to take 3 to 4 weeks off from work. It depends on the type of work you do and how you feel. You may need to take off 4 to 6 weeks if you lift heavy objects in your job.     · You may shower 24 to 48 hours after surgery, if your doctor says it is okay. Pat the cut (incision) dry. Do not take a bath for the first 2 weeks, or until your doctor tells you it is okay.     · Ask your doctor when it is okay for you to have sex. Diet    · You may not have much appetite after the surgery. But try to eat a healthy diet. Your doctor will tell you about any foods you should not eat.     · Eat a low-fiber diet for several weeks after surgery. Eat many small meals throughout the day. Add high-fiber foods a little at a time.     · Eat yogurt. It puts good bacteria into your colon and helps prevent diarrhea.     · Try to avoid nuts, seeds, and corn for a while. They may be hard to digest.     · You may need to take vitamins that contain sodium and potassium. Ask your doctor.     · Drink plenty of fluids to avoid becoming dehydrated. Medicines    · Your doctor will tell you if and when you can restart your medicines. He or she will also give you instructions about taking any new medicines.     · If you take blood thinners, such as warfarin (Coumadin), clopidogrel (Plavix), or aspirin, be sure to talk to your doctor. He or she will tell you if and when to start taking those medicines again. Make sure that you understand exactly what your doctor wants you to do.     · Take pain medicines exactly as directed.   ? If the doctor gave you a prescription medicine for pain, take it as prescribed. ? If you are not taking a prescription pain medicine, ask your doctor if you can take an over-the-counter medicine. ? Do not take two or more pain medicines at the same time unless the doctor told you to. Many pain medicines have acetaminophen, which is Tylenol. Too much acetaminophen (Tylenol) can be harmful.     · If you think your pain medicine is making you sick to your stomach:  ? Take your medicine after meals (unless your doctor tells you not to). ? Ask your doctor for a different pain medicine.     · If your doctor prescribed antibiotics, take them as directed. Do not stop taking them just because you feel better. You need to take the full course of antibiotics.     · You may need to take some medicines in a different form. You will be told whether to crush pills or take a liquid form of the medicine.     · If your doctor gives you a stool softener, take it as directed. Incision care    · If you have strips of tape on the incision, leave the tape on for a week or until it falls off.     · Wash the area daily with warm, soapy water, and pat it dry. Follow-up care is a key part of your treatment and safety. Be sure to make and go to all appointments, and call your doctor if you are having problems. It's also a good idea to know your test results and keep a list of the medicines you take. When should you call for help? Call 911 anytime you think you may need emergency care. For example, call if:    · You passed out (lost consciousness).     · You are short of breath.    Call your doctor now or seek immediate medical care if:    · You are sick to your stomach and cannot drink fluids or keep them down.     · You have signs of a blood clot in your leg (called a deep vein thrombosis), such as:  ? Pain in your calf, back of the knee, thigh, or groin. ? Redness and swelling in your leg or groin.     · You have signs of infection, such as:  ? Increased pain, swelling, warmth, or redness. ?  Red streaks leading from the incision. ? Pus draining from the incision. ? A fever.     · You have pain that does not get better after you take pain medicine.     · You have loose stitches, or your incision comes open.     · Bright red blood has soaked through the bandage.     · You cannot pass stools or gas.    Watch closely for any changes in your health, and be sure to contact your doctor if you have any problems. Where can you learn more? Go to http://olivia-salina.info/. Enter 886 4014 in the search box to learn more about \"Open Bowel Resection: What to Expect at Home. \"  Current as of: March 27, 2018  Content Version: 11.9  © 3170-7773 [employer]. Care instructions adapted under license by [employer] (which disclaims liability or warranty for this information). If you have questions about a medical condition or this instruction, always ask your healthcare professional. [place] [address] any warranty or liability for your use of this information. Incisions Closed With Staples: Care Instructions  Your Care Instructions  The doctor used staples to close incisions. Staples easily and quickly close a cut, which helps the cut heal.  Sometimes a cut can injure tendons, blood vessels, or nerves. If the cut went deep and through the skin, the doctor may have put in a layer of stitches below the staples. The deeper layer of stitches brings the deep part of the cut together. These stitches will dissolve and don't need to be removed. The staples in the upper layer are what you see on the cut. You may have a bandage. You will need to have the staples removed, usually in 7 to 14 days. The doctor has checked you carefully, but problems can develop later. If you notice any problems or new symptoms, get medical treatment right away. Follow-up care is a key part of your treatment and safety.  Be sure to make and go to all appointments, and call your doctor if you are having problems. It's also a good idea to know your test results and keep a list of the medicines you take. How can you care for yourself at home? · Keep the cut dry for the first 24 to 48 hours. After this, you can shower if your doctor okays it. Pat the cut dry. · Don't soak the cut, such as in a bathtub. Your doctor will tell you when it's safe to get the cut wet. · If your doctor told you how to care for your cut, follow your doctor's instructions. If you did not get instructions, follow this general advice:  ? After the first 24 to 48 hours, wash around the cut with clean water 2 times a day. Don't use hydrogen peroxide or alcohol, which can slow healing. ? You may cover the cut with a thin layer of petroleum jelly, such as Vaseline, and a nonstick bandage. ? Apply more petroleum jelly and replace the bandage as needed. · Avoid any activity that could cause your cut to reopen. · Do not remove the staples on your own. Your doctor will tell you when to come back to have the staples removed. · Take pain medicines exactly as directed. ? If the doctor gave you a prescription medicine for pain, take it as prescribed. ? If you are not taking a prescription pain medicine, ask your doctor if you can take an over-the-counter medicine. When should you call for help? Call your doctor now or seek immediate medical care if:    · You have new pain, or your pain gets worse.     · The skin near the cut is cold or pale or changes color.     · You have tingling, weakness, or numbness near the cut.     · The cut starts to bleed, and blood soaks through the bandage. Oozing small amounts of blood is normal.     · You have trouble moving the area near the cut.     · You have symptoms of infection, such as:  ? Increased pain, swelling, warmth, or redness around the cut.  ?  Red streaks leading from the cut.  ? Pus draining from the cut.  ? A fever.    Watch closely for changes in your health, and be sure to contact your doctor if:    · You do not get better as expected. Where can you learn more? Go to http://olivia-salina.info/. Enter L888 in the search box to learn more about \"Cuts Closed With Staples: Care Instructions. \"  Current as of: September 23, 2018  Content Version: 11.9  © 8280-9078 [employer]. Care instructions adapted under license by [employer] (which disclaims liability or warranty for this information). If you have questions about a medical condition or this instruction, always ask your healthcare professional. [address] any warranty or liability for your use of this information. [place] Diet: Care Instructions  Your Care Instructions    A soft-textured, bland diet is used when you need food that is easy to chew, swallow, and digest. You will need to choose soft foods that are low in spices and seasonings. You will need to avoid high-fat foods, as well as caffeine and alcohol. Your doctor or dietitian can help you plan a bland diet based on your health and what you prefer to eat. Ask your doctor how long you should stay on this diet. As you get better, you will probably be able to go back to a regular diet. Talk with your doctor or dietitian before you make changes in your diet. Follow-up care is a key part of your treatment and safety. Be sure to make and go to all appointments, and call your doctor if you are having problems. It's also a good idea to know your test results and keep a list of the medicines you take. How can you care for yourself at home? · Choose foods that are easy to chew and swallow. Good choices are mashed potatoes, soft breads and rolls, cream soups, oatmeal, and Cream of Wheat. · Choose soft, well-cooked vegetables and soft or canned fruits. Good choices are applesauce, ripe bananas, and non-citrus fruit juice. · Try milk, yogurt, or other milk products, if you can digest dairy without too many problems. Your doctor may limit milk and milk products for a while. If so, he or she may recommend a calcium and vitamin D supplement. · Choose soft protein foods such as eggs, tofu, steamed fish, chicken, and turkey. Slow-cooking methods, such as stewing, will help soften meat. Chopping meat in a  or  also will make it easier to eat. · Avoid nuts, raw vegetables, hard crackers, tough meats, and prunes and prune juice. · Avoid foods that are very spicy, such as foods seasoned with black pepper, chili peppers, horseradish, or hot sauce. · Avoid highly acidic foods such as citrus fruits, citrus fruit juices, and tomato-based foods. · Avoid high-fat foods such as fried meat, chips, and rich desserts. · Check with your doctor before you drink alcohol or beverages that have caffeine, such as coffee, tea, and cola beverages. Where can you learn more? Go to http://olivia-salina.info/. Enter D955 in the search box to learn more about \"Soft-Textured, Corene Abdirahman Diet: Care Instructions. \"  Current as of: March 28, 2018  Content Version: 11.9  © 5105-3333 [employer]. Care instructions adapted under license by [employer] (which disclaims liability or warranty for this information). If you have questions about a medical condition or this instruction, always ask your healthcare professional. [place] [address] any warranty or liability for your use of this information.

## 2019-04-04 NOTE — PROGRESS NOTES
Called patient re change of prescriptions, called in Pepcid, patient already picked up Zofran ODT. Family will get the Percocet script from nursing station tomorrow.  0
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
## 2019-04-04 NOTE — DISCHARGE SUMMARY
Hospitalist Discharge Summary Patient ID: 
Salazar Chase [id number] 
71 y.o. 
1955 PCP on record: Edward Zavala MD 
 
Admit date: 3/28/2019 Discharge date and time: 4/4/2019 DISCHARGE DIAGNOSIS: 
Appendiceal cancer with extensive carcinomatosis (new dx) s/p laparoscopy converted to exploratory laparotomy, omentectomy and extended right colectomy 3/28 with Dr. Jonel Houser Hypokalemia Generalized anxiety disorder Gastroesophageal reflux disease CONSULTATIONS: 
IP CONSULT TO ONCOLOGY 
IP CONSULT TO ONCOLOGY 
IP CONSULT TO PALLIATIVE CARE - PROVIDER 
IP CONSULT TO PALLIATIVE CARE - PROVIDER Excerpted HPI from H&P of Kleber Daniel MD: 
Salazar Chase is a 71 y.o. female s/p colonoscopy today with Dr. Sveta Brown, sent to ED with c/o 10/10 abdominal pain. She has been having pain for the past 2 years and has had abnormal CTs since Nov 2017 showing left colitis and a moderately plump appendix with possible tip mucocele. She had a CT 10 days ago which showed complete resolution of the left colitis, but now shows a diffuse inflammatory appearance in the low abdomen/pelvis with some dependent pelvic fluid. She still has a prominent appendix. She is s/p hysterectomy but has both ovaries, and these appear normal on CT but are adjacent to the inflammatory process, as is the appendix. She has a normal appetite and is currently hungry. She has been having fevers. She states that the pain is now so bad that she cannot do normal activities. She denies any nausea or vomiting, and has been having normal bowel function. ______________________________________________________________________ DISCHARGE SUMMARY/HOSPITAL COURSE:  for full details see H&P, daily progress notes, labs, consult notes.   
Hospital course: 
Appendiceal cancer with extensive carcinomatosis (new dx) s/p laparoscopy converted to exploratory laparotomy, omentectomy and extended right colectomy 3/28 with Dr. Tompkins American yrs and abnormal CTs since Nov 2017 showing left colitis and a moderately plump appendix with possible tip mucocele. CTA A/P 3/28 with increased severity of right lower quadrant/pericecal inflammatory changes without cecal wall thickening. No evidence of ischemic colitis as the mesenteric vasculature is widely patent without atherosclerotic disease. Blind-ending tubular structure in the center of the inflammatory process could represent the appendix, and measures 13 mm in diameter. EGD 3/28 with Dr. Villa Trevino demonstrating resistance to scope passage at 25 cm where diverticulosis is noted. Lisset-appendiceal orifice area shows possible ulceration. Views are difficult as there is edema. Rest of the colon is normal appearing. Pt's pathology from omentum/R colon with metastatic appendiceal adenocarcinoma, innumerable tumor implants up to 13 cm in greatest individual. 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 Five of twenty regional lymph nodes positive for metastatic adenocarcinoma. Extranodal extension present. Pt was seen by general surgery and oncology assistance.  Will need surgical referral to [facility] likely in addition to medical oncology - Dr. Bauman Severe office to coordinate. Pt was tolerating diet well on discharge. She was also seen by palliative care and was working on paperwork for [facility] on discharge. Hypokalemia: replaced Generalized anxiety disorder: restarted home valium with ambien at night on discharge Gastroesophageal reflux disease: con't PPI 
 
_______________________________________________________________________ Patient seen and examined by me on discharge day. Pertinent Findings: 
Gen: awake, appropriate, NAD HEENT: cl luiz, no lesions Chest: CTA bilaterally, no crackles or wheezes Cv: RRR, no murmur, no edema Abd: soft, NT, moderately distended, BS+, no mass Neuro: CN intact 
 
_______________________________________________________________________ DISCHARGE MEDICATIONS:  
Current Discharge Medication List  
  
START taking these medications Details  
ondansetron (ZOFRAN ODT) 4 mg disintegrating tablet Take 1 Tab by mouth every eight (8) hours as needed for Nausea. Qty: 20 Tab, Refills: 0  
  
famotidine (PEPCID) 20 mg tablet Take 1 Tab by mouth two (2) times a day. Qty: 60 Tab, Refills: 0 CONTINUE these medications which have CHANGED Details  
oxyCODONE-acetaminophen (PERCOCET) 5-325 mg per tablet Take 1 Tab by mouth every six (6) hours as needed for Pain for up to 3 days. Max Daily Amount: 4 Tabs. Qty: 30 Tab, Refills: 0 Associated Diagnoses: Appendix carcinoma ([address] CONTINUE these medications which have NOT CHANGED Details  
docusate sodium (COLACE) 100 mg capsule Take 100 mg by mouth two (2) times a day. butalbital-acetaminophen-caff (FIORICET) -40 mg per capsule Take 1 Cap by mouth every six (6) hours as needed for Headache. Qty: 15 Cap, Refills: 0  
  
diazePAM (VALIUM) 5 mg tablet Take 5 mg by mouth every eight (8) hours as needed for Anxiety. gabapentin (NEURONTIN) 300 mg capsule Take 300 mg by mouth three (3) times daily. zolpidem CR (AMBIEN CR) 12.5 mg tablet Take 12.5 mg by mouth nightly as needed for Sleep.  
  
estradiol (ESTRACE) 1 mg tablet Take 1 mg by mouth daily. STOP taking these medications  
  
 vitamin E (AQUA GEMS) 400 unit capsule Comments:  
Reason for Stopping:   
   
 ciprofloxacin HCl (CIPRO) 500 mg tablet Comments:  
Reason for Stopping:   
   
 metroNIDAZOLE (FLAGYL) 500 mg tablet Comments:  
Reason for Stopping:   
   
 pantoprazole (PROTONIX) 40 mg tablet Comments: Reason for Stopping:   
   
 aspirin 81 mg tablet Comments:  
Reason for Stopping: My Recommended Diet, Activity, Wound Care, and follow-up labs are listed in the patient's Discharge Insturctions which I have personally completed and reviewed. ______________________________________________________________________ Risk of deterioration: High 
 
Condition at Discharge:  Stable 
______________________________________________________________________ Disposition Home with family and home health services 
______________________________________________________________________ Care Plan discussed with:  
Patient, RN, Care Manager, Consultant (palliative care) 
 
______________________________________________________________________ Code Status: Full Code 
______________________________________________________________________ Follow up with: PCP : Magali Nicole MD 
Follow-up Information Follow up With Specialties Details Why Contact Info Micheal Qiu MD Hematology and Oncology, Internal Medicine, Hematology, Oncology On 4/18/2019 Appointment time: 2:00 pm    NOTE: located in [address] [facility] 
[phone] [address]  PT, Nursing and Social Work  [address] [place] [address] [phone] Magali Nicole MD [facility] Go on 4/19/2019 Hospital follow-up scheduled at 1:30pm ( If you have questions or need to reschedule please call Jennifer Gilliland [address] [facility] 
[phone] Kanika Roche MD General Surgery, Breast Surgery, Surgery, Oncology, Bariatrics In 1 week for abdominal staple removal with Dr. Braulio Lindsay or Dr. Drew Amin [address] [facility] 
[phone] Total time in minutes spent coordinating this discharge (includes going over instructions, follow-up, prescriptions, and preparing report for sign off to her PCP) :  35 minutes Signed: Harpreet Haro MD

## 2019-04-04 NOTE — HOME CARE
Home Health Care Discharge Planning: [facility] Face to Face Encounter NAME: Naty Moulton :  1955 MRN:  [id number] Primary Diagnosis:  
Appendiceal cancer with extensive carcinomatosis (new dx) s/p laparoscopy converted to exploratory laparotomy, omentectomy and extended right colectomy 3/28 with Dr. Maddie Mulligan Date of Face to Face:  2019 10:31 AM        
                        
Face to Face Encounter findings are related to primary reason for home care:   YES 
 
1. I certify that the patient needs intermittent skilled nursing care, physical therapy and/or speech therapy. I will not be following this patient in the Community and Dr. Adam Barker MD will be responsible for signing the [address] 2. Initial Orders for Care: Skilled Nursing 3. I certify that this patient is homebound because of illness or injury, need the aid of supportive devices such as crutches, canes, wheelchairs, and walkers; the use of special transportation; or the assistance of another person in order to leave their place of residence. There exists a normal inability to leave home and leaving home requires a considerable and taxing effort. 4. I certify that this patient is under my care and that I had a Face-to-Face Encounter that meets the physician Face-to-Face Encounter requirements. Document the physical findings from the Face-to-Face Encounter that support the need for skilled services: Has new diagnosis that requires skilled nursing teaching and intervention  and Has new medications that requires skilled nursing teaching and monitoring for understanding and compliance Kiki Real MD 
Discharging Physician Office: [phone] Fax:   [phone]

## 2019-04-04 NOTE — PROGRESS NOTES
General Surgery End of Shift Nursing Note Bedside shift change report given to [address] [place] [address] (oncoming nurse) by Ashlyn Canales (offgoing nurse). Report included the following information SBAR, Kardex, Intake/Output, MAR and Recent Results. Shift worked:   7p-7a Summary of shift:    Patient medicated for pain x3. Issues for physician to address:     
 
Number times ambulated in hallway past shift: 0 Number of times OOB to chair past shift: 0 Pain Management: 
Current medication: Oxycodone, Tylenol, Toradol GI: 
 
Current diet:  DIET REGULAR Tolerating current diet: YES Passing flatus: YES Last Bowel Movement: today Appearance: loose Mathew Haggis Patient Safety: 
 
Falls Score: 3 Bed Alarm On? No 
Sitter? No 
 
Fabi Greene

## 2019-04-04 NOTE — PROGRESS NOTES
PCP JULIANNA appt scheduled with Dr. Merrilyn Goodell on 4/19/2019 at 1:30pm. Appt added to AVS. Krysten Long CM Specialist

## 2019-04-04 NOTE — PROGRESS NOTES
SURGERY PROGRESS NOTE Admit Date: 3/28/2019 POD 7 Days Post-Op Procedure: Procedure(s): LAPAROSCOPY GENERAL DIAGNOSTIC CONVERTED TO EXPLORATORY LAPAROTOMY; OMENTECTOMY; EXTENDED RIGHT COLECTOMY Subjective:  
 
Patient states she has had 3 large BMs since 3 am with flatus. She feels much better. Denies nausea and wants to eat. Pain is much better after BMs. She wants to go home today. Objective:  
 
Visit Vitals /79 Pulse 93 Temp 97.7 °F (36.5 °C) Resp 16 Ht 5' 4\" (1.626 m) Wt 66 kg (145 lb 8 oz) SpO2 99% Breastfeeding? No  
BMI 24.98 kg/m² Temp (24hrs), Av.4 °F (36.9 °C), Min:97.6 °F (36.4 °C), Max:101.1 °F (38.4 °C) No intake/output data recorded.  1901 -  0700 In: 2149.2 [P.O.:170; I.V.:1979.2] Out: 500 [Urine:500] Physical Exam:   
General:  alert, cooperative, no distress, appears stated age Abdomen: soft, bowel sounds active, non-tender Incision:   healing well, no drainage, no erythema, no hernia, no seroma, no swelling, no dehiscence, incision well approximated Assessment:  
 
Principal Problem: 
  Peritonitis ([address] (3/28/2019) Active Problems: 
  Neoplasm of appendix (3/28/2019) Peritoneal carcinomatosis ([address] (3/29/2019) Appendix carcinoma ([address] (3/29/2019) Doing well after surgery. Plan: D/C home today

## 2019-04-04 NOTE — PROGRESS NOTES
Per consult, CM talked with pt about HH vs SNF. Patient stated that she would like to go home with [facility]. Patient is being discharged home today with [address]. A friend will transport pt home. Patient does not have any needs or concerns. Care Management Interventions PCP Verified by CM: Yes(Pt's PCP is Dr. Max Gallo. Pt sees PCP every three months. ) Mode of Transport at Discharge: Other (see comment)(Pt's friend will transport at d/c.) Transition of Care Consult (CM Consult): Home Health [facility] - INPATIENT: Yes Discharge Durable Medical Equipment: No(No DME) Physical Therapy Consult: No 
Occupational Therapy Consult: No 
Speech Therapy Consult: No 
Current Support Network: Own Home(Pt resides by herself in a one level home with two steps to enter. ) Confirm Follow Up Transport: Self(Pt does drive. Pt has supportive family/friends and can transport when necesary. ) Discharge Location Discharge Placement: Home with home health [facility] Ext [id number]

## 2019-04-04 NOTE — NURSE NAVIGATOR
ONCOLOGY NURSE NAVIGATOR 
 
ODALIS visited w/ pt who was just transferred to 2121. Pt getting settled in room. Provided w/ copy of Medicaid application, including Appendix D. Advised of 3 ways to apply and 45 days for processing. Pt lives alone, friend lives in [place] and is still working. So applying for aid/attendant benefit. Spouse (currently ) is [employer] , therefore given VA Aid and Attendant benefit number to contact, however explained that can take up to 18 months to get approved. Given contact information SSD, advised to call and apply for SSD, ODALIS spoke to [facility], [facility] Determination who advised to have pt apply. Pt states pain is better today, appears more comfortable. Shares did have bought of nausea when Dr. Ольга Joya visiting her. States it was difficult to discuss advanced directives. Explained there was information I could provide her to help her discuss w/ her son and friend Yonathan Boyle) Her dtr is in [address]. She was appreciative of this. Provided w/ Honoring Choices folder, nurses at , left on tray table. Did explain what was in packet to Bambi Aguilar, whom ODALIS saw in [facility]. Explained importance of discussing healthcare wishes and being assured that [facility] knows those wishes and can honor them. Bambi Aguilar states dtr and son both calling this evening to speak to pt, \"I told her don't be dragging me into this\"  States he is having a cancerous spot removed from his chest tomorrow. \"I had a piece of my ear taken off in the past\" Maria Fernanda Ramirez RN

## 2019-04-08 NOTE — TELEPHONE ENCOUNTER
Patient called and stated that she would like to speak to someone about her living will and would like a return call.      # [phone]

## 2019-04-12 PROBLEM — A41.9 SEPSIS (HCC): Status: ACTIVE | Noted: 2019-01-01

## 2019-04-12 PROBLEM — K56.609 SMALL BOWEL OBSTRUCTION (HCC): Status: ACTIVE | Noted: 2019-01-01

## 2019-04-12 PROBLEM — E87.1 HYPONATREMIA: Status: ACTIVE | Noted: 2019-01-01

## 2019-04-12 PROBLEM — E43 SEVERE PROTEIN-CALORIE MALNUTRITION (HCC): Status: ACTIVE | Noted: 2019-01-01

## 2019-04-12 PROBLEM — N73.9 PELVIC ABSCESS IN FEMALE: Status: ACTIVE | Noted: 2019-01-01

## 2019-04-12 PROBLEM — K56.609 SBO (SMALL BOWEL OBSTRUCTION) (HCC): Status: ACTIVE | Noted: 2019-01-01

## 2019-04-12 PROBLEM — D64.9 ANEMIA: Status: ACTIVE | Noted: 2019-01-01

## 2019-04-12 NOTE — CONSULTS
[address] [address], [facility], [address] 
[phone] Oncology Inpatient Consult Reason for consult:  
 
Ms. Neal Keen is a 71year old female who we have been asked to see by Dr. Sindy High with a diagnosis of appendiceal carcinomatosis Subjective:  
 
Neal Keen is a 71 y.o. female who was recently diagnosed with appendiceal carcinoma. She underwent a laparotomy, omentectomy, and extended right colectomy on 3/28/2019. She was found to have an infiltrative mass involving appendix with extensive involvement of inferior omentum, rectum and ovaries and diffuse scattered peritoneal implants. Pathology confirmed a diagnosis of appendiceal adenocarcinoma. Ms. Jasmine Perez presented to the ED this morning with complaints of abdominal pain and distention, nausea/vomiting, and generalized weakness. Scans show small bowel obstruction and right lower quadrant anastomosis. She was admitted for management and further evaluation. Surgery has seen and does not feel surgical intervention is an option. Patient with NG tube. Several family members at the bedside. Patient complains of lower abdominal pain. Discussed with patient and family poor prognosis and that given her current situation, she is not a candidate for chemotherapy. Review of Systems: A comprehensive review of systems was negative except for that written in the History of Present Illness. Past Medical History:  
Diagnosis Date  Acid reflux  GERD (gastroesophageal reflux disease)  Hypertension  Other ill-defined conditions(799.89)   
 high cholesterol  Psychiatric disorder   
 depression  Thyroid disease   
 hyperthyroidism Past Surgical History:  
Procedure Laterality Date  COLONOSCOPY N/A 11/30/2017 COLONOSCOPY performed by Janki Nuno MD at [facility] ENDOSCOPY  COLONOSCOPY N/A 3/20/2019 COLONOSCOPY performed by Aishwarya Kaiser MD at [facility] ENDOSCOPY  COLONOSCOPY N/A 3/28/2019 COLONOSCOPY performed by Aishwarya Kaiser MD at [facility] ENDOSCOPY 2021 Bibi Soni N/A 3/20/2019 SIGMOIDOSCOPY FLEXIBLE performed by Aishwarya Kaiser MD at [facility] ENDOSCOPY  
 HX CHOLECYSTECTOMY  HX GYN    
 hysterectomy  HX HEENT    
 thyroid Family History Problem Relation Age of Onset  Cancer Mother  Colon Cancer Father  Cancer Father Social History Tobacco Use  Smoking status: Never Smoker  Smokeless tobacco: Never Used Substance Use Topics  Alcohol use: Yes Alcohol/week: 0.6 oz Types: 1 Cans of beer per week Comment: Socially. Current Facility-Administered Medications Medication Dose Route Frequency Provider Last Rate Last Dose  
 0.9% sodium chloride infusion  125 mL/hr IntraVENous CONTINUOUS Bianca Link  mL/hr at 04/12/19 1619 125 mL/hr at 04/12/19 1619  
 HYDROcodone-acetaminophen (NORCO) 5-325 mg per tablet 1 Tab  1 Tab Oral Q4H PRN Bianca Link MD      
 HYDROmorphone (PF) (DILAUDID) injection 0.5 mg  0.5 mg IntraVENous Q2H PRN Bianca Link MD      
 ondansetron [facility]) injection 4 mg  4 mg IntraVENous Q4H PRN Bianca Link MD      
 phenol throat spray (CHLORASEPTIC) 1 Spray  1 [place] Oral PRN Anita Keys MD      
 piperacillin-tazobactam (ZOSYN) 3.375 g in 0.9% sodium chloride (MBP/ADV) 100 mL  3.375 g IntraVENous Q8H Anita Keys MD 25 mL/hr at 04/12/19 1517 3.375 g at 04/12/19 1517  
 sodium chloride (NS) flush 5-10 mL  5-10 mL IntraVENous PRN Anita Keys MD      
 heparin (porcine) pf 300 Units  300 Units InterCATHeter PRN Anita Keys MD      
 [START ON 4/13/2019] heparin (porcine) injection 5,000 Units  5,000 Units SubCUTAneous Q12H Yajaira Knutson PA      
 fentaNYL citrate (PF) injection 25 mcg  25 mcg IntraVENous Q4H PRN VANESSA Anna   25 mcg at 04/12/19 1625 Allergies Allergen Reactions  Chlorhexidine Towelette Itching Pt c/o itching and skin dryness; no rash noted.  Codeine Itching  Lisinopril Angioedema Objective:  
 
Patient Vitals for the past 8 hrs: 
 BP Temp Pulse Resp SpO2  
04/12/19 1500 134/63 99.8 °F (37.7 °C) (!) 108 (!) 35 98 % 04/12/19 1415 139/77 (!) 102.3 °F (39.1 °C) (!) 116 (!) 32 98 % 04/12/19 1400 136/72  (!) 121 (!) 37 100 % 04/12/19 1330 157/73  (!) 111 26 97 % 04/12/19 1300 165/83  (!) 117 (!) 34   
04/12/19 1230 161/81  (!) 117 (!) 39 95 % 04/12/19 1215 179/52 (!) 101.7 °F (38.7 °C) (!) 123 (!) 37 (!) 81 % 04/12/19 1200 166/79  (!) 113 30 (!) 85 % 04/12/19 1144 173/75 98.4 °F (36.9 °C) (!) 109 27 (!) 85 % 04/12/19 1138   (!) 105 (!) 32 90 % 04/12/19 1137 173/79  (!) 107 (!) 34   
04/12/19 1115 171/75  93 29 (!) 87 % 04/12/19 1100 162/79  88 (!) 31   
04/12/19 1042   91 25 (!) 86 % 04/12/19 1032 155/75      
04/12/19 1023 153/68  88 21 92 % 04/12/19 1014 (!) 144/91  87 22 92 % 04/12/19 1009 153/68  85 22 92 % 04/12/19 1004 145/67  87 22 93 % 04/12/19 0959 141/69  86 24 93 % 04/12/19 0954 130/79  94 24 93 % 04/12/19 0949 128/76  99 26 93 % 04/12/19 0944 116/79  99 26 93 % 04/12/19 0911 125/70 97.5 °F (36.4 °C) (!) 102 24 90 % Lab Results Component Value Date/Time WBC 17.1 (H) 04/12/2019 03:52 AM  
 HGB 9.3 (L) 04/12/2019 03:52 AM  
 HCT 27.3 (L) 04/12/2019 03:52 AM  
 PLATELET 263 (H) 28/30/2157 03:52 AM  
 MCV 86.1 04/12/2019 03:52 AM  
 
 
Physical Exam:  
General appearance: alert,  female, cooperative, appears stated age Head: Normocephalic, without obvious abnormality, atraumatic, NG tube in place Lungs: clear to auscultation bilaterally Heart: regular rate and rhythm Abdomen: abdominal tenderness, distended Extremities: extremities normal, atraumatic, no cyanosis or edema Skin: Skin color, texture, turgor normal. No rashes or lesions Lymph nodes: Cervical, supraclavicular, and axillary nodes normal. 
Neurologic: Grossly normal 
 
 
CT Results (most recent): 
Results from Hospital Encounter encounter on 04/12/19 CT GUIDE CATH/PERC DRAIN PERITON/RETROPERI FLUID W SI  
 Narrative EXAM: CT GUIDE CATH/PERC DRAIN PERITON/RETROPERI FLUID W SI 
 
INDICATION: Pelvic abscess status post right colectomy, known abdominal 
carcinomatosis CT dose reduction was achieved through use of a standardized protocol tailored 
for this examination and automatic exposure control for dose modulation. FINDINGS: The procedure including the risk of bleeding and infection was 
explained to the patient and verbal and written informed consent was obtained. The patient was placed into the CT scanner in the supine position, images were 
obtained and a site was localized for catheter access to the right pericolonic 
collection. The skin was marked and prepped and draped in sterile fashion and 
anesthetized with 1% lidocaine. An 18-gauge Khan needle was advanced into the 
collection and this a 0.035 J-wire placed into the collection and confirmed with 
CT imaging. The heart as well as removed and after serial dilation, a 12 [place]ena Abscession locking loop drainage catheter was placed into the collection. Aspiration yielded approximately 20 cc of thick yellow-brown fluid and some gas. The catheter was connected to according bag drainage and additional 
approximately 30 cc of fluid was yielded. The catheter was affixed with an 
adhesive bandage. The patient was monitored by the radiology nurse throughout the procedure with 
pulse oximetry and EKG monitoring and Versed and fentanyl were administered for 
conscious sedation. The patient tolerated the procedure well without apparent 
complication and will be recovered in the radiology holding unit and discharged 
to home if stable.  
  
 Impression IMPRESSION: Technically successful 12 English locking loop abscess drainage 
catheter placement in right pericolonic abscess collection. Assessment: 1. Appendiceal Carcinomatosis -  
 
S/p diagnostic diagnostic converted to exploratory laparotomy. She was found to have an infiltrative mass involving appendix with extensive involvement of inferior omentum, rectum and ovaries and diffuse scattered peritoneal implants - 3/29/2019 - Dr. Liz Session Final Biopsy Report :  
 
 FINAL PATHOLOGIC DIAGNOSIS 1. Omentum, omentectomy, up to 39 cm:  
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  
2. Extended right colectomy:  
Mixed goblet cell carcinoid-adenocarcinoma, 5 cm, poorly differentiated, with signet ring cells and extracellular mucin of appendix with invasion through serosa and innumerable serosal tumor implants of ileum and colon, mural implant of distal ascending colon, and tumor implants of small bowel mesentery Five of twenty regional lymph nodes positive for metastatic adenocarcinoma (5/20) Size of largest metastasis: 2 mm Extranodal extension: Present Biopsy report shows mixed globet cell and signet ring adenocarcinoma. Signet ring adenocarcinoma is the rarest and most aggressive form of appendix cancer. Overall, this makes her overall prognosis guarded. Patient now admitted with SBO. No longer a candidate for any treatment. Discussed comfort measures and hospice with the patient and family. Will consult Palliative Medicine for symptom management and care decisions. Plan:  
 
> Comfort care with transition to hospice 
> Palliative Medicine consult Signed By: Renata Loya NP April 12, 2019

## 2019-04-12 NOTE — ED NOTES
Informed ed physician, Dr Latia Hobson, about pt's change in VS.  
 
Dr Rosalinda Cade is contacting hospitalist.  He gave verbal orders to hold the fluid bolus based on reported VS changes.

## 2019-04-12 NOTE — LETTER
NOTIFICATION RETURN TO WORK / SCHOOL 
 
4/14/2019 12:23 PM 
 
Ms. Quita Sutton [address] [facility] [phone] [id number] To Whom It May Concern: 
 
Quita Sutton is currently under the care of [facility]. Dr Bryson Owens and Carline Joshi She will be in [facility] for the [facility], is critically ill and will not be able to attend or participate with Court date for at least April and May 2019 , possible longer. If there are questions or concerns please have the patient contact our office. Sincerely, 
 
Dr Ciro Manzanares No name on file.

## 2019-04-12 NOTE — H&P
Surgery History and Physical 
 
Subjective:  
  
Jackie Rico is a 71 y.o. female who presents ER  N/V,  Failure to eat and thrive, weakness,  2 weeks sp right colectomy Dr Leola Babinski for what revealed at surgery appendiceal carcinomatosis  Widely metastatic. IN ER with Post op SBO , CT scan ? Leak anastamosis, with abscess, pelvic bubbles air,  Ascites,  Leukocytosis,  Hyponatremia, hypoalbuminemia, sever protein calorie malnutrition , alb  2. GI Dr Iesha Arriola aware,  Colonoscopy with rectomsigmoid dz not obstruction, Dr Iesha Arriola indicated on my speaking with him, that he would notify pt oncologist.  Pt prognosis poor overall, social situation not good per hx with Dr Leola Babinski,  I reviewed with radiology  Dr Bradley Hernandez, and plan perc drain RLQ abscess,  IV antibx,  Oncology and GI FU  But pt really not much of operative candidate but if needed would be palliative ileostomy,  But function may be poor with overall dx  And may benefit from hospice consult pnd oncology consult. Picc  If pt elects TPN, may not benefit much. Pt aware of prognosis poor,  Pt gave me her ex husbands phone #  [phone], birdie,  I called, but then pt declined to see him. Past Medical History:  
Diagnosis Date  Acid reflux  GERD (gastroesophageal reflux disease)  Hypertension  Other ill-defined conditions(799.89)   
 high cholesterol  Psychiatric disorder   
 depression  Thyroid disease   
 hyperthyroidism Past Surgical History:  
Procedure Laterality Date  COLONOSCOPY N/A 11/30/2017 COLONOSCOPY performed by Ramona Ball MD at [facility] ENDOSCOPY  COLONOSCOPY N/A 3/20/2019 COLONOSCOPY performed by Shalom Morales MD at [facility] ENDOSCOPY  COLONOSCOPY N/A 3/28/2019 COLONOSCOPY performed by Shalom Morales MD at [facility] ENDOSCOPY 2021 Bibi Soni N/A 3/20/2019 SIGMOIDOSCOPY FLEXIBLE performed by Shalom Morales MD at [facility] ENDOSCOPY  
 HX CHOLECYSTECTOMY  HX GYN    
 hysterectomy  HX HEENT    
 thyroid Family History Problem Relation Age of Onset  Cancer Mother  Colon Cancer Father  Cancer Father Social History Tobacco Use  Smoking status: Never Smoker  Smokeless tobacco: Never Used Substance Use Topics  Alcohol use: Yes Alcohol/week: 0.6 oz Types: 1 Cans of beer per week Comment: Socially. Prior to Admission medications Medication Sig Start Date End Date Taking? Authorizing Provider  
ondansetron (ZOFRAN ODT) 4 mg disintegrating tablet Take 1 Tab by mouth every eight (8) hours as needed for Nausea. 4/4/19   Gabriel Osei MD  
famotidine (PEPCID) 20 mg tablet Take 1 Tab by mouth two (2) times a day. 4/4/19   Gabriel Osei MD  
docusate sodium (COLACE) 100 mg capsule Take 100 mg by mouth two (2) times a day. Provider, Historical  
butalbital-acetaminophen-caff (FIORICET) -40 mg per capsule Take 1 Cap by mouth every six (6) hours as needed for Headache. 5/3/18   Matheus Rapp MD  
diazePAM (VALIUM) 5 mg tablet Take 5 mg by mouth every eight (8) hours as needed for Anxiety. Provider, Historical  
gabapentin (NEURONTIN) 300 mg capsule Take 300 mg by mouth three (3) times daily. Provider, Historical  
zolpidem CR (AMBIEN CR) 12.5 mg tablet Take 12.5 mg by mouth nightly as needed for Sleep. Provider, Historical  
estradiol (ESTRACE) 1 mg tablet Take 1 mg by mouth daily. Other, MD Oli  
  
Allergies Allergen Reactions  Chlorhexidine Towelette Itching Pt c/o itching and skin dryness; no rash noted.  Codeine Itching  Lisinopril Angioedema Review of Systems Constitutional: Positive for activity change and appetite change. N/V   
HENT: Negative. Eyes: Negative. Respiratory: Negative. Cardiovascular: Negative. Gastrointestinal: Positive for abdominal distention, abdominal pain, nausea and vomiting. Musculoskeletal: Positive for arthralgias. Neurological: Negative. Psychiatric/Behavioral: Negative. All other systems reviewed and are negative. Objective:  
 
Patient Vitals for the past 8 hrs: 
 BP Temp Resp SpO2 Height Weight 19 0730 145/72   (!) 87 %    
19 0645 165/88   91 %    
19 0630 120/78   96 %    
19 0545 126/72   98 %    
19 0515 124/74   97 %    
19 0500 133/69   (!) 87 %    
19 0334 142/86 97.9 °F (36.6 °C) 16 98 % 5' 5\" (1.651 m) 126 lb 12.2 oz (57.5 kg) Temp (24hrs), Av.9 °F (36.6 °C), Min:97.9 °F (36.6 °C), Max:97.9 °F (36.6 °C) Physical Exam  
Nursing note and vitals reviewed. Constitutional: She is oriented to person, place, and time. Ill tired appearing, frail, malnourished , NAD HENT:  
Head: Normocephalic and atraumatic. Temporal wasting Eyes: EOM are normal.  
Neck: Neck supple. Cardiovascular: Normal rate and regular rhythm. Pulmonary/Chest: Breath sounds normal.  
Abdominal: She exhibits distension. There is no tenderness. There is no rebound and no guarding. Musculoskeletal: Normal range of motion. Neurological: She is alert and oriented to person, place, and time. Skin: Skin is warm and dry. Psychiatric: She has a normal mood and affect. Her behavior is normal. Judgment and thought content normal.  
 
 
Assessment: SBO Pelvic abscess, ? anastamosis leak Anemia Hyponatremia Sever malnutritin Abdomen carcinomatoiss Poor porgnosis. Plan:  
 
Santiago Arita is a 71 y.o. female who presents ER  N/V,  Failure to eat and thrive, weakness,  2 weeks sp right colectomy Dr Kj Esparza for what revealed at surgery appendiceal carcinomatosis  Widely metastatic. IN ER with Post op SBO , CT scan ? Leak anastamosis, with abscess, pelvic bubbles air,  Ascites,  Leukocytosis,  Hyponatremia, hypoalbuminemia, sever protein calorie malnutrition , alb  2.   GI Dr Marvel Henning aware,  Colonoscopy with rectomsigmoid dz not obstruction, Dr Kori Massey indicated on my speaking with him, that he would notify pt oncologist.  Pt prognosis poor overall, social situation not good per hx with Dr Fely Hough,  I reviewed with radiology  Dr Miguelina Rose, and plan perc drain RLQ abscess,  IV antibx,  Oncology and GI FU  But pt really not much of operative candidate but if needed would be palliative ileostomy,  But function may be poor with overall dx  And may benefit from hospice consult pnd oncology consult. Picc  If pt elects TPN, may not benefit much. Pt aware of prognosis poor,  Pt gave me her ex husbands phone #  [phone], birdie,  I called, but then pt declined to see him. Face time with pt  62 min and care of pt discussion other care providers Pt declined me calling other family Signed By: Stuart Elder MD  
[facility] April 12, 2019

## 2019-04-12 NOTE — CONSULTS
Pulmonary, Critical Care, and Sleep Medicine~Consult Note Name: Renee Calderon MRN: [id number] : 1955 Hospital: [facility] Date: 2019 3:12 PM Admission: 2019 Impression Plan 1. SBO 
2. Hyponatremia, initially 129, corrected quickly to 132 with bolus of NS 3. Recent diagnosis of appendiceal CA w/ extenstive carcinomatosis. S/P open lap w/ omentectomy and R colectomy 3/28/19. 
4. Ascites. Peritoneal drain placed by IR  5. Acute hypoxic respiratory failure 6. Left sided pneumonia, likely aspiration 7. Hypertensive on presentation 8. Leukocytosis 9. Pt confirms wishes DNR, but would be ok with NIV / pressor support if needed. 1. Decompression w/ NGT 2. Start 1/2 NS IVF 3. IV Zosyn 4. Currently on NRB, wean O2 as able on nasal cannula 5. Trend WBC, sodium, other lytes 6. F/u blood cultures 7. Pain control 8. Radiographic follow up for PNA and SBO 9. Note that GI and Oncology have been consulted 10. Continue supportive care with CCU monitoring. The patient is at high risk for further decompensation. Daily Progression: We have been asked by the hospital team to see this patient in regards to SBO and hypoxic respiratory failure. The patient is a 71 yr old ill appearing woman who has been admitted for ICU care after she presented to the ED this morning with complaints of abd pain, nausea and vomiting for the past day. She was found to be hypoxic and hypertensive on presentation. SpO2 was 85% on room air and BP was 361J systolic. Unfortunately was diagnosed with appendiceal cancer a couple of weeks ago. Had surgery to include open lap w/ omentectomy and right colectomy. Review of the chart is noteable for: lactate 1.21, WBC 17.1, Na 129 now 132. CXR personally reviewed demonstrates left sided airspace opacities and CT of the abd shows extensive dilation of bowel, ascites and esophagitis. Currently, states that she is feeling a little better with less nausea but complains of 8/10 pain at the peritoneal drain site (placed today). ROS is slightly limited as her speech is a little mumbled and difficult to understand. She denies LOC, but has had some headaches and complains of a sore throat. Denies chest pain. I have reviewed the labs and previous days notes. Pertinent items are noted in HPI. Past Medical History:  
Diagnosis Date  Acid reflux  GERD (gastroesophageal reflux disease)  Hypertension  Other ill-defined conditions(799.89)   
 high cholesterol  Psychiatric disorder   
 depression  Thyroid disease   
 hyperthyroidism Past Surgical History:  
Procedure Laterality Date  COLONOSCOPY N/A 11/30/2017 COLONOSCOPY performed by Lizzy Adams MD at [facility] ENDOSCOPY  COLONOSCOPY N/A 3/20/2019 COLONOSCOPY performed by Jose Guadalupe Awan MD at [facility] ENDOSCOPY  COLONOSCOPY N/A 3/28/2019 COLONOSCOPY performed by Jose Guadalupe Awna MD at [facility] ENDOSCOPY 2021 Bibi Ceballos Nae N/A 3/20/2019 SIGMOIDOSCOPY FLEXIBLE performed by Jose Guadalupe Awan MD at [facility] ENDOSCOPY  
 HX CHOLECYSTECTOMY  HX GYN    
 hysterectomy  HX HEENT    
 thyroid Prior to Admission medications Medication Sig Start Date End Date Taking? Authorizing Provider QUEtiapine (SEROQUEL) 25 mg tablet Take 25 mg by mouth daily. Provider, Historical  
oxyCODONE (ROXICODONE) 5 mg/5 mL solution Take 5 mg by mouth every four (4) hours as needed for Pain. Provider, Historical  
oxyCODONE-acetaminophen (PERCOCET) 5-325 mg per tablet Take 1 Tab by mouth every six (6) hours as needed for Pain. Provider, Historical  
pantoprazole (PROTONIX) 40 mg tablet Take 40 mg by mouth daily. Provider, Historical  
amitriptyline (ELAVIL) 25 mg tablet Take 25 mg by mouth nightly.     Provider, Historical  
ondansetron (ZOFRAN ODT) 4 mg disintegrating tablet Take 1 Tab by mouth every eight (8) hours as needed for Nausea. 19   Barbara Vicente MD  
famotidine (PEPCID) 20 mg tablet Take 1 Tab by mouth two (2) times a day. 19   Barbara Vicente MD  
docusate sodium (COLACE) 100 mg capsule Take 200 mg by mouth two (2) times a day. Provider, Historical  
butalbital-acetaminophen-caff (FIORICET) -40 mg per capsule Take 1 Cap by mouth every six (6) hours as needed for Headache. 5/3/18   Luis Gibbs MD  
diazePAM (VALIUM) 5 mg tablet Take 5 mg by mouth every eight (8) hours as needed for Anxiety. Provider, Historical  
gabapentin (NEURONTIN) 300 mg capsule Take 600 mg by mouth nightly. Provider, Historical  
zolpidem (AMBIEN) 10 mg tablet Take 10 mg by mouth nightly as needed for Sleep. Provider, Historical  
estradiol (ESTRACE) 1 mg tablet Take 1 mg by mouth daily. Other, MD Oli  
 
Allergies Allergen Reactions  Chlorhexidine Towelette Itching Pt c/o itching and skin dryness; no rash noted.  Codeine Itching  Lisinopril Angioedema Social History Tobacco Use  Smoking status: Never Smoker  Smokeless tobacco: Never Used Substance Use Topics  Alcohol use: Yes Alcohol/week: 0.6 oz Types: 1 Cans of beer per week Comment: Socially. Family History Problem Relation Age of Onset  Cancer Mother  Colon Cancer Father  Cancer Father OBJECTIVE: 
 
 Vital Signs: 
    
Visit Vitals /63 Pulse (!) 108 Temp 99.8 °F (37.7 °C) Resp (!) 35 Ht 5' 5\" (1.651 m) Wt 57.5 kg (126 lb 12.2 oz) SpO2 98% BMI 21.09 kg/m² Temp (24hrs), Av.6 °F (37.6 °C), Min:97.5 °F (36.4 °C), Max:102.3 °F (39.1 °C) Intake/Output:  
  Last shift:  0701 -  1900 In: -  
Out: 650 Last 3 shifts: No intake/output data recorded. Intake/Output Summary (Last 24 hours) at 2019 1512 Last data filed at 2019 1434 Gross per 24 hour Intake  Output 650 ml Net -650 ml  
  
 Lines/devices: Right peritoneal drain Drips:   
Discussed care with: CCU nurse, Dr. Samantha Buck, patient Supplemental O2 Goals: 93% or better Ambulatory status:   
Prophylaxis:  SCDs for now. Ordered Subutaneous heparin for 4/13 AM  
Nutrition: NPO Code Status: DNR Contact precautions:   
Triage/Disposition: Unchanged. Continue ICU care. Imaging: 
I have personally reviewed these radiographic films and reports:  CXR, CTAbd I have personally reviewed PFTs: 
 n/a Ventilation: Mode Rate Tidal Volume Pressure Suppport FiO2/LPM PEEP Other Peak airway pressure:     
Minute ventilation:     
Trilogy:    
 
 Physical Exam:                                       
Exam Findings Other General: No resp distress noted, appears older than stated age, thin, frail appearing HEENT:  Oropharynx dry with lesions, NGT R nare, trachea midline, no masses or swelling of the neck Chest: No deformities HEART:  RRR, no murmurs/rubs/gallops Lungs:  Left anteriolateral rhonchi, diminished. No wheezes or crackles ABD: Midline incision approximated w/ staples no erythema. +distended, hypoactive BS. Tender EXT: No cyanosis/clubbing/edema, normal peripheral pulses Skin: No rashes or ulcers, no mottling Neuro: RASS +1, A/O x 3 (sort of) having trouble with the month. Medications: 
Current Facility-Administered Medications Medication Dose Route Frequency  phenol throat spray (CHLORASEPTIC) 1 Spray  1 Spray Oral PRN  piperacillin-tazobactam (ZOSYN) 3.375 g in 0.9% sodium chloride (MBP/ADV) 100 mL  3.375 g IntraVENous Q8H  
 sodium chloride (NS) flush 5-10 mL  5-10 mL IntraVENous PRN  
 heparin (porcine) pf 300 Units  300 Units InterCATHeter PRN Labs: ABG No results for input(s): PHI, PCO2I, PO2I, HCO3I, SO2I, FIO2I in the last 72 hours. CBC Recent Labs 04/12/19 
1402 WBC 17.1* HGB 9.3* HCT 27.3*  
* MCV 86.1 MCH 29.3 Metabolic Panel Recent Labs 04/12/19 
1247 04/12/19 
0459 * 129*  
K 3.9 3.8 CL 97 95* CO2 27 25 GLU 89 97 BUN 11 12 CREA 0.56 0.55 CA 8.0* 7.5* ALB 2.3* 2.0*  
SGOT 17 17 ALT 12 10* Pertinent Labs As above VANESSA Rangel 
4/12/2019

## 2019-04-12 NOTE — PROGRESS NOTES
Update:  Pt pulmonary and overall status deteriorating, pt had perc drain abdomen leak abscess, and pt prognosis poor,, GI consulted Intensivist, I had spoken to hospitalist then cx'd consult since CC seeing pt. I called pt friend Oj Rebollar at [phone] and reviewed grave situation , likely poor outcome, rec. Consider hospice terminal care DNR status given cancer situation ,  Doubt laparotomy and ielostomy much short term or long term benefit, but can consider if pt wants, but likely same outcome, death,  Called pt son Yanet [facility] [phone] and VM was full, no answer.

## 2019-04-12 NOTE — ED NOTES
Mr Rocío Silver:  [phone] (ex-) called in response to Dr Jaron Balderrama message. He states he is not allowed to have physical contact but he will gladly come to the hospital if she wishes. I told him I would pass the message and call him back if she wants me to.

## 2019-04-12 NOTE — ED PROVIDER NOTES
EMERGENCY DEPARTMENT HISTORY AND PHYSICAL EXAM 
     
 
Date: 4/12/2019 Patient Name: Ondina Gibbons History of Presenting Illness Chief Complaint Patient presents with  Vomiting Pt ambulatory to triage with c/o vomiting x 6 hours; pt states she was dx with pancreatic cancer last week, has not started treatment  Abdominal Pain  
  mid to lower abdominal pain with associated N/V History Provided By: Patient HPI: Ondina Gibbons is a 71 y.o. female, with recent diagnosis of appendiceal cancer with carcinomatosis s/p right colectomy on 3/29, who presents to the ED complaining of abdominal pain, NV, distension and generalized poor health. She says she has only been able to have clear liquids since she went home but tried chicken noodle soup last yesterday and wondered if that triggered this episode. She notes she was doing okay for few days after leaving the hospital but since then has declined and is very weak. She specifically denies any recent fevers, chills, CP, SOB, urinary sxs, changes in BM, or headache. PCP: Magali Nicole MD 
 
Social Hx: -tobacco (-), -EtOH (-), -Illicit Drugs (-) There are no other complaints, changes, or physical findings at this time. Current Facility-Administered Medications Medication Dose Route Frequency Provider Last Rate Last Dose  TPN ADULT - CENTRAL AA 5% D20% W/ CA + ELECTROLYTES   IntraVENous CONTINUOUS Chyna Ramos MD 42 mL/hr at 04/23/19 1818  zinc oxide-cod liver oil (DESITIN) 40 % paste   Topical PRN Chyna Ramos MD      
 nystatin (MYCOSTATIN) 100,000 unit/gram powder   Topical PRN Chyna Ramos MD      
 [START ON 4/25/2019] sodium hypochlorite (QUARTER STRENGTH DAKIN'S) 0.125% irrigation (bottle)   Topical Once per day on Thu Sat Chyna Ramos MD      
 insulin lispro (HUMALOG) injection   SubCUTAneous Q6H Mackenzie Duong MD   Stopped at 04/18/19 1200  glucose chewable tablet 16 g  4 Tab Oral PRN Rosalee Chacon MD      
 dextrose (D50W) injection syrg 12.5-25 g  12.5-25 g IntraVENous PRN Rosalee Chacon MD      
 glucagon [facility]) injection 1 mg  1 mg IntraMUSCular PRN Rosalee Chacon MD      
 0.45% sodium chloride infusion  25 mL/hr IntraVENous CONTINUOUS Selma Reddy MD 25 mL/hr at 04/21/19 1728 25 mL/hr at 04/21/19 1728  fat emulsion 20% (LIPOSYN, INTRAlipid) infusion 500 mL  500 mL IntraVENous Q MON, WED & Therisa Fells Selma Reddy MD   500 mL at 04/22/19 2124  butalbital-acetaminophen-caffeine (FIORICET, ESGIC) -40 mg per tablet 1 Tab  1 Tab Oral Q4H PRN Selma Reddy MD   1 Tab at 04/17/19 1142  ampicillin-sulbactam (UNASYN) 3 g in 0.9% sodium chloride (MBP/ADV) 100 mL  3 g IntraVENous Q6H Selma Reddy  mL/hr at 04/24/19 0024 3 g at 04/24/19 0024  scopolamine (TRANSDERM-SCOP) 1 mg over 3 days 1 Patch  1 Patch TransDERmal Q72H Selma Reddy MD   1 Patch at 04/22/19 1300  prochlorperazine (COMPAZINE) with saline injection 10 mg  10 mg IntraVENous Q4H PRN Selma Reddy MD   10 mg at 04/16/19 1553  
 HYDROmorphone (PF) (DILAUDID) injection 0.5 mg  0.5 mg IntraVENous Q4H PRN Selma Reddy MD   0.5 mg at 04/24/19 0023  sodium chloride (NS) flush 5-40 mL  5-40 mL IntraVENous Q8H Selma Reddy MD   10 mL at 04/23/19 2200  
 sodium chloride (NS) flush 5-40 mL  5-40 mL IntraVENous PRN Selma Reddy MD   10 mL at 04/22/19 2333  
 HYDROmorphone (PF) 25 mg/50 mL (DILAUDID) PCA   IntraVENous CONTINUOUS Rosalee Chacon MD      
 ondansetron [facility]) injection 4 mg  4 mg IntraVENous Q4H PRN Otilia Lopez MD   4 mg at 04/23/19 2257  phenol throat spray (CHLORASEPTIC) 1 Spray  1 Spray Oral PRN Otilia Lopez MD   1 Spray at 04/23/19 0046  
 heparin (porcine) pf 300 Units  300 Units InterCATHeter PRN Otilia Lopez MD      
  heparin (porcine) injection 5,000 Units  5,000 Units SubCUTAneous Q12H Violetta King MD   5,000 Units at 04/23/19 1815 Past History Past Medical History: 
Past Medical History:  
Diagnosis Date  Acid reflux  GERD (gastroesophageal reflux disease)  Hypertension  Other ill-defined conditions(799.89)   
 high cholesterol  Psychiatric disorder   
 depression  Thyroid disease   
 hyperthyroidism Past Surgical History: 
Past Surgical History:  
Procedure Laterality Date  COLONOSCOPY N/A 11/30/2017 COLONOSCOPY performed by Girish Richards MD at [facility] ENDOSCOPY  COLONOSCOPY N/A 3/20/2019 COLONOSCOPY performed by Delana Siemens, MD at [facility] ENDOSCOPY  COLONOSCOPY N/A 3/28/2019 COLONOSCOPY performed by Delana Siemens, MD at [facility] ENDOSCOPY [address] N/A 3/20/2019 SIGMOIDOSCOPY FLEXIBLE performed by Delana Siemens, MD at [facility] ENDOSCOPY  
 HX CHOLECYSTECTOMY  HX GYN    
 hysterectomy  HX HEENT    
 thyroid Family History: 
Family History Problem Relation Age of Onset  Cancer Mother  Colon Cancer Father  Cancer Father Social History: 
Social History Tobacco Use  Smoking status: Never Smoker  Smokeless tobacco: Never Used Substance Use Topics  Alcohol use: Yes Alcohol/week: 0.6 oz Types: 1 Cans of beer per week Comment: Socially.  Drug use: No  
 
 
Allergies: Allergies Allergen Reactions  Chlorhexidine Towelette Itching Pt c/o itching and skin dryness; no rash noted.  Codeine Itching  Lisinopril Angioedema Review of Systems Review of Systems Constitutional: Positive for activity change, appetite change and fatigue. Negative for fever. HENT: Negative. Negative for congestion, rhinorrhea and sore throat. Respiratory: Negative. Negative for cough, shortness of breath and wheezing. Cardiovascular: Negative. Negative for chest pain and leg swelling. Gastrointestinal: Positive for abdominal distention, abdominal pain, nausea and vomiting. Negative for constipation and diarrhea. Endocrine: Negative. Genitourinary: Negative for difficulty urinating, dysuria, menstrual problem, vaginal bleeding and vaginal discharge. Musculoskeletal: Negative. Negative for arthralgias, joint swelling and myalgias. Skin: Negative. Negative for rash. Neurological: Negative. Negative for dizziness, weakness, light-headedness and headaches. Psychiatric/Behavioral: Negative. Physical Exam  
Physical Exam  
Constitutional: She is oriented to person, place, and time. She appears well-nourished. She appears distressed. Pt is very ill appearing, chronically ill appearing, weak HENT:  
Head: Atraumatic. Eyes: Pupils are equal, round, and reactive to light. Conjunctivae are normal.  
Neck: Normal range of motion. Cardiovascular: Normal rate, regular rhythm, normal heart sounds and intact distal pulses. No murmur heard. Pulmonary/Chest: Effort normal and breath sounds normal. No respiratory distress. She has no wheezes. She has no rales. She exhibits no tenderness. Abdominal: Bowel sounds are normal. She exhibits distension. She exhibits no mass. There is tenderness. There is guarding. There is no rebound. Musculoskeletal: Normal range of motion. Neurological: She is alert and oriented to person, place, and time. No cranial nerve deficit. Skin: Skin is warm. No rash noted. No erythema. Nursing note and vitals reviewed. Diagnostic Study Results Labs - Recent Results (from the past 12 hour(s)) GLUCOSE, POC Collection Time: 04/23/19  5:59 PM  
Result Value Ref Range Glucose (POC) 110 (H) 65 - 100 mg/dL Performed by Brandy Conway GLUCOSE, POC Collection Time: 04/24/19 12:25 AM  
Result Value Ref Range Glucose (POC) 100 65 - 100 mg/dL Performed by Jackelin Fargo Radiologic Studies -  
XR ABD (KUB) Final Result IMPRESSION: Satisfactory NG tube placement in the stomach. XR ABD ACUTE W 1 V CHEST Final Result IMPRESSION:  
1. Stable abdomen including nonspecific gas distention of bowel loops. 2. Stable chest with pleural effusions and  left greater than right lower lobe  
airspace disease/atelectasis. CT ABD PELV W CONT Final Result IMPRESSION:  
  
1. Interval placement of a pigtail drainage catheter within the right flank  
extraluminal gas and fluid collection. The catheter is well-positioned. There  
has been no significant change in size of the extraluminal collection. 2. Evidence of chronic partial small bowel obstruction, improved from the prior  
study. 3. Enlarging, small bilateral pleural effusions. New left lower lobe airspace  
disease. Chronic right lower lobe airspace disease versus atelectasis. 4. Perihepatic and perisplenic ascites, not significantly changed. Pelvic  
ascites, mildly decreased. XR CHEST PORT Final Result IMPRESSION: Improved left lower lobe pneumonia. XR ABD FLAT/ ERECT Final Result IMPRESSION:  
1. No significant change in the appearance of the abdomen with gas-filled loops  
of small bowel having multiple air-fluid levels. 2. Gastric tube. 3. Pigtail drainage catheter overlying the right midabdomen XR CHEST PORT Final Result IMPRESSION:  
1. New airspace disease in left perihilar region and left lower lobe consistent  
with developing pneumonia or aspiration. The unit was notified. CT GUIDE CATH/PERC DRAIN PERITON/RETROPERI FLUID W SI Final Result IMPRESSION: Technically successful 12 Irish locking loop abscess drainage  
catheter placement in right pericolonic abscess collection. XR CHEST PORT Final Result IMPRESSION: Very mild bibasilar atelectasis. XR ABD (KUB) Final Result Impression: 1. Enteric tube terminates within the distal stomach. The course is more midline and to the right of the spine than usual due to displacement of the  
stomach by dilated left upper quadrant bowel. 2.  Findings of obstruction, better assessed on same-day CT. CT ABD PELV W CONT Final Result IMPRESSION:  
Small bowel obstruction secondary to the level of anastomosis. Cannot exclude  
right paracolic gutter abscess without oral contrast.  
Complex ascites in patient with known carcinomatosis Incidental esophagitis CT Results  (Last 48 hours) None CXR Results  (Last 48 hours) None Medical Decision Making I am the first provider for this patient. I reviewed the vital signs, available nursing notes, past medical history, past surgical history, family history and social history. Vital Signs-Reviewed the patient's vital signs. Patient Vitals for the past 12 hrs: 
 Temp Pulse Resp BP SpO2  
04/23/19 2259 98 °F (36.7 °C) 81 16 150/82 100 % 04/23/19 1945 98.8 °F (37.1 °C) 83 16 148/64 96 % 04/23/19 1639 98.7 °F (37.1 °C) 85 16 133/61 91 % Records Reviewed: Nursing Notes and Old Medical Records Provider Notes (Medical Decision Making): DDx: Pt is quite ill with known cancer, s/p colectomy, now with abd pain, NV, which could represent SBO vs intra-abdominal infection. Will obtain CT scan, labs, and provide symptomatic relief. ED Course:  
Initial assessment performed. The patients presenting problems have been discussed, and they are in agreement with the care plan formulated and outlined with them. I have encouraged them to ask questions as they arise throughout their visit. CONSULT NOTE:  
3:07 AM 
Dr Tee Goff spoke with Dr Preet Posada Specialty: Surgery Discussed pt's hx, disposition, and available diagnostic and imaging results. Reviewed care plans. Consultant agrees with plans as outlined. Written by Mackenzie Ceja 
 
 
Diagnosis Clinical Impression: 1. SBO (small bowel obstruction) ([address] 2. Appendix carcinoma ([facility] [address] 3. Peritoneal carcinomatosis ([facility] [address] 4. Goals of care, counseling/discussion 5. Advanced care planning/counseling discussion 6. Cancer associated pain Disposition: 
 
ADMIT This note will not be viewable in [address]

## 2019-04-12 NOTE — ED TRIAGE NOTES
Pt arrived having abd pain. Pt stated she recently had surgery on her pancreases. Pt c/o \"sharp pain in my abd and in \"back near my kidneys\". Pt reported N/V. Pt states the last bowel movement was 4/3/2019.

## 2019-04-12 NOTE — ED NOTES
Dr Marlena Braga spoke with pt who agrees to DNR status. Code status form filled out and placed on chart

## 2019-04-12 NOTE — ROUTINE PROCESS
0990-Pt taken from Er room 20 to radiology recovery area for abscess drainage catheter placement. Pt alert and oriented x 3. NG tube hooked to suction with immediate output of 250 ml.  VS stable, pt being prepped for procedure at this time. 0937-Dr. Jenny Fernandez into assess pt prior to procedure, assessment completed, procedure explained along with risks and benefits; consent obtained for CT guided abscess drainage catheter placement. 1004-Pt back from CT post procedure. VS stable, cultures taken to the lab as ordered. 1030-Name of procedure:CT guided abscess drainage catheter placement. Complications, if any, r/t procedure:None Sedation medications given: Versed 2 mg and Fentanyl 50 mcg Sedation tolerated: Yes 
VS : Stable Post Procedure Care Needed/order sets in [facility]: Yes post drainage catheter order sets placed into connect care. TRANSFER - OUT REPORT: 
 
Verbal report given to Kirti Rodriguez RN on Corey Romeo  being transferred to ER room 20 for routine progression of care Report consisted of patients Situation, Background, Assessment and  
Recommendations(SBAR). Information from the following report(s) Procedure Summary, Intake/Output and MAR was reviewed with the receiving nurse. Opportunity for questions and clarification was provided. Patient transported with: 
 O2 @ 4 liters Registered Nurse

## 2019-04-12 NOTE — ED NOTES
Passed message to Ms Amie Veronica who asked that I call Mr Amie Veronica back and tell him \"a little white lie\" that she was fine and would not be in the hospital long and he was not to come to the hospital.  That message was given to Mr Amie Veronica who accepted the information without comment or question.

## 2019-04-12 NOTE — PROGRESS NOTES
TRANSFER - IN REPORT: 
 
Verbal report received from Preeti Phipps (name) on Natalie Palma  being received from ED (unit) for routine progression of care Report consisted of patients Situation, Background, Assessment and  
Recommendations(SBAR). Information from the following report(s) SBAR, Kardex and MAR was reviewed with the receiving nurse. Opportunity for questions and clarification was provided. Assessment completed upon patients arrival to unit and care assumed. 1450- Patient arrived to ICU. Patient is allergic to CHG wipes. Per charge nurse, we don't need to bathe patient. Patient does have new linens and a gown. Primary Nurse Jim Yan RN and [facility], RN performed a dual skin assessment on this patient Impairment noted- see wound doc flow sheet Patient has an abdominal wound with sutures intact. Her heels are reddened, but blanchable. 1503- Assessment completed. See flowsheet. Unable to complete med reconciliation because patient is unable to recall what she takes. She feels tired and \"just cannot think right now. \" She does not have a list and her family present does not know what medication she takes. 1626- Fentanyl given for pain. 1748- Pain \"10/10\" dilaudid 0.5 mg given. 1900-Report given to [facility], [facility].   
 
 
 
 
Haritha Croft

## 2019-04-12 NOTE — ED NOTES
Pt back from IR. Received report from Andrew Israel. Pt given fentanyl during procedure. Cardiac monitor placed. O2 sats at 85% on 4L NC. O2 raised to 6L NC with rise in sats to 91%. Will reassess O2 sats as pt recovers. 400 mL output from NG tube at this time.

## 2019-04-12 NOTE — PROGRESS NOTES
PICC (Peripherally Inserted Central Catheter) line insertion  procedure note :  
 
Procedure explained to patient along with risks and benefits  and patient agreed to proceed. Informed consent obtained from  patient. Patient teaching completed. Timeout completed. Pre-procedure assessment done. Maximum sterile barrier precautions observed throughout procedure. Lidocaine 1%  3.0    ml sq given prior to cannulation. Cannulated brachial  vein using ultrasound guidance and modified seldinger technique. Inserted 5  Sammarinese double  lumen PICC to left arm using i[employer] Tip Location System and  [address]. Used left arm b/c pt has PIV to right arm. Pt has    sinus   rhythm. PICC tip location was confirmed by 3 CG tip positioning system, indicating tall P wave and no negative deflection before P wave which would indicate that the PICC tip is properly placed in the distal SVC or at the [place]. PICC tip location was  confirmed by 2 PICC nurses and 3CG printout placed on patient's chart. Blood return verified and flushed with 20 ml normal saline in each port. Sterile dressing applied with biopatch, statLock and occlusive dressing as per protocol. Curos caps applied to each port. Patient tolerated procedure well with minimal blood loss ( less than 5 ml.) Patient education material provided. PICC procedure performed by  :  Sarah Denton RN. PICC nurse Assisted by : Susan Costa RN  PICC nurse Reason for access : reliable access / MD order /   Hemodynamically unstable / Poor vascular access / TPN infusion / Vesicant IV medication infusion Complications related to insertion  : none X-Ray : not applicable Notified primary nurse    RN  that  PICC line can be used. Total Trimmed Length :  45   cm External Length :       0  cm PICC line site arm circumference:  25    cm PICC catheter occupies  15   % of vein Type of PICC: [address] [facility]  
 Ref # :    [id number] Lot # :  [id number] Expiration Date :    2020-06-30 Mitra Barbour RN. BSN. DEVENDRA,CMSRN. Clinician IV .  PICC Nurse, Vascular Access Team.

## 2019-04-12 NOTE — PROGRESS NOTES
I was notified by Dr Earlene Fuentes that they have already consulted the ICU team and intensivist and Told me not to see the pt now and Dr Earlene Fuentes will request hospitalist services if need in future. Thanks Dr Earlene Fuentes and feel free to call us if needed in future.

## 2019-04-12 NOTE — ED NOTES
Bedside shift change report given to Michelle Lucas RN (oncoming nurse) by Ryan Archuleta RN (offgoing nurse). Report included the following information SBAR, Kardex, ED Summary, Procedure Summary, MAR and Recent Results.

## 2019-04-12 NOTE — PROGRESS NOTES
Pt more acutely ill,  And on resp external support FM. Pt with some early mental status changes although seems lucid and appropriately making decisions. I met at bed with son JD  And dtr in law Toro Mccormick #  [phone] and lina #  [phone] and reviewed with pt and family extensively poor prognosis, and options laparotomy and end ileostomy and abdomen washout but with poor nutirional status and tumor burden harjit poor outcome and prognosis regardless op v. Non op and rec is cont. Medical rx and support , antibx but if pt not improving or worsens, pt and family concur DNR Do not resuscitate,  GI has consulted intensivist, but for now no surgical intervention or agrreissive pulmonary or CPR care and death may be imminent if not improving, family and pt aware.

## 2019-04-12 NOTE — PROGRESS NOTES
Pharmacy Clarification of the Prior to Admission Medication Regimen Retrospective to the Admission Medication Reconciliation The patient was interviewed regarding clarification of the prior to admission medication regimen and stated she did not know her medications. Patient stated she lives alone and manages her own medications and to call her outpatient pharmacy for her med list. 
 
T called the outpatient pharmacy on file, [facility], [phone], and spoke with Jacquelin Roberson, [facility], who was able to verify the patients current medications on file. MHT updated PTA Med List based on the information received from the outpatient pharmacy Last doses administered and compliance are unknown at this time. Information Obtained From: Javad Black,  Outpatient Pharmacy Recommendations/Findings: The following amendments were made to the patient's active medication list on file at [facility]:  
 
1) Additions:  
amitriptyline (ELAVIL) 25 mg tablet 
pantoprazole (PROTONIX) 40 mg tablet QUEtiapine (SEROQUEL) 25 mg tablet 
oxyCODONE (ROXICODONE) 5 mg/5 mL solution 
oxyCODONE-acetaminophen (PERCOCET) 5-325 mg per tablet 2) Removals: None 3) Changes: 
docusate sodium (COLACE) 100 mg capsule (Old regimen: 100mg BID  /New regimen: 200mg BID) 
gabapentin (NEURONTIN) 300 mg capsule  (Old regimen: 300mg TID  /New regimen: 600mg QHS) 
zolpidem (AMBIEN)  (Old regimen: (strength 12.5mg Er) QHS PRN  Genetta Salines regimen: (strength 10mg) QHS PRN) 4) Pertinent Pharmacy Findings: The medication history will need to be re-evaluated at a later time during admission when patient is willing/able to participate or if more information is provided. PTA medication list was corrected to the following:  
 
Prior to Admission Medications Prescriptions Last Dose Informant Patient Reported? Taking? QUEtiapine (SEROQUEL) 25 mg tablet Unknown at Unknown time Other Yes No  
Sig: Take 25 mg by mouth daily. amitriptyline (ELAVIL) 25 mg tablet Unknown at Unknown time Other Yes No  
Sig: Take 25 mg by mouth nightly. butalbital-acetaminophen-caff (FIORICET) -40 mg per capsule Unknown at Unknown time Other No No  
Sig: Take 1 Cap by mouth every six (6) hours as needed for Headache.  
diazePAM (VALIUM) 5 mg tablet Unknown at Unknown time Other Yes No  
Sig: Take 5 mg by mouth every eight (8) hours as needed for Anxiety. docusate sodium (COLACE) 100 mg capsule Unknown at Unknown time Other Yes No  
Sig: Take 200 mg by mouth two (2) times a day. estradiol (ESTRACE) 1 mg tablet Unknown at Unknown time Other Yes No  
Sig: Take 1 mg by mouth daily. famotidine (PEPCID) 20 mg tablet Unknown at Unknown time Other No No  
Sig: Take 1 Tab by mouth two (2) times a day.  
gabapentin (NEURONTIN) 300 mg capsule Unknown at Unknown time Other Yes No  
Sig: Take 600 mg by mouth nightly. ondansetron (ZOFRAN ODT) 4 mg disintegrating tablet Unknown at Unknown time Other No No  
Sig: Take 1 Tab by mouth every eight (8) hours as needed for Nausea. oxyCODONE (ROXICODONE) 5 mg/5 mL solution Unknown at Unknown time Other Yes No  
Sig: Take 5 mg by mouth every four (4) hours as needed for Pain. oxyCODONE-acetaminophen (PERCOCET) 5-325 mg per tablet Unknown at Unknown time Other Yes No  
Sig: Take 1 Tab by mouth every six (6) hours as needed for Pain.  
pantoprazole (PROTONIX) 40 mg tablet Unknown at Unknown time Other Yes No  
Sig: Take 40 mg by mouth daily. zolpidem (AMBIEN) 10 mg tablet Unknown at Unknown time Other Yes No  
Sig: Take 10 mg by mouth nightly as needed for Sleep. Facility-Administered Medications: None Thank you, Anisa Krueger Gonzalez Medication History Pharmacy Technician

## 2019-04-12 NOTE — PROCEDURES
Interventional Radiology 4/12/2019 Informed consent obtained Diagnosis: Abdominal abscess Procedure(s): CT guided 12F abscess drainage catheter placement. Specimens removed:  Aproximately 50 cc purulent foul smelling fluid. Complications: None Primary Physician: Karo Padilla MD 
 
Recomendations: none Discharge Disposition: radiology recovery, then return to room. Full dictated report to follow Signed By: Karo Padilla MD

## 2019-04-12 NOTE — ED NOTES
Dr Cassidy Martinez at bedside evaluating pt. Paged Dr Laurent Song as pt has developed chills, with O2 sats consistently in the mid-80's, hr in the 110's, rr in the 30's. Awaiting his return call. Will draw ordered blood tests.

## 2019-04-12 NOTE — H&P
Radiology History and Physical 
 
Patient: Meg Mckeon 71 y.o. female Chief Complaint: Vomiting (Pt ambulatory to triage with c/o vomiting x 6 hours; pt states she was dx with pancreatic cancer last week, has not started treatment) and Abdominal Pain (mid to lower abdominal pain with associated N/V) History of Present Illness: Intra-abdominal abscess. History: 
 
Past Medical History:  
Diagnosis Date  Acid reflux  GERD (gastroesophageal reflux disease)  Hypertension  Other ill-defined conditions(799.89)   
 high cholesterol  Psychiatric disorder   
 depression  Thyroid disease   
 hyperthyroidism Family History Problem Relation Age of Onset  Cancer Mother  Colon Cancer Father  Cancer Father Social History Socioeconomic History  Marital status:  Spouse name: Not on file  Number of children: Not on file  Years of education: Not on file  Highest education level: Not on file Occupational History  Not on file Social Needs  Financial resource strain: Not on file  Food insecurity:  
  Worry: Not on file Inability: Not on file  Transportation needs:  
  Medical: Not on file Non-medical: Not on file Tobacco Use  Smoking status: Never Smoker  Smokeless tobacco: Never Used Substance and Sexual Activity  Alcohol use: Yes Alcohol/week: 0.6 oz Types: 1 Cans of beer per week Comment: Socially.  Drug use: No  
 Sexual activity: Not on file Lifestyle  Physical activity:  
  Days per week: Not on file Minutes per session: Not on file  Stress: Not on file Relationships  Social connections:  
  Talks on phone: Not on file Gets together: Not on file Attends Yazdanism service: Not on file Active member of club or organization: Not on file Attends meetings of clubs or organizations: Not on file Relationship status: Not on file  Intimate partner violence: Fear of current or ex partner: Not on file Emotionally abused: Not on file Physically abused: Not on file Forced sexual activity: Not on file Other Topics Concern  Not on file Social History Narrative  Not on file Allergies: Allergies Allergen Reactions  Chlorhexidine Towelette Itching Pt c/o itching and skin dryness; no rash noted.  Codeine Itching  Lisinopril Angioedema Current Medications: 
Current Facility-Administered Medications Medication Dose Route Frequency  phenol throat spray (CHLORASEPTIC) 1 Spray  1 Spray Oral PRN  piperacillin-tazobactam (ZOSYN) 3.375 g in 0.9% sodium chloride (MBP/ADV) 100 mL  3.375 g IntraVENous Q8H  
 sodium chloride (NS) flush 5-10 mL  5-10 mL IntraVENous PRN Current Outpatient Medications Medication Sig  
 ondansetron (ZOFRAN ODT) 4 mg disintegrating tablet Take 1 Tab by mouth every eight (8) hours as needed for Nausea.  famotidine (PEPCID) 20 mg tablet Take 1 Tab by mouth two (2) times a day.  docusate sodium (COLACE) 100 mg capsule Take 100 mg by mouth two (2) times a day.  butalbital-acetaminophen-caff (FIORICET) -40 mg per capsule Take 1 Cap by mouth every six (6) hours as needed for Headache.  diazePAM (VALIUM) 5 mg tablet Take 5 mg by mouth every eight (8) hours as needed for Anxiety.  gabapentin (NEURONTIN) 300 mg capsule Take 300 mg by mouth three (3) times daily.  zolpidem CR (AMBIEN CR) 12.5 mg tablet Take 12.5 mg by mouth nightly as needed for Sleep.  estradiol (ESTRACE) 1 mg tablet Take 1 mg by mouth daily. Physical Exam: 
Blood pressure (P) 173/75, pulse (!) (P) 109, temperature (P) 98.4 °F (36.9 °C), resp. rate (P) 27, height 5' 5\" (1.651 m), weight 57.5 kg (126 lb 12.2 oz), SpO2 (!) (P) 85 %.  
GENERAL: alert, fatigued, cooperative, distracted, mild distress, appears older than stated age, toxic, pale, LUNG: clear to auscultation bilaterally, HEART: regular rate and rhythm, tachycardic. Alerts:   
Hospital Problems  Date Reviewed: 4/12/2019 Codes Class Noted POA * (Principal) SBO (small bowel obstruction) ([facility] ICD-10-CM: C57.119 ICD-9-CM: 560.9  4/12/2019 Yes Small bowel obstruction ([facility] ICD-10-CM: D97.478 ICD-9-CM: 560.9  4/12/2019 Unknown Severe protein-calorie malnutrition ([facility] ICD-10-CM: A16 ICD-9-CM: 165  4/12/2019 Unknown Hyponatremia ICD-10-CM: E87.1 ICD-9-CM: 276.1  4/12/2019 Unknown Pelvic abscess in female ICD-10-CM: N73.9 ICD-9-CM: 614.4  4/12/2019 Unknown Anemia ICD-10-CM: D64.9 ICD-9-CM: 285.9  4/12/2019 Unknown Sepsis ([facility] ICD-10-CM: A41.9 ICD-9-CM: 038.9, 995.91  4/12/2019 Unknown Peritoneal carcinomatosis ([facility] ICD-10-CM: C78.6, C80.1 ICD-9-CM: 197.6, 199.1  3/29/2019 Yes Appendix carcinoma ([facility] ICD-10-CM: C18.1 ICD-9-CM: 153.5  3/29/2019 Yes Laboratory:     
Recent Labs 04/12/19 
6913 04/12/19 
4385 HGB  --  9.3* HCT  --  27.3* WBC  --  17.1*  
PLT  --  666* BUN 12  --   
CREA 0.55  --   
K 3.8  --   
 
 
 
Plan of Care/Planned Procedure: 
Risks, benefits, and alternatives reviewed with patient and she agrees to proceed with the procedure.   
 
 
Cristobal Moya MD

## 2019-04-12 NOTE — ROUTINE PROCESS
TRANSFER - OUT REPORT: 
 
Verbal report given to [facility] (name) on Neal Keen  being transferred to Room 2534 CCU (unit) for routine progression of care Report consisted of patients Situation, Background, Assessment and  
Recommendations(SBAR). Information from the following report(s) SBAR, ED Summary, [facility] and Recent Results was reviewed with the receiving nurse. Lines: PICC Double Lumen 04/12/19 Left;Brachial (Active) Opportunity for questions and clarification was provided. Patient transported with: 
 Monitor O2 @ 10 liters Registered Nurse

## 2019-04-13 PROBLEM — T81.44XA POSTPROCEDURAL INTRA-ABDOMINAL SEPSIS (HCC): Status: ACTIVE | Noted: 2019-01-01

## 2019-04-13 PROBLEM — J18.9 SEPSIS DUE TO PNEUMONIA (HCC): Status: ACTIVE | Noted: 2019-01-01

## 2019-04-13 PROBLEM — T81.44XA SEPSIS FOLLOWING INTRA-ABDOMINAL SURGERY (HCC): Status: ACTIVE | Noted: 2019-01-01

## 2019-04-13 PROBLEM — A41.9 SEPSIS DUE TO PNEUMONIA (HCC): Status: ACTIVE | Noted: 2019-01-01

## 2019-04-13 NOTE — PROGRESS NOTES
Spiritual Care Assessment/Progress Note [address] 
 
 
NAME: Magalis Khanna      MRN: [id number] AGE: 71 y.o. SEX: female Advent Affiliation: [facility]  
Language: [place] 4/13/2019     Total Time (in minutes): 18 Spiritual Assessment begun in [facility] 2 CRITICAL CARE 2 through conversation with: 
  
    [x]Patient        [] Family    [] Friend(s) Reason for Consult: Palliative Care, Initial/Spiritual Assessment Spiritual beliefs: (Please include comment if needed) 
   [] Identifies with a valerie tradition:     
   [] Supported by a valerie community:        
   [] Claims no spiritual orientation:       
   [] Seeking spiritual identity:            
   [] Adheres to an individual form of spirituality:       
   [x] Not able to assess:  Did not indicate Identified resources for coping:  
   [] Prayer                           
   [] Music                  [] Guided Imagery [x] Family/friends                 [] Pet visits [] Devotional reading                         [] Unknown 
   [] Other:                                          
 
 
Interventions offered during this visit: (See comments for more details) Patient Interventions: Affirmation of emotions/emotional suffering, Catharsis/review of pertinent events in supportive environment, Initial/Spiritual assessment, patient floor Plan of Care: 
 
 [x] Support spiritual and/or cultural needs [x] Support AMD and/or advance care planning process    
 [] Support grieving process 
 [] Coordinate Rites and/or Rituals  
 [] Coordination with community clergy [] No spiritual needs identified at this time 
 [] Detailed Plan of Care below (See Comments)  [] Make referral to Music Therapy 
[] Make referral to Pet Therapy    
[] Make referral to Addiction services 
[] Make referral to [facility] 
[] Make referral to Spiritual Care Partner 
[] No future visits requested [] Follow up visits as needed Comments:   Initial visit in CCU for spiritual assessment of palliative consult patient. Patient's good friend was visiting. Patient recognized me from when she was visiting a hospitalized friend a few months ago. She welcomed visit and expressed concern about putting an advance medical directive in place. She thought she might have one, but nothing was found in this EMR. Paged elsewhere, but will return later today to assist in completion of advance medical directive. NAS Ramírez, [facility],  [facility]  Paging Service  [phone])

## 2019-04-13 NOTE — PROGRESS NOTES
F/U for peritoneal carcinomatosis S: Ms. Hola Tomlin was seen by me today during rounds. At this time, she is resting +comfortably. The patient has no new complaints today. Please see admission consult for details of ROS; there are + changes today. pain is the same Ng in place Date 04/12/19 0700 - 04/13/19 7511 04/13/19 0700 - 04/14/19 8743 Shift [id number] 4896-9270 24 Hour Total 7182-2506 9039-4692 24 Hour Total  
INTAKE  
I.V.(mL/kg/hr) 285.4(0.4) 1725(2.4) 2010.4(1.4) 750  750 Volume (0.9% sodium chloride infusion) 185. 4 1525 1710.4 750  750 Volume (piperacillin-tazobactam (ZOSYN) 3.375 g in 0.9% sodium chloride (MBP/ADV) 100 mL) 100 200 300 NG/GT  10 10 -30  -30 Water Flush Volume (mL) (Nasogastric Tube 04/12/19)    -30  -30 Intake (ml) (Drain Total Abscession General Drainage catheter 04/12/19 Right; Lower Abdomen)  10 10 Shift Total(mL/kg) 285. 4(5) 3130(87.6) 2020. 4(33.2) 720(11.8)  720(11.8) OUTPUT Urine(mL/kg/hr)  500(0.7) 500(0.3) Urine  500 500 Urine Occurrence(s) 1 x  1 x 1 x  1 x Emesis/NG output 50 250 300 0  0 Output (ml) (Nasogastric Tube 04/12/19) 50 250 300 0  0 Drains 5 50 55 95  95 Output (ml) (Drain Total Abscession General Drainage catheter 04/12/19 Right; Lower Abdomen)  50 50 25  25 Output (ml) (Wyline Pester #1 04/12/19 Anterior;Right; Lower Abdomen) 5  5 70  70 Other 650  650 Other Output 650  650 Shift Total(mL/kg) 705(12.3) 800(13.1) 1505(24.7) 95(1.6)  95(1.6) NET -419.6 935 515.4 625  625 Weight (kg) 57.5 60.9 60.9 60.9 60.9 60.9 O: Blood pressure 145/63, pulse 93, temperature 98.6 °F (37 °C), resp. rate (!) 33, height 5' 5\" (1.651 m), weight 60.9 kg (134 lb 4.2 oz), SpO2 97 %. Gen: Patient is in no acute distress  Chronically ill appering. .  There is no jaundice. Lungs: Clear to auscultation bilaterally . Heart:+RRR. Abd: Soft, + tender, non-distended, bowel sounds present. Extremities: Warm. Cross sectional imaging:  Ir drainage reveiwed Lab Results Component Value Date/Time WBC 11.6 (H) 04/13/2019 03:34 AM  
 HGB 6.9 (L) 04/13/2019 03:34 AM  
 HCT 21.0 (L) 04/13/2019 03:34 AM  
 PLATELET 282 (H) 06/30/3397 03:34 AM  
 MCV 87.9 04/13/2019 03:34 AM  
 
Lab Results Component Value Date/Time Sodium 138 04/13/2019 03:34 AM  
 Potassium 3.7 04/13/2019 03:34 AM  
 Chloride 105 04/13/2019 03:34 AM  
 CO2 26 04/13/2019 03:34 AM  
 Anion gap 7 04/13/2019 03:34 AM  
 Glucose 73 04/13/2019 03:34 AM  
 BUN 10 04/13/2019 03:34 AM  
 Creatinine 0.41 (L) 04/13/2019 03:34 AM  
 BUN/Creatinine ratio 24 (H) 04/13/2019 03:34 AM  
 GFR est AA >60 04/13/2019 03:34 AM  
 GFR est non-AA >60 04/13/2019 03:34 AM  
 Calcium 7.4 (L) 04/13/2019 03:34 AM  
 Bilirubin, total 0.6 04/12/2019 12:47 PM  
 AST (SGOT) 17 04/12/2019 12:47 PM  
 Alk. phosphatase 104 04/12/2019 12:47 PM  
 Protein, total 7.1 04/12/2019 12:47 PM  
 Albumin 2.3 (L) 04/12/2019 12:47 PM  
 Globulin 4.8 (H) 04/12/2019 12:47 PM  
 A-G Ratio 0.5 (L) 04/12/2019 12:47 PM  
 ALT (SGPT) 12 04/12/2019 12:47 PM  
  
 
A: Principal Problem: 
  SBO (small bowel obstruction) ([address] (4/12/2019) Active Problems: 
  Sepsis following intra-abdominal surgery ([address] (3/28/2019) Peritoneal carcinomatosis ([address] (3/29/2019) Appendix carcinoma ([address] (3/29/2019) Small bowel obstruction ([address] (4/12/2019) Severe protein-calorie malnutrition ([address] (4/12/2019) Hyponatremia (4/12/2019) Pelvic abscess in female (4/12/2019) Anemia (4/12/2019) Sepsis ([address] (4/12/2019) Postprocedural intra-abdominal sepsis ([address] (4/13/2019) Sepsis due to pneumonia ([address] (4/13/2019) Comment:  Stable, post drainage P:  Iwill follow No current role for stent Teri Scott MD 
4:02 PM 
4/13/2019

## 2019-04-13 NOTE — PROGRESS NOTES
Initial Nutrition Assessment: 
 
INTERVENTIONS/RECOMMENDATIONS:  
· Pending [facility] 
 
ASSESSMENT:  
Chart reviewed, medically noted for Recent diagnosis of appendiceal CA w/ extenstive carcinomatosis. S/P open lap w/ omentectomy and R colectomy, now with SBO and anastomosis leak and PMH shown below. Pt is currently NPO and is documented to have poor prognosis with MDs recommending comfort care with hospice. Nutrition referral triggered due to MST score. Visit was deferred today due to above information. H&P documents minimal nutrition x 2 weeks. Standing scale weight during admission was 126 lbs, current weight is bed scale. If past documented weights are accurate, she has experienced 8 lbs (5.9%) weight loss in the past ~2 weeks. Pt meets ASPEN criteria for acute severe malnutrition. Meets Criteria for Acute Malnutrition  
[x] Severe Malnutrition, as evidenced by: 
 [] Moderate muscle wasting, loss of subcutaneous fat 
 [x] Nutritional intake of <50% of recommended intake for >5 days [x] Weight loss of >1-2% in 1 week, >5% in 1 month, >7.5% in 3 months, or >10% in 6 months 
 [] Moderate-severe edema  
[]Moderate Malnutrition, as evidenced by: 
 [] Mild muscle wasting, loss of subcutaneous fat 
 [] Nutritional intake <75% of recommended intake for >1 week 
 [] Weight loss of 1-2% in 1 week, 5% in 1 month, 7.5% in 3 months, or 10% in 6 months 
 [x] Mild edema Past Medical History:  
Diagnosis Date  Acid reflux  GERD (gastroesophageal reflux disease)  Hypertension  Other ill-defined conditions(799.89)   
 high cholesterol  Psychiatric disorder   
 depression  Thyroid disease   
 hyperthyroidism Diet Order: NPO 
% Eaten:  No data found. Pertinent Medications: [x]Reviewed: NS, zofran, Pertinent Labs: [x]Reviewed:  
Food Allergies: [x]NKFA  []Other Last BM: 4/3 Edema:        []RUE   []LUE   [x]RLE 1+  [x]LLE  1+   
 Pressure Injury:   n/a   [] Stage I   [] Stage II   [] Stage III   [] Stage IV Wt Readings from Last 30 Encounters:  
04/13/19 60.9 kg (134 lb 4.2 oz) 04/05/19 60.8 kg (134 lb) 04/01/19 66 kg (145 lb 8 oz)  
03/28/19 61.2 kg (135 lb)  
03/20/19 60.8 kg (134 lb)  
03/18/19 59.6 kg (131 lb 6.3 oz) 05/02/18 60.1 kg (132 lb 7.9 oz)  
11/28/17 66.8 kg (147 lb 4.3 oz)  
11/26/17 67.3 kg (148 lb 5.9 oz) 10/26/17 62.9 kg (138 lb 10.7 oz) 10/14/16 72.6 kg (160 lb)  
09/20/16 72.6 kg (160 lb) 08/25/16 72.6 kg (160 lb 0.9 oz) 10/05/14 72.6 kg (160 lb) 12/27/13 70.8 kg (156 lb) 12/24/13 70.8 kg (156 lb) Anthropometrics:  
Height: 5' 5\" (165.1 cm) Weight: 60.9 kg (134 lb 4.2 oz) IBW (%IBW):   ( ) UBW (%UBW):   (  %) Last Weight Metrics: 
Weight Loss Metrics 4/13/2019 4/12/2019 4/5/2019 4/1/2019 3/28/2019 3/28/2019 3/28/2019 Today's Wt 134 lb 4.2 oz - 134 lb 145 lb 8 oz - 135 lb -  
BMI - 22.34 kg/m2 22.3 kg/m2 - 24.98 kg/m2 - 23.17 kg/m2 BMI: Body mass index is 22.34 kg/m². This BMI is indicative of: 
 []Underweight    [x]Normal    []Overweight    [] Obesity   [] Extreme Obesity (BMI>40) Estimated Nutrition Needs (Based on):  
1500 Kcals/day(BMR: 1150 x 1.3) , 60 g(1 g/kg) Protein Carbohydrate: At Least 130 g/day  Fluids: 1500 mL/day (1ml/kcal) or per primary team 
 
NUTRITION DIAGNOSES:  
Problem:  Altered GI function Etiology: related to appendiceal CA w/ extenstive carcinomatosis and SBO Signs/Symptoms: as evidenced by MD documentation NUTRITION INTERVENTIONS: 
              TBD 
  
GOAL:  
TBD LEARNING NEEDS (Diet, Food/Nutrient-Drug Interaction):  
 [x] None Identified 
 [] Identified and Education Provided/Documented 
 [] Identified and Pt declined/was not appropriate Cultureal, Caodaism, OR Ethnic Dietary Needs:  
 [x] None Identified 
 [] Identified and Addressed 
 
 [x] Interdisciplinary Care Plan Reviewed/Documented [x] Discharge Planning: TBD MONITORING /EVALUATION:  
  
Food/Nutrient Intake Outcomes: IV fluids Physical Signs/Symptoms Outcomes: Weight/weight change, Electrolyte and renal profile, Glucose profile, GI 
 
NUTRITION RISK:  
 [x] High              [] Moderate           []  Low  []  Minimal/Uncompromised PT SEEN FOR:  
 []  MD Consult: []Calorie Count []Diabetic Diet Education []Diet Education []Electrolyte Management []General Nutrition Management and Supplements []Management of Tube Feeding []TPN Recommendations [x]  RN Referral:  [x]MST score >=2 
   []Enteral/Parenteral Nutrition PTA []Pregnant: Gestational DM or Multigestation 
   []Pressure Ulcer/Wound Care needs 
     
[]  Low BMI 
[]  LOS Referral  
 
 
Priyank Arthur RDN Pager [phone] Weekend Pager [phone]

## 2019-04-13 NOTE — PROGRESS NOTES
Admit Date: 2019 POD * No surgery found * Procedure:  * No surgery found * Subjective:  
 
Patient less abdomen pain ,n no N/V  And feels better, breathing better,  CXR ? Pneumonia , ? Source of sepsis as well and abdomen abscess sepsis,  cx pnd Overall some better, Oncology consult no chemorx PCCM note agree,  I have consulted and spoke [facility] palliative care consult team  
 
 
ROS:  Otherwise neg Review of Systems Constitutional: Positive for fatigue. Gastrointestinal: Negative for abdominal pain. All other systems reviewed and are negative. Objective:  
 
Blood pressure 130/68, pulse 88, temperature 98.6 °F (37 °C), resp. rate 27, height 5' 5\" (1.651 m), weight 134 lb 4.2 oz (60.9 kg), SpO2 96 %. Temp (24hrs), Av °F (37.2 °C), Min:97.9 °F (36.6 °C), Max:102.3 °F (39.1 °C) Physical Exam:   
 
Physical Exam  
Nursing note and vitals reviewed. Constitutional: She is oriented to person, place, and time. Weak appearing NAD, much more comfortable HENT:  
Head: Normocephalic and atraumatic. Cardiovascular: Normal rate. Pulmonary/Chest: Effort normal and breath sounds normal.  
Abdominal:  
F=softer, flat, min tender RLQ  Drain in place with feculent material , Neurological: She is alert and oriented to person, place, and time. Psychiatric: She has a normal mood and affect. Her behavior is normal. Judgment and thought content normal.  
 
  
 
Labs:  
Recent Results (from the past 24 hour(s)) METABOLIC PANEL, BASIC Collection Time: 19  3:34 AM  
Result Value Ref Range Sodium 138 136 - 145 mmol/L Potassium 3.7 3.5 - 5.1 mmol/L Chloride 105 97 - 108 mmol/L  
 CO2 26 21 - 32 mmol/L Anion gap 7 5 - 15 mmol/L Glucose 73 65 - 100 mg/dL BUN 10 6 - 20 MG/DL Creatinine 0.41 (L) 0.55 - 1.02 MG/DL  
 BUN/Creatinine ratio 24 (H) 12 - 20 GFR est AA >60 >60 ml/min/1.73m2 GFR est non-AA >60 >60 ml/min/1.73m2 Calcium 7.4 (L) 8.5 - 10.1 MG/DL  
CBC WITH AUTOMATED DIFF Collection Time: 04/13/19  3:34 AM  
Result Value Ref Range WBC 11.6 (H) 3.6 - 11.0 K/uL  
 RBC 2.39 (L) 3.80 - 5.20 M/uL HGB 6.9 (L) 11.5 - 16.0 g/dL HCT 21.0 (L) 35.0 - 47.0 % MCV 87.9 80.0 - 99.0 FL  
 MCH 28.9 26.0 - 34.0 PG  
 MCHC 32.9 30.0 - 36.5 g/dL  
 RDW 15.4 (H) 11.5 - 14.5 % PLATELET 003 (H) 554 - 400 K/uL MPV 9.6 8.9 - 12.9 FL  
 NRBC 0.0 0  WBC ABSOLUTE NRBC 0.00 0.00 - 0.01 K/uL NEUTROPHILS 87 (H) 32 - 75 % LYMPHOCYTES 7 (L) 12 - 49 % MONOCYTES 5 5 - 13 % EOSINOPHILS 0 0 - 7 % BASOPHILS 0 0 - 1 % IMMATURE GRANULOCYTES 1 (H) 0.0 - 0.5 % ABS. NEUTROPHILS 10.0 (H) 1.8 - 8.0 K/UL  
 ABS. LYMPHOCYTES 0.9 0.8 - 3.5 K/UL  
 ABS. MONOCYTES 0.6 0.0 - 1.0 K/UL  
 ABS. EOSINOPHILS 0.0 0.0 - 0.4 K/UL  
 ABS. BASOPHILS 0.0 0.0 - 0.1 K/UL  
 ABS. IMM. GRANS. 0.1 (H) 0.00 - 0.04 K/UL  
 DF AUTOMATED Data Review images and reports reviewed Assessment:  
 
Principal Problem: 
  SBO (small bowel obstruction) ([address] (4/12/2019) Active Problems: 
  Sepsis following intra-abdominal surgery ([address] (3/28/2019) Peritoneal carcinomatosis ([address] (3/29/2019) [facility] (3/29/2019) Small bowel obstruction ([address] (4/12/2019) Severe protein-calorie malnutrition ([address] (4/12/2019) Hyponatremia (4/12/2019) Pelvic abscess in female (4/12/2019) Anemia (4/12/2019) Sepsis ([address] (4/12/2019) Postprocedural intra-abdominal sepsis ([address] (4/13/2019) Sepsis due to pneumonia ([facility] (4/13/2019) Plan/Recommendations/Medical Decision Making:  
 
Continue present treatment Patient less abdomen pain ,n no N/V  And feels better, breathing better,  CXR ? Pneumonia , ? Source of sepsis as well and abdomen abscess sepsis,  cx pnd Overall some better, Oncology consult no chemorx PCCM note agree,  I have consulted and spoke [facility] palliative care consult team  
 I had lengthy discussion with pt and son Norma Wilson and dtr in law and extended family and reviewed findings last 24 hrs and leak bowel, abscess, resp issues an dimprovement, and plan palliative care consult, ? Hospice, and will need FU CT in few days, about abscess and drain issues,   Clamp NGT see if can remove soon and pnd above ? Start PO in next few days and get pt next level of care for hospice in next 1-2 weeks. Care management consultr Yohannes Lopez MD , [facility]

## 2019-04-13 NOTE — CDMP QUERY
Dr. Indira Torrez : 
Patient admitted with \"SBO\", noted to have \"Abdominal Abscess\". If possible, please document in progress notes and d/c summary if you are evaluating and/or treating any of the following:  
 
=> Sepsis, POA due to: Abdominal Abscess 
=> Sepsis, POA due to: PNA, possible Aspiration 
=> Sepsis, POA due to: Both Abdominal abscess and PNA, possible Aspiration 
=> Other explanation of clinical findings  
=> Clinically Undetermined (no explanation for clinical findings) The medical record reflects the following: 
   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 
[phone]

## 2019-04-13 NOTE — PROGRESS NOTES
0730 Report received form night nurseLay, RN. Assumed care of of patient. 0800 Assessment as documented. Patient soft spoken but drowsy. Oriented to person and place. Right upper quadrant drain in place. Thick brown liquid draining. Currently on NC at 2 liters. IV fluids via PICC. 1030 Patient requesting medication for abdominal pain. Rates 6 out of 10 on pain scale. Medicated with Dilaudid as ordered. 1130 Patient has not voided since straight cath last night; patient feels like she could go but says Audelia Barraza me a few minutes to try\"  Scanned bladder for 375 in bladder. Patient given ice chips per request. 
 
1300 Patient requested to try bedpan for urination, patient successfully urinated large amount of malodorous nancy urine. States she feels much better. Declines the use of the purewick at this time. 1330 Patient reports pain in abdomen, patient requested not to have dilaudid this time. Medicated with fentanyl 25 mcg IV. Will monitor for relief. 1600 No changes in assessment. Constant flow of family and visitors at bedside. Asked patient if she needed a break, patient stated \"not right now. \" 
 
1700 Medicated with Fentanyl 25 mcg for abdominal pain. 1800 Patient resting quietly. 685 Old Dear Kobi Patient requesting something for pain after turning several times to use bedpan. Medicated with Dilaudid 0.5.   
 
1900 Report given to onceda night nurseRohan, RN.

## 2019-04-13 NOTE — PROGRESS NOTES
1930- Bedside and Verbal shift change report given to VICKI Glynn RN (oncoming nurse) by [facility]. Cheo Kaufman RN (offgoing nurse). Report included the following information SBAR, Kardex, Intake/Output, MAR and Recent Results. 2000- pt resting with eyes closed. Assessment as noted. Pt still having constant abd pain. Will medicate as able. Family at bedside for support. 2035- dilaudid 0.5 mg IV given for pain 20/10 per pt. Pt also c/o sore throat and dryness. Pt allowed chloraseptic spray for comfort. 2300- pt still has not voided via purwick. Bladder scanner reveals 300 ml. Pt encouraged to void to avoid having to straight cath. Pt reports she will attempt to go., but does not feel fullness in bladdera t present. Will bladder scan again in 2 hours if she still has not gone. Pt also still c/o pain in abdomen 10/10. Fentanyl 25 mcg IV given. 0100- pt reports pain 10/10, dilaudid 0.5 mg IV. Pt still unable to void via purwick. bladderscanner reveals 400 ml in bladder and pt reports some fullness. This RN performed straight cath and obtained 500 ml dark nancy malodorous urine 0330- pt reports pain in abdomen a 10/10, fentanyl 25 mcg IV given. Labs drawn from PICC without difficulty. Pt resting in bed watching TV eating a few ice chips. no change in assessment. Pt can not find 'readers' in her purse. Will ask family on day shift if they took them home with them.

## 2019-04-13 NOTE — ACP (ADVANCE CARE PLANNING)
Responded to patient request for assistance in completing advance medical directive. Explained document to patient. Placed a copy of completed advance medical directive in chart. Gave patient original and two copies as she requested. Her primary decision maker is her son Casey Arellano. Laura [place] ... who prefers \"Gavin\". He lives in [place], but patient does not know address. His phone number is [phone] Her secondary decision maker is her friend Terrance Brittle. His address is [facility], [address] [place] [address] Road  His telephone number is [phone] Baldemar Heller, [facility], [address] [facility] Paging Service  [phone])

## 2019-04-13 NOTE — PROGRESS NOTES
Talked with Dr. Bruce Jerry about patient. Plan on seeing patient either later today or tomorrow morning and will review goals of care.
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
## 2019-04-14 NOTE — PROGRESS NOTES
Report ended with Lidia Gonsales. Pt alert and oriented watching tv. Assisted with mouth care. Report given to [facility] RN approx 0100 at bedside.
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
## 2019-04-14 NOTE — PROGRESS NOTES
Report Received on patient. 0013: Paged General surgeon per family. 0125: Paged palliative regarding pain medicine. 8858: Dr. Jarad Vasquez at bedside. Spoke with MD about pain med's and patient's pain, and current pain level. Md will adjust.  
1224: NG tube removed per order.

## 2019-04-14 NOTE — PROGRESS NOTES
Admit Date: 2019 POD * No surgery found * Procedure:  * No surgery found * Subjective:  
 
Patient feels better, less abdomen pain, no N/V with NGT clamped and min NGT residual, HGB noted 6.4  Wbc nl, ROS:  Otherwise neg Review of Systems Objective:  
 
Blood pressure 126/64, pulse 77, temperature 98.7 °F (37.1 °C), resp. rate 21, height 5' 5\" (1.651 m), weight 134 lb 4.2 oz (60.9 kg), SpO2 97 %. Temp (24hrs), Av.4 °F (36.9 °C), Min:97.9 °F (36.6 °C), Max:98.7 °F (37.1 °C) Physical Exam:   
 
Physical Exam  
Nursing note and vitals reviewed. Constitutional: She is oriented to person, place, and time. No distress. Pt looks much better, but tired appearing Cardiovascular: Normal rate and regular rhythm. Pulmonary/Chest: Effort normal and breath sounds normal.  
Abdominal: She exhibits no distension and no mass. There is no tenderness. There is no rebound and no guarding. Drain RLQ with stool, Neurological: She is alert and oriented to person, place, and time. Psychiatric: She has a normal mood and affect. Her behavior is normal. Judgment and thought content normal.  
 
  
 
Labs:  
Recent Results (from the past 24 hour(s)) CBC W/O DIFF Collection Time: 19  5:45 AM  
Result Value Ref Range WBC 7.9 3.6 - 11.0 K/uL  
 RBC 2.18 (L) 3.80 - 5.20 M/uL HGB 6.4 (L) 11.5 - 16.0 g/dL HCT 19.2 (L) 35.0 - 47.0 % MCV 88.1 80.0 - 99.0 FL  
 MCH 29.4 26.0 - 34.0 PG  
 MCHC 33.3 30.0 - 36.5 g/dL  
 RDW 15.7 (H) 11.5 - 14.5 % PLATELET 679 (H) 524 - 400 K/uL MPV 9.4 8.9 - 12.9 FL  
 NRBC 0.0 0  WBC ABSOLUTE NRBC 0.00 0.00 - 0.01 K/uL Data Review images and reports reviewed Assessment:  
 
Principal Problem: 
  SBO (small bowel obstruction) ([facility] (2019) Active Problems: 
  Sepsis following intra-abdominal surgery ([address] (3/28/2019) Peritoneal carcinomatosis ([address] (3/29/2019) Appendix carcinoma ([address] (3/29/2019) Small bowel obstruction ([address] (4/12/2019) Severe protein-calorie malnutrition ([address] (4/12/2019) Hyponatremia (4/12/2019) Pelvic abscess in female (4/12/2019) Anemia (4/12/2019) Sepsis ([address] (4/12/2019) Postprocedural intra-abdominal sepsis ([address] (4/13/2019) Sepsis due to pneumonia ([address] (4/13/2019) Plan/Recommendations/Medical Decision Making:  
 
Continue present treatment DC NGT  Slowly advance PO,  
 FU CT tomorrow  April 15, 2019 Transfuse 1 unit PRBC increase O2 carrying capacity for healing postop Case management and palliative care Cont antibx. Migel uJrado MD , [facility]

## 2019-04-14 NOTE — PROGRESS NOTES
F/U for abscess in abdominal cavity [place] for Brenna Reddy  
 
S: Ms. Shyann Hull was seen by me today during rounds. At this time, she is resting + comfortably. Ng out.  +flatus, + some stool  Less drain output  The patient has no new complaints today. Please see admission consult for details of ROS; there are + changes today. feels better O: Blood pressure 140/63, pulse 81, temperature 97.9 °F (36.6 °C), resp. rate 28, height 5' 5\" (1.651 m), weight 64 kg (141 lb 1.5 oz), SpO2 96 %. Gen: Patient is in no acute distress. There is no jaundice. Lungs: Clear to auscultation bilaterally . Heart:+RRR. Abd: Soft,+distended, bowel sounds present. Extremities: Warm. Cross sectional imaging:  None new Lab Results Component Value Date/Time WBC 7.9 04/14/2019 05:45 AM  
 HGB 6.4 (L) 04/14/2019 05:45 AM  
 HCT 19.2 (L) 04/14/2019 05:45 AM  
 PLATELET 654 (H) 58/92/1985 05:45 AM  
 MCV 88.1 04/14/2019 05:45 AM  
. 
Lab Results Component Value Date/Time Sodium 138 04/13/2019 03:34 AM  
 Potassium 3.7 04/13/2019 03:34 AM  
 Chloride 105 04/13/2019 03:34 AM  
 CO2 26 04/13/2019 03:34 AM  
 Anion gap 7 04/13/2019 03:34 AM  
 Glucose 73 04/13/2019 03:34 AM  
 BUN 10 04/13/2019 03:34 AM  
 Creatinine 0.41 (L) 04/13/2019 03:34 AM  
 BUN/Creatinine ratio 24 (H) 04/13/2019 03:34 AM  
 GFR est AA >60 04/13/2019 03:34 AM  
 GFR est non-AA >60 04/13/2019 03:34 AM  
 Calcium 7.4 (L) 04/13/2019 03:34 AM  
 Bilirubin, total 0.6 04/12/2019 12:47 PM  
 AST (SGOT) 17 04/12/2019 12:47 PM  
 Alk. phosphatase 104 04/12/2019 12:47 PM  
 Protein, total 7.1 04/12/2019 12:47 PM  
 Albumin 2.3 (L) 04/12/2019 12:47 PM  
 Globulin 4.8 (H) 04/12/2019 12:47 PM  
 A-G Ratio 0.5 (L) 04/12/2019 12:47 PM  
 ALT (SGPT) 12 04/12/2019 12:47 PM  
 
 
 
A: Principal Problem: 
  SBO (small bowel obstruction) ([facility] (4/12/2019) Active Problems: 
  Sepsis following [facility]-abdominal surgery [facility] (3/28/2019) Peritoneal carcinomatosis ([address] (3/29/2019) Appendix carcinoma ([address] (3/29/2019) Small bowel obstruction ([address] (4/12/2019) Severe protein-calorie malnutrition ([address] (4/12/2019) Hyponatremia (4/12/2019) Pelvic abscess in female (4/12/2019) Anemia (4/12/2019) Sepsis ([address] (4/12/2019) Postprocedural intra-abdominal sepsis ([address] (4/13/2019) Sepsis due to pneumonia ([address] (4/13/2019) Comment:  Better slowly P:  Will follow Tucker Cano MD 
1:01 PM 
4/14/2019

## 2019-04-15 NOTE — ROUTINE PROCESS
Bedside and Verbal shift change report received from VICKI Glynn RN (offgoing nurse). Report included the following information SBAR, Kardex, ED Summary, Procedure Summary, Intake/Output, MAR, Accordion, Recent Results, Med Rec Status, Cardiac Rhythm NSR, Alarm Parameters  and Quality Measures. Dilaudid PCA in use. No c/o pain. 0945:The first unit of RBC's initiated as ordered. She continues to void via the bedpan without any challenges. 1000:No signs or symptoms of a blood transfusion reaction. 1030:; see orders to remove the midline abdominal staples. He spoke with her son and daughter-in-law at length re: the plan of care. 1130: Midline abdominal staples removed, and she tolerated it well. Transported off the unit to the Radiology department for the CT of the Abdomen 1205: We returned to the unit. The cardiac monitor denotes NSR. She remains pleasant and cooperative with her plan of care. Lungs with diminished breath sound. 1300: The first unit of PRBC's completed without sign or symptoms of a reaction. Accepting ice chips. 1335: The second unit of PRBC's started. Her son is at the bedside. 1344:Zofran administered for c/o nausea. Repositioned for comfort. 1415:Noted resting with her eyes closed. No facial grimaces. 1630: The second unit of PRBC's completed. No SOB or chest pain. 1800: She accepted a small amount of her frozen orange ice and c/o nausea; therefore she received Zofran. 1830:Resting at longer intervals with her eyes closed. 1930:Bedside and Verbal shift change report given to ERVIN Mazariegos RN (oncoming nurse) by myself (offgoing nurse). Report included the following information SBAR, Kardex, ED Summary, Procedure Summary, Intake/Output, MAR, Accordion, Recent Results, Med Rec Status, Cardiac Rhythm NSR, Alarm Parameters  and Quality Measures.

## 2019-04-15 NOTE — PROGRESS NOTES
Evaluated patient , she is tired , wants to nap , pain is better on dilaudid PCA, no adjustment suggested at this time . Spoke to her son ELI Maza [facility] ), plan to meet tomorrow at 11:15 am to discuss goals of care /transition to hospice. Full note will be placed. Discussed with bed side Rn [place] .

## 2019-04-15 NOTE — PROGRESS NOTES
1930- Bedside and Verbal shift change report given to VICKI Gylnn RN (oncoming nurse) by Adelso Hare RN (offgoing nurse). Report included the following information SBAR, Kardex, Intake/Output, MAR and Recent Results. 2100- assessment as noted, pt reports better pain control with use of dilaudid PCA, encouraged use as pt reports pain now a 5/10 at present. No other complaint at this time. Pt given ice chips per request 
0400- no change in assessment, labs drawn Shift summary: 
Pt rested well throughout the night and has better pain control on dilaudid PCA. Assisted to bedpan upon request X3
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
## 2019-04-15 NOTE — PROGRESS NOTES
F/U for abscess in abdominal cavity S: Ms. Vishal Darby was seen by me today during rounds. At this time, she is resting + comfortably. NG was pulled out.  +flatus, Denies any stoolt . She looks better to me. O: Blood pressure 142/65, pulse 78, temperature 98.6 °F (37 °C), resp. rate 24, height 5' 5\" (1.651 m), weight 64 kg (141 lb 1.5 oz), SpO2 97 %. Gen: Patient is in no acute distress. There is no jaundice. Lungs: Clear to auscultation bilaterally . Heart:+RRR. Abd: Soft,+distended, bowel sounds dec . Extremities: Warm. Cross sectional imaging:  None new Lab Results Component Value Date/Time WBC 7.2 04/15/2019 04:24 AM  
 HGB 6.9 (L) 04/15/2019 04:24 AM  
 HCT 21.2 (L) 04/15/2019 04:24 AM  
 PLATELET 511 (H) 20/84/3974 04:24 AM  
 MCV 87.6 04/15/2019 04:24 AM  
. 
Lab Results Component Value Date/Time Sodium 138 04/13/2019 03:34 AM  
 Potassium 3.7 04/13/2019 03:34 AM  
 Chloride 105 04/13/2019 03:34 AM  
 CO2 26 04/13/2019 03:34 AM  
 Anion gap 7 04/13/2019 03:34 AM  
 Glucose 73 04/13/2019 03:34 AM  
 BUN 10 04/13/2019 03:34 AM  
 Creatinine 0.41 (L) 04/13/2019 03:34 AM  
 BUN/Creatinine ratio 24 (H) 04/13/2019 03:34 AM  
 GFR est AA >60 04/13/2019 03:34 AM  
 GFR est non-AA >60 04/13/2019 03:34 AM  
 Calcium 7.4 (L) 04/13/2019 03:34 AM  
 Bilirubin, total 0.6 04/12/2019 12:47 PM  
 AST (SGOT) 17 04/12/2019 12:47 PM  
 Alk. phosphatase 104 04/12/2019 12:47 PM  
 Protein, total 7.1 04/12/2019 12:47 PM  
 Albumin 2.3 (L) 04/12/2019 12:47 PM  
 Globulin 4.8 (H) 04/12/2019 12:47 PM  
 A-G Ratio 0.5 (L) 04/12/2019 12:47 PM  
 ALT (SGPT) 12 04/12/2019 12:47 PM  
 
 
 
A: Principal Problem: 
  SBO (small bowel obstruction) ([address] (4/12/2019) Active Problems: 
  Sepsis following intra-abdominal surgery ([address] (3/28/2019) Peritoneal carcinomatosis ([facility] (3/29/2019) Appendix carcinoma ([facility] (3/29/2019) Small bowel obstruction ([address] (4/12/2019) Severe protein-calorie malnutrition ([facility] (4/12/2019) Hyponatremia (4/12/2019) Pelvic abscess in female (4/12/2019) Anemia (4/12/2019) Sepsis ([address] (4/12/2019) Postprocedural intra-abdominal sepsis ([address] (4/13/2019) Sepsis due to pneumonia ([address] (4/13/2019) Micro: 
HEAVY ESCHERICHIA COLI Culture result: Abnormal       Final  
HEAVY KLEBSIELLA OXYTOCA Culture result: Abnormal       Final  
HEAVY KLEBSIELLA PNEUMONIAE Culture result: Abnormal       Final  
HEAVY ENTEROCOCCUS FAECALIS GROUP D Culture result: Abnormal       Final  
HEAVY ENTEROCOCCUS GALLINARUM GROUP D . Anu Stark INTRINSICALLY   
 
 
P:  CT scan abd/pelvis as per surgery today. I spoke with Dr María Worthington before the weekend. NPO with ice chips for now. Follow hgb (given 1 unit PRBC), Continue IVF and IV abx. Anita English MD 
4/15/2019

## 2019-04-15 NOTE — PROGRESS NOTES
Admit Date: 2019 POD * No surgery found * Procedure:  * No surgery found * Subjective:  
 
Patient pt comfortable,  No N/V, some abd distension with NGT out,  Passing some flatus, and IR Drain with much less ouptut Appreciate palliative care consult Dr Cachorro Glasgow I had lengthy discussion with pt and son Tali Chou,   and dtr in law outlining  Plan and expectations with her morbidities and dx  And hoepefully with medical rx, she will recover enough to get home with hospice, doubt chemo will come in to play as doubt she will ever be strong enough, but if SBO and sx not improve, my rec is hospice care home, although option reoperate wash out with end ileostomy but concern she may never recover and be worse off Cont current rx attempts. ROS:  Otherwise neg Review of Systems Min abdomen pain, control. On PCA, some flatus Objective:  
 
Blood pressure 150/68, pulse 85, temperature 98.8 °F (37.1 °C), resp. rate 24, height 5' 5\" (1.651 m), weight 141 lb 1.5 oz (64 kg), SpO2 95 %. Temp (24hrs), Av.6 °F (37 °C), Min:98.4 °F (36.9 °C), Max:98.8 °F (37.1 °C) Physical Exam:   
 
Physical Exam 
  
Pt alert oriented,  CV and lungs ok Abdomen slightly distended, min tender, some BS< incsion clean , staples to be removed, min drain fnx. Labs:  
Recent Results (from the past 24 hour(s)) CBC W/O DIFF Collection Time: 04/15/19  4:24 AM  
Result Value Ref Range WBC 7.2 3.6 - 11.0 K/uL  
 RBC 2.42 (L) 3.80 - 5.20 M/uL HGB 6.9 (L) 11.5 - 16.0 g/dL HCT 21.2 (L) 35.0 - 47.0 % MCV 87.6 80.0 - 99.0 FL  
 MCH 28.5 26.0 - 34.0 PG  
 MCHC 32.5 30.0 - 36.5 g/dL  
 RDW 16.2 (H) 11.5 - 14.5 % PLATELET 944 (H) 370 - 400 K/uL MPV 9.4 8.9 - 12.9 FL  
 NRBC 0.0 0  WBC ABSOLUTE NRBC 0.00 0.00 - 0.01 K/uL Data Review images and reports reviewed Assessment:  
 
Principal Problem: 
  SBO (small bowel obstruction) ([facility] (2019) Active Problems: Sepsis following intra-abdominal surgery ([address] (3/28/2019) Peritoneal carcinomatosis ([address] (3/29/2019) Appendix carcinoma ([address] (3/29/2019) Small bowel obstruction ([address] (4/12/2019) Severe protein-calorie malnutrition ([address] (4/12/2019) Hyponatremia (4/12/2019) Pelvic abscess in female (4/12/2019) Anemia (4/12/2019) Sepsis ([address] (4/12/2019) Postprocedural intra-abdominal sepsis ([address] (4/13/2019) Sepsis due to pneumonia ([address] (4/13/2019) Plan/Recommendations/Medical Decision Making:  
 
Continue present treatment Patient pt comfortable,  No N/V, some abd distension with NGT out,  Passing some flatus, and IR Drain with much less ouptut Appreciate palliative care consult Dr Wanda Soares I had lengthy discussion with pt and son Tay Anglin,   and dtr in law outlining  Plan and expectations with her morbidities and dx  And hoepefully with medical rx, she will recover enough to get home with hospice, doubt chemo will come in to play as doubt she will ever be strong enough, but if SBO and sx not improve, my rec is hospice care home, although option reoperate wash out with end ileostomy but concern she may never recover and be worse off Cont current rx attempts. Aprreciate PCCM and GI and Palliative care input PRBC Randall Cano MD , [facility]
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
## 2019-04-15 NOTE — PROGRESS NOTES
Pulmonary, Critical Care, and Sleep Medicine Name: Lalo Llanes MRN: [id number] : 1955 Hospital: [facility] Date: 4/15/2019 3:12 PM Admission: 2019 Impression Plan 1. Peritonitis and  
2. Abdominal abscess s/p drain IR  3. SBO 4. Hyponatremia, initially 129, corrected quickly to 132 with bolus of NS 
5. Recent diagnosis of appendiceal CA w/ extenstive carcinomatosis. S/P open lap w/ omentectomy and R colectomy 3/28/19. 
6. Acute hypoxic respiratory failure 7. Left sided pneumonia, likely aspiration 8. Hypertensive on presentation 9. Leukocytosis 10. Pt confirms wishes DNR, but would be ok with NIV / pressor support if needed. 1. Decompression w/ NGT 
2. IVF 1/ NS IVF 3. IV Zosyn 4. Wean O2  
5. Trend WBC, sodium, other lytes 6. Needs transfusion today 7. F/u blood cultures 8. Pain control 9. Eventually pt should be referred to Palliative care 10. Continue supportive care 11. Mobilize 12. Transfer to floor today No acute events overnight No acute distress No acute complaints I have reviewed the labs and previous days notes. Pertinent items are noted in HPI. OBJECTIVE: 
 
 Vital Signs: 
    
Visit Vitals /65 (BP 1 Location: Right arm, BP Patient Position: At rest) Pulse 78 Temp 98.6 °F (37 °C) Resp 24 Ht 5' 5\" (1.651 m) Wt 64 kg (141 lb 1.5 oz) SpO2 97% BMI 23.48 kg/m² Temp (24hrs), Av.4 °F (36.9 °C), Min:97.9 °F (36.6 °C), Max:98.8 °F (37.1 °C) Intake/Output:  
  Last shift: No intake/output data recorded. Last 3 shifts:  1901 - 04/15 0700 In: 3686.3 [I.V.:3253.8] Out: 810 [Urine:700; Drains:110] Intake/Output Summary (Last 24 hours) at 4/15/2019 0754 Last data filed at 4/15/2019 0500 Gross per 24 hour Intake 2819.17 ml Output 600 ml Net 2219.17 ml Lines/devices: Right peritoneal drain Drips:   
Discussed care with: CCU nurse, Dr. Corie Harmon, patient Supplemental O2 Goals: 93% or better Ambulatory status:   
Prophylaxis:  SCDs for now. Ordered Subutaneous heparin for 4/13 AM  
Nutrition: NPO Code Status: DNR Contact precautions:   
Triage/Disposition: Unchanged. Continue ICU care. Imaging: 
I have personally reviewed these radiographic films and reports:    
I have personally reviewed PFTs: 
 n/a Ventilation: Mode Rate Tidal Volume Pressure Suppport FiO2/LPM PEEP Other Peak airway pressure:     
Minute ventilation:     
Trilogy:    
 
 Physical Exam:                                       
Exam Findings Other General: No resp distress noted, appears older than stated age, thin, frail appearing HEENT:  Oropharynx dry with lesions, NGT R nare, trachea midline, no masses or swelling of the neck Chest: No deformities HEART:  RRR, no murmurs/rubs/gallops Lungs:  Left anteriolateral rhonchi, diminished. No wheezes or crackles ABD: Midline incision approximated w/ staples no erythema. +distended, hypoactive BS. Tender; right side drain EXT: No cyanosis/clubbing/edema, normal peripheral pulses Skin: No rashes or ulcers, no mottling Neuro: RASS +1, A/O x 3 (sort of) having trouble with the month. Medications: 
Current Facility-Administered Medications Medication Dose Route Frequency  sodium chloride (NS) flush 5-40 mL  5-40 mL IntraVENous Q8H  
 sodium chloride (NS) flush 5-40 mL  5-40 mL IntraVENous PRN  
 diatrizoate carmen-diatrizoat sod (MD-GASTROVIEW,GASTROGRAFIN) 66-10 % contrast solution 30 mL  30 mL Oral RAD ONCE  
 dextrose 5% - 0.45% NaCl with KCl 20 mEq/L infusion  100 mL/hr IntraVENous CONTINUOUS  
 0.9% sodium chloride infusion 250 mL  250 mL IntraVENous PRN  
 HYDROmorphone (PF) 25 mg/50 mL (DILAUDID) PCA   IntraVENous CONTINUOUS  
 mupirocin (BACTROBAN) 2 % ointment   Both Nostrils BID  ondansetron (ZOFRAN) injection 4 mg  4 mg IntraVENous Q4H PRN  
  phenol throat spray (CHLORASEPTIC) 1 Spray  1 Spray Oral PRN  piperacillin-tazobactam (ZOSYN) 3.375 g in 0.9% sodium chloride (MBP/ADV) 100 mL  3.375 g IntraVENous Q8H  
 sodium chloride (NS) flush 5-10 mL  5-10 mL IntraVENous PRN  
 heparin (porcine) pf 300 Units  300 Units InterCATHeter PRN  
 heparin (porcine) injection 5,000 Units  5,000 Units SubCUTAneous Q12H Labs: ABG No results for input(s): PHI, PCO2I, PO2I, HCO3I, SO2I, FIO2I in the last 72 hours. CBC Recent Labs 04/15/19 
0424 04/14/19 
9307 04/13/19 
6197 WBC 7.2 7.9 11.6* HGB 6.9* 6.4* 6.9*  
HCT 21.2* 19.2* 21.0*  
* 463* 490* MCV 87.6 88.1 87.9 MCH 28.5 29.4 28.9 Metabolic Panel Recent Labs 04/13/19 
0334 04/12/19 
1247  132* K 3.7 3.9  97 CO2 26 27 GLU 73 89 BUN 10 11 CREA 0.41* 0.56  
CA 7.4* 8.0* ALB  --  2.3*  
SGOT  --  17 ALT  --  12 Pertinent Labs As above Tahir Sylvester MD 
4/15/2019

## 2019-04-15 NOTE — PROGRESS NOTES
CT FU with cont . Right pelvic air and fluid stool collection around drain I dont think drain is in colon proper,  Trial of PO liquids and FU xray , dont know if pt will heal this or not, as prev. Only other option is laparotomy and takedown anastamosis and likely ileosotomy

## 2019-04-15 NOTE — PROGRESS NOTES
[facility] [place] [phone] ([phone]) [facility]: [phone] ([phone]) LCSW went to meet patient to follow up regarding her upcoming court date 4/18/19 to extend the restraining order that is in place; preventing her  from coming to her home/seeing her. Dr Pranay Segovia, palliative medicine weekend MD saw patient and had asked LCSW to follow up on this. Patient seen briefly with Dr Shanye Florence. Patient mainly wanting to sleep at this time, not interested in having any discussions. She did note, when asked,  that she has informed the court that she is in the hospital. Will follow up tomorrow to see if there is anything else we need to do to assist patient regarding this matter.

## 2019-04-16 NOTE — PERIOP NOTES
1911-Handoff Report from Operating Room to PACU Report received from Myra Sullivan1 and NELL Abmrocio CRNA regarding Emry Paci. Surgeon(s): 
Tobias Kc MD  And Procedure(s) (LRB): 
LAPAROTOMY EXPLORATORY, ILEOSTOMY; KASIA DRAIN PLACEMENT (N/A)  confirmed  
with allergies and dressings discussed. Anesthesia type, drugs, patient history, complications, estimated blood loss, vital signs, intake and output, and last pain medication, lines, reversal medications and temperature were reviewed. 2015- No family present in the surgical waiting room, ICU waiting room, or the pt's room. 2020- TRANSFER - OUT REPORT: 
 
Verbal report given to [place] RN(name) on Emry Paci  being transferred to CCU(unit) for routine post - op Report consisted of patients Situation, Background, Assessment and  
Recommendations(SBAR). Information from the following report(s) SBAR, Kardex, OR Summary, Procedure Summary, Intake/Output, MAR and Recent Results was reviewed with the receiving nurse. Opportunity for questions and clarification was provided. Patient transported with: 
 Monitor O2 @ 4 liters Registered Nurse

## 2019-04-16 NOTE — PROGRESS NOTES
Subjective:  
  
Casey Chowdhury is a 71 y.o. female with widely metastatic appendiceal mucinous adenocarcinoma admitted with sepsis secondary to a contained anastomotic leak. She complains of inability to tolerate anything by mouth, severe nausea and right flank pain not well controlled. Patient Active Problem List  
 Diagnosis Date Noted  Postprocedural intra-abdominal sepsis ([address] 04/13/2019  Sepsis due to pneumonia ([address] 04/13/2019  
 SBO (small bowel obstruction) ([address] 04/12/2019  Small bowel obstruction ([address] 04/12/2019  Severe protein-calorie malnutrition ([address] 04/12/2019  Hyponatremia 04/12/2019  Pelvic abscess in female 04/12/2019  Anemia 04/12/2019  Sepsis ([address] 04/12/2019  Peritoneal carcinomatosis ([address] 03/29/2019  Appendix carcinoma ([address] 03/29/2019  Sepsis following intra-abdominal surgery ([address] 03/28/2019  Neoplasm of appendix 03/28/2019  Colitis 11/29/2017 Past Medical History:  
Diagnosis Date  Acid reflux  GERD (gastroesophageal reflux disease)  Hypertension  Other ill-defined conditions(799.89)   
 high cholesterol  Psychiatric disorder   
 depression  Thyroid disease   
 hyperthyroidism Past Surgical History:  
Procedure Laterality Date  COLONOSCOPY N/A 11/30/2017 COLONOSCOPY performed by Juan Ahuja MD at [facility] ENDOSCOPY  COLONOSCOPY N/A 3/20/2019 COLONOSCOPY performed by Maxine Ball MD at [facility] ENDOSCOPY  COLONOSCOPY N/A 3/28/2019 COLONOSCOPY performed by Maxine Ball MD at [facility] ENDOSCOPY 2021 Bibi Soni N/A 3/20/2019 SIGMOIDOSCOPY FLEXIBLE performed by Maxine Ball MD at [facility] ENDOSCOPY  
 HX CHOLECYSTECTOMY  HX GYN    
 hysterectomy  HX HEENT    
 thyroid Social History Tobacco Use  Smoking status: Never Smoker  Smokeless tobacco: Never Used Substance Use Topics  Alcohol use: Yes Alcohol/week: 0.6 oz Types: 1 Cans of beer per week Comment: Socially. Family History Problem Relation Age of Onset  Cancer Mother  Colon Cancer Father  Cancer Father Current Facility-Administered Medications Medication Dose Route Frequency  sodium chloride (NS) flush 5-40 mL  5-40 mL IntraVENous Q8H  
 sodium chloride (NS) flush 5-40 mL  5-40 mL IntraVENous PRN  
 0.9% sodium chloride infusion 250 mL  250 mL IntraVENous PRN  
 dextrose 5% - 0.45% NaCl with KCl 20 mEq/L infusion  100 mL/hr IntraVENous CONTINUOUS  
 HYDROmorphone (PF) 25 mg/50 mL (DILAUDID) PCA   IntraVENous CONTINUOUS  
 mupirocin (BACTROBAN) 2 % ointment   Both Nostrils BID  ondansetron (ZOFRAN) injection 4 mg  4 mg IntraVENous Q4H PRN  phenol throat spray (CHLORASEPTIC) 1 Spray  1 Spray Oral PRN  piperacillin-tazobactam (ZOSYN) 3.375 g in 0.9% sodium chloride (MBP/ADV) 100 mL  3.375 g IntraVENous Q8H  
 heparin (porcine) pf 300 Units  300 Units InterCATHeter PRN  
 heparin (porcine) injection 5,000 Units  5,000 Units SubCUTAneous Q12H Allergies Allergen Reactions  Chlorhexidine Towelette Itching Pt c/o itching and skin dryness; no rash noted.  Codeine Itching  Lisinopril Angioedema Review of Systems: A comprehensive review of systems was negative except for that written in the History of Present Illness. Objective:  
 
  
Visit Vitals /74 Pulse 78 Temp 98.4 °F (36.9 °C) Resp 22 Ht 5' 5\" (1.651 m) Wt 64 kg (141 lb 1.5 oz) SpO2 97% BMI 23.48 kg/m² Physical Exam: 
GENERAL: alert, cooperative, no distress, appears stated age, LUNG: clear to auscultation bilaterally, HEART: regular rate and rhythm, S1, S2 normal, no murmur, click, rub or gallop, ABDOMEN: distended, tympanic, tender,  Hypoactive bowel sounds. Tanvir stool in drain. Imaging:  images and reports reviewed. No interval improvement with drain placement Lab/Data Review: 
BMP:  
Lab Results Component Value Date/Time  04/16/2019 04:07 AM  
 K 3.3 (L) 04/16/2019 04:07 AM  
  04/16/2019 04:07 AM  
 CO2 29 04/16/2019 04:07 AM  
 AGAP 6 04/16/2019 04:07 AM  
  (H) 04/16/2019 04:07 AM  
 BUN 2 (L) 04/16/2019 04:07 AM  
 CREA 0.37 (L) 04/16/2019 04:07 AM  
 GFRAA >60 04/16/2019 04:07 AM  
 GFRNA >60 04/16/2019 04:07 AM  
 
CBC:  
Lab Results Component Value Date/Time WBC 8.5 04/16/2019 04:07 AM  
 HGB 10.9 (L) 04/16/2019 04:07 AM  
 HCT 32.4 (L) 04/16/2019 04:07 AM  
  (H) 04/16/2019 04:07 AM  
 
 
 
Assessment:  
 
Pam Garcia is a 71 y.o. female with widely metastatic appendiceal mucinous adenocarcinoma admitted with sepsis secondary to a contained anastomotic leak. Sepsis has improved with medical management but, symptoms of bowel obstruction have not. I doubt she will improve without surgical diversion of enteric contents and removal of the abscess. Discussed the pros and cons of surgery with the patient and her daughter (via phone) . Explained surgery has the best chance of allowing PO intake but, it has rsks ncluding by not limited to infection, bleeding, hematoma, and perforation of the intestines or solid organs and the risks of general anesthetic. Discussed continuing present treatment as an alternative to surgery. Maker it clear surgery is to treat acute bowel perforation and is not a treatment for the underlying malignancy. Discussed the need for an ileostomy. Without surgery, I suspect she will once again become septic in the next few days as well as dehydrated. I would anticipate death with in the next 2 weeks . With surgery, there is a chance she could survive several months. Plan: 1. I recommend proceeding with Surgery:  Exploratory laparotomy. 2.  Patient does wish to proceed with surgery.
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
## 2019-04-16 NOTE — INTERDISCIPLINARY ROUNDS
Interdisciplinary team rounds were held 4/16/2019 with the following team members:Care Management, Diabetes Treatment Specialist, Nursing, Nutrition, Pharmacy, Physician and Respiratory Therapy. Plan of care discussed. See clinical pathway and/or care plan for interventions and desired outcomes.

## 2019-04-16 NOTE — PROGRESS NOTES
Nutrition Assessment: 
 
RECOMMENDATIONS:  
 
 
ASSESSMENT:  
Chart reviewed, case discussed during CCU rounds. Pt with a contained anastomosis leak and severe nausea with PO intake. Noted plan for palliative ex lap offered by surgeon this morning. Palliative attempted to see pt but she was not amendable. Unsure of plan of care, and how aggressive pt/fam want to be in regards to nutrition support. Will await clarification. Dietitians Intervention(s)/Plan(s): Monitor plan of care SUBJECTIVE/OBJECTIVE:  
Pt meeting with Palliative Diet Order: NPO 
% Eaten:   
Patient Vitals for the past 72 hrs: 
 % Diet Eaten 04/15/19 1730 5 % Pertinent Medications:zosyn, KCl; IVF([email or web address], [email or web address]). Chemistries: 
Lab Results Component Value Date/Time Sodium 137 04/16/2019 04:07 AM  
 Potassium 3.3 (L) 04/16/2019 04:07 AM  
 Chloride 102 04/16/2019 04:07 AM  
 CO2 29 04/16/2019 04:07 AM  
 Anion gap 6 04/16/2019 04:07 AM  
 Glucose 109 (H) 04/16/2019 04:07 AM  
 BUN 2 (L) 04/16/2019 04:07 AM  
 Creatinine 0.37 (L) 04/16/2019 04:07 AM  
 BUN/Creatinine ratio 5 (L) 04/16/2019 04:07 AM  
 GFR est AA >60 04/16/2019 04:07 AM  
 GFR est non-AA >60 04/16/2019 04:07 AM  
 Calcium 7.7 (L) 04/16/2019 04:07 AM  
 Albumin 2.3 (L) 04/12/2019 12:47 PM  
  
Anthropometrics: Height: 5' 5\" (165.1 cm) Weight: 64 kg (141 lb 1.5 oz)   []bed scale    []stated   [x]unknown(4/14) IBW (%IBW):   ( ) UBW (%UBW):   (  %) BMI: Body mass index is 23.48 kg/m². This BMI is indicative of: 
[]Underweight   [x]Normal   []Overweight   [] Obesity   [] Extreme Obesity (BMI>40) Estimated Nutrition Needs (Based on): 1500 Kcals/day(BMR: 1150 x 1.3) , 60 g(1 g/kg) Protein Carbohydrate: At Least 130 g/day  Fluids: 1500 mL/day Last BM: 4/3-partial SBO (chronic)    []Active     []Hyperactive  [x]Hypoactive       [] Absent   BS Skin:    [] Intact   [x] Incision  [] Breakdown   [] DTI   [] Tears/Excoriation/Abrasion  [x]Edema(trace-BLE)  [] Other: Wt Readings from Last 30 Encounters:  
04/14/19 64 kg (141 lb 1.5 oz) 04/05/19 60.8 kg (134 lb) 04/01/19 66 kg (145 lb 8 oz)  
03/28/19 61.2 kg (135 lb)  
03/20/19 60.8 kg (134 lb)  
03/18/19 59.6 kg (131 lb 6.3 oz) 05/02/18 60.1 kg (132 lb 7.9 oz)  
11/28/17 66.8 kg (147 lb 4.3 oz)  
11/26/17 67.3 kg (148 lb 5.9 oz) 10/26/17 62.9 kg (138 lb 10.7 oz) 10/14/16 72.6 kg (160 lb)  
09/20/16 72.6 kg (160 lb) 08/25/16 72.6 kg (160 lb 0.9 oz) 10/05/14 72.6 kg (160 lb) 12/27/13 70.8 kg (156 lb) 12/24/13 70.8 kg (156 lb) NUTRITION DIAGNOSES:  
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 NUTRITION INTERVENTIONS: 
               
  
GOAL:  
TBD NUTRITION MONITORING AND EVALUATION Previous Goal: TBD Previous Goal Met: N/A Previous Recommendations Implemented: N/A Cultural, Evangelical, or Ethnic Dietary Needs: None LEARNING NEEDS (Diet, Food/Nutrient-Drug Interaction):  
 [x] None Identified 
 [] Identified and Education Provided/Documented 
 [] Identified and Pt declined/was not appropriate [x] Interdisciplinary Care Plan Reviewed/Documented  
 [x] Participated in Discharge Planning: UTD [x] Interdisciplinary Rounds NUTRITION RISK:  
 [x] High              [] Moderate           []  Low  []  Minimal/Uncompromised Lara Lockett RD, [facility] Pager [phone] Weekend Pager [phone]

## 2019-04-16 NOTE — PROGRESS NOTES
1930 - Shift report received. 2000 - shift assessment completed. See flowsheet for full details. Pt resting comfortably. Requests zofran as soon as she can get it. Pt advanced to clear liquid diet on days but only small amounts. Reinforced this with pt. Provided ice chips. 0400 - Reassessment completed. No change in pt status. Abd drain flushed with 10cc sterile saline per order. No output over shift.

## 2019-04-16 NOTE — PROGRESS NOTES
[facility] [phone] ([phone]) [facility]: [phone] ([phone]) Dr Alicia Adam and Sena FANG went to meet with patient and family including her sons Lisette Horton and Amy Cordoba \"Gavin\" White. Patient noted that she did not wish to be bothered, that she was \"sick and come back when I am well\". Patient noted to be extremely anxious, not wishing to speak at all and wanting to be left alone. This was the same attitude patient had yesterday. It was very difficult to engage her or introduce role of support. Patient's son Cara Cao asking to speak outside the room, he was visibly annoyed, irritated and not really interested in speaking to us. He clearly was upset about something (likely his mother's critical condition) and projecting his anger on the palliative team, pacing the room and being dismissive. He was rude, angry and unable to hear what we were trying to share with him. He did say at one point in the meeting that \"before my mother goes to surgery, I want to have a chance to talk to her in case she dies. You better call me before that. I may or may not be available by phone\". It was not possible to provide any support to this angry son and he was not interested in meeting with us. Son Mik Elizabeth was calm, able to communicate more readily and voiced, when asked that \"if my mother has decided on surgery, that's what will be. She is very stubborn and once she has made up her mind, she doesn't change it\". Mik Elizabeth noted that patient is alert and aware and able to make her own decisions. Mik Elizabeth also noted, when asked, that the patient would not want to be on a breathing machine long term. Role of palliative team as one of support, if needed and to assist with the overall picture of medical issues was explained. At this time neither patient nor family want this support. They have clearly voiced and behaved in a manner that indicates that the palliative team cannot be of help to them at this time. Palliative will not engage further with this family, unless patient or they ask for it.

## 2019-04-16 NOTE — TELEPHONE ENCOUNTER
Spoke with Mr Shivani Mejia, Ms Jacobs's son. Mr Shivani Mejia & his brother would like to make sure they have time to speak with Ms Maria Esther Ruff before her surgery today. Time of surgery unknown at this time.

## 2019-04-16 NOTE — PROGRESS NOTES
0700-Bedside shift change report given to Corrine Tabor (oncoming nurse) by Niki Sanchez (offgoing nurse). Report included the following information SBAR, Kardex and Accordion. 1701- Assessment completed. See flowsheet. 1691- Zofran given for nausea. 2692- Dr Nehal Ohara at bedside. Patient has been offered surgery. RN witnessed surgeon discussing risks and benefits. Patient will \"think it over\" and come to a decision. 7723- Patient told RN she would like the surgery. Surgeon notified. He will talk to patient later regarding time. [facility] with Joselito Buckley in the OR regarding CHG bath. Due to the allergy, patient will not receive a CHG bath and they will use betadine to prep her. 1130- Patient vomited foul (fecal) smelling liquid. Mouth care provided. [address] [facility] [address] [facility] [address]. Changes to assessment include absent bowel sounds and patient has tachypnea with diminished lung sounds. 1309- Scopolamine patch applied. Patient vomited a small amount of emesis. 1400- Patient bathed. Linens changed. 1410- Reassessed. No changes to noon assessment. 1556- Compazine given for nausea. 1610- Informed consent witnessed. 1642- Patient transported to the [address] with nurse and monitor. Report given to OR nurse. IV fluids and PCA stopped by OR staff. 1900- Report given to oncoming shift. Patient is still in the OR.

## 2019-04-16 NOTE — ANESTHESIA PREPROCEDURE EVALUATION
Anesthetic History No history of anesthetic complications Review of Systems / Medical History Patient summary reviewed, nursing notes reviewed and pertinent labs reviewed Pulmonary Within defined limits Neuro/Psych Psychiatric history Cardiovascular Within defined limits Hypertension: well controlled Hyperlipidemia Exercise tolerance: >4 METS 
  
GI/Hepatic/Renal 
Within defined limits GERD: poorly controlled Comments: Abdominal Abscess Abnormal CT Scan Colitis Endo/Other Hyperthyroidism: well controlled Anemia Pertinent negatives: No hypothyroidism Other Findings Physical Exam 
 
Airway Mallampati: I 
TM Distance: > 6 cm Neck ROM: normal range of motion Mouth opening: Normal 
 
 Cardiovascular Regular rate and rhythm,  S1 and S2 normal,  no murmur, click, rub, or gallop Rhythm: regular Rate: normal 
 
 
 
 Dental 
 
Dentition: Full upper dentures Pulmonary Breath sounds clear to auscultation Abdominal 
GI exam deferred Other Findings Anesthetic Plan ASA: 2, emergent Anesthesia type: general 
 
 
 
 
Induction: Intravenous Anesthetic plan and risks discussed with: Patient

## 2019-04-16 NOTE — TELEPHONE ENCOUNTER
Patient's son calling to speak with Dr. Allan Chavira. He has many concerns regarding the surgery. Patient's son would like a return call from Dr. Allan Chavira. Please call.

## 2019-04-16 NOTE — BRIEF OP NOTE
BRIEF OPERATIVE NOTE Date of Procedure: 4/16/2019 Preoperative Diagnosis: ABDOMINAL ABSCESS Postoperative Diagnosis: ABDOMINAL ABSCESS Procedure(s): LAPAROTOMY EXPLORATORY, ILEOSTOMY; [address] Surgeon(s) and Role: Dave Frey MD - Primary Surgical Staff: 
Circ-1: Justyn Jang Circ-Relief: Loida Culp RN Scrub RN-1: Crispin Gtz RN Surg Asst-1: Mingo Santos Surg Asst-Relief: Marbella Mendoza Event Time In Time Out Incision Start [phone] Incision Close 6750 Anesthesia: General  
Estimated Blood Loss: 50cc Specimens:  
ID Type Source Tests Collected by Time Destination 1 : Anastamosis Preservative Colon  Alex Temple MD 4/16/2019 1741 Pathology Findings: dilated fluid filled small bowel, large abscess of solid feculent material in RUQ, 3 cm anastomotic disruption, fine nodular peritoneal studding throughout the abdominal cavity Complications: none Implants: * No implants in log *

## 2019-04-16 NOTE — PROGRESS NOTES
Pulmonary, Critical Care, and Sleep Medicine Name: Pam Garcia MRN: [id number] : 1955 Hospital: [facility] Date: 2019 3:12 PM Admission: 2019 Impression Plan 1. Peritonitis and  
2. Abdominal abscess s/p drain IR  3. SBO 4. Hyponatremia, initially 129, corrected quickly to 132 with bolus of NS 
5. Recent diagnosis of appendiceal CA w/ extenstive carcinomatosis. S/P open lap w/ omentectomy and R colectomy 3/28/19. 
6. Acute hypoxic respiratory failure 7. Left sided pneumonia, likely aspiration 8. Hypertensive on presentation 9. Leukocytosis 10. Pt confirms wishes DNR, but would be ok with NIV / pressor support if needed. 1. Decompression w/ NGT 
2. IVF  NS IVF 3. IV Zosyn 4. ID consulted 4/15 5. Wean O2 6. Trend WBC, sodium, other lytes 7. H/H improved after transfusion 4/15 8. F/u blood cultures 9. Pain control 10. Palliative care following 11. Replete K 12. Mobilize 13. Transfer to floor today, day 2 awaiting for a bed No acute events overnight No acute distress No acute complaints I have reviewed the labs and previous days notes. Pertinent items are noted in HPI. OBJECTIVE: 
 
 Vital Signs: 
    
Visit Vitals /79 Pulse 76 Temp 98.6 °F (37 °C) Resp 23 Ht 5' 5\" (1.651 m) Wt 64 kg (141 lb 1.5 oz) SpO2 96% BMI 23.48 kg/m² Temp (24hrs), Av.6 °F (37 °C), Min:98.2 °F (36.8 °C), Max:99.1 °F (37.3 °C) Intake/Output:  
  Last shift: No intake/output data recorded. Last 3 shifts:  1901 -  0700 In: 4903.4 [P.O.:500; I.V.:3786.7] Out: 7996 [Urine:1700; Drains:75] Intake/Output Summary (Last 24 hours) at 2019 0720 Last data filed at 2019 0400 Gross per 24 hour Intake 2748.37 ml Output 1200 ml Net 1548.37 ml Lines/devices: Right peritoneal drain Drips:   
Discussed care with: CCU nurse, Supplemental O2 Goals: 93% or better Ambulatory status:   
Prophylaxis:  SCDs for now. Ordered Subutaneous heparin for 4/13 AM  
Nutrition: NPO Code Status: DNR Contact precautions:   
Triage/Disposition: Unchanged. Continue ICU care. Imaging: 
I have personally reviewed these radiographic films and reports:    
I have personally reviewed PFTs: 
 n/a Ventilation: Mode Rate Tidal Volume Pressure Suppport FiO2/LPM PEEP Other Peak airway pressure:     
Minute ventilation:     
Trilogy:    
 
 Physical Exam:                                       
Exam Findings Other General: No resp distress noted, appears older than stated age, thin, frail appearing HEENT:  Oropharynx dry with lesions, NGT R nare, trachea midline, no masses or swelling of the neck Chest: No deformities HEART:  RRR, no murmurs/rubs/gallops Lungs:  Left anteriolateral rhonchi, diminished. No wheezes or crackles ABD: Midline incision approximated w/ staples no erythema. +distended, hypoactive BS. Tender; right side drain EXT: No cyanosis/clubbing/edema, normal peripheral pulses Skin: No rashes or ulcers, no mottling Neuro: RASS +1, A/O x 3 (sort of) having trouble with the month. Medications: 
Current Facility-Administered Medications Medication Dose Route Frequency  sodium chloride (NS) flush 5-40 mL  5-40 mL IntraVENous Q8H  
 sodium chloride (NS) flush 5-40 mL  5-40 mL IntraVENous PRN  
 0.9% sodium chloride infusion 250 mL  250 mL IntraVENous PRN  
 dextrose 5% - 0.45% NaCl with KCl 20 mEq/L infusion  100 mL/hr IntraVENous CONTINUOUS  
 HYDROmorphone (PF) 25 mg/50 mL (DILAUDID) PCA   IntraVENous CONTINUOUS  
 mupirocin (BACTROBAN) 2 % ointment   Both Nostrils BID  ondansetron (ZOFRAN) injection 4 mg  4 mg IntraVENous Q4H PRN  phenol throat spray (CHLORASEPTIC) 1 Spray  1 Spray Oral PRN  piperacillin-tazobactam (ZOSYN) 3.375 g in 0.9% sodium chloride (MBP/ADV) 100 mL  3.375 g IntraVENous Q8H  
  heparin (porcine) pf 300 Units  300 Units InterCATHeter PRN  
 heparin (porcine) injection 5,000 Units  5,000 Units SubCUTAneous Q12H Labs: ABG No results for input(s): PHI, PCO2I, PO2I, HCO3I, SO2I, FIO2I in the last 72 hours. CBC Recent Labs 04/16/19 
0407 04/15/19 
0424 04/14/19 
4040 WBC 8.5 7.2 7.9 HGB 10.9* 6.9* 6.4* HCT 32.4* 21.2* 19.2*  
* 456* 463* MCV 86.2 87.6 88.1 MCH 29.0 28.5 29.4 Metabolic Panel Recent Labs 04/16/19 
0407   
K 3.3*  
 CO2 29 * BUN 2*  
CREA 0.37* CA 7.7* Pertinent Labs As above Emerald George MD 
4/16/2019

## 2019-04-16 NOTE — ANESTHESIA PROCEDURE NOTES
Arterial Line Placement Start time: 4/16/2019 4:52 PM 
End time: 4/16/2019 5:01 PM 
Performed by: Addy Carreon MD 
Authorized by: Addy Carreon MD  
 
Pre-Procedure Indications:  Arterial pressure monitoring and blood sampling Preanesthetic Checklist: patient identified, patient being monitored and patient being monitored Procedure:  
Prep:  Alcohol Seldinger Technique?: Yes Orientation:  Left Location:  Radial artery Catheter size:  20 G Number of attempts:  1 Cont Cardiac Output Sensor: No   
 
Assessment:  
Post-procedure:  Line secured and sterile dressing applied Patient Tolerance:  Patient tolerated the procedure well with no immediate complications Comment:  
Performed under ultrasound guidance

## 2019-04-17 NOTE — PROGRESS NOTES
1900 Report received from Liudmila Renteria RN 
 
2000 Assessment complete. Patient is alert and oriented. Resting comfortably in bed. Lung sounds are diminished. Patient is on 4L nasal cannula. Bowel sounds are hypoactive and distant. Pulses are palpable. Trace edema noted in lower extremities. Left upper arm PICC line with D5 1/2 Normal Saline with 20meq of Potassium infusing @ 150mL/hr, TPN @ 42mL/hr and Dilaudid PCA. Franco catheter in place draining clear yellow urine. Midline abdominal incision is clean, dry and intact. Ostomy to right of incision. [place] drain is to the left of incision and was flushed with 20mL of saline.  
 
2300 Report given to Gage Charles RN

## 2019-04-17 NOTE — PROGRESS NOTES
Received vancomycin consult on patient that is on Zosyn. Based on cultures from 4/12/19, enterococcus gallinarum is resistant to vancomycin. Discussed cultures with Dr. Samira Torres. Based on discussion, will d/c vancomycin consult and Zosyn 3.375g IV q8h. Will order Unasyn 3g IV q6h.

## 2019-04-17 NOTE — INTERDISCIPLINARY ROUNDS
Interdisciplinary team rounds were held 4/17/2019 with the following team members:Care Management, Diabetes Treatment Specialist, Nursing, Nutrition, Pharmacy, Physician and Clinical Coordinator. Plan of care discussed. See clinical pathway and/or care plan for interventions and desired outcomes.

## 2019-04-17 NOTE — PROGRESS NOTES
Report Received from Kavin Pacheco: spoke with Dr. Noemí Cardenas orders to take out Arterial line. 0945 :Spoke with Dr. Girish Ann, informed of patient's headache. MD will put in orders for fiorcet. Informed of Drainage leaking from [facility] drain. Orders to redress to manage drainage. Surgery is still ok if patient goes to the floor. 1405: Spoke with Md regarding BP. If patient hypotension continues to persist orders for One more 12.5 g albumin large bottle. 1411 :Patient UO has totaled 80 ml so far today. Patient has received 2 L in bolus and 25 g albumin. Paged surgery. 1521: Spoke with Dr. Girish Ann orders for 1L bolus, and orders for BMP. 1517: Received a call from lab stating patient Calcium was 6.4. Informed Md. Orders for 2 gm CA and another 1 L bolus. Will continue to monitor. 1655: Offered patient bath for 3rd time today. Patient wishes to get cleaned up after dinner.  1928: Report Given to [employer]

## 2019-04-17 NOTE — PROGRESS NOTES
SURGERY PROGRESS NOTE Admit Date: 2019 POD 1 Day Post-Op Procedure: Procedure(s): LAPAROTOMY EXPLORATORY, ILEOSTOMY; [address] Subjective:  
 
Patient complains of abdominal pain Objective:  
 
Visit Vitals BP (!) 86/54 (BP 1 Location: Right arm, BP Patient Position: At rest) Pulse 98 Temp 97.9 °F (36.6 °C) Resp 19 Ht 5' 5\" (1.651 m) Wt 64 kg (141 lb 1.5 oz) SpO2 95% BMI 23.48 kg/m² Temp (24hrs), Av.2 °F (36.8 °C), Min:97.7 °F (36.5 °C), Max:98.6 °F (37 °C) 
 
 
701 -  1900 In: 1325 [I.V.:1325] Out: 210 [Urine:30; Drains:30] 
04/15 190 -  0700 In: 5543.3 [I.V.:5293.3] Out: 1418 [Urine:413; Drains:205] Physical Exam:   
General:  alert, cooperative, no distress, appears stated age Abdomen: soft, bowel sounds hypoactive, tender, ostomy pink/patent and productive of moderate amount serous fluid Incision:   dressing is C/D/I Lab Results Component Value Date/Time WBC 7.6 2019 04:59 AM  
 HGB 13.6 2019 04:59 AM  
 HCT 40.6 2019 04:59 AM  
 PLATELET 259 (H)  04:59 AM  
 MCV 86.9 2019 04:59 AM  
 
Lab Results Component Value Date/Time GFR est non-AA >60 2019 04:59 AM  
 GFRNA, POC >60 2019 04:24 AM  
 GFR est AA >60 2019 04:59 AM  
 GFRAA, POC >60 2019 04:24 AM  
 Creatinine 0.52 (L) 2019 04:59 AM  
 Creatinine (POC) 0.7 2019 04:24 AM  
 BUN 2 (L) 2019 04:59 AM  
 Sodium 138 2019 04:59 AM  
 Potassium 4.2 2019 04:59 AM  
 Chloride 108 2019 04:59 AM  
 CO2 23 2019 04:59 AM  
 Magnesium 1.7 2019 04:03 AM  
 Phosphorus 3.2 2019 04:03 AM  
 
 
Assessment:  
 
Principal Problem: 
  SBO (small bowel obstruction) ([facility] (2019) Active Problems: 
  Sepsis following intra-abdominal surgery ([facility] (3/28/2019) Peritoneal carcinomatosis ([facility] (3/29/2019) Appendix carcinoma ([facility] (3/29/2019) Small bowel obstruction ([address] (4/12/2019) Severe protein-calorie malnutrition ([address] (4/12/2019) Hyponatremia (4/12/2019) Pelvic abscess in female (4/12/2019) Anemia (4/12/2019) Sepsis ([address] (4/12/2019) Postprocedural intra-abdominal sepsis ([address] (4/13/2019) Sepsis due to pneumonia ([address] (4/13/2019) POD#1 ex-lap, resection of anastomosis and ileostomy for anastomotic leak. Patient has some marginal UOP. Cr is mildly elevated. Will bolus. Will also start TPN. Continue ABX. Will ad vancomycin to cover the enterococcus in the fluid

## 2019-04-17 NOTE — ANESTHESIA POSTPROCEDURE EVALUATION
Procedure(s): LAPAROTOMY EXPLORATORY, ILEOSTOMY; KASIA DRAIN PLACEMENT. general 
 
Anesthesia Post Evaluation Patient location during evaluation: PACU Note status: Adequate. Level of consciousness: responsive to verbal stimuli and sleepy but conscious Pain management: satisfactory to patient Airway patency: patent Anesthetic complications: no 
Cardiovascular status: acceptable Respiratory status: acceptable Hydration status: acceptable Comments: +Post-Anesthesia Evaluation and Assessment Patient: Meg Mckeon MRN: [id number]  SSN: [id number] YOB: 1955  Age: 71 y.o. Sex: female Cardiovascular Function/Vital Signs /60   Pulse 89   Temp 36.9 °C (98.5 °F)   Resp 12   Ht 5' 5\" (1.651 m)   Wt 64 kg (141 lb 1.5 oz)   SpO2 95%   BMI 23.48 kg/m² Patient is status post Procedure(s): LAPAROTOMY EXPLORATORY, ILEOSTOMY; KASIA DRAIN PLACEMENT. Nausea/Vomiting: Controlled. Postoperative hydration reviewed and adequate. Pain: 
Pain Scale 1: FLACC (04/16/19 1955) Pain Intensity 1: 0 (04/16/19 1955) Managed. Neurological Status:  
Neuro (L): Exceptions to [facility] (04/16/19 1912) At baseline. Mental Status and Level of Consciousness: Arousable. Pulmonary Status:  
O2 Device: Nasal cannula (04/16/19 1945) Adequate oxygenation and airway patent. Complications related to anesthesia: None Post-anesthesia assessment completed. No concerns. Signed By: Gracie Vale MD  
 4/16/2019 Post anesthesia nausea and vomiting:  controlled Vitals Value Taken Time /60 4/16/2019  7:30 PM  
Temp 36.9 °C (98.5 °F) 4/16/2019  7:15 PM  
Pulse 90 4/16/2019  7:58 PM  
Resp 12 4/16/2019  7:58 PM  
SpO2 95 % 4/16/2019  7:58 PM  
Vitals shown include unvalidated device data.

## 2019-04-17 NOTE — OP NOTES
[facility] 
OPERATIVE REPORT Name:  Eric Self 
MR#:  [id number] :  1955 ACCOUNT #:  [de-identified] DATE OF SERVICE:  2019 PREOPERATIVE DIAGNOSES: 
1. Anastomotic leak. 2.  Sepsis. 3.  Small bowel obstruction. 4.  Hyponatremia. 5.  Intra-abdominal abscess. 6.  Anemia. 7.  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 8.  Carcinomatosis. POSTOPERATIVE DIAGNOSES: 
1. Anastomotic leak. 2.  Sepsis. 3.  Small bowel obstruction. 4.  Hyponatremia. 5.  Intra-abdominal abscess. 6.  Anemia. 7.  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 8.  Carcinomatosis. PROCEDURES PERFORMED: 
1. Laparotomy. 2.  Resection of previous anastomosis. 3.  Drainage of intra-abdominal abscess and creation of end ileostomy. SURGEON:  Pedro Garrison MD 
 
ASSISTANTS:  Solomon Kim followed by Rashel Cárdenas who was then released by Judi Galeano. ANESTHESIA:  General endotracheal. 
 
COMPLICATIONS:  None apparent. SPECIMENS REMOVED:  Ileocolonic anastomosis. IMPLANTS:  none. ESTIMATED BLOOD LOSS:  50 mL. FINDINGS: 
1. Dilated fluid-filled small bowel. 2.  A large abscess cavity in the right paracolic gutter containing solid feculent material.  A 3 cm disruption of the staple line of the anastomosis. 3.  Fine nodular peritoneal studding throughout the abdominal cavity. INDICATIONS FOR PROCEDURE:  The patient is a 70-year-old female, who is 14 days out from a laparoscopy, converted to open laparotomy, extended right hemicolectomy for an appendiceal malignancy. The patient was found to have carcinomatosis with liver metastasis and peritoneal studding as well as a large omental mass. Final pathology showed microscopic disease at one of the surgical margins. The patient had been home doing well up until approximately 6 days ago. She presented 4 days ago with severe sepsis and was found to have a contained anastomotic leak.   She was managed conservatively with percutaneous drain placement. She improved clinically with stabilization of her vital signs but her symptoms continued to decline. The patient had worsening nausea, vomiting, bloating, and abdominal pain, so the patient was taken to the operating room for control of the septic source. DESCRIPTION OF PROCEDURE:  The patient was identified in the preoperative holding area and informed consent obtained. She was given preoperative antibiotics. She was then taken to the operating room, placed in a supine position, and underwent general endotracheal anesthesia without complication. A 16-Macedonian Franco catheter was then inserted under sterile technique and an 18-Macedonian nasogastric tube was inserted. The patient's abdomen was then prepped and draped in the usual sterile fashion. A time-out was performed to confirm that the patient was the patient and that she was presenting for laparotomy, drainage of abscesses, and ileostomy. Once this was confirmed, her midline laparotomy incision was reopened with a 10 blade scalpel. Electrocautery was used to dissect through subcutaneous tissue. The patient's previous fascial suture was removed and the abdomen opened. Entry into the abdomen revealed a moderate amount of purulent fluid and dilated loops of small bowel up to 6 cm in diameter eviscerated. The evisceration of small bowel was assisted until most of the small bowel was out of the abdomen. A wound protector was then inserted. A [place] retractor was used to elevate the right side of the abdominal wall and the area of anastomosis identified. There were significant inflammatory changes in the right upper quadrant.   Blunt dissection around the edges of the anastomosis unroofed a large abscess cavity containing solid feculent material.  There were visible solid pieces of food including corn and other vegetable material,  this was all removed. The anastomosis was mobilized and it was found to have a 3 cm staple line disruption. A linear TEQUILA stapler with a blue load in the length of 80 mm was used to divide the ileum. A second cartridge was used to divide the transverse colon. The intervening segment that included the anastomosis was then  from its mesentery using a LigaSure. The anastomosis was then removed and sent to pathology. Attempts were made to irrigate the abdomen but the dilated loops of small bowel made exploration difficult, so the stapled end of the distal bowel was opened and the suction inserted and all of the small bowel contents were milked back to the suction device. A 2-0 silk pursestring was placed around it to prevent spillage. A total of 3 liters of small bowel content was milked out of the small bowel to decompress it. Once this was completed, a window was made in the mesentery of the small bowel 6 cm proximal to its stapled end and the bowel was once again divided with another blue cartridge and this end of the small bowel was sent with the original specimen. The abdomen was then irrigated with 6 liters of normal saline. The last 3 liters contained bacitracin. The abdomen was thoroughly examined. There was fine peritoneal studding throughout the visceral and parietal peritoneum but there was no large bulky disease except for the liver masses identified. The small bowel was then returned back to the abdomen and care was taken to ensure that there was no twisting or kinking of the mesentery at the end of the ileum. [place] was then used to pinch the skin along the right lateral rectus and a small [place] of skin was excised out around this [employer]. A column of subcutaneous fat was excised with electrocautery. The anterior rectus sheath was then divided in a cruciate fashion. The rectus muscles spread with two [employer] and the posterior rectus sheath opened in a cruciate fashion.   The opening was dilated to the width of the surgeon's two fingers. Once this was completed, the [place] was passed through this opening and the end of the terminal ileum was brought out through the incision. Once again, the mesentery was inspected to ensure it was not kinked or twisted. Once this was completed, an Deborra Charan sump drain was inserted  through a left-sided stab incision and placed along the right gutter into the location of the major abscess cavity. The 
drain was sutured to the skin with 2-0 nylon. The fascia was then closed with a running #1 PDS suture. The skin and subcutaneous tissue were left open and packed wet-to-dry. The ostomy was then matured in a Erica-type fashion in four cardinal compass points using a 3-0 Vicryl to tomer the end ileostomy and then the remainder of the circumference of the ileostomy was closed with mucocutaneous sutures. An ostomy appliance was then placed over ileostomy. A dry dressing was placed over the patient's midline incision. She was extubated in the room and taken to postanesthesia care in stable condition. Instruments and sponge counts were correct x2. MD MERCEDEZ [id number] 
D:  04/16/2019 19:54 
T:  04/16/2019 19:59 
JOB #:  [id number]

## 2019-04-17 NOTE — PROGRESS NOTES
Pulmonary, Critical Care, and Sleep Medicine Name: Lalo Llanes MRN: [id number] : 1955 Hospital: [facility] Date: 2019 3:12 PM Admission: 2019 Impression Plan 1. Peritonitis and  
2. Abdominal abscess s/p drain IR  
3. S/p exp lap  4. SBO 
5. Hyponatremia, initially 129, corrected quickly to 132 with bolus of NS 6. Recent diagnosis of appendiceal CA w/ extenstive carcinomatosis. S/P open lap w/ omentectomy and R colectomy 3/28/19. 
7. Acute hypoxic respiratory failure 8. Left sided pneumonia, likely aspiration 9. Hypertensive on presentation 10. Leukocytosis 11. Pt confirms wishes DNR, but would be ok with NIV / pressor support if needed. 1. NGT 
2. IVF volume resuscitation 3. IV abx 4. ID following 5. Wean O2  
6. H/H improved after transfusion 4/15 7. F/u blood cultures 8. TPN 
9. Pain control 10. Palliative care following 11. Mobilize 12. Transfer to floor today? No acute events overnight after surgery  No acute distress No acute complaints I have reviewed the labs and previous days notes. Pertinent items are noted in HPI. OBJECTIVE: 
 
 Vital Signs: 
    
Visit Vitals BP (!) 88/61 Pulse (!) 103 Temp 98.6 °F (37 °C) Resp 17 Ht 5' 5\" (1.651 m) Wt 64 kg (141 lb 1.5 oz) SpO2 95% BMI 23.48 kg/m² Temp (24hrs), Av.2 °F (36.8 °C), Min:97.7 °F (36.5 °C), Max:98.6 °F (37 °C) Intake/Output:  
  Last shift: No intake/output data recorded. Last 3 shifts: 04/15 1901 -  0700 In: 5543.3 [I.V.:5293.3] Out: 1418 [Urine:413; Drains:205] Intake/Output Summary (Last 24 hours) at 2019 4726 Last data filed at 2019 0700 Gross per 24 hour Intake 4543.34 ml Output 1418 ml Net 3125.34 ml Lines/devices: Right peritoneal drain Drips:   
Discussed care with: CCU nurse, Supplemental O2 Goals: 93% or better Ambulatory status: Prophylaxis:  SCDs for now. Ordered Subutaneous heparin for 4/13 AM  
Nutrition: NPO Code Status: DNR Contact precautions:   
Triage/Disposition: Unchanged. Continue ICU care. Imaging: 
I have personally reviewed these radiographic films and reports:    
I have personally reviewed PFTs: 
 n/a Ventilation: Mode Rate Tidal Volume Pressure Suppport FiO2/LPM PEEP Other Peak airway pressure:     
Minute ventilation:     
Trilogy:    
 
 Physical Exam:                                       
Exam Findings Other General: No resp distress noted, appears older than stated age, thin, frail appearing HEENT:  Oropharynx dry with lesions, NGT R nare, trachea midline, no masses or swelling of the neck Chest: No deformities HEART:  RRR, no murmurs/rubs/gallops Lungs:  Left anteriolateral rhonchi, diminished. No wheezes or crackles ABD: Midline incision no erythema. +distended, hypoactive BS. Tender; right side drain EXT: No cyanosis/clubbing/edema, normal peripheral pulses Skin: No rashes or ulcers, no mottling Neuro: RASS +1, A/O x 3 (sort of) having trouble with the month. Medications: 
Current Facility-Administered Medications Medication Dose Route Frequency  sodium chloride 0.9 % bolus infusion 1,000 mL  1,000 mL IntraVENous ONCE  
 TPN ADULT - CENTRAL AA 5% D20% W/ CA + ELECTROLYTES   IntraVENous CONTINUOUS  
 fat emulsion 20% (LIPOSYN, INTRAlipid) infusion 500 mL  500 mL IntraVENous Q MON, WED & FRI  scopolamine (TRANSDERM-SCOP) 1 mg over 3 days 1 Patch  1 Patch TransDERmal Q72H  prochlorperazine (COMPAZINE) with saline injection 10 mg  10 mg IntraVENous Q4H PRN  
 HYDROmorphone (PF) (DILAUDID) injection 0.5 mg  0.5 mg IntraVENous Q4H PRN  
 sodium chloride (NS) flush 5-40 mL  5-40 mL IntraVENous Q8H  
 sodium chloride (NS) flush 5-40 mL  5-40 mL IntraVENous PRN  
 0.9% sodium chloride infusion 250 mL  250 mL IntraVENous PRN  
  dextrose 5% - 0.45% NaCl with KCl 20 mEq/L infusion  150 mL/hr IntraVENous CONTINUOUS  
 HYDROmorphone (PF) 25 mg/50 mL (DILAUDID) PCA   IntraVENous CONTINUOUS  
 mupirocin (BACTROBAN) 2 % ointment   Both Nostrils BID  ondansetron (ZOFRAN) injection 4 mg  4 mg IntraVENous Q4H PRN  phenol throat spray (CHLORASEPTIC) 1 Spray  1 Spray Oral PRN  piperacillin-tazobactam (ZOSYN) 3.375 g in 0.9% sodium chloride (MBP/ADV) 100 mL  3.375 g IntraVENous Q8H  
 heparin (porcine) pf 300 Units  300 Units InterCATHeter PRN  
 heparin (porcine) injection 5,000 Units  5,000 Units SubCUTAneous Q12H Labs: ABG No results for input(s): PHI, PCO2I, PO2I, HCO3I, SO2I, FIO2I in the last 72 hours. CBC Recent Labs 04/17/19 
9567 04/16/19 
0407 04/15/19 
0424 WBC 7.6 8.5 7.2 HGB 13.6 10.9* 6.9*  
HCT 40.6 32.4* 21.2*  
* 465* 456* MCV 86.9 86.2 87.6 MCH 29.1 29.0 28.5 Metabolic Panel Recent Labs 04/17/19 
0459 04/16/19 
0407  137  
K 4.2 3.3*  
 102 CO2 23 29 * 109* BUN 2* 2*  
CREA 0.52* 0.37* CA 6.7* 7.7* Pertinent Labs As above Josias Preciado MD 
4/17/2019

## 2019-04-17 NOTE — PROGRESS NOTES
2100- Patient in room, NSR, 4L O2, sheets, NG, kaleigh drain, ostomy, Picc in place. Primary Nurse Shiv Murphy, MIRELLA and Lana Lin RN performed a dual skin assessment on this patient Impairment noted- see wound doc flow sheet, surgical wound to ABD. Pankaj score is 14 
 
2320- MD paged. 2332- Spoke to Dr. Gulshan Carson MD aware of low urine output, order received. Yony- MD paged 56- Spoke to Dr. Gulshan Carson MD aware patient's urine output remains low, order received.  
1465- MD paged [phone]- Spoke to Dr. Gulshan Carson MD aware of continued low urine output, Vitals, and lab results. MD to review patient's chart.   
0700- Report to Will RN

## 2019-04-17 NOTE — PROGRESS NOTES
F/U for  Anastomotic leak, abscess in abdominal cavity POD 1 S: Ms. Candi Odom was seen by me today during rounds. At this time, she is resting. Using prn Dilaudid and PCA. Had ileostomy last night. NGT in 
 
O: Blood pressure (!) 88/61, pulse (!) 106, temperature 98.6 °F (37 °C), resp. rate 21, height 5' 5\" (1.651 m), weight 64 kg (141 lb 1.5 oz), SpO2 96 %. Gen: Patient is in no acute distress. .  Lungs: Clear to auscultation bilaterally . Heart:+RRR. Abd: Rt new ilestomy with some blod in it. Dressing on lap incision. Firm . Extremities: Warm. Lab Results Component Value Date/Time WBC 7.6 04/17/2019 04:59 AM  
 HGB 13.6 04/17/2019 04:59 AM  
 HCT 40.6 04/17/2019 04:59 AM  
 PLATELET 722 (H) 53/11/2710 04:59 AM  
 MCV 86.9 04/17/2019 04:59 AM  
. 
Lab Results Component Value Date/Time Sodium 138 04/17/2019 04:59 AM  
 Potassium 4.2 04/17/2019 04:59 AM  
 Chloride 108 04/17/2019 04:59 AM  
 CO2 23 04/17/2019 04:59 AM  
 Anion gap 7 04/17/2019 04:59 AM  
 Glucose 121 (H) 04/17/2019 04:59 AM  
 BUN 2 (L) 04/17/2019 04:59 AM  
 Creatinine 0.52 (L) 04/17/2019 04:59 AM  
 BUN/Creatinine ratio 4 (L) 04/17/2019 04:59 AM  
 GFR est AA >60 04/17/2019 04:59 AM  
 GFR est non-AA >60 04/17/2019 04:59 AM  
 Calcium 6.7 (L) 04/17/2019 04:59 AM  
 Bilirubin, total 0.6 04/12/2019 12:47 PM  
 AST (SGOT) 17 04/12/2019 12:47 PM  
 Alk. phosphatase 104 04/12/2019 12:47 PM  
 Protein, total 7.1 04/12/2019 12:47 PM  
 Albumin 2.3 (L) 04/12/2019 12:47 PM  
 Globulin 4.8 (H) 04/12/2019 12:47 PM  
 A-G Ratio 0.5 (L) 04/12/2019 12:47 PM  
 ALT (SGPT) 12 04/12/2019 12:47 PM  
 
 
 
A: Principal Problem: 
  SBO (small bowel obstruction) ([address] (4/12/2019) Active Problems: 
  Sepsis following intra-abdominal surgery ([address] (3/28/2019) Peritoneal carcinomatosis ([address] (3/29/2019) Appendix carcinoma ([address] (3/29/2019) Small bowel obstruction ([address] (4/12/2019) Severe protein-calorie malnutrition ([address] (4/12/2019) Hyponatremia (4/12/2019) Pelvic abscess in female (4/12/2019) Anemia (4/12/2019) Sepsis ([address] (4/12/2019) Postprocedural intra-abdominal sepsis ([address] (4/13/2019) Sepsis due to pneumonia ([address] (4/13/2019) Micro: 
HEAVY ESCHERICHIA COLI Culture result: Abnormal       Final  
HEAVY KLEBSIELLA OXYTOCA Culture result: Abnormal       Final  
HEAVY KLEBSIELLA PNEUMONIAE Culture result: Abnormal       Final  
HEAVY ENTEROCOCCUS FAECALIS GROUP D Culture result: Abnormal       Final  
HEAVY ENTEROCOCCUS GALLINARUM GROUP D . Makenna Jack Makenna Jack INTRINSICALLY   
 
 
OR findings dilated fluid filled small bowel, large abscess of solid feculent material in RUQ, 3 cm anastomotic disruption, fine nodular peritoneal studding throughout the abdominal cavity P:  She is s/p creating of an ileostomy, drain placement and evacuation of the abscess. Appreicate Dr Pawel Montemayor help. NPO post surgery. Symptomatic care and await bowel function to return. Spirometry. DVT/gastritis prophylaxis. Pain control. Discussed care with her RN at bedside. Yanet Mcfadden MD 
4/17/2019

## 2019-04-17 NOTE — PROGRESS NOTES
Nutrition:  Chart reviewed, case discussed during CCU rounds. Pt is s/p laparotomy + resection of anastomosis + drainage of abscess + ileostomy. Noted surgeon ordered TPN to start tonight. If BG <200mg/dL and electrolytes WNL tomorrow, recommend TPN Goal Rate of D20, 5% AA @ 63mL/h + 250mL 20% lipids 3 times per week (provides 1544kcals/76gPro) Will continue to monitor pt's case closely. Thank you! Eugene Tobin RD, [facility] Pager [phone]

## 2019-04-18 NOTE — PROGRESS NOTES
Pulmonary, Critical Care, and Sleep Medicine Name: Enriqueta Gomez MRN: [id number] : 1955 Hospital: [facility] Date: 2019 3:12 PM Admission: 2019 Impression Plan 1. Peritonitis and  
2. Abdominal abscess s/p drain IR  
3. S/p exp lap  4. SBO 
5. Hyponatremia, initially 129, corrected quickly to 132 with bolus of NS 6. Recent diagnosis of appendiceal CA w/ extenstive carcinomatosis. S/P open lap w/ omentectomy and R colectomy 3/28/19. 
7. Acute hypoxic respiratory failure 8. Left sided pneumonia, likely aspiration 9. Hypertensive on presentation 10. Leukocytosis 11. Pt confirms wishes DNR, but would be ok with NIV / pressor support if needed. 1. NGT 
2. IVF volume resuscitation 3. IV abx 4. ID following 5. Wean O2  
6. H/H improved after transfusion 4/15 7. F/u blood cultures 8. TPN 
9. Pain control 10. Palliative care following 11. Mobilize 12. Transfer to floor today No acute events overnight, s/p surgery  No acute distress No acute complaints I have reviewed the labs and previous days notes. Pertinent items are noted in HPI. OBJECTIVE: 
 
 Vital Signs: 
    
Visit Vitals /64 Pulse (!) 101 Temp 98.8 °F (37.1 °C) Resp 20 Ht 5' 5\" (1.651 m) Wt 68.1 kg (150 lb 2.1 oz) SpO2 98% BMI 24.98 kg/m² Temp (24hrs), Av.4 °F (36.9 °C), Min:97.9 °F (36.6 °C), Max:98.8 °F (37.1 °C) Intake/Output:  
  Last shift:  07 - 1900 In: -  
Out: 100 [Urine:100] Last 3 shifts: 1901 -  07 In: 58151.4 [I.V.:57104.4] Out: 2479 [Urine:963; Drains:525] Intake/Output Summary (Last 24 hours) at 2019 8762 Last data filed at 2019 2328 Gross per 24 hour Intake 8726.44 ml Output 2255 ml Net 6471.44 ml Lines/devices: Right peritoneal drain Drips:   
Discussed care with: CCU nurse, Supplemental O2 Goals: 93% or better Ambulatory status: Prophylaxis:  SCDs for now. Ordered Subutaneous heparin for 4/13 AM  
Nutrition: NPO Code Status: DNR Contact precautions:   
Triage/Disposition: Unchanged. Continue ICU care. Imaging: 
I have personally reviewed these radiographic films and reports:    
I have personally reviewed PFTs: 
 n/a Ventilation: Mode Rate Tidal Volume Pressure Suppport FiO2/LPM PEEP Other Peak airway pressure:     
Minute ventilation:     
Trilogy:    
 
 Physical Exam:                                       
Exam Findings Other General: No resp distress noted, appears older than stated age, thin, frail appearing HEENT:  Oropharynx dry with lesions, NGT R nare, trachea midline, no masses or swelling of the neck Chest: No deformities HEART:  RRR, no murmurs/rubs/gallops Lungs:  Left anteriolateral rhonchi, diminished. No wheezes or crackles ABD: Midline incision no erythema. +distended, hypoactive BS. Tender; right side drain EXT: No cyanosis/clubbing/edema, normal peripheral pulses Skin: No rashes or ulcers, no mottling Neuro: RASS +1, A/O x 3 (sort of) having trouble with the month. Medications: 
Current Facility-Administered Medications Medication Dose Route Frequency  TPN ADULT - CENTRAL AA 5% D20% W/ CA + ELECTROLYTES   IntraVENous CONTINUOUS  
 fat emulsion 20% (LIPOSYN, INTRAlipid) infusion 500 mL  500 mL IntraVENous Q MON, WED & FRI  
 butalbital-acetaminophen-caffeine (FIORICET, ESGIC) -40 mg per tablet 1 Tab  1 Tab Oral Q4H PRN  
 ampicillin-sulbactam (UNASYN) 3 g in 0.9% sodium chloride (MBP/ADV) 100 mL  3 g IntraVENous Q6H  
 albumin human 5% (BUMINATE) solution 12.5 g  12.5 g IntraVENous ONCE PRN  
 scopolamine (TRANSDERM-SCOP) 1 mg over 3 days 1 Patch  1 Patch TransDERmal Q72H  prochlorperazine (COMPAZINE) with saline injection 10 mg  10 mg IntraVENous Q4H PRN  
 HYDROmorphone (PF) (DILAUDID) injection 0.5 mg  0.5 mg IntraVENous Q4H PRN  
  sodium chloride (NS) flush 5-40 mL  5-40 mL IntraVENous Q8H  
 sodium chloride (NS) flush 5-40 mL  5-40 mL IntraVENous PRN  
 dextrose 5% - 0.45% NaCl with KCl 20 mEq/L infusion  150 mL/hr IntraVENous CONTINUOUS  
 HYDROmorphone (PF) 25 mg/50 mL (DILAUDID) PCA   IntraVENous CONTINUOUS  
 mupirocin (BACTROBAN) 2 % ointment   Both Nostrils BID  ondansetron (ZOFRAN) injection 4 mg  4 mg IntraVENous Q4H PRN  phenol throat spray (CHLORASEPTIC) 1 Spray  1 Spray Oral PRN  
 heparin (porcine) pf 300 Units  300 Units InterCATHeter PRN  
 heparin (porcine) injection 5,000 Units  5,000 Units SubCUTAneous Q12H Labs: ABG No results for input(s): PHI, PCO2I, PO2I, HCO3I, SO2I, FIO2I in the last 72 hours. CBC Recent Labs 04/18/19 
3985 04/17/19 
0459 04/16/19 
0407 WBC 17.2* 7.6 8.5 HGB 9.2* 13.6 10.9* HCT 28.3* 40.6 32.4*  
 521* 465* MCV 90.7 86.9 86.2 MCH 29.5 29.1 29.0 Metabolic Panel Recent Labs 04/18/19 
8436 04/17/19 
1441 04/17/19 
0459  139 138  
K 4.0 4.6 4.2 * 109* 108 CO2 24 24 23 * 141* 121* BUN 5* 5* 2*  
CREA 0.57 0.81 0.52* CA 6.8* 6.4* 6.7* MG 1.4*  --   --   
PHOS 1.9*  --   --   
ALB 1.6* 1.8*  --   
SGOT 12* 14*  --   
ALT 10* 8*  --   
  
 
Pertinent Labs As above Tahir Sylvester MD 
4/18/2019

## 2019-04-18 NOTE — PROGRESS NOTES
Report received from [facility] 
1120: Franco removed per Dr. Gulshan Carson, wet to dry dressing applied per MD. Will start Dakins with next dressing. 1537: Patient voided 50 mL. Pure wik repositioned 1800: Dressing changed an ABD incision and drain. 1810: Report given to Erwin Orozco RN patient going to room 2112.

## 2019-04-18 NOTE — PROGRESS NOTES
SURGERY PROGRESS NOTE Admit Date: 2019 POD 2 Days Post-Op Procedure: Procedure(s): LAPAROTOMY EXPLORATORY, ILEOSTOMY; [address] Subjective:  
 
Patient states she is feeling better today. Pain is improved. NO nausea. Wants to drink Objective:  
 
Visit Vitals BP 98/48 Pulse 98 Temp 98.8 °F (37.1 °C) Resp 21 Ht 5' 5\" (1.651 m) Wt 68.1 kg (150 lb 2.1 oz) SpO2 96% BMI 24.98 kg/m² Temp (24hrs), Av.5 °F (36.9 °C), Min:98.1 °F (36.7 °C), Max:98.8 °F (37.1 °C) 
 
 
701 -  1900 In: 759.8 [I.V.:759.8] Out: 435 [Urine:305; Drains:30] 
 190 -  0700 In: 98828.4 [I.V.:90460.4] Out: 2531 [Urine:963; Drains:525] Physical Exam:   
General:  alert, cooperative, no distress, appears stated age Abdomen: soft, bowel sounds active, non-tender Ostomy - pink and patent. only serous drainage no gas Incision:   packing removed. Mild amount of purulent material at the edges. Fascia intact Lab Results Component Value Date/Time WBC 17.2 (H) 2019 04:39 AM  
 HGB 9.2 (L) 2019 04:39 AM  
 HCT 28.3 (L) 2019 04:39 AM  
 PLATELET 220  04:39 AM  
 MCV 90.7 2019 04:39 AM  
 
Lab Results Component Value Date/Time GFR est non-AA >60 2019 04:39 AM  
 GFRNA, POC >60 2019 04:24 AM  
 GFR est AA >60 2019 04:39 AM  
 GFRAA, POC >60 2019 04:24 AM  
 Creatinine 0.57 2019 04:39 AM  
 Creatinine (POC) 0.7 2019 04:24 AM  
 BUN 5 (L) 2019 04:39 AM  
 Sodium 140 2019 04:39 AM  
 Potassium 4.0 2019 04:39 AM  
 Chloride 109 (H) 2019 04:39 AM  
 CO2 24 2019 04:39 AM  
 Magnesium 1.4 (L) 2019 04:39 AM  
 Phosphorus 1.9 (L) 2019 04:39 AM  
 
 
Assessment/plan:  
 
Principal Problem: 
  SBO (small bowel obstruction) ([facility] (2019) Active Problems: 
  Sepsis following intra-abdominal surgery ([facility] (3/28/2019) Peritoneal carcinomatosis ([address] (3/29/2019) Appendix carcinoma ([address] (3/29/2019) Small bowel obstruction ([address] (4/12/2019) Severe protein-calorie malnutrition ([address] (4/12/2019) Hyponatremia (4/12/2019) Pelvic abscess in female (4/12/2019) Anemia (4/12/2019) Sepsis ([address] (4/12/2019) Postprocedural intra-abdominal sepsis ([address] (4/13/2019) Sepsis due to pneumonia ([address] (4/13/2019) 
 
wound -  Possible wound infection which is not unexpected given feculent abdominal collection. Will add Dakins BID to clean the wound up Ileus - continue NG and TPN Pt/OT

## 2019-04-18 NOTE — PROGRESS NOTES
Interdisciplinary team rounds were held  4/18/2019  with the following team members:Diabetes Treatment Specialist, Nursing, Nutrition, Pharmacy, Physical Therapy, Physician and Respiratory Therapy. Plan of care discussed. Goal: See MD orders and progress notes for further  interventions and desired outcomes.

## 2019-04-18 NOTE — PROGRESS NOTES
Physical Therapy Note Orders received. Chart reviewed. Patient cleared by nursing for mobility. VSS. Patient required MAX encouragement to agree to participate in acute PT evaluation, however then patient received \"important\" phone call and then son arrived. PT initiated session. Patient then requested PT step out in the hallway during brief phone call and then son came into hallway and told therapist that patient Conrad Griffin wants to see a \" and not work with therapy. Session aborted. Attempted to locate CM, however unsuccessful. Will follow back tomorrow for PT evaluation as patient willing to participate. Thank you, 
Sameer Steele, PT, DPT Total time: 16 minutes

## 2019-04-18 NOTE — WOUND CARE
Ostomy nurse: POD# 2 END ILEOSTOMY Patient is a 72 y/o CF who was here at [id number] [place] [address] 3/28/19 - 4/4/19 s/p exp lap, omentectomy and extended right colectomy for appendiceal cancer with extensive carcinomatosis (new dx) by Dr Danyel Moreno. Consult to Dr. Juhi Rosario - oncology at that time. Patient went home, never followed up with oncologist because \"I was too sick\". She lives alone and has x3 grown children but unsure of their participation in care. Patient returned to [address] [place] [address] ED on 4/12 and admitted with N/V, failure to thrive, pain. Patient found to have an anastomotic leak and a very poor prognosis overall. Patient to surgery 4/16 for a Laparotomy, resection of previous anastomosis, drainage of intra-abdominal abscess and creation of an End Ileostomy. Patient has PMHX:  
Past Medical History:  
Diagnosis Date  Acid reflux  GERD (gastroesophageal reflux disease)  Hypertension  Other ill-defined conditions(799.89)   
 high cholesterol  Psychiatric disorder   
 depression  Thyroid disease   
 hyperthyroidism Currently patient is A&O x 3 in CCU with orders to transfer to floor. NG LWS - mod amount of dark green drainage Franco removed this am 
PCA for pain control TPN for nutrition - Protein total= 4.2 and Albumin=1.6 IV fluids and Abx's = WBC increased from 7.6 on 4/17 to 17.2 on 4/18 Usha sump drain - left lower abdomen, placed in abscess cavity Ostomy nurse introduced self and services to patient. Stoma: red, moist, budded. No gas or stool in pouch. Stoma measures 1 3/4 inches round today. A one piece pouch without a filter placed on patient with stoma barrier ring. Plan: Specialty bed ordered for transfer to floor: Envision on a [facility] bed frame ordered 4/18 wo# [id number] Griselda Martinez RN, [employer]

## 2019-04-18 NOTE — PROGRESS NOTES
Nutrition Assessment: 
 
RECOMMENDATIONS:  
Continue TPN as ordered ASSESSMENT:  
Chart reviewed, case discussed during CCU rounds. Pt with NGT to suction. She is s/p lap + resection of anastomosis + drainage of abscess + ileostomy. TPN started yesterday and is at goal rate (to start tonight). TPN provides 26kcals/kg which is appropriate. NGT with high OP. Ileostomy being managed by WOCN. Phos and Mag low today, being repleted. Dietitians Intervention(s)/Plan(s): Continue TPN as ordered SUBJECTIVE/OBJECTIVE:  
 
Diet Order: NPO, Other (comment)(TPN: D20, 5% AA @ 63mL/h + 500mL 20% lipids 3 x week (provides 1759kcals/76gPro) ) 
% Eaten:   
Patient Vitals for the past 72 hrs: 
 % Diet Eaten 04/15/19 1730 5 % NGT to suction at   flush with       via NG Tube   Residuals: 900 mL Pertinent Medications:unasyn, humalog, MagSO4, Kphos; Pelopia@[employer]). Chemistries: 
Lab Results Component Value Date/Time Sodium 140 04/18/2019 04:39 AM  
 Potassium 4.0 04/18/2019 04:39 AM  
 Chloride 109 (H) 04/18/2019 04:39 AM  
 CO2 24 04/18/2019 04:39 AM  
 Anion gap 7 04/18/2019 04:39 AM  
 Glucose 172 (H) 04/18/2019 04:39 AM  
 BUN 5 (L) 04/18/2019 04:39 AM  
 Creatinine 0.57 04/18/2019 04:39 AM  
 BUN/Creatinine ratio 9 (L) 04/18/2019 04:39 AM  
 GFR est AA >60 04/18/2019 04:39 AM  
 GFR est non-AA >60 04/18/2019 04:39 AM  
 Calcium 6.8 (L) 04/18/2019 04:39 AM  
 Albumin 1.6 (L) 04/18/2019 04:39 AM  
  
Anthropometrics: Height: 5' 5\" (165.1 cm) Weight: 68.1 kg (150 lb 2.1 oz)   []bed scale    []stated   [x]unknown (4/17) IBW (%IBW):   ( ) UBW (%UBW):   (  %) BMI: Body mass index is 24.98 kg/m². This BMI is indicative of: 
[]Underweight   [x]Normal   []Overweight   [] Obesity   [] Extreme Obesity (BMI>40) Estimated Nutrition Needs (Based on): 1500 Kcals/day(BMR: 1150 x 1.3) , 60 g(1 g/kg) Protein Carbohydrate: At Least 130 g/day  Fluids: 1500 mL/day Last BM: ileostomy   []Active     []Hyperactive  [x]Hypoactive       [] Absent   BS Skin:    [] Intact   [x] Incision  [] Breakdown   [] DTI   [] Tears/Excoriation/Abrasion  [x]Edema(+1 pitting-BLE) [] Other: Wt Readings from Last 30 Encounters:  
04/17/19 68.1 kg (150 lb 2.1 oz) 04/05/19 60.8 kg (134 lb) 04/01/19 66 kg (145 lb 8 oz)  
03/28/19 61.2 kg (135 lb)  
03/20/19 60.8 kg (134 lb)  
03/18/19 59.6 kg (131 lb 6.3 oz) 05/02/18 60.1 kg (132 lb 7.9 oz)  
11/28/17 66.8 kg (147 lb 4.3 oz)  
11/26/17 67.3 kg (148 lb 5.9 oz) 10/26/17 62.9 kg (138 lb 10.7 oz) 10/14/16 72.6 kg (160 lb)  
09/20/16 72.6 kg (160 lb) 08/25/16 72.6 kg (160 lb 0.9 oz) 10/05/14 72.6 kg (160 lb) 12/27/13 70.8 kg (156 lb) 12/24/13 70.8 kg (156 lb) NUTRITION DIAGNOSES:  
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 NUTRITION INTERVENTIONS: 
  Enteral/Parenteral Nutrition: Other(Continue TPN as ordered) GOAL:  
Pt will tolerate TPN at goal rate with BG <200mg/dL and electrolytes WNL in 2-4 days. NUTRITION MONITORING AND EVALUATION Previous Goal: TBD Previous Goal Met: N/A Previous Recommendations Implemented: N/A Cultural, Congregational, or Ethnic Dietary Needs: None LEARNING NEEDS (Diet, Food/Nutrient-Drug Interaction):  
 [x] None Identified 
 [] Identified and Education Provided/Documented 
 [] Identified and Pt declined/was not appropriate [x] Interdisciplinary Care Plan Reviewed/Documented  
 [x] Participated in Discharge Planning: Unable to determine 
 [x] Interdisciplinary Rounds NUTRITION RISK:  
 [x] High              [] Moderate           []  Low  []  Minimal/Uncompromised Heriberto Collins, RD, [facility] Pager [phone] Weekend Pager [phone]

## 2019-04-18 NOTE — DIABETES MGMT
DTC Progress Note Recommendations/ Comments: Pt discussed with rounding team and Dr. Jose L Youssef. Plan to begin poc glucose and correctional insulin while on TPN. Chart reviewed on Shantelle Healy during Multidisciplinary Rounds. A1c:  
No results found for: HBA1C, HGBE8, [id number] Recent Glucose Results:  
Lab Results Component Value Date/Time  (H) 04/18/2019 04:39 AM  
  (H) 04/17/2019 02:41 PM  
  
 
Lab Results Component Value Date/Time Creatinine 0.57 04/18/2019 04:39 AM  
 
Estimated Creatinine Clearance: 88.5 mL/min (based on SCr of 0.57 mg/dL). Active Orders Diet DIET NPO  
  
 
PO intake:  
Patient Vitals for the past 72 hrs: 
 % Diet Eaten 04/15/19 1730 5 % Will continue to follow as needed. Thank you. Susie Coughlin, PAUN, RN, CDE [facility] Time spent: 5 min

## 2019-04-18 NOTE — PROGRESS NOTES
F/U for  Anastomotic leak, abscess in abdominal cavity POD 2 S: Ms. Lori Jon was seen by me today during rounds. At this time, she is resting. Not much in ostomy bag. NG cannister with 300 cc per Rn. 
 
O: Blood pressure 109/64, pulse (!) 101, temperature 98.8 °F (37.1 °C), resp. rate 20, height 5' 5\" (1.651 m), weight 68.1 kg (150 lb 2.1 oz), SpO2 98 %. Gen: Patient is in no acute distress. .  Lungs: Clear to auscultation bilaterally . Heart:+RRR. Abd: Rt new ilestomy with some blod in it. Dressing on lap incision. dec BS . Extremities: Warm. Lab Results Component Value Date/Time WBC 17.2 (H) 04/18/2019 04:39 AM  
 HGB 9.2 (L) 04/18/2019 04:39 AM  
 HCT 28.3 (L) 04/18/2019 04:39 AM  
 PLATELET 132 61/38/0960 04:39 AM  
 MCV 90.7 04/18/2019 04:39 AM  
. 
Lab Results Component Value Date/Time Sodium 140 04/18/2019 04:39 AM  
 Potassium 4.0 04/18/2019 04:39 AM  
 Chloride 109 (H) 04/18/2019 04:39 AM  
 CO2 24 04/18/2019 04:39 AM  
 Anion gap 7 04/18/2019 04:39 AM  
 Glucose 172 (H) 04/18/2019 04:39 AM  
 BUN 5 (L) 04/18/2019 04:39 AM  
 Creatinine 0.57 04/18/2019 04:39 AM  
 BUN/Creatinine ratio 9 (L) 04/18/2019 04:39 AM  
 GFR est AA >60 04/18/2019 04:39 AM  
 GFR est non-AA >60 04/18/2019 04:39 AM  
 Calcium 6.8 (L) 04/18/2019 04:39 AM  
 Bilirubin, total 0.2 04/18/2019 04:39 AM  
 AST (SGOT) 12 (L) 04/18/2019 04:39 AM  
 Alk. phosphatase 109 04/18/2019 04:39 AM  
 Protein, total 4.2 (L) 04/18/2019 04:39 AM  
 Albumin 1.6 (L) 04/18/2019 04:39 AM  
 Globulin 2.6 04/18/2019 04:39 AM  
 A-G Ratio 0.6 (L) 04/18/2019 04:39 AM  
 ALT (SGPT) 10 (L) 04/18/2019 04:39 AM  
 
 
 
A: Principal Problem: 
  SBO (small bowel obstruction) ([address] (4/12/2019) Active Problems: 
  Sepsis following intra-abdominal surgery ([address] (3/28/2019) Peritoneal carcinomatosis ([address] (3/29/2019) Appendix carcinoma ([address] (3/29/2019) Small bowel obstruction ([address] (4/12/2019) Severe protein-calorie malnutrition ([address] (4/12/2019) Hyponatremia (4/12/2019) Pelvic abscess in female (4/12/2019) Anemia (4/12/2019) Sepsis ([address] (4/12/2019) Postprocedural intra-abdominal sepsis ([address] (4/13/2019) Sepsis due to pneumonia ([address] (4/13/2019) Micro: 
HEAVY ESCHERICHIA COLI Culture result: Abnormal       Final  
HEAVY KLEBSIELLA OXYTOCA Culture result: Abnormal       Final  
HEAVY KLEBSIELLA PNEUMONIAE Culture result: Abnormal       Final  
HEAVY ENTEROCOCCUS FAECALIS GROUP D Culture result: Abnormal       Final  
HEAVY ENTEROCOCCUS GALLINARUM GROUP D . Jamas Dubonnet Jamas Dubonnet INTRINSICALLY   
 
 
OR findings dilated fluid filled small bowel, large abscess of solid feculent material in RUQ, 3 cm anastomotic disruption, fine nodular peritoneal studding throughout the abdominal cavity P:  IV Vancomycin was added to Zosyn. WBC is 17 k, hgb has dropped a little. TPN started. Diet advancement as per Dr Beulah ballard. 
Spirometry, DVT/gastritis prophylaxis. Pain control. Discussed care with her RN(Will) . Possible move out of the ICU today. Jasson Oliveros MD 
4/18/2019

## 2019-04-19 NOTE — PROGRESS NOTES
4:34PM 
 
Plan:  
-Likely d/c home with resumption of [address] 
-Could transition to hospice at home, if pt agreeable POD #3 for ileostomy. Pt continuing with NG. Declining PT/OT. Reporting significant pain. Palliative attempts to engage pt in discussion of [address] but pt not interested at this time. Pt open to [address] for SN/PT/OT/SW. Will need TIERRA at d/c. Pt could transition to hospice at home from [facility]. CM will continue to follow and assist with d/c planning. Sherif Jackson, MSW Care Manager

## 2019-04-19 NOTE — PROGRESS NOTES
RAPID RESPONSE TEAM 
 
Rounded on patient due to recent transfer from CCU. Discussed with primary RN. No acute concerns, VSS. No RRT interventions indicated at this time. Please call with any questions or concerns. Bianca Acevedo Rapid Response RN Lang Feldman

## 2019-04-19 NOTE — PROGRESS NOTES
Patient declining evaluation at this time. Wanted to be put on bedpan, would not try BSC or toilet. Will retry tomorrow and if she continued to decline order can be completed at that time.

## 2019-04-19 NOTE — PROGRESS NOTES
Patient has refused to get OOB on 3 occasions. She speaks about her failing marriage, past surgeries, and losing home. She continues to say, \"Give me a minute\". Patient reminded that she will be assisted to recliner today.

## 2019-04-19 NOTE — PROGRESS NOTES
.General Surgery End of Shift Nursing Note Bedside shift change report given to Nona Laboy (oncoming nurse) by [place] (offgoing nurse). Report included the following information SBAR, Procedure Summary, Intake/Output, MAR and Recent Results. Shift worked: 3477-2334 Summary of shift:     Patient continues to refuse care from pt/ot, ambulation and getting oob. She became very upset when nursing insisted on her getting to a bedside recliner. She stayed in the chair one hour. She became upset over family issues and asked writer to place a \"no visitors' sign on the door. Wound care of midline completed per order. Oscar drain hooked to sheets bag now vs suction. Issues for physician to address:  Encourage patient involvement in care. May need psych consult for family problems voiced about losing home,  and seen family tension. Number times ambulated in hallway past shift: 0 = refuses pt/ot or nurse assistance Number of times OOB to chair past shift: 1 - 1hour Pain Management: 
Current medication: dilaudid pca and prn dilaudid Patient states pain is manageable on current pain medication: yes-gave prn dilaudid one time GI: 
 
Current diet:  DIET NPO 
TPN ADULT - CENTRAL AA 5% D20% W/ CA + ELECTROLYTES Tolerating current diet: yes Passing flatus: yes Last Bowel Movement: 
Respiratory: 
 
Incentive Spirometer at bedside: yes Patient instructed on use: yes but refusing use Patient Safety: 
 
Falls Score: 3 Bed Alarm On?no Sitter?  no 
 
Kathy Umanzor RN

## 2019-04-19 NOTE — PROGRESS NOTES
Patient requested to have writer place a no visitors sign on the door. 3 family members came and they were respectful of the sign. Her son came and she was yelling at the top of her lungs at him to leave. Writer went in the room to intervene and told the son of the patient right and request for privacy. He was upset and stated, \"If my mom dies tonight then you can imagine who I am going to be upset with\". Son did leave and thanked staff on exiting.

## 2019-04-19 NOTE — PROGRESS NOTES
SURGERY PROGRESS NOTE Admit Date: 2019 POD 3 Days Post-Op Procedure: Procedure(s): LAPAROTOMY EXPLORATORY, ILEOSTOMY; [address] Subjective:  
 
Patient state she is feeling better. Pain is improving. Denies nausea. NG still bilious Objective:  
 
Visit Vitals /79 (BP Patient Position: At rest) Pulse 82 Temp 98.7 °F (37.1 °C) Resp 16 Ht 5' 5\" (1.651 m) Wt 68.5 kg (151 lb 0.2 oz) SpO2 95% BMI 25.13 kg/m² Temp (24hrs), Av.8 °F (37.1 °C), Min:98.4 °F (36.9 °C), Max:99.3 °F (37.4 °C) 
 
 
701 - 1900 In: -  
Out: 575 [Urine:525] 1901 -  07 In: 7310 [I.V.:6471] Out: 3804 [Urine:1695; Drains:140] Physical Exam:   
General:  alert, cooperative, no distress, appears stated age Abdomen: soft, bowel sounds active, non-tender Ostomy - pink with some thin fluid Incision:    today. Fascia intact Lab Results Component Value Date/Time WBC 17.2 (H) 2019 04:39 AM  
 HGB 9.2 (L) 2019 04:39 AM  
 HCT 28.3 (L) 2019 04:39 AM  
 PLATELET 458  04:39 AM  
 MCV 90.7 2019 04:39 AM  
 
Lab Results Component Value Date/Time GFR est non-AA >60 2019 04:39 AM  
 GFRNA, POC >60 2019 04:24 AM  
 GFR est AA >60 2019 04:39 AM  
 GFRAA, POC >60 2019 04:24 AM  
 Creatinine 0.57 2019 04:39 AM  
 Creatinine (POC) 0.7 2019 04:24 AM  
 BUN 5 (L) 2019 04:39 AM  
 Sodium 140 2019 04:39 AM  
 Potassium 4.0 2019 04:39 AM  
 Chloride 109 (H) 2019 04:39 AM  
 CO2 24 2019 04:39 AM  
 Magnesium 1.4 (L) 2019 04:39 AM  
 Phosphorus 1.9 (L) 2019 04:39 AM  
 
 
Assessment:  
 
Principal Problem: 
  SBO (small bowel obstruction) ([facility] (2019) Active Problems: 
  Sepsis following intra-abdominal surgery ([facility] (3/28/2019) Peritoneal carcinomatosis ([facility] (3/29/2019) Appendix carcinoma ([facility] (3/29/2019) Small bowel obstruction ([address] (4/12/2019) Severe protein-calorie malnutrition ([address] (4/12/2019) Hyponatremia (4/12/2019) Pelvic abscess in female (4/12/2019) Anemia (4/12/2019) Sepsis ([address] (4/12/2019) Postprocedural intra-abdominal sepsis ([address] (4/13/2019) Sepsis due to pneumonia ([address] (4/13/2019) Improving Plan:  
 
 
Continue present treatment KUB to assess for NG position

## 2019-04-19 NOTE — PROGRESS NOTES
Physical Therapy Chart reviewed and nursing cleared to see. Attempted PT evaluation but patient declined, citing \"I have some mental things to work through\"  Offerred assist to [facility] or into chair but patient refused both. Will continue to follow. Chuck Stoner

## 2019-04-19 NOTE — PROGRESS NOTES
Rapid Response Team: 
 
Rounded on patient this AM due to recent tx from CCU on 4/18. Patient alert, appears in no acute distress at this time, vital signs stable. Reviewed chart, vital signs, and spoke with MIRELLA Donis who verbalized no concerns. No RRT interventions indicated at this time. Lesa MAIN, RN, CCRN, ASHLEY, CPEN Rapid Response RN

## 2019-04-19 NOTE — PROGRESS NOTES
F/U for  Anastomotic leak, abscess in abdominal cavity POD 2 S: Ms. Lalo Llanes was seen by me today during rounds, was transferred out of the ICU. At this time, she is resting +uncomfortably. NG output has decreased today per nurse, had 150 cc output per night shift. Pt did not wish to speak with me today, asking me to come back tomorrow because she had company, also declined PT, OT. She reports she is in pain, 10/10. Per nurse she forgets she has PCA pump that she can use for her pain. O: Blood pressure 129/68, pulse 97, temperature 98.7 °F (37.1 °C), resp. rate 18, height 5' 5\" (1.651 m), weight 68.5 kg (151 lb 0.2 oz), SpO2 98 %. Gen: Patient is in no acute distress. Declined exam at this time. Lab Results Component Value Date/Time WBC 17.2 (H) 04/18/2019 04:39 AM  
 HGB 9.2 (L) 04/18/2019 04:39 AM  
 HCT 28.3 (L) 04/18/2019 04:39 AM  
 PLATELET 829 51/84/8059 04:39 AM  
 MCV 90.7 04/18/2019 04:39 AM  
. 
Lab Results Component Value Date/Time Sodium 140 04/18/2019 04:39 AM  
 Potassium 4.0 04/18/2019 04:39 AM  
 Chloride 109 (H) 04/18/2019 04:39 AM  
 CO2 24 04/18/2019 04:39 AM  
 Anion gap 7 04/18/2019 04:39 AM  
 Glucose 172 (H) 04/18/2019 04:39 AM  
 BUN 5 (L) 04/18/2019 04:39 AM  
 Creatinine 0.57 04/18/2019 04:39 AM  
 BUN/Creatinine ratio 9 (L) 04/18/2019 04:39 AM  
 GFR est AA >60 04/18/2019 04:39 AM  
 GFR est non-AA >60 04/18/2019 04:39 AM  
 Calcium 6.8 (L) 04/18/2019 04:39 AM  
 Bilirubin, total 0.2 04/18/2019 04:39 AM  
 AST (SGOT) 12 (L) 04/18/2019 04:39 AM  
 Alk. phosphatase 109 04/18/2019 04:39 AM  
 Protein, total 4.2 (L) 04/18/2019 04:39 AM  
 Albumin 1.6 (L) 04/18/2019 04:39 AM  
 Globulin 2.6 04/18/2019 04:39 AM  
 A-G Ratio 0.6 (L) 04/18/2019 04:39 AM  
 ALT (SGPT) 10 (L) 04/18/2019 04:39 AM  
 
 
 
A: Principal Problem: 
  SBO (small bowel obstruction) ([facility] (4/12/2019) Active Problems: 
  Sepsis following intra-abdominal surgery ([facility] (3/28/2019) Peritoneal carcinomatosis ([address] (3/29/2019) Appendix carcinoma ([address] (3/29/2019) Small bowel obstruction ([address] (4/12/2019) Severe protein-calorie malnutrition ([address] (4/12/2019) Hyponatremia (4/12/2019) Pelvic abscess in female (4/12/2019) Anemia (4/12/2019) Sepsis ([address] (4/12/2019) Postprocedural intra-abdominal sepsis ([address] (4/13/2019) Sepsis due to pneumonia ([address] (4/13/2019) Micro: 
HEAVY ESCHERICHIA COLI Culture result: Abnormal       Final  
HEAVY KLEBSIELLA OXYTOCA Culture result: Abnormal       Final  
HEAVY KLEBSIELLA PNEUMONIAE Culture result: Abnormal       Final  
HEAVY ENTEROCOCCUS FAECALIS GROUP D Culture result: Abnormal       Final  
HEAVY ENTEROCOCCUS GALLINARUM GROUP D . Ethelene Gauss Ethelene Gauss INTRINSICALLY   
 
 
OR findings dilated fluid filled small bowel, large abscess of solid feculent material in RUQ, 3 cm anastomotic disruption, fine nodular peritoneal studding throughout the abdominal cavity P:   
Pt is on IV Vancomycin and Zosyn for suspected wound infection. WBC increased to 17 k yesterday, no labs done today however order is in. TPN continued, Diet advancement as per Dr. Candace Brenner. Spirometry, DVT/gastritis prophylaxis. Pain control. Discussed care with her RN. GI will sign off her case, should questions arise or further GI consultation be required please contact us. VANESSA Arboleda 
4/19/2019  
11:39 AM 
 
Post surgical care. I will follow her peripherally as she recovers from the surgery done by Dr Candace Brenner. I have personally reviewed the history of the patient. I have reviewed the chart and agree with the documentation recorded by the Mid Level Provider, including the assessment, treatment plan, and disposition. Andre Trevino MD

## 2019-04-20 NOTE — PROGRESS NOTES
PT note:  
 
Chart reviewed and cleared by nursing to attempt PT evaluation. This is the 3rd day of attempting evaluation and patient continues to refuse. Patient with multiple excuses, claiming she just got up with nursing and just got bathed. Patient requesting therapy return at night time or very early in the morning. RN reports patient has not gotten to the chair with nursing staff this date. Explained role of therapy and purpose of evaluation. Patient requesting PT follow up. Informed patient and nursing that therapy will check on patient one more time on Monday. If she refuses, we will have to sign off. Patient understanding. Aborted session. Keith Drake, PT, DPT Total time spent: 11 minutes

## 2019-04-20 NOTE — PROGRESS NOTES
General Surgery End of Shift Nursing Note Bedside shift change report given to Natasha Kim (oncoming nurse) by Elaine Landry (offgoing nurse). Report included the following information SBAR, Kardex, Intake/Output, MAR and Recent Results. Shift worked:   7a-7p Summary of shift:    Pt did eventually get OOB, sat up in chair for about 3 hours and ambualted to bathroom x2 - required much positive encouragement for this. Still requests the bedpan. Moisture-associated redness between buttocks, cleansed several times and applied barrier cream.  
 
35 cc from bailey drain NGT output 350cc Still just serous drainage from ostomy Issues for physician to address:   none Jennifer Humphrey

## 2019-04-20 NOTE — PROGRESS NOTES
F/U for  Anastomotic leak, abscess in abdominal cavity POD 2 S: Ms. Salazar Chase was seen by me today during rounds. Pain is better. Still on NG tube O: Blood pressure 129/69, pulse 98, temperature 98 °F (36.7 °C), resp. rate 17, height 5' 5\" (1.651 m), weight 69.4 kg (153 lb), SpO2 98 %. Gen: Patient is in no acute distress. GI: L ostomy. Dec BS Lab Results Component Value Date/Time WBC 14.0 (H) 04/20/2019 04:00 AM  
 HGB 8.2 (L) 04/20/2019 04:00 AM  
 HCT 24.4 (L) 04/20/2019 04:00 AM  
 PLATELET 320 32/20/6634 04:00 AM  
 MCV 87.1 04/20/2019 04:00 AM  
. 
Lab Results Component Value Date/Time Sodium 135 (L) 04/20/2019 04:00 AM  
 Potassium 4.1 04/20/2019 04:00 AM  
 Chloride 103 04/20/2019 04:00 AM  
 CO2 28 04/20/2019 04:00 AM  
 Anion gap 4 (L) 04/20/2019 04:00 AM  
 Glucose 121 (H) 04/20/2019 04:00 AM  
 BUN 3 (L) 04/20/2019 04:00 AM  
 Creatinine 0.36 (L) 04/20/2019 04:00 AM  
 BUN/Creatinine ratio 8 (L) 04/20/2019 04:00 AM  
 GFR est AA >60 04/20/2019 04:00 AM  
 GFR est non-AA >60 04/20/2019 04:00 AM  
 Calcium 7.2 (L) 04/20/2019 04:00 AM  
 Bilirubin, total 0.2 04/18/2019 04:39 AM  
 AST (SGOT) 12 (L) 04/18/2019 04:39 AM  
 Alk. phosphatase 109 04/18/2019 04:39 AM  
 Protein, total 4.2 (L) 04/18/2019 04:39 AM  
 Albumin 1.6 (L) 04/18/2019 04:39 AM  
 Globulin 2.6 04/18/2019 04:39 AM  
 A-G Ratio 0.6 (L) 04/18/2019 04:39 AM  
 ALT (SGPT) 10 (L) 04/18/2019 04:39 AM  
 
 
 
A: Principal Problem: 
  SBO (small bowel obstruction) ([address] (4/12/2019) Active Problems: 
  Sepsis following intra-abdominal surgery ([address] (3/28/2019) Peritoneal carcinomatosis ([address] (3/29/2019) Appendix carcinoma ([address] (3/29/2019) Small bowel obstruction ([address] (4/12/2019) Severe protein-calorie malnutrition ([address] (4/12/2019) Hyponatremia (4/12/2019) Pelvic abscess in female (4/12/2019) Anemia (4/12/2019) Sepsis ([address] (4/12/2019) Postprocedural intra-abdominal sepsis ([facility] (4/13/2019) Sepsis due to pneumonia ([facility] (4/13/2019) Micro: 
HEAVY ESCHERICHIA COLI Culture result: Abnormal       Final  
HEAVY KLEBSIELLA OXYTOCA Culture result: Abnormal       Final  
HEAVY KLEBSIELLA PNEUMONIAE Culture result: Abnormal       Final  
HEAVY ENTEROCOCCUS FAECALIS GROUP D Culture result: Abnormal       Final  
HEAVY ENTEROCOCCUS GALLINARUM GROUP D . Deshaun Romano INTRINSICALLY   
 
 
OR findings dilated fluid filled small bowel, large abscess of solid feculent material in RUQ, 3 cm anastomotic disruption, fine nodular peritoneal studding throughout the abdominal cavity P:   
Await bowel function to return. Analgesia. TPN /diet advancement as per Dr. Dominick Friedman. DVT/gastritis prophylaxis Following peripherally. Corey Narvaez MD 
4/20/2019

## 2019-04-20 NOTE — PROGRESS NOTES
1150 Encouraged pt to get OOB, she states she is unable to due to pain \"my stomach is cramping. \" Recommended mobility to alleviate this and she stated she was too weak. Administered PRN IV push dilauidid and pt agreeable to getting OOB in about an hour. 1303 Attempted to get pt OOB as her pain has decreased, she states \"I need time to get myself together. \" Handed pt incentive spirometer and RN will return in 30 minutes to attempt again -\"I'll need more time than that. \"

## 2019-04-20 NOTE — PROGRESS NOTES
SURGERY PROGRESS NOTE Admit Date: 2019 POD 4 Days Post-Op Procedure: Procedure(s): LAPAROTOMY EXPLORATORY, ILEOSTOMY; [address] Subjective:  
 
Patient has no new complaints. NG still high output and bilious Objective:  
 
Visit Vitals /69 Pulse 98 Temp 98 °F (36.7 °C) Resp 17 Ht 5' 5\" (1.651 m) Wt 69.4 kg (153 lb) SpO2 98% BMI 25.46 kg/m² Temp (24hrs), Av.6 °F (37 °C), Min:98 °F (36.7 °C), Max:99.2 °F (37.3 °C) 
 
 
701 - 1900 In: -  
Out: 963 [DPNBK:613] 1901 - 700 In: 7489.5 [P.O.:90; I.V.:7399.5] Out: 3265 [Urine:2075] Physical Exam:   
General:  alert, cooperative, no distress, appears stated age Abdomen: soft, bowel sounds active, non-tender, ostomy pink/patent serous fluid but no gas Incision:  Clean, fascia intact Lab Results Component Value Date/Time WBC 14.0 (H) 2019 04:00 AM  
 HGB 8.2 (L) 2019 04:00 AM  
 HCT 24.4 (L) 2019 04:00 AM  
 PLATELET 596  04:00 AM  
 MCV 87.1 2019 04:00 AM  
 
Lab Results Component Value Date/Time GFR est non-AA >60 2019 04:00 AM  
 GFRNA, POC >60 2019 04:24 AM  
 GFR est AA >60 2019 04:00 AM  
 GFRAA, POC >60 2019 04:24 AM  
 Creatinine 0.36 (L) 2019 04:00 AM  
 Creatinine (POC) 0.7 2019 04:24 AM  
 BUN 3 (L) 2019 04:00 AM  
 Sodium 135 (L) 2019 04:00 AM  
 Potassium 4.1 2019 04:00 AM  
 Chloride 103 2019 04:00 AM  
 CO2 28 2019 04:00 AM  
 Magnesium 1.4 (L) 2019 04:39 AM  
 Phosphorus 1.9 (L) 2019 04:39 AM  
 
 
Assessment:  
 
Principal Problem: 
  SBO (small bowel obstruction) ([address] (2019) Active Problems: 
  Sepsis following intra-abdominal surgery ([address] (3/28/2019) Peritoneal carcinomatosis ([address] (3/29/2019) Appendix carcinoma ([address] (3/29/2019) Small bowel obstruction ([address] (2019) Severe protein-calorie malnutrition ([address] (4/12/2019) Hyponatremia (4/12/2019) Pelvic abscess in female (4/12/2019) Anemia (4/12/2019) Sepsis ([address] (4/12/2019) Postprocedural intra-abdominal sepsis ([address] (4/13/2019) Sepsis due to pneumonia ([address] (4/13/2019) post operative ileus due to significant intraabdominal infection Plan:  
 
 
Continue present treatment

## 2019-04-20 NOTE — PROGRESS NOTES
Occupational Therapy Chart reviewed and cleared by nursing to attempt OT evaluation. Patient received on bed pan, assisted off bed pan and able to complete hygiene with set up. Second attempt for OT evaluation. Patient refusing. Poor understanding of role of OT even following education. Patient confused with multiple excuses and poor insight to situation and need for assist with ADL tasks. She reports she has been up in the chair this morning however nursing reports she has been refusing this morning. Patient is most likley poor rehab candidate secondary to poor participation and prognosis. Per chart review, potential hospice. Recommend one additional attempt for OT eval.  
 
Je Chavez OT Total time spent: 11 minutes

## 2019-04-20 NOTE — PROGRESS NOTES
RAPID RESPONSE TEAM 
 
Rounded on patient due to recent transfer out of CCU on 4/18/19. Discussed with primary RN Kailash Irene. No acute concerns. No patient complaints. Vitals stable. MEWS 1. No RRT interventions indicated at this time. RN encouraged to call with any questions or concerns. Sarah Beth Beltran Rapid Response RN Lei Reeves

## 2019-04-21 NOTE — PROGRESS NOTES
Patient using bed pan frequently and able to lift bottom off bed for use. Complains of pain and when encouraged to use PCA a dose is delivered.

## 2019-04-21 NOTE — PROGRESS NOTES
General Surgery End of Shift Nursing Note Bedside shift change report given to [place] (oncoming nurse) by Jose Parry (offgoing nurse). Report included the following information SBAR, Kardex, Intake/Output, MAR and Recent Results. Shift worked:   7a-7p Summary of shift:    Increasing activity, ambulating to bathroom but also frequently getting on bedpan/ has been up to chair. NGT clamped, only 100cc out for day prior to clamp. Requires much encouragement to use IS and still requiring O2 at 2lpm, Desat to 84% on room air. Issues for physician to address:   none Lindsey Arita

## 2019-04-21 NOTE — PROGRESS NOTES
SURGERY PROGRESS NOTE Admit Date: 2019 POD 5 Days Post-Op Procedure: Procedure(s): LAPAROTOMY EXPLORATORY, ILEOSTOMY; [address] Subjective:  
 
Patient complains of pain denies nausea Objective:  
 
Visit Vitals /70 (BP Patient Position: At rest) Pulse 87 Temp 98.4 °F (36.9 °C) Resp 16 Ht 5' 5\" (1.651 m) Wt 68.5 kg (151 lb) SpO2 100% BMI 25.13 kg/m² Temp (24hrs), Av.8 °F (37.1 °C), Min:98.4 °F (36.9 °C), Max:99.1 °F (37.3 °C) No intake/output data recorded.  07 -  1900 In: 4697.6 [P.O.:60; I.V.:4637.6] Out: 5610 [Urine:4825; Drains:35] Physical Exam:   
General:  alert, cooperative, no distress, appears stated age Abdomen: soft, bowel sounds active, non-tender Ostomy - productive of raheel and liquid stool Incision:   healing well, no drainage, no erythema, no hernia, no seroma, no swelling, no dehiscence, incision well approximated Lab Results Component Value Date/Time WBC 11.2 (H) 2019 03:00 AM  
 HGB 8.7 (L) 2019 03:00 AM  
 HCT 27.0 (L) 2019 03:00 AM  
 PLATELET 280  03:00 AM  
 MCV 89.1 2019 03:00 AM  
 
Lab Results Component Value Date/Time GFR est non-AA >60 2019 03:00 AM  
 GFRNA, POC >60 2019 04:24 AM  
 GFR est AA >60 2019 03:00 AM  
 GFRAA, POC >60 2019 04:24 AM  
 Creatinine 0.36 (L) 2019 03:00 AM  
 Creatinine (POC) 0.7 2019 04:24 AM  
 BUN 4 (L) 2019 03:00 AM  
 Sodium 134 (L) 2019 03:00 AM  
 Potassium 4.1 2019 03:00 AM  
 Chloride 100 2019 03:00 AM  
 CO2 27 2019 03:00 AM  
 Magnesium 2.0 2019 03:00 AM  
 Phosphorus 4.7 2019 03:00 AM  
 
 
Assessment:  
 
Principal Problem: 
  SBO (small bowel obstruction) ([facility] (2019) Active Problems: 
  Sepsis following intra-abdominal surgery ([facility] (3/28/2019) Peritoneal carcinomatosis ([facility] (3/29/2019) Appendix carcinoma ([address] (3/29/2019) Small bowel obstruction ([address] (4/12/2019) Severe protein-calorie malnutrition ([address] (4/12/2019) Hyponatremia (4/12/2019) Pelvic abscess in female (4/12/2019) Anemia (4/12/2019) Sepsis ([address] (4/12/2019) Postprocedural intra-abdominal sepsis ([address] (4/13/2019) Sepsis due to pneumonia ([address] (4/13/2019) bowel function returning Plan:  
 
 
Clamp trial of NG  
Decrease IVF Renew TPN

## 2019-04-22 NOTE — PROGRESS NOTES
Physical Therapy Reviewed chart and nursing cleared to see, attempted visit but patient refused even though RN had reported that patient wanted to get up in chair. Patient had multiple excuses and requested that I come back later today. Will attempt one more time. Ilsa Figueroa

## 2019-04-22 NOTE — PROGRESS NOTES
Admit Date: 2019 POD 6 Days Post-Op Procedure:  Procedure(s): LAPAROTOMY EXPLORATORY, ILEOSTOMY; [address] Subjective:  
 
Patient has no new complaints. No nausea. Negligible bilious NG o/p.   
 
Objective:  
 
Blood pressure 117/69, pulse 88, temperature 99.4 °F (37.4 °C), resp. rate 16, height 5' 5\" (1.651 m), weight 151 lb (68.5 kg), SpO2 100 %. Temp (24hrs), Av.7 °F (37.1 °C), Min:98.1 °F (36.7 °C), Max:99.4 °F (37.4 °C) Physical Exam:  GENERAL: alert, cooperative, no distress, appears stated age, LUNG: clear to auscultation bilaterally, HEART: regular rate and rhythm, ABDOMEN: soft, NT, midline wound clean with beginning granulation, ostomy healthy appearing, Richardson drain site intact, EXTREMITIES:  extremities normal, atraumatic, no cyanosis or edema Labs:  
Recent Results (from the past 24 hour(s)) GLUCOSE, POC Collection Time: 19 12:22 PM  
Result Value Ref Range Glucose (POC) 119 (H) 65 - 100 mg/dL Performed by Lavell Franco GLUCOSE, POC Collection Time: 19 12:12 AM  
Result Value Ref Range Glucose (POC) 108 (H) 65 - 100 mg/dL Performed by Gilda Kussmaul CBC W/O DIFF Collection Time: 19  3:50 AM  
Result Value Ref Range WBC 10.2 3.6 - 11.0 K/uL  
 RBC 2.64 (L) 3.80 - 5.20 M/uL HGB 7.7 (L) 11.5 - 16.0 g/dL HCT 23.2 (L) 35.0 - 47.0 % MCV 87.9 80.0 - 99.0 FL  
 MCH 29.2 26.0 - 34.0 PG  
 MCHC 33.2 30.0 - 36.5 g/dL  
 RDW 15.7 (H) 11.5 - 14.5 % PLATELET 075 506 - 444 K/uL MPV 10.1 8.9 - 12.9 FL  
 NRBC 0.0 0  WBC ABSOLUTE NRBC 0.00 0.00 - 0.01 K/uL METABOLIC PANEL, BASIC Collection Time: 19  3:50 AM  
Result Value Ref Range Sodium 136 136 - 145 mmol/L Potassium 3.8 3.5 - 5.1 mmol/L Chloride 99 97 - 108 mmol/L  
 CO2 31 21 - 32 mmol/L Anion gap 6 5 - 15 mmol/L Glucose 105 (H) 65 - 100 mg/dL BUN 5 (L) 6 - 20 MG/DL  Creatinine 0.34 (L) 0.55 - 1.02 MG/DL  
 BUN/Creatinine ratio 15 12 - 20 GFR est AA >60 >60 ml/min/1.73m2 GFR est non-AA >60 >60 ml/min/1.73m2 Calcium 7.6 (L) 8.5 - 10.1 MG/DL MAGNESIUM Collection Time: 04/22/19  3:50 AM  
Result Value Ref Range Magnesium 2.1 1.6 - 2.4 mg/dL PHOSPHORUS Collection Time: 04/22/19  3:50 AM  
Result Value Ref Range Phosphorus 4.6 2.6 - 4.7 MG/DL  
GLUCOSE, POC Collection Time: 04/22/19  5:55 AM  
Result Value Ref Range Glucose (POC) 120 (H) 65 - 100 mg/dL Performed by Gogo Zhang Data Review images and reports reviewed Assessment:  
 
Principal Problem: 
  SBO (small bowel obstruction) ([address] (4/12/2019) Active Problems: 
  Sepsis following intra-abdominal surgery ([address] (3/28/2019) Peritoneal carcinomatosis ([address] (3/29/2019) Appendix carcinoma ([address] (3/29/2019) Small bowel obstruction ([address] (4/12/2019) Severe protein-calorie malnutrition ([address] (4/12/2019) Hyponatremia (4/12/2019) Pelvic abscess in female (4/12/2019) Anemia (4/12/2019) Sepsis ([address] (4/12/2019) Postprocedural intra-abdominal sepsis ([address] (4/13/2019) Sepsis due to pneumonia ([address] (4/13/2019) Plan/Recommendations/Medical Decision Making:  
 
Continue present treatment D/c ngt VAC to midline wound Start backing out [employer] Continue TPN today Fatuma Hall. Constantine Irene MD, [address] [place] [address] [facility]

## 2019-04-22 NOTE — PROGRESS NOTES
Occupational Therapy Chart reviewed; cleared for tx; patient declined OT eval. Nsg notified. Will retry later as able.  Robyn Almonte OTR/L

## 2019-04-22 NOTE — PROGRESS NOTES
Nutrition Assessment: 
 
RECOMMENDATIONS:  
Continue TPN as ordered ASSESSMENT:  
Chart reviewed. Pt remains on TPN at goal rate. NGT to be removed today. C/o nausea this morning. -120 and electrolytes WNL. She has had negligible NGT OP over the weekend. Ileostomy with 100mL OP yesterday. Will monitor readiness for PO per surgeons plan. Dietitians Intervention(s)/Plan(s): Continue TPN as ordered, monitor plan of care SUBJECTIVE/OBJECTIVE:  
 
Diet Order: NPO, Other (comment)(TPN: D20, 5% AA @ 63mL/h + 500mL 20% lipids 3 x week (provides 1759kcals/76gPro) ) 
% Eaten:  No data found. NGT to suction at   flush with       via NG Tube   Residuals: 900 mL Pertinent Medications:unasyn, [email or web address]; [email or web address]). Chemistries: 
Lab Results Component Value Date/Time Sodium 136 04/22/2019 03:50 AM  
 Potassium 3.8 04/22/2019 03:50 AM  
 Chloride 99 04/22/2019 03:50 AM  
 CO2 31 04/22/2019 03:50 AM  
 Anion gap 6 04/22/2019 03:50 AM  
 Glucose 105 (H) 04/22/2019 03:50 AM  
 BUN 5 (L) 04/22/2019 03:50 AM  
 Creatinine 0.34 (L) 04/22/2019 03:50 AM  
 BUN/Creatinine ratio 15 04/22/2019 03:50 AM  
 GFR est AA >60 04/22/2019 03:50 AM  
 GFR est non-AA >60 04/22/2019 03:50 AM  
 Calcium 7.6 (L) 04/22/2019 03:50 AM  
 Albumin 1.6 (L) 04/18/2019 04:39 AM  
  
Anthropometrics: Height: 5' 5\" (165.1 cm) Weight: 68.5 kg (151 lb)   [x]bed scale (4/21)   []stated   []unknown IBW (%IBW):   ( ) UBW (%UBW):   (  %) BMI: Body mass index is 25.13 kg/m². This BMI is indicative of: 
[]Underweight   [x]Normal   []Overweight   [] Obesity   [] Extreme Obesity (BMI>40) Estimated Nutrition Needs (Based on): 1500 Kcals/day(BMR: 1150 x 1.3) , 60 g(1 g/kg) Protein Carbohydrate: At Least 130 g/day  Fluids: 1500 mL/day Last BM: ileostomy-100mL   [x]Active     []Hyperactive  []Hypoactive       [] Absent   BS Skin:    [] Intact   [x] Incision  [] Breakdown   [] DTI   [x] Tears/Excoriation/Abrasion  [x]Edema(trace-BLE) [] Other: Wt Readings from Last 30 Encounters:  
04/21/19 68.5 kg (151 lb) 04/05/19 60.8 kg (134 lb) 04/01/19 66 kg (145 lb 8 oz)  
03/28/19 61.2 kg (135 lb)  
03/20/19 60.8 kg (134 lb)  
03/18/19 59.6 kg (131 lb 6.3 oz) 05/02/18 60.1 kg (132 lb 7.9 oz)  
11/28/17 66.8 kg (147 lb 4.3 oz)  
11/26/17 67.3 kg (148 lb 5.9 oz) 10/26/17 62.9 kg (138 lb 10.7 oz) 10/14/16 72.6 kg (160 lb)  
09/20/16 72.6 kg (160 lb) 08/25/16 72.6 kg (160 lb 0.9 oz) 10/05/14 72.6 kg (160 lb) 12/27/13 70.8 kg (156 lb) 12/24/13 70.8 kg (156 lb) NUTRITION DIAGNOSES:  
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 NUTRITION INTERVENTIONS: 
  Enteral/Parenteral Nutrition: Other(Continue TPN as ordered) GOAL:  
Pt will tolerate TPN at goal rate with BG <200mg/dL and electrolytes WNL in 2-4 days NUTRITION MONITORING AND EVALUATION Previous Goal: Pt will tolerate TPN at goal rate with BG <200mg/dL and electrolytes WNL in 2-4 days Previous Goal Met: Yes Previous Recommendations Implemented: Yes Cultural, Confucianism, or Ethnic Dietary Needs: None LEARNING NEEDS (Diet, Food/Nutrient-Drug Interaction):  
 [x] None Identified 
 [] Identified and Education Provided/Documented 
 [] Identified and Pt declined/was not appropriate [x] Interdisciplinary Care Plan Reviewed/Documented  
 [x] Participated in Discharge Planning: Unable to determine 
 [] Interdisciplinary Rounds NUTRITION RISK:  
 [x] High              [] Moderate           []  Low  []  Minimal/Uncompromised Luke Galvez RD, [facility] Pager [phone] Weekend Pager [phone]

## 2019-04-22 NOTE — PROGRESS NOTES
General Surgery End of Shift Nursing Note Bedside shift change report given to Joanna VU (oncoming nurse) by Loreto Zhang (offgoing nurse). Report included the following information SBAR, Kardex, OR Summary, Intake/Output, MAR and Recent Results. Shift worked:   7p-7a Summary of shift:    Patient requested bedpan frequently all night, rested very little sleeping at only short intervals. NG tube unclamped with only 70cc residual but complaining of extreme nausea so unclamped and resumed suction. O2 2l nc Issues for physician to address:   none Number times ambulated in hallway past shift: 0 Number of times OOB to chair past shift: 0 Pain Management: 
Current medication: Dilaudid PCA Patient states pain is manageable on current pain medication: YES 
 
GI: 
 
Current diet:  DIET NPO 
TPN ADULT - CENTRAL AA 5% D20% W/ CA + ELECTROLYTES Tolerating current diet: YES Passing flatus: NO 
Last Bowel Movement: several days ago Respiratory: 
 
Incentive Spirometer at bedside: YES Patient instructed on use: YES Patient Safety: 
 
Falls Score: 2 Bed Alarm On? No 
Sitter? No 
 
Bhupendra Conley

## 2019-04-22 NOTE — WOUND CARE
Wound-Ostomy Nurse: POD #6 end ileostomy Patient out of CCU and on Surg-Tele now. Patient alert, garbled speech and unsure of her mental status. NGT just removed, TPN infusing, IV fluids and ABX's, PCA for pain control, sheets out and incontinent of urine - perineum red and irritated, Abrasom drain to left abdomen to bedside bag. Dr Alejandro Bermudez ordered NPWT? Wound VAC dressing applied to midline abdomen wound. Today. Wound Ostomy nurse explained wound VAC dressing to patient. Patient not able to grasp concepts well. Patient has yeast moisture rash to surrounding skin. Wound bed is pale due to lack of nutrition and ileostomy stoma is starting some lisset-stomal separation - again, for lack of nutrition. Vacuum Assisted Closure Mid Abdominal (Active) Vac Status Suction, continuous 4/22/2019  1:54 PM  
Suction (mmHg) 125 4/22/2019  1:54 PM  
Site Assessment Clean 4/22/2019  1:54 PM  
Dressing Status New; Old drainage 4/22/2019  1:54 PM  
Number of days:   
   
Wound Abdomen Open midline incision (Active) Dressing Status Removed 4/22/2019  1:51 PM  
Dressing Type Packing; Wet to dry 4/22/2019  1:51 PM  
Incision Site Well Approximated No 4/22/2019  1:51 PM  
Non-staged Wound Description Full thickness 4/22/2019  1:51 PM  
Wound Length (cm) 14 cm 4/22/2019  1:51 PM  
Wound Width (cm) 4 cm 4/22/2019  1:51 PM  
Wound Depth (cm) 2.8 cm 4/22/2019  1:51 PM  
Wound Volume (cm^3) 156.8 cm^3 4/22/2019  1:51 PM  
Condition of Base Straughn;Slough 4/22/2019  1:51 PM  
Condition of Edges Open 4/22/2019  1:51 PM  
Assessment Straughn 4/21/2019 10:39 PM  
Tissue Type Percent Pink 80 4/22/2019  1:51 PM  
Tissue Type Percent Yellow 20 4/22/2019  1:51 PM  
Drainage Amount Small 4/22/2019  1:51 PM  
Drainage Color Serosanguinous 4/22/2019  1:51 PM  
Wound Odor None 4/22/2019  1:51 PM  
Lisset-wound Assessment Intact 4/22/2019  1:51 PM  
Cleansing and Cleansing Agents  Sodium hypochlorite 25 % 4/22/2019  1:51 PM  
 Dressing Changed Changed/New 4/22/2019  1:51 PM  
Dressing Type Applied Vacuum dressing 4/22/2019  1:51 PM  
Procedure Bleeding None 4/21/2019 10:39 PM  
Procedure Tolerated Well 4/22/2019  1:51 PM  
Number of days: 6 Wound Buttocks Mid blanchable raised erythema 04/20/19 (Active) Dressing Status Clean, dry, and intact 4/20/2019  9:21 PM  
Dressing Type Zinc based paste 4/20/2019  1:45 PM  
Cleansing and Cleansing Agents  Soap and water 4/20/2019  1:45 PM  
Dressing Type Applied Zinc based paste 4/20/2019  1:45 PM  
Number of days: 2 Wound VAC: 
1. Wound cleansed with Dakins solution, nikolai-wound skin cleaned and prepped 2. x2 pieces of balck foam placed in wound bed 3. Occlusively sealed with drape, TRAC pad applied and NPWT -125 mm/hg, continuous as ordered Stoma: measures 1 5/8\" round today. Pink, moist, protuberant. One piece pouch with stoma barrier ring applied. Plan: Wound VAC dressing change and ostomy pouch changes x2/week. Will start ostomy teaching once patient is able to retain information.  
 
Carlos Paul RN, [employer]

## 2019-04-23 NOTE — PROGRESS NOTES
Problem: Self Care Deficits Care Plan (Adult) Goal: *Acute Goals and Plan of Care (Insert Text) Description Occupational Therapy Goals Initiated 4/23/2019 1. Patient will perform grooming in standing VSS with supervision/set-up within 7 day(s). 2.  Patient will perform bathing with minimal assistance/contact guard assist within 7 day(s). 3.  Patient will perform upper body dressing and lower body dressing with supervision/set-up within 7 day(s). 4.  Patient will perform toilet transfers with supervision/set-up within 7 day(s). 5.  Patient will perform all aspects of toileting with supervision/set-up within 7 day(s). 6.  Patient will participate in upper extremity therapeutic exercise/activities with supervision/set-up for 5 minutes within 7 day(s). 7.  Patient will utilize energy conservation techniques during functional activities with verbal and visual cues within 7 day(s). Outcome: Progressing Towards Goal 
  
Problem: Patient Education: Go to Patient Education Activity Goal: Patient/Family Education Outcome: Progressing Towards Goal 
 OCCUPATIONAL THERAPY EVALUATION Patient: Gris Maria (78 y.o. female) Date: 4/23/2019 Primary Diagnosis: Small bowel obstruction ([facility] [address] [E48.750] Small bowel obstruction ([facility] [address] [I69.907] Procedure(s) (LRB): 
LAPAROTOMY EXPLORATORY, ILEOSTOMY; KASIA DRAIN PLACEMENT (N/A) 7 Days Post-Op Precautions:   Fall, DNR(max encouragement needed; multiple lines) ASSESSMENT : 4-12 to ER with subsequent surgical needs now 7 days post op; patient had declined therapy several times, but willing to work today. I PTA, homemaker. Based on the objective data described below, patient presents with Minimum assistance upper body ADLs, Minimum assistance and Moderate Assistance lower body ADLs, and Minimum assistance functional mobility. Limited by 8/10 pain with pain pump in place as well as decreased functional endurance. The following are barriers to ADL independence while in acute care:  
- Cognitive and/or behavioral: attention to task, command following, initiation, safety awareness, insight into deficits and fear of falling; pain - Medical condition: strength, functional reach, functional endurance, standing balance, cardiopulmonary tolerance, precautions and pain tolerance   
- Other:    
 
Patient will benefit from skilled acute intervention to address the above impairments. Patients rehabilitation potential is considered to be Fair Discharge recommendations: Rehab at skilled nursing facility (SNF) (to regain functional baseline patient requires rehab) depending on acute care progress, wound care needs etc 
If above is not an option then recommend: Home health (to increase independence and safety) Barriers to discharging home, in addition to above listed impairments: level of physical assist required to maintain patient safety. Equipment recommendations for successful discharge (if) home: bedside commode, transfer bench, rollator and hand held shower; possibly hospital bed PLAN : 
Recommendations and Planned Interventions: self care training, functional mobility training, therapeutic exercise, balance training, therapeutic activities, cognitive retraining, endurance activities, patient education, home safety training and family training/education Frequency/Duration: Patient will be followed by occupational therapy 4 times a week to address goals. SUBJECTIVE:  
Patient stated \"I have tried to stay up a long time today.  (initiated training of \"balance of rest/activity\") OBJECTIVE DATA SUMMARY:  
HISTORY:  
Past Medical History:  
Diagnosis Date  Acid reflux  GERD (gastroesophageal reflux disease)  Hypertension  Other ill-defined conditions(499.89)   
 high cholesterol  Psychiatric disorder   
 depression  Thyroid disease   
 hyperthyroidism Past Surgical History: Procedure Laterality Date  COLONOSCOPY N/A 11/30/2017 COLONOSCOPY performed by [facility]aura Farfan [facility] at [facility] ENDOSCOPY  COLONOSCOPY N/A 3/20/2019 COLONOSCOPY performed by Evelin Cody MD at [facility] ENDOSCOPY  COLONOSCOPY N/A 3/28/2019 COLONOSCOPY performed by Evelin Cody MD at [facility] ENDOSCOPY 2021 Bibi Soni N/A 3/20/2019 SIGMOIDOSCOPY FLEXIBLE performed by Evelin Cody MD at [facility] ENDOSCOPY  
 HX CHOLECYSTECTOMY  HX GYN    
 hysterectomy  HX HEENT    
 thyroid Prior Level of Function/Environment/Context: I PTA Home Situation Home Environment: Private residence One/Two Story Residence: One story Living Alone: No 
Support Systems: Family member(s) Patient Expects to be Discharged to[de-identified] Private residence Current DME Used/Available at Home: Raised toilet seat Tub or Shower Type: Shower Hand dominance: Right EXAMINATION OF PERFORMANCE DEFICITS: 
Cognitive/Behavioral Status: 
Neurologic State: Alert; Appropriate for age Orientation Level: Oriented X4 Cognition: Follows commands; Appropriate safety awareness Perception: Appears intact Perseveration: No perseveration noted Safety/Judgement: Decreased insight into deficits Skin: see nsg notes for wounds info Hearing: Auditory Auditory Impairment: None Vision/Perceptual:   
    
    
    
  
    
Acuity: Within Defined Limits Corrective Lenses: Reading glasses Range of Motion: 
B UE 
AROM: Generally decreased, functional 
  
  
  
  
  
  
  
Strength: 
B UE 
Strength: Generally decreased, functional; declines with fatigue Coordination: 
Coordination: Generally decreased, functional 
Fine Motor Skills-Upper: Left Intact; Right Intact(declines with fatigue) Gross Motor Skills-Upper: Left Intact; Right Intact(declines with fatigue) Tone & Sensation: 
 
Tone: Normal 
Sensation: Intact Balance: 
Sitting: Intact Standing: Impaired Standing - Static: Fair Standing - Dynamic : Fair Functional Mobility and Transfers for ADLs: 
Bed Mobility: 
Rolling: Minimum assistance Supine to Sit: Minimum assistance Sit to Supine: Minimum assistance; Moderate assistance Transfers: 
Sit to Stand: Minimum assistance Stand to Sit: Minimum assistance Bed to Chair: Minimum assistance Toilet Transfer : Minimum assistance ADL Assessment: 
Feeding: Setup(clear liquids only at this time) Oral Facial Hygiene/Grooming: Setup; Additional time(encouragement) Bathing: Moderate assistance;Maximum assistance; Additional time(simulation) Upper Body Dressing: Minimum assistance Lower Body Dressing: Minimum assistance; Moderate assistance; Additional time(may benefit from AE for endurance/energy conservation) Toileting: Minimum assistance(not yet manageing her bowel) ADL Intervention and task modifications: 
  
Training of energy conservation, fall prevention with focus on bathroom safety Cognitive Retraining Safety/Judgement: Decreased insight into deficits Therapeutic Exercise: 
Trained need to increase gentle B UE AROM to facilitate ADL strength and endurance Functional Measure: 
Barthel Index: 
 
Bathin Bladder: 5 Bowels: 0 Groomin Dressin Feedin Mobility: 0 Stairs: 0 Toilet Use: 5 Transfer (Bed to Chair and Back): 10 Total: 30/100 Percentage of impairment  
0% 1-19% 20-39% 40-59% 60-79% 80-99% 100% Barthel Score 0-100 100 99-80 79-60 59-40 20-39 1-19 
 0 The Barthel ADL Index: Guidelines 1. The index should be used as a record of what a patient does, not as a record of what a patient could do. 2. The main aim is to establish degree of independence from any help, physical or verbal, however minor and for whatever reason. 3. The need for supervision renders the patient not independent.  
4. A patient's performance should be established using the best available evidence. Asking the patient, friends/relatives and nurses are the usual sources, but direct observation and common sense are also important. However direct testing is not needed. 5. Usually the patient's performance over the preceding 24-48 hours is important, but occasionally longer periods will be relevant. 6. Middle categories imply that the patient supplies over 50 per cent of the effort. 7. Use of aids to be independent is allowed. Shanice Vasquez., Barthel, D.W. ([id number]). Functional evaluation: the Barthel Index. [address] [facility] (14)2. Jada Hall dmitry MELISSA Mcnamara, Griselda Lerma., Aditya Pacheco., Chaitanya, [address] (1999). Measuring the change indisability after inpatient rehabilitation; comparison of the responsiveness of the Barthel Index and Functional Floyd Measure. Journal of Neurology, Neurosurgery, and Psychiatry, 66(4), 811-694. Nhi Caba, NCatalinaJ.A, DICK Bliss, & Fly Martinez M.A. (2004.) Assessment of post-stroke quality of life in cost-effectiveness studies: The usefulness of the Barthel Index and the EuroQoL-5D. [facility], 13, 278-92 Occupational Therapy Evaluation Charge Determination History Examination Decision-Making LOW Complexity : Brief history review  MEDIUM Complexity : 3-5 performance deficits relating to physical, cognitive , or psychosocial skils that result in activity limitations and / or participation restrictions MEDIUM Complexity : Patient may present with comorbidities that affect occupational performnce. Miniml to moderate modification of tasks or assistance (eg, physical or verbal ) with assesment(s) is necessary to enable patient to complete evaluation Based on the above components, the patient evaluation is determined to be of the following complexity level: LOW Pain: 
Pre treatment: 8 /10 During treatment: 8/10 Post treatment:  8/10 Location: abdomin Description:ache 
 Aggravating factors: UE AROM/functional use/functional mobility Activity Tolerance:  
Fair, requires frequent rest breaks and observed SOB with actity Please refer to the flowsheet for vital signs taken during this treatment. After treatment patient left:  
Up in chair Bed/Chair-wheels locked Call light within reach COMMUNICATION/EDUCATION:  
The patients plan of care was discussed with: Physical Therapist and Registered Nurse. Home safety education was provided and the patient/caregiver indicated understanding., Patient/family have participated as able in goal setting and plan of care. and Patient/family agree to work toward stated goals and plan of care. This patients plan of care is appropriate for delegation to [facility].  
 
Thank you for this referral. 
Paul Louise OTR/SUSIE

## 2019-04-23 NOTE — PROGRESS NOTES
Admit Date: 2019 POD 7 Days Post-Op Procedure:  Procedure(s): LAPAROTOMY EXPLORATORY, ILEOSTOMY; [address] Subjective:  
 
Patient c/o some incisional pain. No nausea s/p NGT removal. 
 
Objective:  
 
Blood pressure 122/60, pulse 93, temperature 99.5 °F (37.5 °C), resp. rate 18, height 5' 5\" (1.651 m), weight 151 lb (68.5 kg), SpO2 90 %. Temp (24hrs), Av.9 °F (37.2 °C), Min:98.3 °F (36.8 °C), Max:99.5 °F (37.5 °C) Physical Exam:  GENERAL: alert, cooperative, no distress, appears stated age, LUNG: clear to auscultation bilaterally, HEART: regular rate and rhythm, ABDOMEN: soft, NT, midline wound clean with beginning granulation, ostomy healthy appearing, Richardson drain site intact, EXTREMITIES:  extremities normal, atraumatic, no cyanosis or edema Labs:  
Recent Results (from the past 24 hour(s)) GLUCOSE, POC Collection Time: 19 12:52 PM  
Result Value Ref Range Glucose (POC) 108 (H) 65 - 100 mg/dL Performed by Geetha Contreras GLUCOSE, POC Collection Time: 19  4:34 PM  
Result Value Ref Range Glucose (POC) 115 (H) 65 - 100 mg/dL Performed by Jame Carreno (PCT) GLUCOSE, POC Collection Time: 19 11:33 PM  
Result Value Ref Range Glucose (POC) 121 (H) 65 - 100 mg/dL Performed by Jesica Avelar GLUCOSE, POC Collection Time: 19  6:05 AM  
Result Value Ref Range Glucose (POC) 137 (H) 65 - 100 mg/dL Performed by Jesica Avelar Data Review images and reports reviewed Assessment:  
 
Principal Problem: 
  SBO (small bowel obstruction) ([address] (2019) Active Problems: 
  Sepsis following intra-abdominal surgery ([address] (3/28/2019) Peritoneal carcinomatosis ([address] (3/29/2019) Appendix carcinoma ([address] (3/29/2019) Small bowel obstruction ([address] (2019) Severe protein-calorie malnutrition ([facility] (2019) Hyponatremia (2019) Pelvic abscess in female (2019) Anemia (4/12/2019) Sepsis ([facility] [address] (4/12/2019) Postprocedural intra-abdominal sepsis ([facility] [address] (4/13/2019) Sepsis due to pneumonia ([facility] [address] (4/13/2019) Plan/Recommendations/Medical Decision Making:  
 
Continue present treatment VAC to midline wound Continue backing out [employer] Weaning TPN today Clear liquids Harmony White. Serena Ramos MD, [facility] [address] [facility]

## 2019-04-23 NOTE — PROGRESS NOTES
Problem: Mobility Impaired (Adult and Pediatric) Goal: *Acute Goals and Plan of Care (Insert Text) Description Physical Therapy Goals Initiated 4/23/2019 1. Patient will move from supine to sit and sit to supine , scoot up and down and roll side to side in bed with independence within 7 day(s). 2.  Patient will transfer from bed to chair and chair to bed with independence using the least restrictive device within 7 day(s). 3.  Patient will perform sit to stand with independence within 7 day(s). 4.  Patient will ambulate with modified independence for 200 feet with the least restrictive device within 7 day(s). 5.  Patient will ascend/descend 3 stairs with 1 handrail(s) with modified independence within 7 day(s). Outcome: Progressing Towards Goal 
 PHYSICAL THERAPY EVALUATION Patient: Hola Tomlin (52 y.o. female) Date: 4/23/2019 Primary Diagnosis: Small bowel obstruction ([address] [V08.542] Small bowel obstruction ([address] [D15.270] Procedure(s) (LRB): 
LAPAROTOMY EXPLORATORY, ILEOSTOMY; KASIA DRAIN PLACEMENT (N/A) 7 Days Post-Op Precautions:   Fall ASSESSMENT :  
Based on the objective data described below, patient presents with Minimum assistance overall for functional mobility. Gait training completed at Minimum assistance, 15 feet and using a gait belt and rolling walker. Required second assist for line management. Patient has refused therapy evaluation three days this admission and is now 7 days post op exploratory laparotomy and ileostomy. Required maximal bargaining and encouragement to participate with evaluation. Pt came to EOB with Min A, stood with Min A and ambulated to bathroom with HHA. Given RW to walk short distance to doorway and turned once reaching hallway. Ambulated slowly back to recliner and left with all needs in reach. On supplemental O2 throughout with saturations 91% at rest and with activity.  Anticipate if pt would be more willing to participate with therapy she could discharge home with HHPT however if she continues to decline will most likely require SNF placement d/t weakness and decreased functional capacity/endurance to be able to complete ADLs/transfers safely at home. The following are barriers to independence while in acute care:  
-Cognitive and/or behavioral: insight into deficits, insight into abilities, volition and learned dependency 
-Medical condition: ROM, strength, functional endurance, standing balance, pain tolerance and medical history   
-Other:    
 
The patient will benefit from skilled acute intervention to address the above impairments and their rehabilitation potential is considered to be Good Discharge recommendations: Rehab at skilled nursing facility (SNF) (to regain functional baseline patient requires rehab) If above is not an option then recommend: Home health (to increase independence and safety) 24 supervision Patient's barriers to discharging home, in addition to above impairments: family availability to assist. 
  
Equipment recommendations for successful discharge (if) home: TBD PLAN : 
Recommendations and Planned Interventions: bed mobility training, transfer training, gait training, therapeutic exercises, neuromuscular re-education, patient and family training/education and therapeutic activities Frequency/Duration: Patient will be followed by physical therapy  4 times a week to address goals. SUBJECTIVE:  
Patient stated ? I would rather wait. ? OBJECTIVE DATA SUMMARY:  
HISTORY:   
Past Medical History:  
Diagnosis Date Acid reflux GERD (gastroesophageal reflux disease) Hypertension Other ill-defined conditions(799.89)   
 high cholesterol Psychiatric disorder   
 depression Thyroid disease   
 hyperthyroidism Past Surgical History:  
Procedure Laterality Date COLONOSCOPY N/A 11/30/2017 COLONOSCOPY performed by Maria Luz Bishop MD at [facility] ENDOSCOPY  
 COLONOSCOPY N/A 3/20/2019 COLONOSCOPY performed by Jim Reynoso MD at [facility] ENDOSCOPY  
 COLONOSCOPY N/A 3/28/2019 COLONOSCOPY performed by Jim Reynoso MD at [facility] ENDOSCOPY FLEXIBLE SIGMOIDOSCOPY N/A 3/20/2019 SIGMOIDOSCOPY FLEXIBLE performed by Jim Reynoso MD at [facility] ENDOSCOPY  
 HX CHOLECYSTECTOMY HX GYN    
 hysterectomy HX HEENT    
 thyroid Prior Level of Function/Home Situation: Pt was independent with mobility prior to admission in early April. Per CM note may be considering hospice/ 
Personal factors and/or comorbidities impacting plan of care: depression, poor volition Home Situation Home Environment: Private residence One/Two Story Residence: One story Living Alone: No 
Support Systems: Family member(s) Patient Expects to be Discharged to[de-identified] Private residence Current DME Used/Available at Home: Raised toilet seat EXAMINATION/PRESENTATION/DECISION MAKING:  
Critical Behavior: 
Neurologic State: Restless Orientation Level: Appropriate for age Cognition: Appropriate decision making Hearing: Auditory Auditory Impairment: None Skin:   
Edema:  
Range Of Motion: 
AROM: Generally decreased, functional 
  
  
  
  
  
  
  
Strength:   
Strength: Generally decreased, functional 
  
  
  
  
  
  
Tone & Sensation:  
Tone: Normal 
  
  
  
  
Sensation: Intact Coordination: 
Coordination: Generally decreased, functional 
Vision:  
  
Functional Mobility: 
Bed Mobility: 
Rolling: Minimum assistance Supine to Sit: Minimum assistance Transfers: 
Sit to Stand: Minimum assistance Stand to Sit: Minimum assistance Balance:  
Sitting: Intact Standing: Impaired Standing - Static: Fair Standing - Dynamic : Fair Ambulation/Gait Training: 
Distance (ft): 20 Feet (ft) Assistive Device: Gait belt;Walker, rolling Ambulation - Level of Assistance: Minimal assistance Gait Abnormalities: Decreased step clearance; Antalgic; Path deviations Base of Support: Narrowed Speed/Soco: Pace decreased (<100 feet/min); Shuffled Step Length: Right shortened;Left shortened Stairs: Therapeutic Exercises:  
 
 
Functional Measure: 
Tinetti test: 
 
Sitting Balance: 1 Arises: 0 Attempts to Rise: 0 Immediate Standing Balance: 0 Standing Balance: 0 Nudged: 0 Eyes Closed: 0 Turn 360 Degrees - Continuous/Discontinuous: 0 Turn 360 Degrees - Steady/Unsteady: 0 Sitting Down: 1 Balance Score: 2 Indication of Gait: 1 
R Step Length/Height: 1 L Step Length/Height: 1 
R Foot Clearance: 1 L Foot Clearance: 1 Step Symmetry: 1 Step Continuity: 0 Path: 0 Trunk: 0 Walking Time: 0 Gait Score: 6 Total Score: 8 Tinetti Tool Score Risk of Falls 
<19 = High Fall Risk 19-24 = Moderate Fall Risk 25-28 = Low Fall Risk Tinetti ME. Performance-Oriented Assessment of Mobility Problems in Elderly Patients. [address]; [id number]. (Scoring Description: PT Bulletin Feb. 10, 1993) Older adults: Arleen Delong et al, 2009; n = [address] [employer] elderly evaluated with ABC, LUCIA, ADL, and IADL) · Mean LUCIA score for males aged 69-68 years = 26.21(3.40) · Mean LUCIA score for females age 69-68 years = 25.16(4.30) · Mean LUCIA score for males over 80 years = 23.29(6.02) · Mean LUCIA score for females over 80 years = 17.20(8.32) Physical Therapy Evaluation Charge Determination History Examination Presentation Decision-Making MEDIUM  Complexity : 1-2 comorbidities / personal factors will impact the outcome/ POC  MEDIUM Complexity : 3 Standardized tests and measures addressing body structure, function, activity limitation and / or participation in recreation  LOW Complexity : Stable, uncomplicated  Other outcome measures tinetti  LOW Based on the above components, the patient evaluation is determined to be of the following complexity level: LOW Activity Tolerance:  
Fair, desaturates with exertion and requires oxygen and requires rest breaks Please refer to the flowsheet for vital signs taken during this treatment. After treatment patient left:  
Up in chair Bed in low position Call light within reach RN notified COMMUNICATION/EDUCATION:  
The patient?s plan of care was discussed with: Registered Nurse. Fall prevention education was provided and the patient/caregiver indicated understanding., Patient/family have participated as able in goal setting and plan of care. and Patient/family agree to work toward stated goals and plan of care. Thank you for this referral. 
Christopher Friedman, PT Time Calculation: 25 mins

## 2019-04-23 NOTE — PROGRESS NOTES
.General Surgery End of Shift Nursing Note Bedside shift change report given to [facility] (oncoming nurse) by Marcelino Cosme (offgoing nurse). Report included the following information SBAR, Kardex, Intake/Output, MAR and Recent Results. Shift worked:   [id number] Summary of shift:   Patient TPN decreased to 42ml/hr. Pt started on clear liquid diet. Wound vac to midline incision intact at 125mmhz. Usha drain to sheets bag. Ileostomy no output. Issues for physician to address:   n/a Number times ambulated in hallway past shift: 1x with pt/ot Number of times OOB to chair past shift: 3 Pain Management: 
Current medication: dilaudid pca and prn iv dilaudid Patient states pain is manageable on current pain medication: no - wants an increase to pca pump GI: 
 
Current diet:  TPN ADULT - CENTRAL AA 5% D20% W/ CA + ELECTROLYTES 
DIET CLEAR LIQUID Tolerating current diet: minimal intake of clear liquids Passing flatus:no Last Bowel Movement:  
Respiratory: 
 
Incentive Spirometer at bedside:yes} Patient instructed on use:yes Patient Safety: 
 
Falls Score: 3 Bed Alarm On?no Sitter? no Lurlene Romberg, RN

## 2019-04-23 NOTE — PROGRESS NOTES
Patient got up with assistance to the chair, and readily calls for assistance with ambulation, Patient turns self frequently in bed

## 2019-04-24 NOTE — PROGRESS NOTES
Plan: 
-D/c home with [address] [facility] PT/OT/SN/SW 
-Home wound vac  
 
 
UPDATE 9:57AM 
CM in to meet with pt regarding d/c plan. Pt sitting up in chair and actively engaged in discussion. Discussed PT/OT recs for SNF. Pt declining SNF, preferring to return home with [facility] [address] [facility] order entered. Pt names friends who can assist her with transportation and has son and DIL nearby that can assist. CM consult for wound vac noted. CM PC to wound care nurse, Edvin Raya. [employer] form left on chart for Edvin Raya to complete. Pt states that she does not have a RW and would benefit from one. UPDATE 2:48PM 
PC from pt's Barbara ARITA, checking on d/c plan. Lindsey April confirmed that she and pt's son, Barry Fuentes,  live nearby and will be able and willing to assist at home and with transportation to f/us. CM will continue to follow and assist with d/c planning. Kelley Barth, MSW Care Manager

## 2019-04-24 NOTE — PROGRESS NOTES
General Surgery End of Shift Nursing Note Bedside shift change report given to Francisco Cruz (oncoming nurse) by Regi Khanna (offgoing nurse). Report included the following information SBAR, Kardex, Intake/Output and MAR. Shift worked:   7p to [facility] Summary of shift:   Pt has been using PCA and dilaudid prn for pain and it does not seem to be tolerating the pain. Issues for physician to address:   none Pain Management: 
Current medication: PCA and Dilaudid prn Patient states pain is manageable on current pain medication: YES Markos Thornton RN

## 2019-04-24 NOTE — PROGRESS NOTES
Admit Date: 2019 POD 8 Days Post-Op Procedure:  Procedure(s): LAPAROTOMY EXPLORATORY, ILEOSTOMY; [address] Subjective:  
 
Patient c/o some incisional pain. Some cramping with liquids yesterday. Objective:  
 
Blood pressure 140/62, pulse 81, temperature 98.6 °F (37 °C), resp. rate 18, height 5' 5\" (1.651 m), weight 150 lb 12.7 oz (68.4 kg), SpO2 93 %. Temp (24hrs), Av.5 °F (36.9 °C), Min:98 °F (36.7 °C), Max:98.8 °F (37.1 °C) Physical Exam:  GENERAL: alert, cooperative, no distress, appears stated age, LUNG: clear to auscultation bilaterally, HEART: regular rate and rhythm, ABDOMEN: soft, NT, midline wound clean with beginning granulation, ostomy healthy appearing, Richardson drain site intact, EXTREMITIES:  extremities normal, atraumatic, no cyanosis or edema Labs:  
Recent Results (from the past 24 hour(s)) GLUCOSE, POC Collection Time: 19 12:14 PM  
Result Value Ref Range Glucose (POC) 114 (H) 65 - 100 mg/dL Performed by [employer] (PCT) GLUCOSE, POC Collection Time: 19  5:59 PM  
Result Value Ref Range Glucose (POC) 110 (H) 65 - 100 mg/dL Performed by Trini Goode GLUCOSE, POC Collection Time: 19 12:25 AM  
Result Value Ref Range Glucose (POC) 100 65 - 100 mg/dL Performed by Jarred Stoddard GLUCOSE, POC Collection Time: 19  6:02 AM  
Result Value Ref Range Glucose (POC) 115 (H) 65 - 100 mg/dL Performed by Filiberto Donovan (PCT) Data Review images and reports reviewed Assessment:  
 
Principal Problem: 
  SBO (small bowel obstruction) ([address] (2019) Active Problems: 
  Sepsis following intra-abdominal surgery ([address] (3/28/2019) Peritoneal carcinomatosis ([address] (3/29/2019) Appendix carcinoma ([address] (3/29/2019) Small bowel obstruction ([address] (2019) Severe protein-calorie malnutrition ([facility] (2019) Hyponatremia (2019) Pelvic abscess in female (4/12/2019) Anemia (4/12/2019) Sepsis ([facility] (4/12/2019) Postprocedural intra-abdominal sepsis ([facility] (4/13/2019) Sepsis due to pneumonia ([facility] (4/13/2019) Plan/Recommendations/Medical Decision Making:  
 
Continue present treatment VAC to midline wound Continue backing out [employer] D/c tpn today Full liquids Ambulate Will need home VAC therapy Stephanie Mccoy. Rebecca Manzo MD, [facility] [address] [facility]

## 2019-04-24 NOTE — WOUND CARE
Ostomy care: POD# 8 new ileostomy Patient now able to start education on ostomy. Ostomy nurse explained: what her anatomy was like and what is like now; what a stoma is; what an ileostomy is and the type of discharge to expect. Patient asked if Ileostomy was permanent and I explained to her that it was and why. The last surgery was done to extend her life with her new diagnosis of Appendix CA with peritoneal metastasis. Patient said she did not understand her situation and was confused. Ostomy nurse explained that if she did not get the surgery/ileostomy she would have  from sepsis/bowel perforation. She needs at least 6 weeks post surgery before the can attempt to slow the progress of her cancer with chemo- therapy (Dr Jyothi Taylor). Plan: Ostomy pouch and Wound VAC dressing change tomorrow. Continued ostomy education. Wound VAC application for home use/home health upon discharge. filled out and left on chart for MD signatiure.  
 
Jeanne Morris RN, [employer]

## 2019-04-24 NOTE — PROGRESS NOTES
Problem: Mobility Impaired (Adult and Pediatric) Goal: *Acute Goals and Plan of Care (Insert Text) Description Physical Therapy Goals Initiated 4/23/2019 1. Patient will move from supine to sit and sit to supine , scoot up and down and roll side to side in bed with independence within 7 day(s). 2.  Patient will transfer from bed to chair and chair to bed with independence using the least restrictive device within 7 day(s). 3.  Patient will perform sit to stand with independence within 7 day(s). 4.  Patient will ambulate with modified independence for 200 feet with the least restrictive device within 7 day(s). 5.  Patient will ascend/descend 3 stairs with 1 handrail(s) with modified independence within 7 day(s). Outcome: Progressing Towards Goal 
 PHYSICAL THERAPY TREATMENT Patient: Santiago Arita (76 y.o. female) Date: 4/24/2019 Diagnosis: Small bowel obstruction ([address] [D34.598] Small bowel obstruction ([address] [K56.609] SBO (small bowel obstruction) ([address] Procedure(s) (LRB): 
LAPAROTOMY EXPLORATORY, ILEOSTOMY; KASIA DRAIN PLACEMENT (N/A) 8 Days Post-Op Precautions: Fall, DNR(max encouragement needed; multiple lines) Chart, physical therapy assessment, plan of care and goals were reviewed. ASSESSMENT: 
Patient is received sitting upright in chair. She is agreeable to ambulating to bedside commode. She demonstrates SBA level transfers and gait with rolling walker. Patient requires assistance with line management during mobility. Patient demonstrates Mod I sit<>stand with arm rests. She is independent with hygiene. Patient ambulates back to recliner and assisted in line management to increase slack. Patient would benefit from SNF rehab secondary to increased lines and need for assistance with management. Patient is at risk for fall while attempting to mobilize with lines. Per CM notes patient is declining SNF and request outpatient therapy. Progression toward goals: ?    Improving appropriately and progressing toward goals ? Improving slowly and progressing toward goals ? Not making progress toward goals and plan of care will be adjusted PLAN: 
Patient continues to benefit from skilled intervention to address the above impairments. Continue treatment per established plan of care. Discharge Recommendations:  Kavin JefferyRogelio singh declines and wants [facility] Further Equipment Recommendations for Discharge:  RW   
 
SUBJECTIVE:  
Patient stated ? I do need to get up and go to the bathroom. ? OBJECTIVE DATA SUMMARY:  
Critical Behavior: 
Neurologic State: Alert, Appropriate for age Orientation Level: Oriented X4, Appropriate for age Cognition: Appropriate decision making, Appropriate for age attention/concentration, Appropriate safety awareness, Follows commands Safety/Judgement: Decreased insight into deficits Functional Mobility Training: 
Bed Mobility: 
  
  
  
  
  
  
Transfers: 
Sit to Stand: Stand-by assistance Stand to Sit: Stand-by assistance Balance: 
Sitting: Intact Standing: Intact Ambulation/Gait Training: 
Distance (ft): 10 Feet (ft) Assistive Device: Gait belt Ambulation - Level of Assistance: Stand-by assistance Speed/Soco: Slow Step Length: Right shortened;Left shortened Stairs: 
  
  
   
 
Neuro Re-Education: 
Therapeutic Exercises:  
 
Pain: 
Pain Scale 1: Numeric (0 - 10) Pain Intensity 1: 3 Pain Location 1: Abdomen Pain Orientation 1: Anterior;Mid 
Pain Description 1: Aching; Sore Pain Intervention(s) 1: Encouraged PCA Activity Tolerance:  
Patient demonstrates good activity tolerance but mobility is limited and patient is frustrated with increased lines Please refer to the flowsheet for vital signs taken during this treatment. After treatment:  
?    Patient left in no apparent distress sitting up in chair ?    Patient left in no apparent distress in bed 
? Call bell left within reach ? Nursing notified ? Caregiver present- wound care ? Bed alarm activated COMMUNICATION/COLLABORATION:  
The patient?s plan of care was discussed with: Registered Nurse Missy Ohs, PT Time Calculation: 19 mins

## 2019-04-24 NOTE — PROGRESS NOTES
General Surgery End of Shift Nursing Note Bedside shift change report given to Silvia Sebastian RN (oncoming nurse) by Joseph Mcintosh RN (offgoing nurse). Report included the following information SBAR, Kardex and MAR. Shift worked:   7a-7p Summary of shift:    The patient had a busy day, working with PT/OT, wound care, ambulated to the bedside commode multiple times, wound vac in place. TPN slowed to 20ml/hr to finish the current bag. Not renewed for this evening. The patient receptive to emptying her bag. Issues for physician to address:   None Number times ambulated in hallway past shift: 1 Number of times OOB to chair past shift: 3 Pain Management: 
Current medication: Dilaudid PCA Patient states pain is manageable on current pain medication: YES 
 
GI: 
 
Current diet:  DIET FULL LIQUID Tolerating current diet: YES Passing flatus: NO 
Last Bowel Movement: several days ago Appearance: Ileostomy green drainage Respiratory: 
 
Incentive Spirometer at bedside: YES Patient instructed on use: YES Patient Safety: 
 
Falls Score: 2 Bed Alarm On? No 
Sitter?  No 
 
Emmanuel Trejo RN

## 2019-04-24 NOTE — PROGRESS NOTES
Problem: Self Care Deficits Care Plan (Adult) Goal: *Acute Goals and Plan of Care (Insert Text) Description Occupational Therapy Goals Initiated 4/23/2019 1. Patient will perform grooming in standing VSS with supervision/set-up within 7 day(s). 2.  Patient will perform bathing with minimal assistance/contact guard assist within 7 day(s). 3.  Patient will perform upper body dressing and lower body dressing with supervision/set-up within 7 day(s). 4.  Patient will perform toilet transfers with supervision/set-up within 7 day(s). 5.  Patient will perform all aspects of toileting with supervision/set-up within 7 day(s). 6.  Patient will participate in upper extremity therapeutic exercise/activities with supervision/set-up for 5 minutes within 7 day(s). 7.  Patient will utilize energy conservation techniques during functional activities with verbal and visual cues within 7 day(s). Outcome: Progressing Towards Goal 
  
Problem: Patient Education: Go to Patient Education Activity Goal: Patient/Family Education Outcome: Progressing Towards Goal 
 OCCUPATIONAL THERAPY TREATMENT Patient: Yaw Fernandez (69 y.o. female) Date: 4/24/2019 Diagnosis: Small bowel obstruction ([facility] [B73.170] Small bowel obstruction ([facility] [K56.609] SBO (small bowel obstruction) ([facility] Procedure(s) (LRB): 
LAPAROTOMY EXPLORATORY, ILEOSTOMY; KASIA DRAIN PLACEMENT (N/A) 8 Days Post-Op Precautions: Fall, DNR(max encouragement needed; multiple lines) Chart, occupational therapy assessment, plan of care, and goals were reviewed. ASSESSMENT:  
The patient presents with Setup upper body ADLs, Contact guard assistance and Minimum assistance lower body ADLs, and Contact guard assistance and Minimum assistance assist functional mobility. The following are barriers to ADL independence while in acute care:  
- Cognitive and/or behavioral: insight into deficits and learned dependency - Medical condition: strength, cardiopulmonary tolerance, precautions, pain tolerance and medical history   
- Other:    
 
Prior level of function: I PTA PLAN: 
Patient continues to benefit from skilled intervention to address the above impairments. Continue treatment per established plan of care. Recommend with staff: out of bed at least 3x/day with increased meals tomorrow; begin to let patient manage ostomy, wound vac management during transfers in prep for discharge planning Recommend next OT session: simulate management of lines, wound vac etc for discharge planning, as well as review bathing adaptations needed with current medical situation Discharge recommendations: Home health (to increase independence and safety) If above is not an option then recommend: Rehab at skilled nursing facility (SNF) (to regain functional baseline patient requires rehab) Barriers to discharging home, in addition to above listed impairments:none Equipment recommendations for successful discharge (if) home: TTB, wound shield for bathing; TTB; HHS SUBJECTIVE:  
Patient stated ? This is making sounds. ? (wound vac- session ended as wound care needed to address) OBJECTIVE DATA SUMMARY:  
Cognitive/Behavioral Status: 
Neurologic State: Alert; Appropriate for age Orientation Level: Oriented X4;Appropriate for age Cognition: Appropriate decision making; Appropriate for age attention/concentration; Appropriate safety awareness; Follows commands Functional Mobility and Transfers for ADLs: 
Bed Mobility: 
 S Transfers: 
Sit to Stand: Stand-by assistance Balance: 
Sitting: Intact Standing: Intact ADL Intervention: 
Feeding Feeding Assistance: (TPN being D/C tonight per RN) Toileting Bowel Hygiene: Total assistance (dependent)(not yet managing ostomy or wound vac lines) Therapeutic Exercises: B UE AROM with light resistance using yellow theraband with training for grading task with increased/decreased repetition, increased/decreased length and thickness of theraband with good verbalization of understanding of benefit of increased activity/exercises Pain: 
Pre treatment: 3/10 During treatment: 3/10 Post treatment:  3/10 Location: surgical sites/abdomin Description:ache; pain management strategies reviewed; she says current meds are managing her pain [facility] Aggravating factors: changes in position sitting and standing Activity Tolerance:  
Fair and improving Please refer to the flowsheet for vital signs taken during this treatment. After treatment patient left:  
Up in chair RN notified Nurse at bedside COMMUNICATION/COLLABORATION:  
The patient?s plan of care was discussed with: Registered Nurse Regi Austin OTR/L Time Calculation: 11 mins

## 2019-04-25 NOTE — PROGRESS NOTES
Admit Date: 2019 POD 9 Days Post-Op Procedure:  Procedure(s): LAPAROTOMY EXPLORATORY, ILEOSTOMY; [address] Subjective:  
 
Patient c/o some incisional pain. Tolerating full liquids well and now with good ileostomy output. Objective:  
 
Blood pressure 145/83, pulse 81, temperature 98.2 °F (36.8 °C), resp. rate 18, height 5' 5\" (1.651 m), weight 150 lb 12.7 oz (68.4 kg), SpO2 90 %. Temp (24hrs), Av.3 °F (36.8 °C), Min:98 °F (36.7 °C), Max:98.7 °F (37.1 °C) Physical Exam:  GENERAL: alert, cooperative, no distress, appears stated age, LUNG: clear to auscultation bilaterally, HEART: regular rate and rhythm, ABDOMEN: soft, NT, midline wound clean with beginning granulation, ostomy healthy appearing, Richardson drain site intact, EXTREMITIES:  extremities normal, atraumatic, no cyanosis or edema Labs:  
Recent Results (from the past 24 hour(s)) GLUCOSE, POC Collection Time: 19 11:38 AM  
Result Value Ref Range Glucose (POC) 99 65 - 100 mg/dL Performed by Nandini Delong (PCT) GLUCOSE, POC Collection Time: 19  5:37 PM  
Result Value Ref Range Glucose (POC) 90 65 - 100 mg/dL Performed by Qing REYES(CON) GLUCOSE, POC Collection Time: 19 11:40 PM  
Result Value Ref Range Glucose (POC) 107 (H) 65 - 100 mg/dL Performed by Kaley Chopra (PCT) GLUCOSE, POC Collection Time: 19  5:50 AM  
Result Value Ref Range Glucose (POC) 105 (H) 65 - 100 mg/dL Performed by Kaley Chopra (PCT) Data Review images and reports reviewed Assessment:  
 
Principal Problem: 
  SBO (small bowel obstruction) ([address] (2019) Active Problems: 
  Sepsis following intra-abdominal surgery ([address] (3/28/2019) Peritoneal carcinomatosis ([address] (3/29/2019) Appendix carcinoma ([address] (3/29/2019) Small bowel obstruction ([facility] (2019) Severe protein-calorie malnutrition ([facility] (2019) Hyponatremia (4/12/2019) Pelvic abscess in female (4/12/2019) Anemia (4/12/2019) Sepsis ([address] (4/12/2019) Postprocedural intra-abdominal sepsis ([address] (4/13/2019) Sepsis due to pneumonia ([address] (4/13/2019) Plan/Recommendations/Medical Decision Making:  
 
Continue present treatment VAC to midline wound Continue backing out [employer] GI lite diet Ambulate Will need home VAC therapy Madeleine Villa. Katt Hirsch MD, [facility] [address] [facility]

## 2019-04-25 NOTE — WOUND CARE
Wound Ostomy nurse: POD# 9 new ileostomy with NPWT dressing to open midline abdomen incision. Patient still using PCA for pain control, TPN d/c'd and starting GI LITE diet. Up to void at [facility], Abramsom drain still in place and causing her the most pain. The sutures holding this large drain in place have erythema and the tube has not been secured and has been pulling on her skin. WC nurse Padded and secured Usha drain after cleaning the sutures. Patient tolerated wound VAC dressing change and wound is starting to granulate (40%) and the rest is pink (60%). Small amount of ser-sang drainage in cannister (50 mls). Vacuum Assisted Closure Mid Abdominal (Active) Vac Status Suction, continuous 4/25/2019 11:55 AM  
Suction (mmHg) 125 4/25/2019 11:55 AM  
Site Assessment Clean 4/25/2019 11:55 AM  
Dressing Status New;Occlusive 4/25/2019 11:55 AM  
Drainage Chamber Level (ml) 50 ml 4/25/2019 11:55 AM  
Cannister Changed No 4/25/2019 11:55 AM  
Output (ml) 50 ml 4/25/2019 11:55 AM  
Number of days:   
   
   
Wound Abdomen Open midline incision (Active) Dressing Status Removed 4/25/2019 11:53 AM  
Dressing Type Vacuum dressing 4/25/2019 11:53 AM  
Incision Site Well Approximated No 4/25/2019 11:53 AM  
Non-staged Wound Description Full thickness 4/25/2019 11:53 AM  
Wound Length (cm) 14 cm 4/22/2019  1:51 PM  
Wound Width (cm) 4 cm 4/22/2019  1:51 PM  
Wound Depth (cm) 2.8 cm 4/22/2019  1:51 PM  
Wound Volume (cm^3) 156.8 cm^3 4/22/2019  1:51 PM  
Condition of Base Pink;Granulation 4/25/2019 11:53 AM  
Condition of Edges Open 4/25/2019 11:53 AM  
Assessment [place] 4/21/2019 10:39 PM  
Tissue Type Percent Pink 60 4/25/2019 11:53 AM  
Tissue Type Percent Red 40 4/25/2019 11:53 AM  
Tissue Type Percent Yellow 0 4/25/2019 11:53 AM  
Drainage Amount Small 4/25/2019 11:53 AM  
Drainage Color Serosanguinous 4/25/2019 11:53 AM  
Wound Odor None 4/25/2019 11:53 AM  
Lisset-wound Assessment Intact 4/25/2019 11:53 AM  
 Cleansing and Cleansing Agents  Dermal wound cleanser 4/25/2019 11:53 AM  
Dressing Changed Changed/New 4/25/2019 11:53 AM  
Dressing Type Applied Vacuum dressing 4/25/2019 11:53 AM  
Procedure Bleeding None 4/21/2019 10:39 PM  
Procedure Tolerated Well 4/25/2019 11:53 AM  
Number of days: 9 Wound VAC dressing: x3 pieces of black granufoam placed in wound, occlusively sealed and TRAC pad applied. NPWT -125 mm/hg, continuous. Ileostomy: stoma pink, moist and budded. Peristomal sutures intact, no nikolai-stomal skin breakdown. Positive gas and liquid dark green discharge. Ostomy education: how often to empty pouch (1/3-1/2 full), check pouch frequently to see how full it is. Pouch is changed every 3-4 days or PROMPTLY should it leak. Ostomy pouch and accessories explained. Plan: continue NPWT dressing and Ileostomy changes x2/week. Ileostomy education Mon-Friday.  
 
Chad Rosenthal RN, [employer]

## 2019-04-25 NOTE — PROGRESS NOTES
Attempted to see patient x2 for PT intervention today. She deferred in the am d/t pain and fatigue after \"just getting back to bed. \" She adamantly deferred a 2nd time for the pm d/t concern for a hypoglycemic episode (resolved 1 hour prior) and severe abdominal pain. Will defer and follow up tomorrow. Spoke with nsg.  
Brittany Ulloa, PT, DPT

## 2019-04-25 NOTE — PROGRESS NOTES
Occupational Therapy Chart reviewed; cleared for tx by nsg if patient willing to participate, which she was not, due to pain 7.5 in wound region. Patient reports she has been up to [facility] mod I voiding today, donning /doffing socks mod I but currently in too much pain to get back out of bed at this time; reviewed LE AE for [facility] and pain management which patient reports she does not want to try because summer is coming and she wont need socks and expects to be well before adaptations need to be made. Time spent 7 minutes.  Von Castillo OTR/L

## 2019-04-25 NOTE — PROGRESS NOTES
Plan: [facility] for [facility] PT/OT/SN/SW 
-Home wound vac  
 
UPDATE 10:00AM 
Wound vac order form and supporting documents faxed to [facility]. CM will continue to follow and assist with d/c planning. BOB Soto Care Manager

## 2019-04-25 NOTE — PROGRESS NOTES
General Surgery End of Shift Nursing Note Bedside shift change report given to *** (oncoming nurse) by Maria Del Carmen Napier RN (offgoing nurse). Report included the following information SBAR. Shift worked:   7am-7pm  
Summary of shift:   Patient in bed resting quietly Given medication for breakthrough pain once today Tolerating diet bu not eating much. Issues for physician to address:   *** Number times ambulated in hallway past shift: {NUMBERS 0-5 [[id number]]} Number of times OOB to chair past shift: {NUMBERS 0-5 [[id number]]} Pain Management: 
Current medication: *** Patient states pain is manageable on current pain medication: {YES/NO:39390} GI: 
 
Current diet:  DIET GI LITE (POST SURGICAL) Tolerating current diet: {YES/NO:01051} Passing flatus: {YES/NO:39840} Last Bowel Movement: {Time; today/yest/etc:24990} Appearance: *** Respiratory: 
 
Incentive Spirometer at bedside: {YES/NO:15809} Patient instructed on use: {YES/NO:82911} Patient Safety: 
 
Falls Score: {Numbers; 1-4 :[id number]} Bed Alarm On? {YES/NO:68969} Sitter? {YES/NO:64400} Maria Del Carmen Napier

## 2019-04-25 NOTE — PROGRESS NOTES
General Surgery End of Shift Nursing Note Bedside shift change report given to Janine Parker (oncoming nurse) by Antonio Torres (offgoing nurse). Report included the following information SBAR, Kardex, Intake/Output and MAR. Shift worked:   [id number] Summary of shift:    Shift uneventful. PCA continued without issue for pain management. Valium administered once. Up to [facility] to void. Issues for physician to address:   None. Elliott Pizano

## 2019-04-26 NOTE — PROGRESS NOTES
Bedside shift change report given to Yassine Harden (oncoming nurse) by Gisela Hemphill RN (offgoing nurse). Report included the following information SBAR, Kardex, Procedure Summary, Intake/Output, MAR, Recent Results, Med Rec Status, Procedure Verification and Quality Measures.

## 2019-04-26 NOTE — PROGRESS NOTES
Admit Date: 2019 POD 10 Days Post-Op Procedure:  Procedure(s): LAPAROTOMY EXPLORATORY, ILEOSTOMY; [address] Subjective:  
 
Patient c/o some incisional pain. Tolerating diet well and now with good ileostomy output. Objective:  
 
Blood pressure 150/64, pulse 82, temperature 97.9 °F (36.6 °C), resp. rate 18, height 5' 5\" (1.651 m), weight 135 lb 12.8 oz (61.6 kg), SpO2 100 %. Temp (24hrs), Av °F (36.7 °C), Min:97.8 °F (36.6 °C), Max:98.4 °F (36.9 °C) Physical Exam:  GENERAL: alert, cooperative, no distress, appears stated age, LUNG: clear to auscultation bilaterally, HEART: regular rate and rhythm, ABDOMEN: soft, NT, midline wound clean with beginning granulation, ostomy healthy appearing, Richardson drain site intact, EXTREMITIES:  extremities normal, atraumatic, no cyanosis or edema Labs:  
Recent Results (from the past 24 hour(s)) GLUCOSE, POC Collection Time: 19 12:47 PM  
Result Value Ref Range Glucose (POC) 68 65 - 100 mg/dL Performed by [employer] (PCT) GLUCOSE, POC Collection Time: 19  1:14 PM  
Result Value Ref Range Glucose (POC) 67 65 - 100 mg/dL Performed by [employer] (PCT) GLUCOSE, POC Collection Time: 19  1:38 PM  
Result Value Ref Range Glucose (POC) 74 65 - 100 mg/dL Performed by [employer] (PCT) GLUCOSE, POC Collection Time: 19  2:08 PM  
Result Value Ref Range Glucose (POC) 94 65 - 100 mg/dL Performed by Juan Francisco Pike GLUCOSE, POC Collection Time: 19  4:37 PM  
Result Value Ref Range Glucose (POC) 91 65 - 100 mg/dL Performed by [employer] (PCT) GLUCOSE, POC Collection Time: 19 12:31 AM  
Result Value Ref Range Glucose (POC) 101 (H) 65 - 100 mg/dL Performed by Joseph Barr (PCT) GLUCOSE, POC Collection Time: 19  6:09 AM  
Result Value Ref Range Glucose (POC) 93 65 - 100 mg/dL Performed by Earnest Givens (PCT) Data Review images and reports reviewed Assessment:  
 
Principal Problem: 
  SBO (small bowel obstruction) ([address] (4/12/2019) Active Problems: 
  Sepsis following intra-abdominal surgery ([address] (3/28/2019) Peritoneal carcinomatosis ([address] (3/29/2019) Appendix carcinoma ([address] (3/29/2019) Small bowel obstruction ([address] (4/12/2019) Severe protein-calorie malnutrition ([address] (4/12/2019) Hyponatremia (4/12/2019) Pelvic abscess in female (4/12/2019) Anemia (4/12/2019) Sepsis ([address] (4/12/2019) Postprocedural intra-abdominal sepsis ([address] (4/13/2019) Sepsis due to pneumonia ([address] (4/13/2019) Plan/Recommendations/Medical Decision Making:  
 
Continue present treatment VAC to midline wound Continue backing out [employer] GI lite diet Ambulate Will need home VAC therapy Awaiting VAC unit for home and home health not available until Monday, so she will be here through the weekend. Dylan Campbell. Carline Joshi MD, [address] [place] [address]. [facility]

## 2019-04-26 NOTE — PROGRESS NOTES
General Surgery End of Shift Nursing Note Bedside shift change report given to Gianfranco Marshall (oncoming nurse) by Elif Campa (offgoing nurse). Report included the following information SBAR, Kardex, OR Summary, Procedure Summary, Intake/Output, MAR, Accordion and Recent Results. Shift worked:   7 am -7pm   
Summary of shift:    Pt has little to no appetite. One episode of vomiting 150 cc of emesis. Total ileostomy output for this shift = 1075 cc of green drainage. Output from kaleigh = 25 cc. Unable to ambulate much because of \"tight\" feeling across abdomen. Pain treated 1X with norco. Valium given once for \"antsy\" feeling. Issues for physician to address:   None Number times ambulated in hallway past shift: 0 Number of times OOB to chair past shift: 1 Pain Management: 
Current medication: see MAR Patient states pain is manageable on current pain medication: YES 
 
GI: 
 
Current diet:  DIET GI LITE (POST SURGICAL) DIET NUTRITIONAL SUPPLEMENTS No; Breakfast, Lunch; Ensure Clear DIET NUTRITIONAL SUPPLEMENTS No; Lunch, Dinner; Shayna-Marshall DIET ONE TIME MESSAGE Tolerating current diet: YES Passing flatus: YES Last Bowel Movement: today Appearance: green Respiratory: 
 
Incentive Spirometer at bedside: YES Patient instructed on use: YES Patient Safety: 
 
Falls Score: 3 Bed Alarm On? Not applicable Sitter? Not applicable Rah Buenrostro

## 2019-04-26 NOTE — PROGRESS NOTES
Plan: 
-D/c home with [facility] [address] for SN/PT/OT/SW 
-Wound vac 
-RW 
 
UPDATE 4:02PM 
Pt with N/V today so therapy and wound care deferred. Wound vac delivered to bedside by [facility]. [address] able to resume care on Monday, if pt d/c over the w/e. RW referral accepted by [place]. CM will continue to follow and assist with d/c planning. BOB Gallo Care Manager

## 2019-04-26 NOTE — WOUND CARE
Ostomy Education: postponned secondary to patient Nausea and vomiting. Makenna Jack Patient is barely eating, TPN has stopped, No IV fluids running. I emptied 500 ml's of watery green output from Ileostomy pouch. Concerns for dehydration and N/V and increased output from ileostomy r/t to staff nurse and Dr Katt Hirsch.  
 
Dae Caceres RN, [employer]

## 2019-04-26 NOTE — PROGRESS NOTES
Physical Therapy Chart reviewed and consulted with RN and patient, will defer today due to nausea and vomiting. Margoth Driscoll

## 2019-04-26 NOTE — PROGRESS NOTES
Nutrition Assessment: 
 
RECOMMENDATIONS:  
Continue diet per surgeon RD to add Ensure Clear and Magic Cup BID ASSESSMENT:  
Chart reviewed. Pt transitioned off of TPN and is on a solid diet now with poor intake the last couple of days. She vomited yesterday and continues to have some issues with nausea. Ileostomy with some OP. Wound vac to abdomen. Noted plans for discharge Monday, will add PO supplements to try to boost kcal and protein intake. Dietitians Intervention(s)/Plan(s): Continue diet, add PO supplements SUBJECTIVE/OBJECTIVE:  
 
Diet Order: Other (comment)(GI Lite ) 
% Eaten:   
Patient Vitals for the past 72 hrs: 
 % Diet Eaten 04/26/19 0954 10 % 04/26/19 0938 25 % 04/24/19 1948 25 % 04/24/19 1458 50 % 04/24/19 1059 75 % 04/24/19 0800 50 % Pertinent Medications:pepcid. Chemistries: 
Lab Results Component Value Date/Time Sodium 136 04/22/2019 03:50 AM  
 Potassium 3.8 04/22/2019 03:50 AM  
 Chloride 99 04/22/2019 03:50 AM  
 CO2 31 04/22/2019 03:50 AM  
 Anion gap 6 04/22/2019 03:50 AM  
 Glucose 105 (H) 04/22/2019 03:50 AM  
 BUN 5 (L) 04/22/2019 03:50 AM  
 Creatinine 0.34 (L) 04/22/2019 03:50 AM  
 BUN/Creatinine ratio 15 04/22/2019 03:50 AM  
 GFR est AA >60 04/22/2019 03:50 AM  
 GFR est non-AA >60 04/22/2019 03:50 AM  
 Calcium 7.6 (L) 04/22/2019 03:50 AM  
 Albumin 1.6 (L) 04/18/2019 04:39 AM  
  
Anthropometrics: Height: 5' 5\" (165.1 cm) Weight: 61.6 kg (135 lb 12.8 oz)   []bed scale    []stated   []unknown IBW (%IBW):   ( ) UBW (%UBW):   (  %) BMI: Body mass index is 22.6 kg/m². This BMI is indicative of: 
[]Underweight   [x]Normal   []Overweight   [] Obesity   [] Extreme Obesity (BMI>40) Estimated Nutrition Needs (Based on): 1500 Kcals/day(BMR: 1150 x 1.3) , 60 g(1 g/kg) Protein Carbohydrate: At Least 130 g/day  Fluids: 1500 mL/day Last BM: ileostomy-200mL   [x]Active     []Hyperactive  []Hypoactive       [] Absent   BS 
 Skin:    [] Intact   [x] Incision(wound vac-abdomen)  [] Breakdown   [] DTI   [] Tears/Excoriation/Abrasion  []Edema [] Other: Wt Readings from Last 30 Encounters:  
04/25/19 61.6 kg (135 lb 12.8 oz) 04/05/19 60.8 kg (134 lb) 04/01/19 66 kg (145 lb 8 oz)  
03/28/19 61.2 kg (135 lb)  
03/20/19 60.8 kg (134 lb)  
03/18/19 59.6 kg (131 lb 6.3 oz) 05/02/18 60.1 kg (132 lb 7.9 oz)  
11/28/17 66.8 kg (147 lb 4.3 oz)  
11/26/17 67.3 kg (148 lb 5.9 oz) 10/26/17 62.9 kg (138 lb 10.7 oz) 10/14/16 72.6 kg (160 lb)  
09/20/16 72.6 kg (160 lb) 08/25/16 72.6 kg (160 lb 0.9 oz) 10/05/14 72.6 kg (160 lb) 12/27/13 70.8 kg (156 lb) 12/24/13 70.8 kg (156 lb) NUTRITION DIAGNOSES:  
Problem:   Inadequate protein-energy intake Etiology: related to  poor appetite and nausea Signs/Symptoms: as evidenced by  PO intake 10-25%. NUTRITION INTERVENTIONS: 
Meals/Snacks: General/healthful diet  Supplements: Commercial supplement GOAL:  
Pt will consume >50% of meals/supplements in 2-4 days. NUTRITION MONITORING AND EVALUATION Previous Goal: Pt will tolerate TPN at goal rate with BG <200mg/dL and electrolytes WNL in 2-4 days Previous Goal Met: N/A Previous Recommendations Implemented: Yes Cultural, Restorationist, or Ethnic Dietary Needs: None LEARNING NEEDS (Diet, Food/Nutrient-Drug Interaction):  
 [x] None Identified 
 [] Identified and Education Provided/Documented 
 [] Identified and Pt declined/was not appropriate [x] Interdisciplinary Care Plan Reviewed/Documented  
 [x] Participated in Discharge Planning: To be determined 
 [] Interdisciplinary Rounds NUTRITION RISK:  
 [x] High              [] Moderate           []  Low  []  Minimal/Uncompromised Vanna Lees RD, [facility] Pager [phone] Weekend Pager [phone]

## 2019-04-27 NOTE — ROUTINE PROCESS
General Surgery End of Shift Nursing Note Bedside shift change report given to  Blanche Thornton (oncoming nurse) by Mik Quinonez (offgoing nurse). Report included the following information SBAR, Kardex, Intake/Output and MAR. Shift worked:   7a-7p Summary of shift:    Patient complained of nausea today, Zofran given. Norco given for pain and discomfort in abdomen. Heavy output from illeostomy. Issues for physician to address:   None Number times ambulated in hallway past shift: 0 Number of times OOB to chair past shift: 1 Pain Management: 
Current medication: See [facility] Patient states pain is manageable on current pain medication: YES 
 
GI: 
 
Current diet:  DIET GI LITE (POST SURGICAL) DIET NUTRITIONAL SUPPLEMENTS No; Breakfast, Lunch; Ensure Clear DIET NUTRITIONAL SUPPLEMENTS No; Lunch, Dinner; Shayna-Marshall DIET ONE TIME MESSAGE Tolerating current diet: YES Passing flatus: YES Last Bowel Movement: today Appearance: very small Respiratory: 
 
Incentive Spirometer at bedside: YES Patient instructed on use: YES Patient Safety: 
 
Falls Score: 3 Bed Alarm On? No 
Sitter? No 
 
Анна Erwin

## 2019-04-27 NOTE — PROGRESS NOTES
Admit Date: 2019 POD 11 Days Post-Op Procedure:  Procedure(s): LAPAROTOMY EXPLORATORY, ILEOSTOMY; [address] Subjective:  
 
Patient feeling better today. Had N/V yesterday and poor po intake due to nausea. Now ale po better. Still with watery ileostomy o/p Objective:  
 
Blood pressure 153/79, pulse 69, temperature 97.9 °F (36.6 °C), resp. rate 18, height 5' 5\" (1.651 m), weight 135 lb 12.8 oz (61.6 kg), SpO2 100 %. Temp (24hrs), Av.1 °F (36.7 °C), Min:97.8 °F (36.6 °C), Max:98.7 °F (37.1 °C) Physical Exam:  GENERAL: alert, cooperative, no distress, appears stated age, LUNG: clear to auscultation bilaterally, HEART: regular rate and rhythm, ABDOMEN: soft, NT, midline wound clean with beginning granulation, ostomy healthy appearing, Richardson drain site intact, EXTREMITIES:  extremities normal, atraumatic, no cyanosis or edema Labs:  
Recent Results (from the past 24 hour(s)) GLUCOSE, POC Collection Time: 19 11:40 AM  
Result Value Ref Range Glucose (POC) 83 65 - 100 mg/dL Performed by Luann Reyes (PCT) CBC WITH AUTOMATED DIFF Collection Time: 19  2:11 AM  
Result Value Ref Range WBC 9.9 3.6 - 11.0 K/uL  
 RBC 3.11 (L) 3.80 - 5.20 M/uL HGB 8.7 (L) 11.5 - 16.0 g/dL HCT 26.6 (L) 35.0 - 47.0 % MCV 85.5 80.0 - 99.0 FL  
 MCH 28.0 26.0 - 34.0 PG  
 MCHC 32.7 30.0 - 36.5 g/dL  
 RDW 15.2 (H) 11.5 - 14.5 % PLATELET 584 (H) 529 - 400 K/uL MPV 10.6 8.9 - 12.9 FL  
 NRBC 0.0 0  WBC ABSOLUTE NRBC 0.00 0.00 - 0.01 K/uL NEUTROPHILS 75 32 - 75 % LYMPHOCYTES 16 12 - 49 % MONOCYTES 8 5 - 13 % EOSINOPHILS 0 0 - 7 % BASOPHILS 0 0 - 1 % IMMATURE GRANULOCYTES 1 (H) 0.0 - 0.5 % ABS. NEUTROPHILS 7.5 1.8 - 8.0 K/UL  
 ABS. LYMPHOCYTES 1.6 0.8 - 3.5 K/UL  
 ABS. MONOCYTES 0.8 0.0 - 1.0 K/UL  
 ABS. EOSINOPHILS 0.0 0.0 - 0.4 K/UL  
 ABS. BASOPHILS 0.0 0.0 - 0.1 K/UL ABS. IMM. GRANS. 0.1 (H) 0.00 - 0.04 K/UL  
 DF AUTOMATED METABOLIC PANEL, BASIC Collection Time: 04/27/19  2:11 AM  
Result Value Ref Range Sodium 135 (L) 136 - 145 mmol/L Potassium 3.5 3.5 - 5.1 mmol/L Chloride 102 97 - 108 mmol/L  
 CO2 27 21 - 32 mmol/L Anion gap 6 5 - 15 mmol/L Glucose 107 (H) 65 - 100 mg/dL BUN 6 6 - 20 MG/DL Creatinine 0.46 (L) 0.55 - 1.02 MG/DL  
 BUN/Creatinine ratio 13 12 - 20 GFR est AA >60 >60 ml/min/1.73m2 GFR est non-AA >60 >60 ml/min/1.73m2 Calcium 8.0 (L) 8.5 - 10.1 MG/DL Data Review images and reports reviewed Assessment:  
 
Principal Problem: 
  SBO (small bowel obstruction) ([address] (4/12/2019) Active Problems: 
  Sepsis following intra-abdominal surgery ([address] (3/28/2019) Peritoneal carcinomatosis ([address] (3/29/2019) Appendix carcinoma ([address] (3/29/2019) Small bowel obstruction ([address] (4/12/2019) Severe protein-calorie malnutrition ([address] (4/12/2019) Hyponatremia (4/12/2019) Pelvic abscess in female (4/12/2019) Anemia (4/12/2019) Sepsis ([address] (4/12/2019) Postprocedural intra-abdominal sepsis ([address] (4/13/2019) Sepsis due to pneumonia ([address] (4/13/2019) Plan/Recommendations/Medical Decision Making:  
 
Continue present treatment VAC to midline wound Continue backing out [employer] GI lite diet Ambulate Will need home VAC therapy Add low dose Imodium Possibly home Monday after additional ostomy teaching. Ollie Taylor. Teri Huffman MD, [facility]

## 2019-04-27 NOTE — PROGRESS NOTES
Declot intervention was explained to patient. Instilling heparin was unsuccessful per primary nurse. Initiated declot interventions of cathflo at 1800 Repositioning the patient in bed, asking patient to cough and deep breathe and raising left arm were unsuccessful. Instilled 1mg/1ml Cathflo to both ports. Port capped and labeled not to use until evaluated by VAT or Nursing Supervisor. Patient tolerated procedure well. Primary nurse, [facility], RN aware. Refer to [facility] and Docflow sheet for specifics. Declot follow up. Aspirated instilled Cathflo  1mg/1 ml post 60 minute dwell. Noted + blood return from both red and purple ports. 5 ml of blood wasted from both purple and red ports. Flushed both purple and red ports with 20 ml NS each. Primary nurse [facility], RN is aware of successful declot intervention. Patient is aware of successful declot intervention. Patient resting in bed, bed in lowest position, call bell in reach. Brooklynn Head RN [facility]  Vascular Access Team

## 2019-04-27 NOTE — PROGRESS NOTES
General Surgery End of Shift Nursing Note Bedside shift change report given to Hanane [address] (oncoming nurse) by Natalia Ortega RN (offgoing nurse). Report included the following information SBAR. Shift worked: 7p-7a Summary of shift:     
  Pt up to the bathrrom about 5 times. Patient requested melatonin to help with sleep. Patients colostomy output was not as high during the night Issues for physician to address:   Patients low appetite Number times ambulated in hallway past shift: 0 Number of times OOB to chair past shift: 0 Pain Management: 
Current medication: Received pain medication 1tim Patient states pain is manageable on current pain medication: YES 
 
GI: 
 
Current diet:  DIET GI LITE (POST SURGICAL) DIET NUTRITIONAL SUPPLEMENTS No; Breakfast, Lunch; Ensure Clear DIET NUTRITIONAL SUPPLEMENTS No; Lunch, Dinner; Shayna-Marshall DIET ONE TIME MESSAGE Tolerating current diet: yes Passing flatus: YES Last Bowel Movement: today Appearance: liquid Respiratory: 
 
Incentive Spirometer at bedside: YES Patient instructed on use: YES Patient Safety: 
 
Falls Score: 3 Bed Alarm On? No 
Sitter? No 
 
Matias Love

## 2019-04-28 NOTE — PROGRESS NOTES
SURGERY PROGRESS NOTE Admit Date: 2019 POD 12 Days Post-Op Procedure: Procedure(s): LAPAROTOMY EXPLORATORY, ILEOSTOMY; [address] Subjective:  
 
Patient has no complaints Objective:  
 
Visit Vitals /66 Pulse 78 Temp 97.9 °F (36.6 °C) Resp 17 Ht 5' 5\" (1.651 m) Wt 61.6 kg (135 lb 12.8 oz) SpO2 96% BMI 22.60 kg/m² Temp (24hrs), Av.9 °F (36.6 °C), Min:97.7 °F (36.5 °C), Max:98.5 °F (36.9 °C) 
 
 
701 -  1900 In: 589.3 [P.O.:221; I.V.:368.3] Out: 300 [Urine:300] 1901 -  0700 In: 0617 [I.V.:3535] Out: 2350 [VRGBK:] Physical Exam:   
General:  alert, cooperative, no distress, appears stated age Abdomen: soft, bowel sounds active, non-tender Incision:  Wound vac intact Lab Results Component Value Date/Time WBC 9.9 2019 02:11 AM  
 HGB 8.7 (L) 2019 02:11 AM  
 HCT 26.6 (L) 2019 02:11 AM  
 PLATELET 113 (H)  02:11 AM  
 MCV 85.5 2019 02:11 AM  
 
Lab Results Component Value Date/Time GFR est non-AA >60 2019 02:11 AM  
 GFRNA, POC >60 2019 04:24 AM  
 GFR est AA >60 2019 02:11 AM  
 GFRAA, POC >60 2019 04:24 AM  
 Creatinine 0.46 (L) 2019 02:11 AM  
 Creatinine (POC) 0.7 2019 04:24 AM  
 BUN 6 2019 02:11 AM  
 Sodium 135 (L) 2019 02:11 AM  
 Potassium 3.5 2019 02:11 AM  
 Chloride 102 2019 02:11 AM  
 CO2 27 2019 02:11 AM  
 Magnesium 2.1 2019 03:50 AM  
 Phosphorus 4.6 2019 03:50 AM  
 
 
Assessment:  
 
Principal Problem: 
  SBO (small bowel obstruction) ([address] (2019) Active Problems: 
  Sepsis following intra-abdominal surgery ([address] (3/28/2019) Peritoneal carcinomatosis ([address] (3/29/2019) Appendix carcinoma ([address] (3/29/2019) Small bowel obstruction ([address] (2019) Severe protein-calorie malnutrition ([address] (2019) Hyponatremia (2019) Pelvic abscess in female (4/12/2019) Anemia (4/12/2019) Sepsis ([address] (4/12/2019) Postprocedural intra-abdominal sepsis ([address] (4/13/2019) Sepsis due to pneumonia ([facility] (4/13/2019) 
 
ready for discharge. Awaiting  to be set up Plan:  
 
 
Continue present treatment

## 2019-04-29 NOTE — PROGRESS NOTES
Problem: Mobility Impaired (Adult and Pediatric) Goal: *Acute Goals and Plan of Care (Insert Text) Description Physical Therapy Goals Initiated 4/23/2019 1. Patient will move from supine to sit and sit to supine , scoot up and down and roll side to side in bed with independence within 7 day(s). 2.  Patient will transfer from bed to chair and chair to bed with independence using the least restrictive device within 7 day(s). 3.  Patient will perform sit to stand with independence within 7 day(s). 4.  Patient will ambulate with modified independence for 200 feet with the least restrictive device within 7 day(s). 5.  Patient will ascend/descend 3 stairs with 1 handrail(s) with modified independence within 7 day(s). Outcome: Progressing Towards Goal 
 PHYSICAL THERAPY TREATMENT Patient: Shyann Hull (37 y.o. female) Date: 4/29/2019 Diagnosis: Small bowel obstruction ([address] [V79.621] Small bowel obstruction ([address] [K56.609] SBO (small bowel obstruction) ([address] Procedure(s) (LRB): 
LAPAROTOMY EXPLORATORY, ILEOSTOMY; KASIA DRAIN PLACEMENT (N/A) 13 Days Post-Op Precautions: Fall, DNR(max encouragement needed; multiple lines) Chart, physical therapy assessment, plan of care and goals were reviewed. ASSESSMENT: 
Patient making steady progress toward goals. Session focused today on line management during ambulation as she plans to DC home with wound vac and RW. Needs supervision for all aspects as well as some verbal cues for line management. Amb approx 125 feet with RW and SBA with no overt LOB but slow diya. [facility]. Progression toward goals: 
?    Improving appropriately and progressing toward goals ? Improving slowly and progressing toward goals ? Not making progress toward goals and plan of care will be adjusted PLAN: 
Patient continues to benefit from skilled intervention to address the above impairments. Continue treatment per established plan of care. Discharge Recommendations:  Home Health Further Equipment Recommendations for Discharge:  rolling walker SUBJECTIVE:  
Patient stated ? I think I'll be fine once I get home. ? OBJECTIVE DATA SUMMARY:  
Critical Behavior: 
Neurologic State: Alert, Appropriate for age Orientation Level: Oriented X4, Appropriate for age Cognition: Appropriate decision making, Appropriate for age attention/concentration, Appropriate safety awareness, Follows commands Safety/Judgement: Decreased insight into deficits Functional Mobility Training: 
Bed Mobility: 
Rolling: Supervision Supine to Sit: Supervision Sit to Supine: Supervision Transfers: 
Sit to Stand: Stand-by assistance Stand to Sit: Stand-by assistance Balance: 
Sitting: Intact Standing: Intact Standing - Static: Constant support;Good Standing - Dynamic : Fair Ambulation/Gait Training: 
Distance (ft): 125 Feet (ft) Assistive Device: Gait belt;Walker, rolling Ambulation - Level of Assistance: Stand-by assistance Gait Abnormalities: Decreased step clearance Base of Support: Narrowed Speed/Soco: Pace decreased (<100 feet/min); Shuffled; Slow Step Length: Left shortened;Right shortened Practiced unplugging and plugging wound vac in and line management during ambulation as cord could be trip hazard Pain: 
Pain Scale 1: Numeric (0 - 10) Pain Intensity 1: 2 Pain Location 1: Abdomen Pain Orientation 1: Mid 
Pain Description 1: Aching; Sore Pain Intervention(s) 1: Medication (see MAR) Activity Tolerance: VSS Please refer to the flowsheet for vital signs taken during this treatment. After treatment:  
?    Patient left in no apparent distress sitting up in chair ? Patient left in no apparent distress in bed 
? Call bell left within reach ? Nursing notified ? Caregiver present ? Bed alarm activated COMMUNICATION/COLLABORATION:  
The patient?s plan of care was discussed with: Physical Therapist, Occupational Therapist and Registered Nurse Allyssa Rushing, PT, DPT Time Calculation: 14 mins

## 2019-04-29 NOTE — DISCHARGE INSTRUCTIONS
Patient Education        Ostomy Care: Care Instructions  Your Care Instructions    When a part of your intestine doesn't work as it should, a doctor can do surgery to make an opening in your belly and bring a part of your intestine to the surface of your skin. This opening is called an ostomy. There are two types. A colostomy is an ostomy of the colon. An ileostomy is an ostomy of the small intestine. With an ostomy, waste no longer leaves your body from your anus. It leaves your body through the part of your intestine at the ostomy opening. This part of the intestine is called the stoma. There's no muscle around the stoma. So you can't control when waste or gas leaves your body. Now your waste automatically goes from the stoma into a plastic bag (pouch) around the stoma. This pouch will block the smell of the waste. It can't be seen when you are wearing clothes. You can learn to take care of your ostomy. Good care can make living with a stoma easier. It can help keep a good seal between the skin and the pouch. This can prevent your skin from getting irritated. Follow-up care is a key part of your treatment and safety. Be sure to make and go to all appointments, and call your doctor if you are having problems. It's also a good idea to know your test results and keep a list of the medicines you take. How can you care for yourself at home? · If the skin under your pouch is red, irritated, or itchy, you need to treat your skin. Follow these steps:  ? Gently remove the pouch. ? Clean the skin under the pouch with water. ? Dry the skin. ? Sprinkle ostomy powder on the skin. Then gently wipe off the extra powder. ? Reattach or replace the pouch. · If you continue to have skin irritation, talk to your ostomy nurse. · Follow all instructions from your ostomy nurse. · Empty and replace your ostomy pouch as often as your nurse recommends. · Be safe with medicines. Take your medicines exactly as prescribed.  Call your doctor if you think you are having a problem with your medicine. You will get more details on the specific medicines your doctor prescribes. When should you call for help? Call your doctor now or seek immediate medical care if:    · You are vomiting.     · You have new or worse belly pain.     · You have a fever.     · You cannot pass stools or gas.    Watch closely for changes in your health, and be sure to contact your doctor if:    · Your stoma turns pale or changes color.     · Your stoma swells or bleeds.     · You have little or no waste going into your pouch. Where can you learn more? Go to http://olivia-salina.info/. Enter 88 [id number] 141 in the search box to learn more about \"Ostomy Care: Care Instructions. \"  Current as of: March 27, 2018  Content Version: 11.9  © 2806-4523 [employer]. Care instructions adapted under license by [employer] (which disclaims liability or warranty for this information). If you have questions about a medical condition or this instruction, always ask your healthcare professional. [place] [address] any warranty or liability for your use of this information. Patient Education        Ostomy Diet: Care Instructions  Your Care Instructions    Right after an ostomy you will only be allowed to have liquids. Little by little you can add foods to your diet. In time, you may be able to eat many of the foods you enjoyed before the ostomy. The foods you eat pass more quickly through your body and out into the ostomy pouch. This means that some foods may cause smells, gas, or diarrhea. You may want to avoid these foods, along with foods such as nuts or popcorn that might block the intestine. Always talk with your doctor before you make changes in your diet. Follow-up care is a key part of your treatment and safety. Be sure to make and go to all appointments, and call your doctor if you are having problems.  It's also a good idea to know your test results and keep a list of the medicines you take. How can you care for yourself at home? · Eat a balanced diet that includes a variety of foods from the basic food groups: grains, vegetables, fruits, dairy, and protein foods. · Eat 3 to 4 meals a day at regular times. It may help to avoid big meals in the evening, so that you do not pass a big amount of waste into the ostomy pouch during the night. You can add snacks during the day. · If you notice bad odors from your ostomy pouch, note which foods cause odors so that you can limit them. Eggs, dried beans, fish, corn, garlic, onions, asparagus, cabbage, broccoli, and alcohol may cause odors. Very spicy foods and some vitamin and mineral supplements also cause odors. Try cranberry juice, buttermilk, yogurt, or parsley to help reduce odors. You also can use odor-proof ostomy bags or special deodorants for the bags. · If gas or diarrhea is a problem, limit or avoid beans, cabbage, onions, beer, carbonated drinks, cheese, coffee, spinach, raw fruits, and sprouts. · Chew slowly, and take your time eating. That will help your body digest the food. · If you eat seeds and kernels, take the time to chew them well, because they can block or get stuck in the intestine. Other foods that can block the intestine include raisins, raw vegetables, and corn. · Some foods will pass through your body without being completely digested. And some foods may change the color of your stools. You may see corn kernels, bright red beet juice, red pepper pieces, and other bits of your meals in the pouch. This is normal.  · Drink plenty of water and other fluids. Your doctor may recommend that you drink 2 to 3 quarts of water each day. Your large intestine is no longer absorbing liquids from what you eat and drink, and your body still needs those fluids.  If you have kidney, heart, or liver disease and have to limit fluids, talk with your doctor before you increase the amount of fluids you drink. · Your doctor may recommend that you drink liquid that contains electrolytes to help replace lost fluids and minerals. These include drinks like Gatorade, Powerade, or other rehydration drinks that your doctor suggests. Or you can make your own drink. Measure everything carefully. The drink may not work well or may even be harmful if the amounts are off. Mix together:  ? 1 quart water  ? ½ teaspoon salt  ? 6 teaspoons sugar  · Talk to your doctor about taking vitamin and mineral supplements. Where can you learn more? Go to http://olivia-salina.info/. Enter F307 in the search box to learn more about \"Ostomy Diet: Care Instructions. \"  Current as of: March 28, 2018  Content Version: 11.9  © 6140-0068 [employer]. Care instructions adapted under license by [employer] (which disclaims liability or warranty for this information). If you have questions about a medical condition or this instruction, always ask your healthcare professional. [place] [address] any warranty or liability for your use of this information. Patient Education        Vacuum-Assisted Closure for Wound Healing: Care Instructions  Your Care Instructions  When you have a wound that is hard to close your doctor may treat it with vacuum-assisted closure (VAC). VAC uses negative pressure (suction) to help bring the edges of your wound together. It also removes fluid and dead tissue from the wound area. In VAC:  · A special piece of foam or cotton gauze fits over your wound. This covers and protects the wound. A clear bandage (film dressing) goes several inches beyond the foam or gauze dressing to create a seal for the vacuum. · A tube connects the foam to a small machine called the therapy unit. The therapy unit creates the suction. · The [facility] system may be carried around (portable) or may stay in one place (stationary). VAC does not hurt. You may feel a mild pulling on the wound when treatment first starts. How long you need VAC depends on the size and type of wound you have and how well the [facility] works. You will be limited in what you can do while the wound heals. You will use VAC 24 hours a day. Follow-up care is a key part of your treatment and safety. Be sure to make and go to all appointments, and call your doctor if you are having problems. It's also a good idea to know your test results and keep a list of the medicines you take. How can you care for yourself at home? · A home health care worker will come to your home a few times a week to change the bandage and check the machine. You may need it changed more often if there is a lot of drainage. · Your doctor will give you information on what you can and can't do. This depends on where your wound is located. Your activities may be limited during the time you're using VAC. · You will be able to take sponge baths. Don't shower or take baths unless your doctor says it is okay. · Take pain medicines exactly as directed. ? If the doctor gave you a prescription medicine for pain, take it as prescribed. ? If you are not taking a prescription pain medicine, ask your doctor if you can take an over-the-counter medicine. · If your doctor prescribed antibiotics, take them as directed. Do not stop taking them just because you feel better. You need to take the full course of antibiotics. When should you call for help? Call 911 anytime you think you may need emergency care. For example, call if:    · You have a lot of bleeding or see a sudden change in the color or texture of the drainage.     · The wound splits open and organs under the skin can be seen (evisceration).    Call your doctor now or seek immediate medical care if:    · The wound starts bleeding.     · The bandage comes off.  Cover the area with a sterile bandage until you can see your doctor or your home health care worker comes by.     · You have signs of infection, such as:  ? Increased pain, swelling, warmth, or redness around the wound. ? Red streaks leading from the wound. ? Pus draining from the wound. ? A fever.    Watch closely for changes in your health, and be sure to contact your doctor if:    · The noise the machine makes changes or gets very loud. This may mean the seal is broken or the machine is not producing enough suction. Where can you learn more? Go to http://olivia-salina.info/. Enter T536 in the search box to learn more about \"Vacuum-Assisted Closure for Wound Healing: Care Instructions. \"  Current as of: September 26, 2018  Content Version: 11.9  © 9653-7409 [employer], [employer]. Care instructions adapted under license by [employer] (which disclaims liability or warranty for this information). If you have questions about a medical condition or this instruction, always ask your healthcare professional. [place] [address] any warranty or liability for your use of this information. Please call [phone] to make a follow up appointment with Dandy Mcqueen or Beth Issa in [address] [place] [address] Surgical Specialist of [address], [address] [facility]  [phone]  Fax [phone]

## 2019-04-29 NOTE — PROGRESS NOTES
SURGERY PROGRESS NOTE Admit Date: 2019 POD 13 Days Post-Op Procedure: Procedure(s): LAPAROTOMY EXPLORATORY, ILEOSTOMY; [address] Subjective:  
 
Patient has no complaints Objective:  
 
Visit Vitals /69 Pulse 82 Temp 97.9 °F (36.6 °C) Resp 16 Ht 5' 5\" (1.651 m) Wt 57.5 kg (126 lb 11.2 oz) SpO2 96% BMI 21.08 kg/m² Temp (24hrs), Av °F (36.7 °C), Min:97.9 °F (36.6 °C), Max:98.3 °F (36.8 °C) 
 
 
701 - 1900 In: 190 [I.V.:190] Out: 525  
1901 -  0700 In: 4984.3 [P.O.:461; I.V.:4523.3] Out: 4788 [ITKII:0266] Physical Exam:   
General:  alert, cooperative, no distress, appears stated age Abdomen: soft, bowel sounds active, non-tender Incision:   negative pressure dressing intact Assessment:  
 
Principal Problem: 
  SBO (small bowel obstruction) ([address] (2019) Active Problems: 
  Sepsis following intra-abdominal surgery ([address] (3/28/2019) Peritoneal carcinomatosis ([address] (3/29/2019) Appendix carcinoma ([address] (3/29/2019) Small bowel obstruction ([address] (2019) Severe protein-calorie malnutrition ([address] (2019) Hyponatremia (2019) Pelvic abscess in female (2019) Anemia (2019) Sepsis ([address] (2019) Postprocedural intra-abdominal sepsis ([address] (2019) Sepsis due to pneumonia ([address] (2019) doing well ready for discharge Plan: D/C home

## 2019-04-29 NOTE — PROGRESS NOTES
Patient PICC line removed with cath tip intact. Tolerated well. The patient had discharge instructions reviewed, and questions answered. Follow up appointments made and the patient will be followed by home health for wound vac assistance. Her  arrived by the lobby entrance, and the patient was assisted via wheelchair and a cart for all belongings, AVS and prescription given.

## 2019-04-29 NOTE — PROGRESS NOTES
Plan: 
-Pt refused SNF 
-D/c home with [facility] 
-Wound vac 
-RW 
-Family to transport UPDATE 8:52AM 
CM PC to wound care nurse. Nurse aware wound vac at bedside. Wound care nurse to continue ostomy teaching with pt today. Pt previously refusing SNF, preferring to return home. Son and DIL live nearby. UPDATE 11:32AM 
D/c order acknowledged by CM. CM PC to [facility] to update on d/c today. Confirmed pt without drains and PICC being pulled. Wound care nurse applying pt's home wound vac. PCP appt scheduled and on AVS. UPDATE 12:16PM 
RW delivered to bedside. Pt stated that her son and/or  will transport her home and that her son or DIL will take turns staying with her overnight. Wound care nurses have changed wound vac to pt's home wound vac and have completed teaching with pt. CM provided pt with list of SNFs and informed that she has a 30 day window to be admitted at home, if needed. AVS updated. Medicare pt has received, reviewed, and signed 2nd IM letter informing them of their right to appeal the discharge. Signed copy has been placed on pt bedside chart. Pt ready for d/c from CM perspective. CM available for any additional needs. Care Management Interventions PCP Verified by CM: Yes 
Palliative Care Criteria Met (RRAT>21 & CHF Dx)?: Yes 
Palliative Consult Recommended?: Yes Mode of Transport at Discharge: Other (see comment)(Family ) Transition of Care Consult (CM Consult): Home Health [facility] - INPATIENT: Yes Discharge Durable Medical Equipment: No 
Physical Therapy Consult: Yes Occupational Therapy Consult: Yes Speech Therapy Consult: No 
Current Support Network: Own Home, Lives Alone Confirm Follow Up Transport: Family Plan discussed with Pt/Family/Caregiver: Yes Freedom of Choice Offered: Yes Discharge Location Discharge Placement: Home with home health BOB Lewis Care Manager

## 2019-04-29 NOTE — PROGRESS NOTES
General Surgery End of Shift Nursing Note Bedside shift change report given to Peng Cramer (oncoming nurse) . Report included the following information SBAR, Kardex, Intake/Output, MAR, Accordion and Recent Results. Shift worked:   [id number] Summary of shift:    Ostomy output still significantly high. Wound vac with scant ouptut Issues for physician to address:     
 
Number times ambulated in hallway past shift: 0 Number of times OOB to chair past shift: 0 Pain Management: 
Current medication: Dilaudid Patient states pain is manageable on current pain medication: YES 
 
GI: 
 
Current diet:  DIET GI LITE (POST SURGICAL) DIET NUTRITIONAL SUPPLEMENTS No; Breakfast, Lunch; Ensure Clear DIET NUTRITIONAL SUPPLEMENTS No; Lunch, Dinner; Shayna-Marshall DIET ONE TIME MESSAGE Tolerating current diet: YES Passing flatus: YES Last Bowel Movement: today Appearance: watery Respiratory: 
 
Incentive Spirometer at bedside: YES Patient instructed on use: YES Patient Safety: 
 
Falls Score: 2 Bed Alarm On? No 
Sitter?  No 
 
Rufus Johnston RN

## 2019-04-29 NOTE — WOUND CARE
Wound VAC dressing and Ileostomy pouch change: POD#13 Patient insistent about going home, even though patient encourage to go to SNF by PT/OT and Ostomy nurse. Patient has been set up with [facility] for wound VAC dressing management and ileostomy care. Patient requesting pouch with filter so given some one piece cut to fit pouches with filter and lock n' roll closure [place] # [id number]. Patient did a return demonstration on pouch emptying on the [facility]. Still needs practice. Patient walked through pouch changing and she cut out her own ostomy pouch. Patient unable to change pouch herself due to weakness. Stoma pink, moist and budded. Measured 35 mm, round today. Stoma barrier ring used due to high, watery output of ileostomy. Midline abdominal wound: 
 
 
 
Vacuum Assisted Closure Mid Abdominal (Active) Vac Status Suction, continuous 4/29/2019 12:15 PM  
Suction (mmHg) 125 4/29/2019 12:15 PM  
Site Assessment Clean 4/29/2019 12:15 PM  
Dressing Status New;Occlusive 4/29/2019 12:15 PM  
Drainage Chamber Level (ml) 225 ml 4/29/2019 12:15 PM  
Cannister Changed Yes 4/29/2019 12:15 PM  
Output (ml) 100 ml 4/25/2019 11:28 PM  
Number of days:   
   
   
Wound Abdomen Open midline incision (Active) Dressing Status Removed 4/29/2019 12:13 PM  
Dressing Type Vacuum dressing 4/29/2019 12:13 PM  
Incision Site Well Approximated No 4/29/2019 12:13 PM  
Non-staged Wound Description Full thickness 4/29/2019 12:13 PM  
Wound Length (cm) 11.2 cm 4/29/2019 12:13 PM  
Wound Width (cm) 3 cm 4/29/2019 12:13 PM  
Wound Depth (cm) 2.3 cm 4/29/2019 12:13 PM  
Wound Volume (cm^3) 77.28 cm^3 4/29/2019 12:13 PM  
Condition of Base Granulation;Slough 4/29/2019 12:13 PM  
Condition of Edges Open 4/29/2019 12:13 PM  
Assessment Pink 4/21/2019 10:39 PM  
Tissue Type Percent Pink 20 4/29/2019 12:13 PM  
Tissue Type Percent Red 65 4/29/2019 12:13 PM  
Tissue Type Percent Yellow 15 4/29/2019 12:13 PM  
 Drainage Amount Small 4/29/2019 12:13 PM  
Drainage Color Serosanguinous 4/29/2019 12:13 PM  
Wound Odor None 4/29/2019 12:13 PM  
Lisset-wound Assessment Intact 4/29/2019 12:13 PM  
Cleansing and Cleansing Agents  Dermal wound cleanser 4/29/2019 12:13 PM  
Dressing Changed Changed/New 4/29/2019 12:13 PM  
Dressing Type Applied Vacuum dressing 4/29/2019 12:13 PM  
Procedure Bleeding None 4/21/2019 10:39 PM  
Procedure Tolerated Well 4/29/2019 12:13 PM  
Number of days: 13 Plan: patient states she will have her ex- bring her home and her son is taking off work and will be staying with her. Ostomy nurse has not met or taught family and patient has a very tenuous grasp on self ileostomy care. Patient was told by this nurse, but do not think patient and family are understanding how advanced her cancer is and how weak she is. Supplies: ileostomy supplies for discharge in room. Patient's home NPWT/wound VAC applied to patient for discharge. Reviewed care of home wound VAC equipment with patient. Afshan Bernal RN, [facility] Afshan Bernal RN, [employer]

## 2019-04-30 NOTE — ED NOTES
Lower abdomen surgical dressing removed. Wound cleansed with saline and wet-to-dry dressing applied, per Dr. Magda Bro. Wound care instructions provided; pt verbalizes understanding.

## 2019-04-30 NOTE — ED NOTES
Dr. Katelin Fernández at bedside to provide discharge paperwork. Vital signs stable. Pt in no apparent distress at this time. Mental status at baseline. Ambulatory to waiting room with steady gate, discharge paperwork in hand. Accompanied by her .

## 2019-04-30 NOTE — ED PROVIDER NOTES
EMERGENCY DEPARTMENT HISTORY AND PHYSICAL EXAM 
 
 
Date: 4/30/2019 Patient Name: Silvia Cerna Patient Age and Sex: 71 y.o. female History of Presenting Illness Chief Complaint Patient presents with  Abdominal Pain  
  pt and family members poor historian. pt states she has a drain in her abd that was placed a month ago and today she accidentally stepped on the tubing and \"pulled it out\" History Provided By: Patient HPI: Silvia Cerna is a 29-year-old female with a history of intestinal cancer status post surgery here for a wound VAC issue. Patient states that today she is going to the bathroom and accidentally stepped on her wound VAC tube causing it to rip off from its insertion site on the abdomen. Patient denies any new pain. Patient states that she always has chronic pain in her abdomen after the surgery but nothing changed. Denies any nausea, vomiting, fevers. She was afraid to change the wound VAC herself and so came to the ER. There are no other complaints, changes, or physical findings at this time. PCP: Francois Calderon MD 
 
No current facility-administered medications on file prior to encounter. Current Outpatient Medications on File Prior to Encounter Medication Sig Dispense Refill  oxyCODONE IR (ROXICODONE) 5 mg immediate release tablet Take 1 Tab by mouth every four (4) hours as needed for Pain for up to 3 days. Max Daily Amount: 30 mg. 20 Tab 0  
 QUEtiapine (SEROQUEL) 25 mg tablet Take 25 mg by mouth daily.  pantoprazole (PROTONIX) 40 mg tablet Take 40 mg by mouth daily.  amitriptyline (ELAVIL) 25 mg tablet Take 25 mg by mouth nightly.  ondansetron (ZOFRAN ODT) 4 mg disintegrating tablet Take 1 Tab by mouth every eight (8) hours as needed for Nausea. 20 Tab 0  
 famotidine (PEPCID) 20 mg tablet Take 1 Tab by mouth two (2) times a day. 60 Tab 0  
 docusate sodium (COLACE) 100 mg capsule Take 200 mg by mouth two (2) times a day.  butalbital-acetaminophen-caff (FIORICET) -40 mg per capsule Take 1 Cap by mouth every six (6) hours as needed for Headache. 15 Cap 0  
 diazePAM (VALIUM) 5 mg tablet Take 5 mg by mouth every eight (8) hours as needed for Anxiety.  gabapentin (NEURONTIN) 300 mg capsule Take 600 mg by mouth nightly.  zolpidem (AMBIEN) 10 mg tablet Take 10 mg by mouth nightly as needed for Sleep.  estradiol (ESTRACE) 1 mg tablet Take 1 mg by mouth daily. Past History Past Medical History: 
Past Medical History:  
Diagnosis Date  Acid reflux  GERD (gastroesophageal reflux disease)  Hypertension  Other ill-defined conditions(799.89)   
 high cholesterol  Psychiatric disorder   
 depression  Thyroid disease   
 hyperthyroidism Past Surgical History: 
Past Surgical History:  
Procedure Laterality Date  COLONOSCOPY N/A 11/30/2017 COLONOSCOPY performed by Carmela Mcfadden MD at [facility] ENDOSCOPY  COLONOSCOPY N/A 3/20/2019 COLONOSCOPY performed by Anna Escobar MD at [facility] ENDOSCOPY  COLONOSCOPY N/A 3/28/2019 COLONOSCOPY performed by Anna Escobar MD at [facility] ENDOSCOPY 2021 Bibi Soni N/A 3/20/2019 SIGMOIDOSCOPY FLEXIBLE performed by Anna Escobar MD at [facility] ENDOSCOPY  
 HX CHOLECYSTECTOMY  HX GYN    
 hysterectomy  HX HEENT    
 thyroid Family History: 
Family History Problem Relation Age of Onset  Cancer Mother  Colon Cancer Father  Cancer Father Social History: 
Social History Tobacco Use  Smoking status: Never Smoker  Smokeless tobacco: Never Used Substance Use Topics  Alcohol use: Yes Alcohol/week: 0.6 oz Types: 1 Cans of beer per week Comment: Socially.  Drug use: No  
 
 
Allergies: Allergies Allergen Reactions  Chlorhexidine Towelette Itching Pt c/o itching and skin dryness; no rash noted.  Codeine Itching  Lisinopril Angioedema Review of Systems Review of Systems Constitutional: Negative for chills and fever. Respiratory: Negative for cough and shortness of breath. Cardiovascular: Negative for chest pain. Gastrointestinal: Positive for abdominal pain. Negative for constipation, diarrhea, nausea and vomiting. Skin: Positive for wound. Wound VAC issue Neurological: Negative for weakness and numbness. All other systems reviewed and are negative. Physical Exam  
Physical Exam  
Constitutional: She is oriented to person, place, and time. She appears well-developed and well-nourished. HENT:  
Head: Normocephalic and atraumatic. Eyes: Conjunctivae and EOM are normal.  
Neck: Normal range of motion. Neck supple. Cardiovascular: Normal rate and regular rhythm. Pulmonary/Chest: Effort normal and breath sounds normal. No respiratory distress. Abdominal: Soft. She exhibits no distension. There is no tenderness. Soft abdomen and slightly tender diffusely. Wound VAC sponge in place with Tegaderm around. Site in the middle of wound VAC has been ripped. Musculoskeletal: Normal range of motion. Neurological: She is alert and oriented to person, place, and time. Skin: Skin is warm and dry. Psychiatric: She has a normal mood and affect. Nursing note and vitals reviewed. Diagnostic Study Results Labs - No results found for this or any previous visit (from the past 12 hour(s)). Radiologic Studies - No orders to display CT Results  (Last 48 hours) None CXR Results  (Last 48 hours) None Medical Decision Making I am the first provider for this patient. I reviewed the vital signs, available nursing notes, past medical history, past surgical history, family history and social history. Vital Signs-Reviewed the patient's vital signs. Patient Vitals for the past 12 hrs: 
 Temp Pulse Resp BP SpO2  
04/30/19 1545  90     
04/30/19 1459 97.5 °F (36.4 °C) (!) 120 20 117/79 100 % Records Reviewed: Nursing Notes and Old Medical Records Provider Notes (Medical Decision Making):  
Patient presenting with ripped wound VAC. Will just need replacement. ED Course:  
Initial assessment performed. The patients presenting problems have been discussed, and they are in agreement with the care plan formulated and outlined with them. I have encouraged them to ask questions as they arise throughout their visit. ED Course as of Apr 30 1900 Tue Apr 30, 2019  
1547 Spoke with wound care. Stated that I can place wet-to-dry dressing and home health to follow-up with her tomorrow to place wound VAC. [JS] ED Course User Index [JS] Mildred Babcock MD  
 
Critical Care Time:  
0 Disposition: 
Discharge Note: The patient has been re-evaluated and is ready for discharge. Reviewed available results with patient. Counseled patient on diagnosis and care plan. Patient has expressed understanding, and all questions have been answered. Patient agrees with plan and agrees to follow up as recommended, or to return to the ED if their symptoms worsen. Discharge instructions have been provided and explained to the patient, along with reasons to return to the ED. PLAN: 
1. Discharge Medication List as of 4/30/2019  3:48 PM  
  
 
2. Follow-up Information Follow up With Specialties Details Why Contact Info Meekrashimary ellen Emile On 5/1/2019 Home health will call with time of visit. if they do not call by noon please contact them. [address] [place] [id number] [phone] 3. Return to ED if worse Diagnosis Clinical Impression: 1. History of intestinal surgery 2. Visit for wound check Attestations: 
 
Franck Snow M.D. Please note that this dictation was completed with [employer], the [employer] voice recognition software.   Quite often unanticipated grammatical, syntax, homophones, and other interpretive errors are inadvertently transcribed by the computer software. Please disregard these errors. Please excuse any errors that have escaped final proofreading. Thank you.

## 2019-04-30 NOTE — PROGRESS NOTES
Date of previous inpatient admission/ ED visit? Pt previous visit 4/12/19- 4/29/19 For SBO What brought the patient back to ED? Pt stepped on her home wound vac tubing and pulled the dressing off. Pt came to the ED Did patient decline recommended services during last admission/ ED visit (if yes, what)? Pt currently open to [facility] Has patient seen a provider since their last inpatient admission/ED visit (if yes, when)? Not at this time pt discharged 4/29/19. CM Interventions: 
From previous inpatient admission/ED visit: Home Health,  
From current inpatient admission/ED visit: 
CM contacted [facility] Liaison regarding need for [facility] visit. Per [facility] pt is scheduled for [facility] visit tomorrow. Per [facility] Liaison [facility] will contact pt and schedule time for visit later today or tomorrow. JULIANNA Plan 1) return Home with [facility]-   to visit tomorrow 2) Family to transport Cheryle Rilee. RN, BSN [facility] [phone]

## 2019-04-30 NOTE — ED NOTES
Attempted to contact wound care RN using the following numbers provided by Nursing Supervisor: [facility], [phone], [address] [facility], [address] [facility], [id number], [address], [phone]

## 2019-05-02 NOTE — PROGRESS NOTES
Oncology End of Shift Note Bedside shift change report given to Tay Medina RN (incoming nurse) by Gibran Lu (outgoing nurse) on [employer]. Report included the following information SBAR, Kardex, ED Summary and MAR. Shift Summary:  
Patient admitted at end of shift. Patient complains of 10/10 pain, PRN pain medication given. Patient given ice chips. Patient has yet to void urine. Ileostomy bag emptied. Issues for Physician to Address:  none Patient on Cardiac Monitoring? [] Yes 
[x] No 
 
Rhythm:   
 
 
 
Shift Events Gibran Lu

## 2019-05-02 NOTE — PROGRESS NOTES
Pharmacy Clarification of the Prior to Admission Medication Regimen Retrospective to the Admission Medication Reconciliation The patient was interviewed regarding clarification of the prior to admission medication regimen and was questioned regarding use of any other inhalers, topical products, over the counter medications, herbal medications, vitamin products or ophthalmic/nasal/otic medication use. Information Obtained From: PatientArnie Recommendations/Findings: The following amendments were made to the patient's active medication list on file at [facility]:  
 
1) Additions:  
acetaminophen (TYLENOL) 325 mg tablet 
vitamin E acetate (VITAMIN E PO) 2) Removals:  
zolpidem 3) Changes: NONE 4) Pertinent Pharmacy Findings: 
famotidine (PEPCID) 20 mg tablet and ondansetron (ZOFRAN ODT) 4 mg disintegrating tablet: Patient stated these agents were called in to her pharmacy and she has not picked them up as of 5/2/19. PTA medication list was corrected to the following:  
 
Prior to Admission Medications Prescriptions Last Dose Informant Patient Reported? Taking? QUEtiapine (SEROQUEL) 25 mg tablet 5/1/2019 at Unknown time Self Yes Yes Sig: Take 25 mg by mouth nightly. acetaminophen (TYLENOL) 325 mg tablet 5/2/2019 at Unknown time Self Yes Yes Sig: Take 975 mg by mouth every four (4) hours as needed for Pain. amitriptyline (ELAVIL) 25 mg tablet 5/1/2019 at Unknown time Self Yes Yes Sig: Take 25 mg by mouth nightly. butalbital-acetaminophen-caff (FIORICET) -40 mg per capsule 5/1/2019 at Unknown time Self No Yes Sig: Take 1 Cap by mouth every six (6) hours as needed for Headache.  
diazePAM (VALIUM) 5 mg tablet 5/1/2019 at Unknown time Self Yes Yes Sig: Take 5 mg by mouth every eight (8) hours as needed for Anxiety. docusate sodium (COLACE) 100 mg capsule 5/1/2019 at Unknown time Self Yes Yes Sig: Take 200 mg by mouth two (2) times a day. estradiol (ESTRACE) 1 mg tablet 5/1/2019 at Unknown time Self Yes Yes Sig: Take 1 mg by mouth daily. famotidine (PEPCID) 20 mg tablet Not Taking at Unknown time Self No No  
Sig: Take 1 Tab by mouth two (2) times a day.  
gabapentin (NEURONTIN) 300 mg capsule 5/1/2019 at Unknown time Self Yes Yes Sig: Take 600 mg by mouth nightly. ondansetron (ZOFRAN ODT) 4 mg disintegrating tablet Not Taking at Unknown time Self No No  
Sig: Take 1 Tab by mouth every eight (8) hours as needed for Nausea. oxyCODONE IR (ROXICODONE) 5 mg immediate release tablet 5/1/2019 at Unknown time Self No Yes Sig: Take 1 Tab by mouth every four (4) hours as needed for Pain for up to 3 days. Max Daily Amount: 30 mg.  
pantoprazole (PROTONIX) 40 mg tablet 5/1/2019 at Unknown time Self Yes Yes Sig: Take 40 mg by mouth daily. vitamin E acetate (VITAMIN E PO) 5/1/2019 at Unknown time Self Yes Yes Sig: Take 1 Cap by mouth daily. Facility-Administered Medications: None Thank you, 
Karena Mark, Cindy Medication History Pharmacy Technician

## 2019-05-02 NOTE — ED TRIAGE NOTES
Patient arrives to the emergency department via EMS with a chief complaint of lethargy and abdominal pain. Patient reports she has felt very lethargic since being discharged from the hospital. Patient has pump emptying from her abdomen that patient reports is not working. Someone adjusted the pump yesterday but it only lasted for about an hour. Patient reports hx of stomach cancer.

## 2019-05-02 NOTE — H&P
Surgery Consult Subjective:  
  
Corey Romeo is a 71 y.o. female who is being seen for evaluation of abdominal pain, nausea, weakness and malaise. Patient was discharged 4 days ago after an anastomotic leak. She has been to the ER twice since discharge with complaints of weakness, nausea, abdominal pain and problems with wound vac and ileostomy. Patient Active Problem List  
 Diagnosis Date Noted  Postprocedural intra-abdominal sepsis ([address] 04/13/2019  Sepsis due to pneumonia ([address] 04/13/2019  
 SBO (small bowel obstruction) ([address] 04/12/2019  Small bowel obstruction ([address] 04/12/2019  Severe protein-calorie malnutrition ([address] 04/12/2019  Hyponatremia 04/12/2019  Pelvic abscess in female 04/12/2019  Anemia 04/12/2019  Sepsis ([address] 04/12/2019  Peritoneal carcinomatosis ([address] 03/29/2019  Appendix carcinoma ([address] 03/29/2019  Sepsis following intra-abdominal surgery ([address] 03/28/2019  Neoplasm of appendix 03/28/2019  Colitis 11/29/2017 Past Medical History:  
Diagnosis Date  Acid reflux  GERD (gastroesophageal reflux disease)  Hypertension  Other ill-defined conditions(799.89)   
 high cholesterol  Psychiatric disorder   
 depression  Thyroid disease   
 hyperthyroidism Past Surgical History:  
Procedure Laterality Date  COLONOSCOPY N/A 11/30/2017 COLONOSCOPY performed by Betty Adam MD at [facility] ENDOSCOPY  COLONOSCOPY N/A 3/20/2019 COLONOSCOPY performed by Eleazar Campbell MD at [facility] ENDOSCOPY  COLONOSCOPY N/A 3/28/2019 COLONOSCOPY performed by Eleazar Campbell MD at [facility] ENDOSCOPY 2021 Bibi Soni N/A 3/20/2019 SIGMOIDOSCOPY FLEXIBLE performed by Eleazar Campbell MD at [facility] ENDOSCOPY  
 HX CHOLECYSTECTOMY  HX GYN    
 hysterectomy  HX HEENT    
 thyroid Social History Tobacco Use  Smoking status: Never Smoker  Smokeless tobacco: Never Used Substance Use Topics  Alcohol use:  Yes  
 Alcohol/week: 0.6 oz Types: 1 Cans of beer per week Comment: Socially. Family History Problem Relation Age of Onset  Cancer Mother  Colon Cancer Father  Cancer Father Current Facility-Administered Medications Medication Dose Route Frequency  sodium chloride (NS) flush 5-40 mL  5-40 mL IntraVENous Q8H  
 sodium chloride (NS) flush 5-40 mL  5-40 mL IntraVENous PRN  piperacillin-tazobactam (ZOSYN) 3.375 g in 0.9% sodium chloride (MBP/ADV) 100 mL  3.375 g IntraVENous NOW Current Outpatient Medications Medication Sig  
 oxyCODONE IR (ROXICODONE) 5 mg immediate release tablet Take 1 Tab by mouth every four (4) hours as needed for Pain for up to 3 days. Max Daily Amount: 30 mg.  
 QUEtiapine (SEROQUEL) 25 mg tablet Take 25 mg by mouth daily.  pantoprazole (PROTONIX) 40 mg tablet Take 40 mg by mouth daily.  amitriptyline (ELAVIL) 25 mg tablet Take 25 mg by mouth nightly.  ondansetron (ZOFRAN ODT) 4 mg disintegrating tablet Take 1 Tab by mouth every eight (8) hours as needed for Nausea.  famotidine (PEPCID) 20 mg tablet Take 1 Tab by mouth two (2) times a day.  docusate sodium (COLACE) 100 mg capsule Take 200 mg by mouth two (2) times a day.  butalbital-acetaminophen-caff (FIORICET) -40 mg per capsule Take 1 Cap by mouth every six (6) hours as needed for Headache.  diazePAM (VALIUM) 5 mg tablet Take 5 mg by mouth every eight (8) hours as needed for Anxiety.  gabapentin (NEURONTIN) 300 mg capsule Take 600 mg by mouth nightly.  zolpidem (AMBIEN) 10 mg tablet Take 10 mg by mouth nightly as needed for Sleep.  estradiol (ESTRACE) 1 mg tablet Take 1 mg by mouth daily. Allergies Allergen Reactions  Chlorhexidine Towelette Itching Pt c/o itching and skin dryness; no rash noted.  Codeine Itching  Lisinopril Angioedema Review of Systems: A comprehensive review of systems was negative except for that written in the History of Present Illness. Objective:  
 
  
Visit Vitals /85 (BP 1 Location: Right arm, BP Patient Position: At rest) Pulse 88 Temp 97.6 °F (36.4 °C) Resp 19 SpO2 95% Physical Exam: 
GENERAL: alert, cooperative, no distress, appears stated age, EYE: conjunctivae/corneas clear. PERRL, EOM's intact. Fundi benign, LUNG: clear to auscultation bilaterally, HEART: regular rate and rhythm, S1, S2 normal, no murmur, click, rub or gallop, ABDOMEN: soft, non-tender. Bowel sounds normal. No masses,  no organomegaly, mildly distended. Imaging:  images and reports reviewed Lab/Data Review: 
BMP:  
Lab Results Component Value Date/Time  (L) 05/02/2019 12:58 PM  
 K 4.3 05/02/2019 12:58 PM  
 CL 94 (L) 05/02/2019 12:58 PM  
 CO2 23 05/02/2019 12:58 PM  
 AGAP 10 05/02/2019 12:58 PM  
  (H) 05/02/2019 12:58 PM  
 BUN 39 (H) 05/02/2019 12:58 PM  
 CREA 2.07 (H) 05/02/2019 12:58 PM  
 GFRAA 29 (L) 05/02/2019 12:58 PM  
 GFRNA 24 (L) 05/02/2019 12:58 PM  
 
CBC:  
Lab Results Component Value Date/Time WBC 16.6 (H) 05/02/2019 12:58 PM  
 HGB 12.9 05/02/2019 12:58 PM  
 HCT 39.4 05/02/2019 12:58 PM  
  (H) 05/02/2019 12:58 PM  
 
 
 
Assessment:  
 
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 Suspect PSBO is due to adhesions or the abscess and not peritoneal disease. At exploration recently her peritoneal implants were very small and granular. However progressive malaise may be from her cancer. I attempted to discuss hospice care with the patient. She is confused between home health and hospice and I did not help her understand in the ER just now. We readdress over the next few days Plan: 1. Admit 2. IV antibiotics 3. Percutaneously drain collection 4. Megace

## 2019-05-02 NOTE — ED NOTES
Patient reports she is unable to urinate at this time, IV fluids running and call bell within patients reach and instructed to call when she feels as if she can go

## 2019-05-02 NOTE — ED PROVIDER NOTES
EMERGENCY DEPARTMENT HISTORY AND PHYSICAL EXAM 
 
 
Date: 5/2/2019 Patient Name: Dolores Johnson History of Presenting Illness Chief Complaint Patient presents with  Lethargy  Abdominal Pain History Provided By: Patient and Patient's  HPI: Dolores Johnson, 71 y.o. female with PMHx significant for appendiceal carcinoma, peritoneal carcinoma, recent small bowel obstruction, diverting colostomy, presents to the ED with cc of moderate weakness and lethargy as well as left lower quadrant abdominal pain. Symptoms been ongoing over the last 2 to 3 days. Patient was discharged in the hospital 4 days ago after a prolonged surgical recovery after a small bowel obstruction comp gated by sepsis. Patient reports being followed by Dr. Holly Jean as an outpatient. Patient reports that she feels that her bag is not draining appropriately and that is leaking from the wound. She also reports feeling very weak and tired. She denies any fevers or chills or any other associated symptoms. No other exacerbating or ameliorating factors. There are no other complaints, changes, or physical findings at this time. PCP: Tarah Yeung MD 
 
No current facility-administered medications on file prior to encounter. Current Outpatient Medications on File Prior to Encounter Medication Sig Dispense Refill  acetaminophen (TYLENOL) 325 mg tablet Take 975 mg by mouth every four (4) hours as needed for Pain.  vitamin E acetate (VITAMIN E PO) Take 1 Cap by mouth daily.  QUEtiapine (SEROQUEL) 25 mg tablet Take 25 mg by mouth nightly.  pantoprazole (PROTONIX) 40 mg tablet Take 40 mg by mouth daily.  amitriptyline (ELAVIL) 25 mg tablet Take 25 mg by mouth nightly.  docusate sodium (COLACE) 100 mg capsule Take 200 mg by mouth two (2) times a day.     
 butalbital-acetaminophen-caff (FIORICET) -40 mg per capsule Take 1 Cap by mouth every six (6) hours as needed for Headache. 15 Cap 0  
 diazePAM (VALIUM) 5 mg tablet Take 5 mg by mouth every eight (8) hours as needed for Anxiety.  gabapentin (NEURONTIN) 300 mg capsule Take 600 mg by mouth nightly.  estradiol (ESTRACE) 1 mg tablet Take 1 mg by mouth daily.  ondansetron (ZOFRAN ODT) 4 mg disintegrating tablet Take 1 Tab by mouth every eight (8) hours as needed for Nausea. 20 Tab 0  
 famotidine (PEPCID) 20 mg tablet Take 1 Tab by mouth two (2) times a day. 60 Tab 0 Past History Past Medical History: 
Past Medical History:  
Diagnosis Date  Acid reflux  GERD (gastroesophageal reflux disease)  Hypertension  Other ill-defined conditions(799.89)   
 high cholesterol  Psychiatric disorder   
 depression  Thyroid disease   
 hyperthyroidism Past Surgical History: 
Past Surgical History:  
Procedure Laterality Date  COLONOSCOPY N/A 11/30/2017 COLONOSCOPY performed by Hilda Sylvester MD at [facility]"" ENDOSCOPY  COLONOSCOPY N/A 3/20/2019 COLONOSCOPY performed by Lesa Gonzalez MD at \[facility]"" ENDOSCOPY  COLONOSCOPY N/A 3/28/2019 COLONOSCOPY performed by Lesa Gonzalez MD at \[facility]"" ENDOSCOPY 2021 Bibi Soni N/A 3/20/2019 SIGMOIDOSCOPY FLEXIBLE performed by Lesa Gonzalez MD at \[facility]\"" ENDOSCOPY  
 HX CHOLECYSTECTOMY  HX GYN    
 hysterectomy  HX HEENT    
 thyroid Family History: 
Family History Problem Relation Age of Onset  Cancer Mother  Colon Cancer Father  Cancer Father Social History: 
Social History Tobacco Use  Smoking status: Never Smoker  Smokeless tobacco: Never Used Substance Use Topics  Alcohol use: Yes Alcohol/week: 0.6 oz Types: 1 Cans of beer per week Comment: Socially.  Drug use: No  
 
 
Allergies: Allergies Allergen Reactions  Chlorhexidine Towelette Itching Pt c/o itching and skin dryness; no rash noted.  Codeine Itching  Lisinopril Angioedema Review of Systems Review of Systems Constitutional: Positive for appetite change and fatigue. Negative for chills, diaphoresis and fever. HENT: Negative for ear pain and sore throat. Eyes: Negative for pain and redness. Respiratory: Negative for cough and shortness of breath. Cardiovascular: Negative for chest pain and leg swelling. Gastrointestinal: Positive for abdominal pain. Negative for diarrhea, nausea and vomiting. Endocrine: Negative for cold intolerance and heat intolerance. Genitourinary: Negative for flank pain and hematuria. Musculoskeletal: Negative for back pain and neck stiffness. Skin: Negative for rash and wound. Neurological: Negative for dizziness, syncope and headaches. All other systems reviewed and are negative. Physical Exam  
Physical Exam  
Constitutional: She is oriented to person, place, and time. Patient is a cachectic appearing female who appears in mild distress. HENT:  
Head: Normocephalic and atraumatic. Mouth/Throat: Oropharynx is clear and moist. No oropharyngeal exudate. Dry mucous membranes Eyes: Pupils are equal, round, and reactive to light. Conjunctivae and EOM are normal.  
Neck: Normal range of motion. Cardiovascular: Normal rate and regular rhythm. No murmur heard. Pulmonary/Chest: Effort normal and breath sounds normal. No respiratory distress. She has no wheezes. Abdominal: Soft. Bowel sounds are normal. She exhibits no distension. There is tenderness. Patient with colostomy bag in place. There is associated drain as well to the area. The bag is filling with a yellowish-brown fluid. No melena or bright red blood. Musculoskeletal: Normal range of motion. She exhibits no edema or deformity. Neurological: She is alert and oriented to person, place, and time. Coordination normal.  
Skin: Skin is warm and dry. No rash noted. Psychiatric: She has a normal mood and affect. Her behavior is normal.  
Nursing note and vitals reviewed. Diagnostic Study Results Labs - No results found for this or any previous visit (from the past 24 hour(s)). Radiologic Studies -  
CT GUIDE CATH/PERC DRAIN PERITON/RETROPERI FLUID W SI Final Result IMPRESSION:   
  
Successful CT-guided drainage of a right lower quadrant abscess with an 8 [place]  
drain. There were no immediate complications. CT ABD PELV W CONT Final Result IMPRESSION:   
1. Decreased pleural effusions with bilateral calcified pleural plaques  
suggesting asbestos exposure. 2. Decreased ascites. 3. Status post right colectomy with right lower quadrant ileostomy and  
decompressed colon. 4. Partial small bowel obstruction presumably secondary to adhesions as there is  
a lot of stranding around the distal small bowel in the pelvis and right lower  
quadrant. 5. Right paracolic gutter fluid collection. The pigtail catheter has been  
removed. 6. Status post hysterectomy with air and fluid in the vagina. CT Results  (Last 48 hours) None CXR Results  (Last 48 hours) None Medical Decision Making I am the first provider for this patient. I reviewed the vital signs, available nursing notes, past medical history, past surgical history, family history and social history. Vital Signs-Reviewed the patient's vital signs. Patient Vitals for the past 24 hrs: 
 Temp Pulse Resp BP SpO2  
05/09/19 0830 97.9 °F (36.6 °C) 92 16 119/80 99 % 05/09/19 0011 98.2 °F (36.8 °C) 84 16 114/67 100 % 05/08/19 2022 98.3 °F (36.8 °C) 96 16 123/71 97 % 05/08/19 1544 98.4 °F (36.9 °C) 94 18 123/72 96 % Records Reviewed: Nursing Notes and Old Medical Records Differential Diagnosis: 
 
Patient presents with abdominal pain.   DDx: Gastroenteritis, SBO, appendicitis, colitis, IBD, diverticulitis, mesenteric ischemia, AAA or descending dissection, ACS, ureteral stone. Will get labs and CT Abdomen. Provider Notes (Medical Decision Making):  
Patient found to have partial SBO on CT scan. Given patient's significant symptoms will admit for further management. Given leukocytosis and possible signs of infection given broad-spectrum antibiotics. Will admit to Dr. Estrella Sheldon for further management. ED Course:  
 
Initial assessment performed. The patients presenting problems have been discussed, and they are in agreement with the care plan formulated and outlined with them. I have encouraged them to ask questions as they arise throughout their visit. Critical Care Time:  
 
None Disposition: 
Admit Note: 
10:49 AM 
Pt is being admitted by Dr. Estrella Sheldon. The results of their tests and reason(s) for their admission have been discussed with pt and/or available family. They convey agreement and understanding for the need to be admitted and for admission diagnosis. PLAN: 
1. Current Discharge Medication List  
  
 
2. Follow-up Information None Return to ED if worse Diagnosis Clinical Impression: 1. SBO (small bowel obstruction) ([address] 2. Sepsis, due to unspecified organism ([address] 3. Goals of care, counseling/discussion 4. Cachectic ([address] 5. Moderate protein-calorie malnutrition ([address] 6. Anxiety 7. Frailty 8. Debility

## 2019-05-02 NOTE — WOUND CARE
Wound Care nurse called to ED to attended to leaking home wound VAC to patient's mid-line abdomen. Patient was discharged to home with [facility] (despite encouragement to go to SNF) on 4/29/19. Patient returned to ED on 4/30/19 because she stepped on her home wound VAC tubing and dislodged the dressing. Patient was not going to be admitted so staff placed a NS wet to dry dressing on patient and sent her home after contacting [facility]. She returns again today for lethargy/weakness/unable to walk - CT scan ordered. Patient states [facility] came out yesterday and applied new wound VAC dressing and an hour after [place] left the wound VAC started \"beeping\"/alarming leak. [facility] came out today and recommended she go to ER for weakness and pain and did not address wound VAC issue. Suction was not able to be maintained since 4:30pm yesterday. Complete dressing removed and replaced due to this fact. If suction is not reapplied <2hours after leak detected, the drainage has been sitting in wound and dressing starts to grow bacteria. Wound cleansed with NS before new clean dressing applied FYI: the reason wound VAC leaked was because they placed the drape over the Ostomy pouch and could not get a good seal. Wound VAC needs to be occlusively sealed to skin and then ostomy pouch applied over vac drape. Plan: if patient discharged, [facility] will resume care.  If patient admitted, wound VAC will be maintained by inpatient Wc team. 
 
Trina Romano RN, CWON

## 2019-05-03 NOTE — WOUND CARE
Wound Ostomy Nurse: 
Patient seen yesterday in ED and wound VAC dressing change to midline abdomen changed then. Patient admitted for RLQ abscess and drain placed in IR today. PMHX: dx w/ appendix CA 3/2019 after surgery, re-admit 4/2019 for anastomotic leak, surgical repair with end ileostomy. Discharged 4/29/2019 to home with [facility], back to ED x2 with admit for abscess on 2nd visit. Patients home wound VAC rental equipment removed from patient and transferred to hospital rental equipment# [id number]. Ileostomy: draining watery green fluid only. Pouch changed using a 1 piece #[id number] cut to fit pouch and stoma barrier ring around stoma (NO paste). Beginnings of nikolai-stomal separation seen at 2 o'clock. Plan: 
Specialty bed outside patients room and staff will transfer her on to it. Wound VAC dressing change Monday with MD if possible Asked Dr Dyan Kraft about possible Palliative Care consult to help with pain control and help with care planning.  
 
Trina Romano RN, [employer]

## 2019-05-03 NOTE — PROGRESS NOTES
Name of procedure: RLQ Drain placement, specimens collected and sent to lab. Complications, if any, r/t procedure: NONE Sedation medications given: Fentanyl 125 mcg / Versed 3 mg Sedation tolerated: well VS : Stable Post Procedure Care Needed/order sets in [facility]. Report called to [facility] RN for routine transfer of care. Transport requested.

## 2019-05-03 NOTE — PROGRESS NOTES
Oncology End of Shift Note Bedside shift change report given to Viktoriya Ac RN (incoming nurse) by Bruce Hope (outgoing nurse) on [employer]. Report included the following information SBAR, Kardex, ED Summary, Intake/Output and MAR. Shift Summary:  
Patient complained of abdominal pain throughout shift. PRN pain medication given. Antianxiety medication given once. Patient had a drain placed this morning, patient tolerated procedure well. Patient is now on a regular diet. No complaints of nausea. Wound care came and changed the wound vac and ileostomy bag. Ileostomy bag emptied 3 times during shift. Patient placed on a specialty bed. Issues for Physician to Address:  none Patient on Cardiac Monitoring? [] Yes 
[x] No 
 
Rhythm:   
 
 
 
Shift Events Bruce Hope

## 2019-05-03 NOTE — PROGRESS NOTES
2000: lactic acid and urine samples sent to lab. 56: Nurse called lab, to since Lactic Acid was never resulted. Nurse placing new order and will redraw Lactic Acid.

## 2019-05-03 NOTE — PROGRESS NOTES
Oncology End of Shift Note Bedside shift change report given to MIRELLA Novoa (incoming nurse) by Bailey Troncoso (outgoing nurse) on [employer]. Report included the following information SBAR, Kardex, Intake/Output and MAR. Shift Summary: Pt remained stable during shift. Pain meds x4. Zofran x1. Labs and urine sent lab. Ileostomy emptied x3. Pain on left side of body. IV abx x2. NPO except sips of water. Issues for Physician to Address:   
 
Patient on Cardiac Monitoring? [] Yes 
[x] No 
 
Rhythm:   
 
 
Bailey Troncoso

## 2019-05-03 NOTE — CDMP QUERY
3 of 3 Dr. Shea Sen, 
 
Pt admitted with PSBO/Pt noted to have Admit WBC: 16.6, Neut: 82, HR: 103-107, RR: 28. If possible, please document in the progress notes and d/c summary if you are evaluating and / or treating any of the following: 
 
? Sepsis, present on admission, suspected or probable causative organism (please specify) ? Sepsis, now resolved, suspected or probable causative organism (please specify) ? Sepsis, not present on admission, suspected or probable causative organism (please specify) ? No Sepsis, suspected or probable localized infection (please specify) ? Sepsis was ruled out (include corresponding diagnosis for patients clinical picture and treatment) ? Other, please specify ? Clinically unable to determine The medical record reflects the following: 
   Risk Factors: 71 WF w/hx: appendix cancer s/p anastomotic leak Clinical Indicators: Recent sepsis with RLQ abcess, Admit WBC: 16.6, Neut; 82, HR: 103-107, RR: 28 
   Treatment: CT guided-drainage, Zosyn IV, 2000 ml NS bolus IV, D5 1/2 NS w/ 20 KCl @ 125 ml/hr Thank you, [email or web address] 
[phone]

## 2019-05-03 NOTE — CDMP QUERY
2 of 3 Dr. Emerson Ramey, 
 
Pt admitted with PSBO/Pt noted to have Admit BUN/Cr; 39/2.07 GFR: 24. If possible, please document in the progress notes and d/c summary if you are evaluating and / or treating any of the following: ? Acute kidney failure ? Acute kidney failure with tubular necrosis ? Acute kidney injury ? Other type of acute kidney failure  
? Other cause (please specify) ? Unable to determine The medical record reflects the following: 
  Risk Factors: 71 WF w/hx: appendix carcinoma s/p anastomotic leak Clinical Indicators: Admit BUN/Cr: 39/2.07 GFR: 24 with previous 4/27/19 BUN/Cr: 6/0.46  GFR >60 Treatment: 2000 ml NS bolus IV, D5 1/2 NS w/ 20 KCl @ 125 ml/hr Thank you, [email or web address] 
[phone]

## 2019-05-03 NOTE — PROGRESS NOTES
SURGERY PROGRESS NOTE Admit Date: 2019 Subjective:  
 
Patient feels a little better after drain placement. Wants to eat Objective:  
 
Visit Vitals /59 (BP 1 Location: Right arm, BP Patient Position: At rest) Pulse 92 Temp 98.4 °F (36.9 °C) Resp 24 Ht 5' 3\" (1.6 m) Wt 54.2 kg (119 lb 7.8 oz) SpO2 97% BMI 21.17 kg/m² Temp (24hrs), Av °F (36.7 °C), Min:97.5 °F (36.4 °C), Max:98.4 °F (36.9 °C) 
 
 
701 -  190 In: -  
Out: 240  
1901 -  07 In: 1004.2 [I.V.:1004.2] Out: 900 [Urine:400] Physical Exam:   
General:  alert, cooperative, no distress, appears stated age Abdomen: soft, bowel sounds active, non-tender RAMÍREZ -  bloody purulent Incision:   wound vac in place Lab Results Component Value Date/Time WBC 16.6 (H) 2019 12:58 PM  
 HGB 12.9 2019 12:58 PM  
 HCT 39.4 2019 12:58 PM  
 PLATELET 738 (H) 7684 12:58 PM  
 MCV 85.8 2019 12:58 PM  
 
 
Assessment:  
 
Active Problems: 
  [facility]small [facility] obstruction[facility] ([facility]) Will see how treating this abscess goes. If no improvement in a few days will need to seriously consider hospice care Plan:  
 
 
Regular diet

## 2019-05-03 NOTE — CDMP QUERY
1 of 3 Dr. Estrella Sheldon, 
 
 Patient admitted with PSBO, noted to have Na: 127. If possible, please document in progress notes and d/c summary if you are evaluating and/or treating any of the following: ? Hyponatremia POA ? Other, please specify ? Unable to Determine The medical record reflects the following: 
  Risk Factors: 71 WF w/hx: appendix carcinoma s/p anastomotic leak Clinical Indicators: 5/2: Na: 127 Treatment: D5 1/2 NS w 20 KCl @ 125 ml/hr, 2000 ml NS bolus IV Thank you, [email or web address] 
[phone]

## 2019-05-03 NOTE — PROGRESS NOTES
Problem: Falls - Risk of 
Goal: *Absence of Falls Description Document [facility] Fall Risk and appropriate interventions in the flowsheet.  
Outcome: Progressing Towards Goal

## 2019-05-03 NOTE — HOME CARE
Please note that this patient was open to [address] [place] [address] services at the time of hospital admission. If services will be needed at discharge, please order to resume them. Darlin Burks RN [address] [facility] [address] [phone] or [phone]

## 2019-05-04 PROBLEM — E86.0 DEHYDRATION: Status: ACTIVE | Noted: 2019-01-01

## 2019-05-04 PROBLEM — N28.9 ACUTE RENAL INSUFFICIENCY: Status: ACTIVE | Noted: 2019-01-01

## 2019-05-04 PROBLEM — K65.1 PERITONEAL ABSCESS (HCC): Status: ACTIVE | Noted: 2019-01-01

## 2019-05-04 NOTE — PROGRESS NOTES
Oncology End of Shift Note Bedside shift change report given to Claude Dimmer, RN (incoming nurse) by Ayse Cornejo RN (outgoing nurse) on [facility]. Report included the following information SBAR. Shift Summary:  
Pt still in much pain from co morbidities. Emptied ileostomy 3 times. Pt eating is better. Using bedpain.   
 
 
Issues for Physician to Address:   
 
 
 
 
Ayse Cornejo RN

## 2019-05-04 NOTE — PROGRESS NOTES
SURGERY PROGRESS NOTE Admit Date: 2019 Subjective:  
 
Patient states she feels better. Nausea resolved. More appetite and food tastes better. Denies pain. complains of fatigue Objective:  
 
Visit Vitals /60 (BP 1 Location: Right arm, BP Patient Position: At rest) Pulse 89 Temp 98.3 °F (36.8 °C) Resp 18 Ht 5' 3\" (1.6 m) Wt 54.2 kg (119 lb 7.8 oz) SpO2 96% BMI 21.17 kg/m² Temp (24hrs), Av.3 °F (36.8 °C), Min:98.2 °F (36.8 °C), Max:98.5 °F (36.9 °C) 
 
 
701 -  190 In: 120 [P.O.:120] Out: 550  
1901 -  0700 In: 3938 [I.V.:4535] Out: 1340 [Urine:400] Physical Exam:   
General:  alert, cooperative, no distress, appears stated age Abdomen: soft, bowel sounds active, non-tender Ostomy - thicker output Drain - purulent fluid Incision:   healing well, no drainage, no erythema, no hernia, no seroma, no swelling, no dehiscence, incision well approximated Lab Results Component Value Date/Time WBC 16.6 (H) 2019 12:58 PM  
 HGB 12.9 2019 12:58 PM  
 HCT 39.4 2019 12:58 PM  
 PLATELET 119 (H)  12:58 PM  
 MCV 85.8 2019 12:58 PM  
 
Lab Results Component Value Date/Time GFR est non-AA >60 2019 03:48 AM  
 GFRNA, POC >60 2019 04:24 AM  
 GFR est AA >60 2019 03:48 AM  
 GFRAA, POC >60 2019 04:24 AM  
 Creatinine 0.66 2019 03:48 AM  
 Creatinine (POC) 0.7 2019 04:24 AM  
 BUN 9 2019 03:48 AM  
 Sodium 133 (L) 2019 03:48 AM  
 Potassium 4.3 2019 03:48 AM  
 Chloride 104 2019 03:48 AM  
 CO2 25 2019 03:48 AM  
 Magnesium 2.1 2019 03:50 AM  
 Phosphorus 4.6 2019 03:50 AM  
 
 
Assessment:  
 
Active Problems: 
  Peritoneal carcinomatosis ([facility] [address] (3/29/2019) Appendix carcinoma ([facility] [address] (3/29/2019) SBO (small bowel obstruction) ([facility] [address] (2019) Severe protein-calorie malnutrition ([facility] [address] (2019) Hyponatremia (4/12/2019) Peritoneal abscess ([address] (5/4/2019) Acute renal insufficiency (5/4/2019) Dehydration (5/4/2019) SBO -  Appears to be resolving with abscess drainage Peritoneal abscess -  Drained and on antibiotics. Improving Hyponatremia - improved with IVF Acute renal insufficiency -  Due to dehydration. Resolved with IVF Plan:  
 
 
Continue present treatment

## 2019-05-04 NOTE — PROGRESS NOTES
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 
Zosyn 3.375g IV q8h (Start Date 19; Day # 2) Previous Antimicrobial Therapy: 
 
Significant Cultures:  
: Blood = NGSF (pending) : Urine = >100k GNR (pending) 5/3: abdominal fluid gram stain - pending 5/3: anaerobic (body fluid) - pending Radiology / Imaging results: (X-ray, CT scan or MRI):  
 CT ABD 1. Decreased pleural effusions with bilateral calcified pleural plaques 
suggesting asbestos exposure. 2. Decreased ascites. 3. Status post right colectomy with right lower quadrant ileostomy and 
decompressed colon. 4. Partial small bowel obstruction presumably secondary to adhesions as there is 
a lot of stranding around the distal small bowel in the pelvis and right lower 
quadrant. 5. Right paracolic gutter fluid collection. The pigtail catheter has been 
removed. 6. Status post hysterectomy with air and fluid in the vagina. Labs: 
Recent Labs 19 
0348 19 
1258 CREA 0.66 2.07* BUN 9 39* WBC  --  16.6* Temp (24hrs), Av.4 °F (36.9 °C), Min:98.2 °F (36.8 °C), Max:98.5 °F (36.9 °C) Creatinine Clearance (mL/min) or Dialysis: 70 ml/min Impression/Plan:  
Urine w/ >100k GNR (pending) ; Blood pending Continue Zosyn 3.375gm IV q8h Daily BMP Antimicrobial stop date TBD Pharmacy will follow daily and adjust medications as appropriate for renal function and/or serum levels. Thank you, Francis Bahena, PHARMD 
 
Recommended duration of therapy 
http://[facility]/[place]/[facility]/Pharmacy/Clinical%20Companion/Duration%20of%20ABX%20therapy. docx Renal Dosing 
http://[facility]/[place]/[facility]/Pharmacy/Clinical%20Companion/Renal%20Dosing%[id number]. pdf

## 2019-05-04 NOTE — PROGRESS NOTES
Oncology End of Shift Note Bedside shift change report given to Alex Farfan RN (incoming nurse) by Nii Rob (outgoing nurse) on [facility]. Report included the following information SBAR, Kardex, Intake/Output and MAR. Shift Summary: Pt remained stable during shift. Pain medications x4. PRN Valium and Ambien x1. Pt with pain most of the night. Labs drawn. IV abx x2. No major complaints besides pain. Ileostomy emptied x3. Issues for Physician to Address:   
 
Patient on Cardiac Monitoring? [] Yes 
[x] No 
 
Rhythm:   
 
 
Nii Rob

## 2019-05-05 PROBLEM — N39.0 UTI (URINARY TRACT INFECTION): Status: ACTIVE | Noted: 2019-01-01

## 2019-05-05 NOTE — PROGRESS NOTES
Problem: Pressure Injury - Risk of 
Goal: *Prevention of pressure injury Description Document Pankaj Scale and appropriate interventions in the flowsheet. Outcome: Progressing Towards Goal 
  
Problem: Patient Education: Go to Patient Education Activity Goal: Patient/Family Education Outcome: Progressing Towards Goal 
  
Problem: Falls - Risk of 
Goal: *Absence of Falls Description Document [place] Fall Risk and appropriate interventions in the flowsheet. Outcome: Progressing Towards Goal 
  
Problem: Patient Education: Go to Patient Education Activity Goal: Patient/Family Education Outcome: Progressing Towards Goal

## 2019-05-05 NOTE — PROGRESS NOTES
Oncology End of Shift Note Bedside shift change report given to Jose Jurado RN (incoming nurse) by Dilan Chen (outgoing nurse) on [facility]. Report included the following information SBAR, Kardex, Intake/Output and MAR. Shift Summary: good output ileostomy, wound vac intact and working, yeast infection around vaginal area and buttocks is severe (needs to be cleaned with soap and water and patted dry, then apply nystatin as frequently as possible) pain is not being well controlled with just the Roxicodone and dilaudid q 4 hours (pt constantly reports 8 out 10 for pain) Issues for Physician to Address:  Pain management, fentanyl dc? Patient on Cardiac Monitoring? [] Yes 
[x] No 
 
Rhythm:   
 
 
 
Shift Events Dilan hCen

## 2019-05-05 NOTE — PROGRESS NOTES
Admit Date: 2019 POD * No surgery found * Procedure:  * No surgery found * Subjective:  
 
Patient has no new complaints. States she ate like a pig yesterday, including a whole sandwich from  and half an order of fries. Good ileostomy output and no more nausea. Objective:  
 
Blood pressure 107/66, pulse 83, temperature 98.4 °F (36.9 °C), resp. rate 18, height 5' 3\" (1.6 m), weight 119 lb 7.8 oz (54.2 kg), SpO2 98 %. Temp (24hrs), Av.5 °F (36.9 °C), Min:98.3 °F (36.8 °C), Max:98.7 °F (37.1 °C) Physical Exam:  GENERAL: alert, cooperative, no distress, appears stated age, LUNG: clear to auscultation bilaterally, HEART: regular rate and rhythm, ABDOMEN: soft, non-tender. Bowel sounds normal. Stoma healthy appearing, wound c/d/i with VAC, EXTREMITIES:  extremities normal, atraumatic, no cyanosis or edema Labs:  
Recent Results (from the past 24 hour(s)) METABOLIC PANEL, BASIC Collection Time: 19  4:21 AM  
Result Value Ref Range Sodium 133 (L) 136 - 145 mmol/L Potassium 4.0 3.5 - 5.1 mmol/L Chloride 103 97 - 108 mmol/L  
 CO2 23 21 - 32 mmol/L Anion gap 7 5 - 15 mmol/L Glucose 85 65 - 100 mg/dL BUN 5 (L) 6 - 20 MG/DL Creatinine 0.59 0.55 - 1.02 MG/DL  
 BUN/Creatinine ratio 8 (L) 12 - 20 GFR est AA >60 >60 ml/min/1.73m2 GFR est non-AA >60 >60 ml/min/1.73m2 Calcium 8.2 (L) 8.5 - 10.1 MG/DL Data Review images and reports reviewed Assessment:  
 
Active Problems: 
  Peritoneal carcinomatosis ([address] (3/29/2019) Appendix carcinoma ([facility] [address] (3/29/2019) SBO (small bowel obstruction) ([facility] [address] (2019) Severe protein-calorie malnutrition ([address] (2019) Hyponatremia (2019) Peritoneal abscess ([facility] [address] (2019) Acute renal insufficiency (2019) Dehydration (2019) UTI (urinary tract infection) (2019) Plan/Recommendations/Medical Decision Making:  
 
Continue present treatment Continue iv antibiotics today Abscess cultures gram negative rods prelim. + polymicrobial UTI on admission, resistant to FQ. Continue Zosyn today. Repeat CBC in am. 
Possibly home tomorrow. Claudine Perales. Karen Fatima MD, [address] [facility]

## 2019-05-05 NOTE — PROGRESS NOTES
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 
Zosyn 3.375g IV q8h (Start Date 19; Day # 3) Previous Antimicrobial Therapy: 
 
Significant Cultures:  
: Blood = NGSF (pending) : Urine = >100k GNR--E.coli, K.pneumoniae, yeast - (pending) (zosyn susceptible) 5/3: abdominal fluid gram stain - gram + and gram neg rods - pending 5/3: anaerobic (body fluid) - no growth- final 
 
Radiology / Imaging results: (X-ray, CT scan or MRI):  
 CT ABD 1. Decreased pleural effusions with bilateral calcified pleural plaques 
suggesting asbestos exposure. 2. Decreased ascites. 3. Status post right colectomy with right lower quadrant ileostomy and 
decompressed colon. 4. Partial small bowel obstruction presumably secondary to adhesions as there is 
a lot of stranding around the distal small bowel in the pelvis and right lower 
quadrant. 5. Right paracolic gutter fluid collection. The pigtail catheter has been 
removed. 6. Status post hysterectomy with air and fluid in the vagina. Labs: 
Recent Labs 19 
0421 19 
7523 CREA 0.59 0.66  
BUN 5* 9 Temp (24hrs), Av.5 °F (36.9 °C), Min:98.3 °F (36.8 °C), Max:98.7 °F (37.1 °C) Creatinine Clearance (mL/min) or Dialysis: 79 ml/min Impression/Plan:  
Urine w/ >100k GNR (pending) ; Blood pending Continue Zosyn 3.375gm IV q8h Daily BMP Antimicrobial stop date TBD Pharmacy will follow daily and adjust medications as appropriate for renal function and/or serum levels. Thank you, Francis Bahena, ALONZOD 
 
Recommended duration of therapy 
http://[facility]/Pharmacy/Clinical%20Companion/Duration%20of%20ABX%20therapy. docx Renal Dosing 
http://[facility]/[place]/[facility]/Pharmacy/Clinical%20Companion/Renal%20Dosing%[email or web address]. pdf

## 2019-05-06 NOTE — PROGRESS NOTES
Spiritual Care Assessment/Progress Note [address] 
 
 
NAME: Gris Maria      MRN: [id number] AGE: 71 y.o. SEX: female Sabianist Affiliation: Gordo Boss  
Language: [place] 5/6/2019     Total Time (in minutes): 26 Spiritual Assessment begun in [facility] through conversation with: 
  
    [x]Patient        [x] Family    [] Friend(s) Reason for Consult: Palliative Care, Initial/Spiritual Assessment Spiritual beliefs: (Please include comment if needed) [x] Identifies with a valerie tradition:     
   [x] Supported by a valerie community:        
   [] Claims no spiritual orientation:       
   [] Seeking spiritual identity:            
   [] Adheres to an individual form of spirituality:       
   [] Not able to assess:                   
 
    
Identified resources for coping:  
   [x] Prayer                           
   [] Music                  [] Guided Imagery [x] Family/friends                 [] Pet visits [] Devotional reading                         [] Unknown 
   [] Other:                                       
 
 
Interventions offered during this visit: (See comments for more details) Patient Interventions: Affirmation of valerie, Affirmation of emotions/emotional suffering, Iconic (affirming the presence of God/Higher Power), Prayer (assurance of) Family/Friend(s): Affirmation of emotions/emotional suffering, Iconic (affirming the presence of God/Higher Power), Prayer (assurance of) Plan of Care: 
 
 [x] Support spiritual and/or cultural needs  
 [] Support AMD and/or advance care planning process    
 [] Support grieving process 
 [] Coordinate Rites and/or Rituals  
 [] Coordination with community clergy 
 [x] No spiritual needs identified at this time 
 [] Detailed Plan of Care below (See Comments)  [] Make referral to Music Therapy 
[] Make referral to Pet Therapy    
[] Make referral to Addiction services [] Make referral to [facility] 
[] Make referral to Spiritual Care Partner 
[] No future visits requested       
[x] Follow up visits as needed Comments:  After knocking and entering the patient's room, we exchanged greetings and I introduced myself to both the patient and her spouse. The patient stated that she knew who I was because we had met more than once before this meeting. The stated that we had prayed together previously. The patient informed me that she is not doing well today. I assured the patient that we could have prayer today if she wished. The patient declined. I offered the continuing support and availability of chaplains to provide spiritual care support as requested. Rev. Liliane Escobedo EdD  MDiv Palliative  Fellow For [facility] Page [phone])

## 2019-05-06 NOTE — PROGRESS NOTES
Problem: Falls - Risk of 
Goal: *Absence of Falls Description Document Feliberto Razo Fall Risk and appropriate interventions in the flowsheet.  
Outcome: Progressing Towards Goal

## 2019-05-06 NOTE — PROGRESS NOTES
Reason for Readmission:     Pt readmitted for Small Bowel Obstruction (SBO). Pt recently d/c from [facility] 4/12-29/2019. RRAT Score and Risk Level:     24 Level of Readmission:  High Care Conference scheduled:    
    
Resources/supports as identified by patient/family:   Pt voiced she has family support to help w/her care. Top Challenges facing patient (as identified by patient/family and CM): Finances/Medication cost?   Pt didn't identify any financial concerns and/or barriers. Transportation   Pt doesn't drive. Family/friends provides transportation to medical appointments. Support system or lack thereof? Support system consists of family and friends. Living arrangements? Pt voices she doesn't live alone, and has help inside the home. Self-care/ADLs/Cognition? Pt voices prior to this admission she was/is independent w/ADL's, and requires no assistance. Current Advanced Directive/Advance Care Plan:  FULL code. Pt states she has a Living Will. Jin Lucia ([facility]) Jose L Faulkner is her decision maker. Plan for utilizing home health:   Pt is currently open to [facility] w/[facility]. Will require resumption of care orders upon discharge. Likelihood of additional readmission:   High 
          
Transition of Care Plan:    Based on readmission, the patient's previous Plan of Care 
 has been evaluated and/or modified. The current Transition of Care Plan is:        
1) Hospice Consult to be placed to discuss w/patient and family. 2) Follow up appt w/PCP/Oncologist as recommended per AVS.   
 
SW to continue to assist.   
 
Care Management Interventions PCP Verified by CM: Yes(Pt states she sees PCP x 3 mos.  ) Mode of Transport at Discharge: Other (see comment)(Family to transport) Transition of Care Consult (CM Consult): Discharge Planning Discharge Durable Medical Equipment: (DME (waler/cane) no O2.  ) Current Support Network: Own Home(Lives in ranch style home w/two steps to enter into the home.  ) Confirm Follow Up Transport: Family Plan discussed with Pt/Family/Caregiver: Yes Discharge Location Discharge Placement: (Home ) Katt Arnett, MSW 
[phone]

## 2019-05-06 NOTE — PROGRESS NOTES
Consult received case d/w personally with Dr Acosta Alcantar , patient known to Palliative care , will be able to see tomorrow . Thank you .

## 2019-05-06 NOTE — WOUND CARE
Wound VAC dressing change/Ileostomy pouch change today. Patient took pain medicine over an hour ago. Mid-abdomen wound has opened more at the base to expose some adherent slough and a prolene suture: 
 
 
Patient is nutritional deprived and has no appetite. Her main concerns are pain control and fighting \"this bloated feeling\" with some mild nausea. Ileostomy still has watery green-yellow discharge with a few flecks of stool. Palliative care to meet with patient tomorrow.  nurse encouraging that patient work with Palliative for pain control, goals of care and helping us help her. WOUND POA CONDITIONS Vacuum Assisted Closure Mid Abdominal (Active) Vac Status Suction, continuous 5/6/2019  5:29 PM  
Suction (mmHg) 125 5/6/2019  5:29 PM  
Site Assessment Clean 5/6/2019  5:29 PM  
Dressing Status New;Occlusive 5/6/2019  5:29 PM  
Cannister Changed No 5/6/2019  5:29 PM  
Number of days:   
   
Wound Abdomen (Active) Dressing Status Removed 5/6/2019  5:27 PM  
Dressing Type Vacuum dressing 5/6/2019  5:27 PM  
Incision Site Well Approximated No 5/6/2019  5:27 PM  
Non-staged Wound Description Full thickness 5/6/2019  5:27 PM  
Wound Length (cm) 11 cm 5/6/2019  5:27 PM  
Wound Width (cm) 2.2 cm 5/6/2019  5:27 PM  
Wound Depth (cm) 2.5 cm 5/6/2019  5:27 PM  
Wound Volume (cm^3) 60.5 cm^3 5/6/2019  5:27 PM  
Condition of Base Pink;Slough 5/6/2019  5:27 PM  
Condition of Edges Open 5/6/2019  5:27 PM  
Tissue Type Percent Pink 80 5/6/2019  5:27 PM  
Tissue Type Percent Yellow 20 5/6/2019  5:27 PM  
Drainage Amount Small 5/6/2019  5:27 PM  
Drainage Color Serous 5/6/2019  5:27 PM  
Wound Odor None 5/6/2019  5:27 PM  
Lisset-wound Assessment Intact 5/6/2019  5:27 PM  
Cleansing and Cleansing Agents  Dermal wound cleanser 5/6/2019  5:27 PM  
Dressing Changed Changed/New 5/6/2019  5:27 PM  
Dressing Type Applied Vacuum dressing 5/6/2019  5:27 PM  
Procedure Tolerated Well 5/6/2019  5:27 PM  
Number of days: 39  
 Wound VAC dressing: wound and periwound skin cleaned and prepped. X1 piece of black foam in wound, occlusively covered with drape and TRAC pad applied. NPWT -125 mm/hg, continuous. Ileostomy: stoma pink, moist protuberant . Measured 1 1/8\" round today. [place] pouch #7906 with stoma barrier ring. Plan: wound vac and ostomy changes x2/week. Will continue to monitor and follow patient's chart.  
Courtney Centeno RN, [employer]

## 2019-05-06 NOTE — PROGRESS NOTES
Problem: Pressure Injury - Risk of 
Goal: *Prevention of pressure injury Description Document Pankaj Scale and appropriate interventions in the flowsheet.  
Outcome: Progressing Towards Goal

## 2019-05-06 NOTE — PROGRESS NOTES
Oncology End of Shift Note Bedside shift change report given to  [address] [place] [address] Avenue (incoming nurse) by Silas Gutierrez (outgoing nurse) on [facility]. Report included the following information SBAR, Kardex, Intake/Output, MAR and Accordion. Shift Summary: continues to have nausea despite using Zofran, continues to have unrelieved abdominal cramping and a pain level of 8 despite diluadid and roxicodone prn, slept on and off throughout the night, am labs drawn, vaginal yeast infection looks like it is improving, wants to have a dietician come talk to her about how to eat with the ilestomy Issues for Physician to Address:   
 
Patient on Cardiac Monitoring? [] Yes 
[x] No 
 
Rhythm:   
 
 
 
Shift Events Silas Gutierrez

## 2019-05-06 NOTE — PROGRESS NOTES
Admit Date: 2019 Subjective:  
 
Patient c/o ongoing pain control difficulties. Ate half a sandwich yesterday then developed crampy pain. Ileostomy putting out thin liquid. Objective:  
 
Blood pressure 139/77, pulse 84, temperature 98.5 °F (36.9 °C), resp. rate 16, height 5' 3\" (1.6 m), weight 119 lb 7.8 oz (54.2 kg), SpO2 99 %. Temp (24hrs), Av.8 °F (37.1 °C), Min:98.2 °F (36.8 °C), Max:99.7 °F (37.6 °C) Physical Exam:  GENERAL: alert, cooperative, no distress, appears stated age, LUNG: clear to auscultation bilaterally, HEART: regular rate and rhythm, ABDOMEN: soft, non-tender. Bowel sounds normal. Stoma healthy appearing, wound c/d/i with VAC, EXTREMITIES:  extremities normal, atraumatic, no cyanosis or edema Labs:  
Recent Results (from the past 24 hour(s)) METABOLIC PANEL, BASIC Collection Time: 19  3:28 AM  
Result Value Ref Range Sodium 133 (L) 136 - 145 mmol/L Potassium 4.0 3.5 - 5.1 mmol/L Chloride 103 97 - 108 mmol/L  
 CO2 22 21 - 32 mmol/L Anion gap 8 5 - 15 mmol/L Glucose 93 65 - 100 mg/dL BUN 3 (L) 6 - 20 MG/DL Creatinine 0.59 0.55 - 1.02 MG/DL  
 BUN/Creatinine ratio 5 (L) 12 - 20 GFR est AA >60 >60 ml/min/1.73m2 GFR est non-AA >60 >60 ml/min/1.73m2 Calcium 8.6 8.5 - 10.1 MG/DL  
CBC WITH AUTOMATED DIFF Collection Time: 19  3:28 AM  
Result Value Ref Range WBC 9.7 3.6 - 11.0 K/uL  
 RBC 3.58 (L) 3.80 - 5.20 M/uL HGB 9.6 (L) 11.5 - 16.0 g/dL HCT 30.8 (L) 35.0 - 47.0 % MCV 86.0 80.0 - 99.0 FL  
 MCH 26.8 26.0 - 34.0 PG  
 MCHC 31.2 30.0 - 36.5 g/dL  
 RDW 15.4 (H) 11.5 - 14.5 % PLATELET 920 (H) 864 - 400 K/uL MPV 9.2 8.9 - 12.9 FL  
 NRBC 0.0 0  WBC ABSOLUTE NRBC 0.00 0.00 - 0.01 K/uL NEUTROPHILS 75 32 - 75 % LYMPHOCYTES 18 12 - 49 % MONOCYTES 6 5 - 13 % EOSINOPHILS 1 0 - 7 % BASOPHILS 1 0 - 1 % IMMATURE GRANULOCYTES 0 0.0 - 0.5 % ABS. NEUTROPHILS 7.2 1.8 - 8.0 K/UL ABS. LYMPHOCYTES 1.7 0.8 - 3.5 K/UL  
 ABS. MONOCYTES 0.5 0.0 - 1.0 K/UL  
 ABS. EOSINOPHILS 0.1 0.0 - 0.4 K/UL  
 ABS. BASOPHILS 0.1 0.0 - 0.1 K/UL  
 ABS. IMM. GRANS. 0.0 0.00 - 0.04 K/UL  
 DF AUTOMATED Data Review images and reports reviewed Assessment:  
 
Active Problems: 
  Peritoneal carcinomatosis ([address] (3/29/2019) Appendix carcinoma ([address] (3/29/2019) SBO (small bowel obstruction) ([address] (4/12/2019) Severe protein-calorie malnutrition ([address] (4/12/2019) Hyponatremia (4/12/2019) Peritoneal abscess ([address] (5/4/2019) Acute renal insufficiency (5/4/2019) Dehydration (5/4/2019) UTI (urinary tract infection) (5/5/2019) Plan/Recommendations/Medical Decision Making:  
 
Continue present treatment Continue iv antibiotics today Abscess cultures MODERATE KLEBSIELLA OXYTOCA sensitive to Zosyn. Will ask palliative to see pt, this is end stage disease. Would benefit from home hospice care. Myran Franco. Maddie Mulligan MD, [facility]. [facility]

## 2019-05-06 NOTE — PROGRESS NOTES
Oncology End of Shift Note Bedside shift change report given to Shaye Barker RN (incoming nurse) by Magen Pulido (outgoing nurse) on Dolores Alex. Report included the following information SBAR, Kardex and MAR. Shift Summary: Patient received pain medication and nausea medication throughout the day. Patient's pain and nausea are not being controlled. Ileostomy bag was emptied and changed today. Wound care nurse completed wound care and applied new vac on patient. Issues for Physician to Address:  Pain and nausea are not being managed. Patient on Cardiac Monitoring? [] Yes 
[x] No 
 
Rhythm:   
 
 
 
 
 
Manual Grout
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
## 2019-05-07 NOTE — PROGRESS NOTES
Admit Date: 2019 Subjective:  
 
Patient eating a little better. Objective:  
 
Blood pressure 128/75, pulse 94, temperature 97.7 °F (36.5 °C), resp. rate 18, height 5' 3\" (1.6 m), weight 119 lb 7.8 oz (54.2 kg), SpO2 96 %. Temp (24hrs), Av.2 °F (36.8 °C), Min:97.7 °F (36.5 °C), Max:99 °F (37.2 °C) Physical Exam:  GENERAL: alert, cooperative, no distress, appears stated age, LUNG: clear to auscultation bilaterally, HEART: regular rate and rhythm, ABDOMEN: soft, non-tender. Bowel sounds normal. Stoma healthy appearing, wound c/d/i with VAC, EXTREMITIES:  extremities normal, atraumatic, no cyanosis or edema Labs:  
No results found for this or any previous visit (from the past 24 hour(s)). Data Review images and reports reviewed Assessment:  
 
Active Problems: 
  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  
 
Continue present treatment Continue iv antibiotics today Abscess cultures MODERATE KLEBSIELLA OXYTOCA sensitive to Zosyn. Appreciate palliative care consult. Recommending hospice house. Pt appropriate for discharge soon if there is availability. Stephanie Mccoy. Rebecca Manzo MD, [address] [facility]

## 2019-05-07 NOTE — PROGRESS NOTES
Oncology End of Shift Note Bedside shift change report given to Nathan Sandoval RN (incoming nurse) by Thea Beasley (outgoing nurse) on [facility]. Report included the following information SBAR, Kardex and MAR. Shift Summary: Anxiety medication was prescribed to patient as well as 1 mg of dilaudid. Patient received megace x3. Ileostomy bag was emptied x3. Issues for Physician to Address:  None. Patient on Cardiac Monitoring? [] Yes 
[x] No 
 
Rhythm:   
 
 
 
 
 
Thea Beasley
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
## 2019-05-08 NOTE — PROGRESS NOTES
Admit Date: 2019 Subjective:  
 
Patient eating better today. Objective:  
 
Blood pressure 124/78, pulse 87, temperature 97.1 °F (36.2 °C), resp. rate 18, height 5' 3\" (1.6 m), weight 119 lb 7.8 oz (54.2 kg), SpO2 94 %. Temp (24hrs), Av °F (36.7 °C), Min:97.1 °F (36.2 °C), Max:98.4 °F (36.9 °C) Physical Exam:  GENERAL: alert, cooperative, no distress, appears stated age, LUNG: clear to auscultation bilaterally, HEART: regular rate and rhythm, ABDOMEN: soft, non-tender. Bowel sounds normal. Stoma healthy appearing, wound c/d/i with VAC, EXTREMITIES:  extremities normal, atraumatic, no cyanosis or edema Labs:  
No results found for this or any previous visit (from the past 24 hour(s)). Data Review images and reports reviewed Assessment:  
 
Active Problems: 
  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  
 
Continue present treatment Continue iv antibiotics today Abscess cultures MODERATE KLEBSIELLA OXYTOCA sensitive to Zosyn. Appreciate palliative care consult. Recommending hospice house. Pt appropriate for discharge as soon as there is availability. Will switch to po antibiotics on discharge. Bart Clarke. Alejandro Bermudez MD, [address] [facility]

## 2019-05-08 NOTE — PROGRESS NOTES
Initial Nutrition Assessment: 
 
INTERVENTIONS/RECOMMENDATIONS:  
Meals/Snacks: General/healthful diet: continue current diet Ensure Enlive BID ASSESSMENT:  
Chart reviewed, patient medically noted for small bowel obstruction (SBO), appendix carcinoma and peritoneal carcinomatosis. PHM colitis, GERD and hypertension. Visited patient at bedside, patient was having breakfast the time of visit. Patient stated that her appetite was okay today. She said that she was trying to finish her meal but the frequent interruption from staff was making it a difficult task. Discussed the addition of oral nutrition supplements to her diet . Patient loved the idea of having Ensure shakes at meal time. Patient had no additional questions or concerns. Diet Order: Regular 
% Eaten:   
Patient Vitals for the past 72 hrs: 
 % Diet Eaten 05/07/19 0830 0 % 05/06/19 0830 0 % 05/06/19 0247 0 % 05/05/19 1950 0 % Pertinent Medications: [x]Reviewed []Other Pertinent Labs: [x]Reviewed []Other Food Allergies: [x]None []Other Last BM: 05/05  [x]Active     []Hyperactive  []Hypoactive       [] Absent BS Skin:    [] Intact   [x] Incision  [] Breakdown  [] Other: Anthropometrics:  
Height: 5' 3\" (160 cm) Weight: 54.2 kg (119 lb 7.8 oz) IBW (%IBW):   ( ) UBW (%UBW):   (  %) Last Weight Metrics: 
Weight Loss Metrics 5/2/2019 4/30/2019 4/29/2019 4/12/2019 4/5/2019 4/1/2019 3/28/2019 Today's Wt 119 lb 7.8 oz 126 lb 126 lb 11.2 oz - 134 lb 145 lb 8 oz -  
BMI 21.17 kg/m2 20.97 kg/m2 - 21.08 kg/m2 22.3 kg/m2 - 24.98 kg/m2 BMI: Body mass index is 21.17 kg/m². This BMI is indicative of: 
 []Underweight    [x]Normal    []Overweight    [] Obesity   [] Extreme Obesity (BMI>40) Estimated Nutrition Needs (Based on):  
7999 Kcals/day , 71 g(1.3 g/kg) Protein Carbohydrate: At Least 130 g/day  Fluids: 1375 mL/day (1ml/kcal) NUTRITION DIAGNOSES:  
Problem:  Inadequate protein-energy intake Etiology: related to appetite and SBO Signs/Symptoms: as evidenced by PO intake < 25 % of meals NUTRITION INTERVENTIONS: 
Meals/Snacks: General/healthful diet   Supplements: Commercial supplement GOAL:  
Increase PO intake > 50 % of meals/supplements in 3-5 days LEARNING NEEDS (Diet, Food/Nutrient-Drug Interaction):  
 [x] None Identified 
 [] Identified and Education Provided/Documented 
 [] Identified and Pt declined/was not appropriate Cultureal, Tenriism, OR Ethnic Dietary Needs:  
 [x] None Identified 
 [] Identified and Addressed 
 
 [x] Interdisciplinary Care Plan Reviewed/Documented  
 [x] Discharge Planning: Continue regular diet MONITORING /EVALUATION:  
 
Food/Nutrient Intake Outcomes: Total energy intake Physical Signs/Symptoms Outcomes: Weight/weight change NUTRITION RISK:  
 [x] Patient At Nutritional Risk  
 [] Patient Not At Nutritional Risk PT SEEN FOR:  
 []  MD Consult: []Calorie Count []Diabetic Diet Education []Diet Education []Electrolyte Management []General Nutrition Management and Supplements []Management of Tube Feeding []TPN Recommendations []  RN Referral:  []MST score >=2 
   []Enteral/Parenteral Nutrition PTA []Pregnant: Gestational DM or Multigestation 
   []Pressure Ulcer/Wound Care needs 
     
[]  Low BMI [x]  MAXIM AguilarBen [facility] Pager [phone] Weekend Pager [phone]

## 2019-05-08 NOTE — PROGRESS NOTES
Oncology End of Shift Note Bedside shift change report given to [facility], RN (incoming nurse) by Sylvia Lopez (outgoing nurse) on [facility]. Report included the following information SBAR, Kardex, Intake/Output and MAR. Shift Summary: Pt remained stable during shift. PRN pain meds x4. PRN Diazapam x1. Ileostomy bag changed. IV abx x2. Issues for Physician to Address:   
 
Patient on Cardiac Monitoring? [] Yes 
[x] No 
 
Rhythm Sylvia Lopez

## 2019-05-08 NOTE — PROGRESS NOTES
Oncology End of Shift Note Bedside shift change report given to Gris Stark RN (incoming nurse) by Ewa Leonard (outgoing nurse) on [facility]. Report included the following information SBAR, Kardex and MAR. Shift Summary: Patient's colostomy was emptied 5+ times. Patient was given dilaudid twice and stephanie once. Patient received all medication but refused lovenox shot. IV antibiotic was hung. IV fluids still running. Issues for Physician to Address:  None. Patient on Cardiac Monitoring? [] Yes 
[x] No 
 
Rhythm:   
 
 
 
 
Ewa Leonard

## 2019-05-08 NOTE — HOSPICE
[facility] Good Help to Those in Need 
[phone] Patient Name: Renee Calderon YOB: 1955 Age: 71 y.o. [facility] RN Note:  Hospice consult noted. Chart reviewed. Plan of care discussed with patients nurse & care manager. In to meet with patient. Discussed Hospice philosophy, general plan of care, levels of care, services and on call procedures. Family information packet provided. Currently, patient is stating wanting to have physical therapy and rehab to get stronger and go home. She states that her MD said she could have 2 years of life left and she is not interested in hospice services at this point. She is receptive to follow up by hospice tomorrow. Thank you for the opportunity to be of service to this patient.
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
## 2019-05-08 NOTE — PROGRESS NOTES
CM received consult for Hospice - [facility]. Jamal Sheppard, RN, BSN, ACM [id number] [facility]   [phone]

## 2019-05-08 NOTE — NURSE NAVIGATOR
ONCOLOGY NURSE NAVIGATOR 
 
ODALIS met w/ pt in rm [phone], laying in bed much more alert today. States just washed up face and brushed teeth. ONN provided active listening. Pt tearful, shares her goal is to go home, be able to do things for herself, and sit in her back yard. Asked who she has to assist w/ care needs at home \"my \"  Addressed restraining order, states unable to make court date due to being hospitalized. \"He has stopped drinking and has been very helpful to me\"  Shares that he knows how sick she is and is sad. Reports has people coming daily to help her. Update given to [facility] NP who will see pt today. Spoke w/ [facility] re: probably referral for Hospice House. Brando Baezaorest Dr. Constantine Irene, as pt does not recollect conversation w/ him about Hospice yesterday. He will see today. TC to [facility] currently active w/ SN/PT/OT, updated pt that she has [facility] only for in home assistance. [address] [place] [address], pt has not filed for [employer].    
 
Laura Luque RN

## 2019-05-08 NOTE — PROGRESS NOTES
15: 05 pm, SW contacted PAU Rao, RN. Writer informed pt is currently open to [facility] services. If patient desires to restart this will require resumption of care orders. Writer acknowledged understanding. Will inform patient of this information. Emir Saenz, MSW 
[phone] SW met w/patient to discuss rehab options. Pt voiced she had [facility] in the past, and desires to restart [facility] services. Writer acknowledged understanding voiced will contact [facility] to find out the status of patient. Pt acknowledged understanding. Emir Saenz, MSW 
[phone]

## 2019-05-09 NOTE — PROGRESS NOTES
Admit Date: 2019 Subjective:  
 
Patient eating better today. Objective:  
 
Blood pressure 119/80, pulse 92, temperature 97.9 °F (36.6 °C), resp. rate 16, height 5' 3\" (1.6 m), weight 119 lb 7.8 oz (54.2 kg), SpO2 99 %. Temp (24hrs), Av.2 °F (36.8 °C), Min:97.9 °F (36.6 °C), Max:98.4 °F (36.9 °C) Physical Exam:  GENERAL: alert, cooperative, no distress, appears stated age, LUNG: clear to auscultation bilaterally, HEART: regular rate and rhythm, ABDOMEN: soft, non-tender. Bowel sounds normal. Stoma healthy appearing, wound c/d/i with VAC, EXTREMITIES:  extremities normal, atraumatic, no cyanosis or edema Labs:  
No results found for this or any previous visit (from the past 24 hour(s)). Data Review images and reports reviewed Assessment:  
 
Active Problems: 
  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  
 
Appreciate palliative care consult. Recommending hospice house. This is her best option. Someone discussed rehab with her yesterday which she is not actually a candidate for given her terminal diagnosis. Case management has been discussing discharge home with home health. This is absolutely the worst possible scenario. Pt will quickly become dehydrated and weak and end up back in the ED for readmission. I again discussed indications for hospice house with her and she is now agreeable to do that until she is strong enough to be discharged to home hospice. Pt appropriate for discharge as soon as there is availability. Will switch to po antibiotics on discharge. Laverne Gomez. Kj Esparza MD, [facility] [address] [facility]

## 2019-05-09 NOTE — INTERDISCIPLINARY ROUNDS
Oncology Interdisciplinary rounds were held today to discuss patient plan of care and outcomes. The following members were present: Nursing, Physician, Case Management, Pharmacy, and PT/OT Actual Length of Stay: 7 DRG GLOS: 4.8 Expected Length of Stay: 4d 19h Plan            Discharge Pain management Krissy Sigala [address] [place] [address]

## 2019-05-09 NOTE — PROGRESS NOTES
Oncology End of Shift Note Bedside shift change report given to Maxim Bautista RN (incoming nurse) by Nii Rob (outgoing nurse) on [facility]. Report included the following information SBAR, Kardex, Intake/Output and MAR. Shift Summary: Pt remained stable. During shift. PRN pain medication x4. Ileostomy emptied x3. IV abx x2. No major complaints. Issues for Physician to Address:   
 
Patient on Cardiac Monitoring? [] Yes 
[x] No 
 
Rhythm:   
 
 
Nii Rob

## 2019-05-09 NOTE — PROGRESS NOTES
Oncology End of Shift Note Bedside shift change report given to Brennon Mercado RN (incoming nurse) by Javier Payton (outgoing nurse) on Triston Snare. Report included the following information SBAR, Kardex and MAR. Shift Summary:  
Patient complained of constant pain throughout the afternoon. Palliative notified. Patient spoke with hospice to discuss future plans. Patient ostomy drained and documented. Patient family visited for a short amount of time. Patient appetite unchanged (patient tried to eat). Patient tolerated care fairly. Nonpharmacologic pain methods attempted. Education regarding pain management and medications given throughout shift. Issues for Physician to Address:   
 
Patient on Cardiac Monitoring? [] Yes 
[x] No 
 
Rhythm:   
 
 
 
Shift Events Javier Payton

## 2019-05-09 NOTE — ACP (ADVANCE CARE PLANNING)
AMD signed 4/2019 on file MPOA Primary Decision Maker: Zamzam Schwartz. \"Gavin\" - Jin - [phone] Secondary Decision Maker: Charlotte Meredith - Friend - [phone] Living Will In  the event death is imminent and medical treatment will not help with recovery, then patient does NOT want life prolonging interventions In the event patient is unaware of hesrelf, her surroundings, or others and it is reasonably certain awareness will never be recovered , then patient would like to try treatments for 14 days, after which time if there is not improvement, support can be withdrawn. Patient is an organ donor Full cofe
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
## 2019-05-09 NOTE — HOSPICE
In to meet with patient along with hospice BOB, Graeme Pires, Nurse Navigator, Nurse Manager and unit nurse. Patient is requesting better pain control. She became tearful at mention of the [facility], stating she did not want to go there to die. She was agreeable to going home with hospice with her , Natasha Vega as primary caregiver and her son, Aimee Hager as a back up. Patient understands that she will not have wound vac at home and will be prescribed PO medication for pain. Spoke with , Natasha Vega who agreed to be caregiver and understands that hospice is not a full time care giving service. He stated that he and the patient have had their differences but he wants to amanda her the opportunity to come home to die if that is her wish. He will meet with us tomorrow to iron out details of discharge and is working on getting his  children to help him prepare the home for DME: bed, oxygen and BSC. Family meeting scheduled for 3 pm tomorrow in the patient's room. Discussed with Palliative NP who will adjust pain medications to prepare for discharge. Thank you for the opportunity to serve this patient and family.

## 2019-05-09 NOTE — PROGRESS NOTES
Palliative Medicine Social Work Nanette Barillas NP and I met with patient in her room to provide clarity about hospice - we heard from nurse she was still confused about what hospice is despite multiple meetings. We talked about it as a service for people who are at the end as we know she is. Hospice is her all in one care team for the specialized issues she will face as her disease progresses. She said she had a better understanding. She was tearful. She does not want to die, she thought she had longer and there were things she wanted to do. SHe was very upset that she wouldn't get to see daughter again who is incarcerated in [place]. She also wanted time to go home and work on things in her house and spend time with her animals. Another worry is she wants to get certain affairs in order and it was discussed that hospice social worker could support her with his. Patient does not want  involved in this. This led into a discussion about her prognosis - over the past few days, patient has understood that she doesn't have as long as she thought (she had originally heard 3 months to 2 years) but still seemed unclear. She requested to be told \"Straight up\". We discussed that patient had about a month, could be much shorter, could be a little longer, but based on what we see medically and her not being able to eat, her time is short overall. Patient did not cry when she heard this. She didn't seem surprised either but I have a feeling when the shock wears off and  this sinks in she will be very tearful as she has repeatedly verbalized she is not ready to die and she wanted more time. We did discuss her bringing her family up to date with how long she has left. Currently she has been saying up to 2 years. She has been in touch with a brother in [place] who is supportive though he has health problems and may not be able to visit her.  
 
Patient was in pain and we discussed adjusting pain meds and anti-anxiety to best support her going forward (See Paulette Link NPs note). Will follow up tomorrow. Thank you for the opportunity to be involved in the care of Ms. Anai Harman. Foster Winslow, [facility] Palliative Medicine  [phone]

## 2019-05-10 NOTE — PROGRESS NOTES
Oncology End of Shift Note Bedside shift change report given to Shu De Santiago RN (incoming nurse) by Lyric Aguila (outgoing nurse) on [facility]. Report included the following information SBAR, Kardex, Intake/Output and MAR. Shift Summary: Pt remained stable during shift. PRN pain medication x6. Pt rest for few hours. Ileostomy emptied x2. No other complaints other than pain. Issues for Physician to Address:   
 
Patient on Cardiac Monitoring? [] Yes 
[x] No 
 
  
 
 
Lyric Aguila

## 2019-05-10 NOTE — PROGRESS NOTES
Physical Therapy Attempted to see patient x 3 today but patient resting, then in pain, and vomiting on last attempt. Will attempt again tomorrow.

## 2019-05-10 NOTE — HOSPICE
[facility] RN note:  Family meeting held with social adrienne Mendoza, palliative NP Joseph Jacinto, this RN,  Landen Hatfield, son Rishi Yael and Mary Wright ([phone]) and son Ke Macon on the phone. Lengthy conversation about care going forward. Palliative unsuccessfully attempted to transition symptom medication from IV to po. Pt in more pain, anxious and nauseas making home not an option at this time. PCA of dilaudid started per palliative. Reportedly pt is agreeable to going to the hospice house if home continues to not be an option. Consent forms signed by pt with social adrienne Damon Doctor prior to initiation of PCA dilaudid. Plan is to evaluate pt in the morning to determine the best location of care, either [facility] or in at [facility] depending on decline. Thank you for the opportunity to care for this pt and family. Please contact hospice at [phone] with any questions or concerns.

## 2019-05-10 NOTE — PROGRESS NOTES
Oncology End of Shift Note Bedside shift change report given to Keshav Self RN (incoming nurse) by Uri Camarena (outgoing nurse) on [facility]. Report included the following information SBAR, Kardex and MAR. Shift Summary:  
Patient tolerated care poorly throughout shift. Patient complaint of pain unchanged. Palliative consulted with patient frequently to adjust meds. Patient Nauseated and vomited three times at end of shift. Nausea medication given which provided some relief. End of shift meds held per patient request because of nausea. PCA pump placed and two nurse sign off completed. Patient appetite was poor. Patient voided without difficulty. Ileostomy drained as needed. Drain patent and draining. Issues for Physician to Address:   
 
Patient on Cardiac Monitoring? [] Yes 
[x] No 
 
Rhythm:   
 
 
 
Shift Events Uri Camarena

## 2019-05-10 NOTE — PROGRESS NOTES
Palliative MedicineJoseph: [phone] ([phone]) [facility] ED: [phone] ([phone]) Code Status: DNR Advance Care Planning: 
Advance Care Planning 5/2/2019 Patient's Healthcare Decision Maker is: -son - Brett Aviles; friend Leonel Souza Primary Decision Maker Name -  
Confirm Advance Directive Yes, on file Patient Would Like to Complete Advance Directive -  
  Primary Decision Maker: Kelsey Shi. \"Gavin\" - Son - [phone] Secondary Decision Maker: Cynthia Sanchez - Friend - [phone] Patient / Family Encounter Documentation Participants (names): Patient, Palliative Medicine (Tammy Kelley NP) Narrative:  
 
Giuseppe JUAREZ and I met with patient in her room. She was awake and alert, lying in bed. 1. Patient very frustrated about pain meds - nothing has worked. Susannah adjusting meds. Please see her note for details. 2. Patient said she talked to her family about not having much longer to live. She confirmed she is very anxious about this. \"I thought I would have more time. \" provided emotional support but patient said she doesn't want to talk about it because it makes her more anxious. 3. Patient worried about getting medical bed delivered to house. She very much wants to get that taken care so she can get out of hospital.  
 
4. DNR: discussed DNR with patient. She said she is already a DNR and I did confirm there is an in-patient code form signed 4.12.19. Not sure why she is full code but that has since been changed. Patient completed DDNR and I provided education on where to post in home. Goals of Care / Plan:  
 
Signed DDNR on chart to go with patient at transport Copy of DDNR on chart to be scanned. Patient has copies as well. PERCY Foy notified Thank you for the opportunity to be involved in the care of Ms. Nusrat Chong and her family. Prosper Leo, [facility] Palliative Medicine  [phone]

## 2019-05-10 NOTE — PROGRESS NOTES
Admit Date: 2019 Subjective:  
 
Patient very discouraged after marychuy discussion with hospice yesterday. Objective:  
 
Blood pressure 116/79, pulse 90, temperature 98 °F (36.7 °C), resp. rate 16, height 5' 3\" (1.6 m), weight 119 lb 7.8 oz (54.2 kg), SpO2 96 %. Temp (24hrs), Av °F (36.7 °C), Min:97.9 °F (36.6 °C), Max:98.3 °F (36.8 °C) Physical Exam:  GENERAL: alert, cooperative, no distress, appears stated age, LUNG: clear to auscultation bilaterally, HEART: regular rate and rhythm, ABDOMEN: soft, non-tender. Bowel sounds normal. Stoma healthy appearing, wound c/d/i with VAC, EXTREMITIES:  extremities normal, atraumatic, no cyanosis or edema Labs:  
No results found for this or any previous visit (from the past 24 hour(s)). Data Review images and reports reviewed Assessment:  
 
Active Problems: 
  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  
 
Appreciate palliative care and hospice care consults. Pt now agreeable to discharge home with hospice care. This should be ready tomorrow morning. Will switch to po antibiotics on discharge. Rubina White. Braulio Lindsay MD, [facility] [address] [facility]

## 2019-05-10 NOTE — PALLIATIVE CARE DISCHARGE
Goals of Care/Treatment Preferences The Palliative Medicine team was consulted as part of your/your loved one's care in the hospital. Our team is a supportive service; we strive to relieve suffering and improve quality of life. We reviewed advance care planning information, which includes the following: 
Confirm Advance Directive: Yes, on file Patient/Health Care Proxy Stated Goals: Comfort We reviewed / discussed your code status as: DNR 
   Full Code means perform CPR in the event of cardiac arrest. 
    DNR means do NOT perform CPR in the event of cardiac arrest. 
    Partial Code means you have specific preferences, please discuss with your healthcare team. 
    Keny Nab means this issue was not addressed / resolved during your stay Medical Interventions: Comfort measures Other Instructions:  
 
Dear Ms. Ashleytao Chavez, It was our honor to support you in making some very difficult decisions while you were in the hospital. We covered a lot of important ground and ultimately you opted to go home with hospice and focus on your quality of life with your family and pets surrounding you. You also completed a Durable Do Not Resuscitate Order, which should travel home with you. It is recommended that this form be placed in a visible location such as on the refrigerator or bedroom door. You will be in our thoughts and prayers as you transition home. Wishing you peace and ease, Nani Garcia Palliative Medicine  [phone] Because of the importance of this information, we are providing you with a printed copy to share with other healthcare providers after this hospitalization is complete.
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
## 2019-05-11 NOTE — PROGRESS NOTES
Admit Date: 2019 Subjective:  
 
Patient hoping to go home today. Objective:  
 
Blood pressure 108/67, pulse 100, temperature 98.9 °F (37.2 °C), resp. rate 18, height 5' 3\" (1.6 m), weight 119 lb 7.8 oz (54.2 kg), SpO2 97 %. Temp (24hrs), Av.2 °F (36.8 °C), Min:97.9 °F (36.6 °C), Max:98.9 °F (37.2 °C) Physical Exam:  GENERAL: alert, cooperative, no distress, appears stated age, LUNG: clear to auscultation bilaterally, HEART: regular rate and rhythm, ABDOMEN: soft, non-tender. Bowel sounds normal. Stoma healthy appearing, wound c/d/i with VAC, EXTREMITIES:  extremities normal, atraumatic, no cyanosis or edema Labs:  
No results found for this or any previous visit (from the past 24 hour(s)). Data Review images and reports reviewed Assessment:  
 
Active Problems: 
  Peritoneal carcinomatosis ([address] (3/29/2019) Appendix carcinoma ([address] (3/29/2019) SBO (small bowel obstruction) ([address] (2019) Severe protein-calorie malnutrition ([address] (2019) Hyponatremia (2019) Peritoneal abscess ([address] (2019) Acute renal insufficiency (2019) Dehydration (2019) UTI (urinary tract infection) (2019) Goals of care, counseling/discussion () Cachectic ([address] () Frailty () Debility () Generalized abdominal pain () Plan/Recommendations/Medical Decision Making:  
 
Appreciate palliative care and hospice care consults. Pt now agreeable to discharge home with hospice care. Will switch to po antibiotics. Started on PCA yesterday. Awaiting word from Hospice regarding discharge or transfer. Tanvir Mckenzie. Fely Hough MD, [facility] [address] [facility]

## 2019-05-11 NOTE — HOSPICE
[facility] RN consultation visit note. Order for hospice noted. History and events of this hospitalization reviewed. In to meet with patient and support given. Juno Ball reported no pain with accessing bolus dose a few times since PCA started yesterday. Denied nausea. Discussed tentative plans to transfer to the [facility] for continuation of care. Patient said \"I don't want to and that is crazy. \"  She reported that if the goal is to get home that it seemed to her the best thing would be to keep her right where she is to make plans to get home. Above discussed with Hospice and Palliative care MD on call Dr. Aquilla Eisenmenger. Order received to have patient remain at [facility] under care of Dr. Preet Posada and [facility] through the weekend. Dr. Dimitry Joy, on call for Dr. Girish Ann, advised of above. Discussed POC with Pharmacy regarding antibiotics per Dr. Stu Bowden. .Patient advised and expressed understanding and appreciation. [facility] Lud[facility] Ruose and message left of the above. TC and spoke to # [facility] friend Collette Morales and advised of the above. Understanding expressed. Please call [phone] if questions or concerns Sadie Parra, MIRELLA [facility]

## 2019-05-12 NOTE — HOSPICE
[facility] RN note: Follow up visit with pt and  ED. Discussed transfer to [facility] with PCA pump with medicaid/ foundation support for room and board. Pt confirmed desire to go home which will require transitioning off of PCA to oral medication. Pt considerably more comfortable on PCA than oral medications attempted last Friday. Will collaborate with palliative care tomorrow to work on plan. Thank you for the opportunity to care for this pt and family. Please contact hospice at [phone] with any questions or concerns.

## 2019-05-12 NOTE — PROGRESS NOTES
Problem: Pressure Injury - Risk of 
Goal: *Prevention of pressure injury Description Document Pankaj Scale and appropriate interventions in the flowsheet. Outcome: Progressing Towards Goal 
  
Problem: Patient Education: Go to Patient Education Activity Goal: Patient/Family Education Outcome: Progressing Towards Goal 
  
Problem: Patient Education: Go to Patient Education Activity Goal: Patient/Family Education Outcome: Progressing Towards Goal

## 2019-05-12 NOTE — PROGRESS NOTES
Oncology End of Shift Note Bedside shift change report given to Christina Duarte RN (incoming nurse) by Venu Barriga (outgoing nurse) on [facility]. Report included the following information SBAR, Kardex and MAR. Shift Summary:  
Patient tolerated care well, No new complaints. Patient pain managed via PCA pump. Patient stated \"The PCA pump is working better for me. \" Pain seemed to be better managed. Patients family present at bedside throughout shift. Ileostomy emptied and documented. Wound vac intact. Accordion drained charged and patent. Patient appetite poor but did attempt to eat as well as consumed most of her ensure with encouragement. PCA rate verified at the end of the shift with RN. Issues for Physician to Address:   
 
Patient on Cardiac Monitoring? [] Yes 
[x] No 
 
Rhythm:   
 
 
 
Shift Events Venu Barriga

## 2019-05-12 NOTE — PROGRESS NOTES
SURGERY PROGRESS NOTE Admit Date: 2019 Subjective:  
 
Patient has no complaints. Pain controlled with PCA. Objective:  
 
Visit Vitals /65 (BP 1 Location: Right arm, BP Patient Position: At rest) Pulse 100 Temp 98.1 °F (36.7 °C) Resp 16 Ht 5' 3\" (1.6 m) Wt 54.2 kg (119 lb 7.8 oz) SpO2 90% Comment: between 75-90 BMI 21.17 kg/m² Temp (24hrs), Av.1 °F (36.7 °C), Min:98.1 °F (36.7 °C), Max:98.1 °F (36.7 °C) No intake/output data recorded. 05/10 1901 -  0700 In: 59 [P. O.:64] Out: 2819 [Urine:250; Drains:175] Physical Exam:   
General:  alert, cooperative, no distress, appears stated age Abdomen: soft, bowel sounds active, non-tender Incision:   good negative pressure dressing Assessment:  
 
Active Problems: 
  Peritoneal carcinomatosis ([address] (3/29/2019) Appendix carcinoma ([address] (3/29/2019) SBO (small bowel obstruction) ([address] (2019) Severe protein-calorie malnutrition ([address] (2019) Hyponatremia (2019) Peritoneal abscess ([address] (2019) Acute renal insufficiency (2019) Dehydration (2019) UTI (urinary tract infection) (2019) Goals of care, counseling/discussion () Cachectic ([address] () Frailty () Debility () Generalized abdominal pain () 
 
stable Drain removed. Will D/C wound vac in am and go to local wound care. Hopefull home on hospice soon
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
## 2019-05-13 NOTE — PROGRESS NOTES
SURGERY PROGRESS NOTE Admit Date: 2019 Subjective:  
 
Patient states pain is controlled. Denies nausea and tolerating PO. Wants to go home today. Objective:  
 
Visit Vitals /87 Pulse (!) 108 Temp 98.2 °F (36.8 °C) Resp 16 Ht 5' 3\" (1.6 m) Wt 54.2 kg (119 lb 7.8 oz) SpO2 98% BMI 21.17 kg/m² Temp (24hrs), Av.5 °F (36.9 °C), Min:98.2 °F (36.8 °C), Max:98.8 °F (37.1 °C) No intake/output data recorded.  1901 -  0700 In: 59 [P. O.:64] Out: 1000 [Urine:250] Physical Exam:   
General:  alert, cooperative, no distress, appears stated age Abdomen: soft, bowel sounds active, non-tender Incision:  Negative pressure pressing intact Assessment:  
 
Active Problems: 
  Peritoneal carcinomatosis ([address] (3/29/2019) Appendix carcinoma ([address] (3/29/2019) SBO (small bowel obstruction) ([address] (2019) Severe protein-calorie malnutrition ([address] (2019) Hyponatremia (2019) Peritoneal abscess ([address] (2019) Acute renal insufficiency (2019) Dehydration (2019) UTI (urinary tract infection) (2019) Goals of care, counseling/discussion () Cachectic ([address] () Frailty () Debility () Generalized abdominal pain () 
 
ready for discharge Plan:  
 
 
Stop wound vac and go to WTD packing Discharge to home hospice when admitted

## 2019-05-13 NOTE — DISCHARGE INSTRUCTIONS
Ileostomy care: pouch changes x2/week (currently Mon-Thurs schedule) on schedule or promptly if leaking. Pouch emptying needs to be done when 1/3-1/2 full - pouch will over fill and leak if not emptied. Stoma measures 1 1/8\" round. Using a [place] cut to fit pouch system #[id number] and a [place] adapt stoma barrier ring# [id number]. The Ileostomy pouch and wound care can be done x2/week together which will be better for patient and nursing care. Abdominal wound: change x2/week, cleanse wound with dermal wound cleanser spray. Apply Carrysen wound gel to wound base and pack with [employer] silver hydrafiber. Cover with dry gauze and ABD pad.

## 2019-05-13 NOTE — WOUND CARE
Wound Care nurse spoke with Dr Candace Brenner and Hospice/Palliative notes read. Dr Candace Brenner ordered wound VAC dressing removed and patient changed to a dressing that does not have to be changed daily. Suggested Aquacell-AG packing with wound gel. Aquacell-AG can be left in a wound up to 5 days before changing. She is having scant drainage so a dressing change x2/week can be done (q3-4 days). Wound CARE nurse explained plan of care to patient who was happy to hear she would not be connected to wound VAC anymore. The Ileostomy pouch and wound care can be done x2/week together which will be better for patient and nursing care. Abdominal wound: change x2/week, cleanse wound with dermal wound cleanser spray. Apply Carrysen wound gel to wound base and pack with Aquacell-AG silver hydrafiber. Cover with dry gauze and ABD pad. Wound Abdomen (Active) Dressing Status Removed 5/13/2019 11:41 AM  
Dressing Type Vacuum dressing 5/13/2019 11:41 AM  
Incision Site Well Approximated No 5/9/2019  1:59 PM  
Non-staged Wound Description Full thickness 5/9/2019  1:59 PM  
Wound Length (cm) 8 cm 5/13/2019 11:41 AM  
Wound Width (cm) 1.5 cm 5/13/2019 11:41 AM  
Wound Depth (cm) 1 cm 5/13/2019 11:41 AM  
Wound Volume (cm^3) 12 cm^3 5/13/2019 11:41 AM  
Condition of Base Pink;Slough 5/13/2019 11:41 AM  
Condition of Edges Open 5/13/2019 11:41 AM  
Tissue Type Percent Pink 85 5/9/2019  1:59 PM  
Tissue Type Percent Yellow 15 5/9/2019  1:59 PM  
Drainage Amount Scant 5/13/2019 11:41 AM  
Drainage Color Serosanguinous 5/13/2019 11:41 AM  
Wound Odor None 5/13/2019 11:41 AM  
Lisset-wound Assessment Intact 5/13/2019 11:41 AM  
Cleansing and Cleansing Agents  Dermal wound cleanser 5/13/2019 11:41 AM  
Dressing Changed Changed/New 5/13/2019 11:41 AM  
Dressing Type Applied Wound gel;Silver products;4 x 4;ABD pad 5/13/2019 11:41 AM  
Procedure Tolerated Well 5/13/2019 11:41 AM  
Number of days: 46  
   
 Wound Abdomen Open midline incision (Active) Number of days: [id number] [place], RN, [employer]

## 2019-05-13 NOTE — PROGRESS NOTES
Problem: Pressure Injury - Risk of 
Goal: *Prevention of pressure injury Description Document Pankaj Scale and appropriate interventions in the flowsheet. Outcome: Progressing Towards Goal 
  
Problem: Falls - Risk of 
Goal: *Absence of Falls Description Document Bassett Beady Fall Risk and appropriate interventions in the flowsheet. Outcome: Progressing Towards Goal 
  
Problem: Pain Goal: *Control of Pain Outcome: Progressing Towards Goal

## 2019-05-13 NOTE — PROGRESS NOTES
Patient placed on PCA to better control Pain. Upon entering the room the patient appears to be resting with eyes cloesed. Patients is easy to awaken. Pain appears to be better controlled as evidence by, less restlessness, reduced call bell utilization for pain control, Patient stating \"I feel my pain is getting better. \"
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
## 2019-05-13 NOTE — DISCHARGE SUMMARY
DISCHARGE SUMMARY Patient ID: 
Corey Romeo [id number] 
71 y.o. 
1955 Admit date: 5/2/2019 Discharge date and time: No discharge date for patient encounter. Admitting Physician: Corazon Friedman MD  
 
Discharge Physician: Corazon Friedman MD 
 
Admission Diagnoses: SBO (small bowel obstruction) ([address] [M00.237] Discharge Diagnoses: Active Problems: 
  Peritoneal carcinomatosis ([address] (3/29/2019) Appendix carcinoma ([address] (3/29/2019) SBO (small bowel obstruction) ([address] (4/12/2019) Severe protein-calorie malnutrition ([address] (4/12/2019) Hyponatremia (4/12/2019) Peritoneal abscess ([address] (5/4/2019) Acute renal insufficiency (5/4/2019) Dehydration (5/4/2019) UTI (urinary tract infection) (5/5/2019) Goals of care, counseling/discussion () Cachectic ([address] () Frailty () Debility () Generalized abdominal pain () Procedures: * No surgery found * Consults: general surgery, Palliative care Hospital Course:  
Patient was admitted with abdominal pain and a RLQ abdominal abscess. She underwent percutaneous drainage and pain impoved. Palliative care was consulted due to pain, overwhelming symptoms and extensive stage IV appendiceal cancer. Patient is not strong enough to tolerate palliative adjuvant therapy. Hospice was recommended. Over several days patient considered hospice and eventually decided to enter into home hospice. A PCA was strated to better anticipate her home pain requirements. On discharge, patient's pain was controled. D/C Disposition: home hospice Patient Instructions: see separate med reconciliation Diet: resume pre-hospital diet Follow-up is not needed Signed: 
Corazon Friedman MD 
5/13/2019 
10:39 AM

## 2019-05-13 NOTE — PROGRESS NOTES
Problem: Mobility Impaired (Adult and Pediatric) Goal: *Acute Goals and Plan of Care (Insert Text) Description Physical Therapy Goals Initiated 5/13/2019 1. Patient will move from supine to sit and sit to supine , scoot up and down and roll side to side in bed with minimal assistance/contact guard assist within 7 day(s) to reduce caregiver burden and reduce risk of skin breakdown 2. Patient will perform supine UE/LE therex with verbal cues to reduce risk of joint contractures within 7 days Outcome: Progressing Towards Goal 
 
PHYSICAL THERAPY EVALUATION Patient: Ondina Gibbons (65 y.o. female) Date: 5/13/2019 Primary Diagnosis: SBO (small bowel obstruction) [facility] [Y43.309] Precautions:   DNR 
 
ASSESSMENT : 
Based on the objective data described below, the patient presents with generalized weakness, decreased functional mobility, increased pain requiring PCA use s/p admission for SBO. Pt received supine in bed with wound vac in place. Per chart review, pt plans to discharge with hospice services (at home or inpatient TBD). Pt verbalized goals of going home to see her family. Pt disoriented to date and confused during evaluation. Pt required mod A for rolling and max A for scooting in bed. Pt able to bridge to place bed pan underneath patient to urinate. Pt educated on benefits of turning and repositioning to prevent skin breakdown and reduce caregiver burden. Also performed supine UE/LE therex to promote joint mobility. Continue to recommend frequent turning and repositioning. Discharge recs: defer to primary team and focus on comfort measures Patient will benefit from skilled intervention to address the above impairments. Patient?s rehabilitation potential is considered to be Poor - Hospice Factors which may influence rehabilitation potential include:  
? None noted ? Mental ability/status ? Medical condition ? Home/family situation and support systems ?         Safety awareness 
? Pain tolerance/management 
? Other: PLAN : 
Recommendations and Planned Interventions: 
?           Bed Mobility Training             ? Neuromuscular Re-Education ? Transfer Training                   ? Orthotic/Prosthetic Training 
? Gait Training                         ? Modalities ? Therapeutic Exercises           ? Edema Management/Control ? Therapeutic Activities            ? Patient and Family Training/Education ? Other (comment): Frequency/Duration: Patient will be followed by physical therapy  1 time a week to address goals. Discharge Recommendations: Defer to primary team--plans for hospice/comfort measures Further Equipment Recommendations for Discharge: none SUBJECTIVE:  
Patient stated ? My doctor said I would leave on Tuesday. It's Friday right? .? OBJECTIVE DATA SUMMARY:  
HISTORY:   
Past Medical History:  
Diagnosis Date Acid reflux GERD (gastroesophageal reflux disease) Hypertension Other ill-defined conditions(799.89)   
 high cholesterol Psychiatric disorder   
 depression Thyroid disease   
 hyperthyroidism Past Surgical History:  
Procedure Laterality Date COLONOSCOPY N/A 11/30/2017 COLONOSCOPY performed by Elpidio Walker MD at \[facility]\"" ENDOSCOPY  
 COLONOSCOPY N/A 3/20/2019 COLONOSCOPY performed by Cliff Escobar MD at \[facility]\"" ENDOSCOPY  
 COLONOSCOPY N/A 3/28/2019 COLONOSCOPY performed by Cliff Escobar MD at \[facility]\"" ENDOSCOPY FLEXIBLE SIGMOIDOSCOPY N/A 3/20/2019 SIGMOIDOSCOPY FLEXIBLE performed by Cliff Escobar MD at \[facility]\"" ENDOSCOPY  
 HX CHOLECYSTECTOMY HX GYN    
 hysterectomy HX HEENT    
 thyroid Prior Level of Function/Home Situation: pt lives alone, ambulatory with a RW Home Situation Home Environment: Private residence # Steps to Enter: 2 One/Two Story Residence: One story Living Alone:  Yes 
 Support Systems: Friends \ neighbors, Family member(s) Patient Expects to be Discharged to[de-identified] [facility]) Current DME Used/Available at Home: Walker, rolling EXAMINATION/PRESENTATION/DECISION MAKING:  
Critical Behavior: 
Neurologic State: Alert Orientation Level: Disoriented to time, Oriented to person, Oriented to place Cognition: Follows commands Hearing: Auditory Auditory Impairment: None Skin:  wound vac Edema:  
Range Of Motion: 
AROM: Generally decreased, functional 
 
  
Strength:   
Strength: Generally decreased, functional 
  
Functional Mobility: 
Bed Mobility: 
Rolling: Moderate assistance Supine bridging with min A Scooting: Maximum assistance Balance:  
Sitting: Impaired Therapeutic Exercises:  
Lateral trunk rotations, knee flexion, ankle pumps, UE elevation Placed pillows under patient for comfort and support of extremities Functional Measure: 
Barthel Index: 
Bathin Bladder: 5 Bowels: 10 
Groomin Dressin Feedin Mobility: 0 Stairs: 0 Toilet Use: 0 Transfer (Bed to Chair and Back): 5 Total: 25/100 Percentage of impairment  
0% 1-19% 20-39% 40-59% 60-79% 80-99% 100% Barthel Score 0-100 100 99-80 79-60 59-40 20-39 1-19 
 0 The Barthel ADL Index: Guidelines 1. The index should be used as a record of what a patient does, not as a record of what a patient could do. 2. The main aim is to establish degree of independence from any help, physical or verbal, however minor and for whatever reason. 3. The need for supervision renders the patient not independent. 4. A patient's performance should be established using the best available evidence. Asking the patient, friends/relatives and nurses are the usual sources, but direct observation and common sense are also important. However direct testing is not needed.  
5. Usually the patient's performance over the preceding 24-48 hours is important, but occasionally longer periods will be relevant. 6. Middle categories imply that the patient supplies over 50 per cent of the effort. 7. Use of aids to be independent is allowed. Sol Dubois., Barthel, D.W. (8969). Functional evaluation: the Barthel Index. [address] [facility] (14)2. MELISSA Bailey, Edwar Lizarraga., [facility] [place], [address] [place] [address] (1999). Measuring the change indisability after inpatient rehabilitation; comparison of the responsiveness of the Barthel Index and Functional [place] Measure. Journal of Neurology, Neurosurgery, and Psychiatry, 66(4), 837-665. Lu Almeida, CHEPE, DICK Bliss, & Bren Rader M.A. (2004.) Assessment of post-stroke quality of life in cost-effectiveness studies: The usefulness of the Barthel Index and the EuroQoL-5D. [facility], 13, 315-33 Based on the above components, the patient evaluation is determined to be of the following complexity level: MEDIUM Pain: 
Pain Scale 1: Numeric (0 - 10) Pain Intensity 1: 9 Pain Location 1: Abdomen Pain Orientation 1: Lower Pain Description 1: Aching Pain Intervention(s) 1: Encouraged PCA Activity Tolerance:  
Fair. Limited by pain and generalized weakness Please refer to the flowsheet for vital signs taken during this treatment. After treatment:  
?         Patient left in no apparent distress sitting up in chair ? Patient left in no apparent distress in bed 
? Call bell left within reach ? Nursing notified ? Caregiver present ? Bed alarm activated COMMUNICATION/EDUCATION:  
The patient?s plan of care was discussed with: Registered Nurse. ?         Fall prevention education was provided and the patient/caregiver indicated understanding. ? Patient/family have participated as able in goal setting and plan of care. ?         Patient/family agree to work toward stated goals and plan of care. ?         Patient understands intent and goals of therapy, but is neutral about his/her participation. ? Patient is unable to participate in goal setting and plan of care. Thank you for this referral. 
Sonja Uribe, PT, DPT Time Calculation: 17 mins

## 2019-05-13 NOTE — PROGRESS NOTES
Palliative Medicine [phone] Returned a phone call to patients son Sadia Rodriguez He wanted his mom to be started on TPN Explained that this will not change the outcome, and has a high risk of infection, so at this point its likely going to hurt more than help He tells me he does not agree with her going home today- I gently reassured him that this was a decision between his mom and the surgeon, and it was her wish to go home, as she wanted to spend the time she has left with her family and not in the hospital 
He asked about her pain control, I informed him about the Fentanyl patch, and its ability to be titrated if he pain increased once she got home She always has the option to go to the hospice house for uncontrolled pain if she requires IV once she gets home He thanked me and hung up- I think he hear what I had to say, and was not aggressive as he was on Friday when we spoke, but he is having a very hard time with his moms diagnosis. Suspect he may need support from the hospice team after Ms Argentina Griffin passes Damari Edouard NP Addendum:  
Spoke with Jennifer from Hospice Patient appears to be doing well and wants to go home OK to keep PCA until transport, to allow her to get a dose of IV medication for transport Has Fentanyl patch on, likely will need to be titrated up based on cachectic state and unknown absorption, but Hospice can evaluate this after admission Patient not willing to re-try liquid or PO medication options due to nausea at this time Deferred Wound Vac to wound care and surgeon I am OK with writing scripts for Lorazepam and Fentanyl patch for discharge if needed- asked Hospice RN to please let me know if this is needed Will see later today if patient is unable to be admitted home today

## 2019-05-13 NOTE — PROGRESS NOTES
Oncology End of Shift Note Bedside shift change report given to Lance Hollins RN (incoming nurse) by Stanford Rowell RN (outgoing nurse) on [facility]. Report included the following information SBAR, Kardex and Accordion. Shift Summary: Pt needs to be reminded to use PCA frequently, forgets that she has it Issues for Physician to Address:  Family issues, pt's son wants patient to be on tpn, does her body is wasting away but doesn't want her mind to waste away En Tripp can be reached @ [phone] Patient on Cardiac Monitoring? [] Yes 
[x] No 
 
Rhythm:   
 
 
 
Shift Events Stanford Rowell RN
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
## 2019-05-13 NOTE — PROGRESS NOTES
Oncology End of Shift Note Bedside shift change report given to MIRELLA meyer (incoming nurse) by Sofy Agarwal (outgoing nurse) on Vishal Darby. Report included the following information SBAR, Kardex and MAR. Shift Summary:  
Patient tolerated care well today, son and  visited. Patient refused med (see MAR). Education provided regarding medications. PCA rate changed and verified with second RN. Patient stated \"pain is better controlled. \" Patient consumed 50% of dinner and 2 ensure shakes with encouragement. Issues for Physician to Address:   
 
Patient on Cardiac Monitoring? [] Yes 
[x] No 
 
Rhythm:   
 
 
 
Shift Events 
-10:15am- Patient son \"Gavin\", patients son expressing concerns, and upset. I notified the nurse manager and they spoke with the patients son regarding his concerns. Sofy Agarwal

## 2019-05-13 NOTE — PROGRESS NOTES
Music Therapy Assessment [facility] Christiano Gurrola [phone]    
1955  71 y.o.  female Patient Telephone Number: [phone] (home) Gnosticism Affiliation: [facility]  
Language: [place] Patient Active Problem List  
 Diagnosis Date Noted  Goals of care, counseling/discussion  Cachectic ([address]  Frailty  Debility  Generalized abdominal pain  UTI (urinary tract infection) 05/05/2019  Peritoneal abscess ([address] 05/04/2019  Acute renal insufficiency 05/04/2019  Dehydration 05/04/2019  Postprocedural intra-abdominal sepsis ([address] 04/13/2019  Sepsis due to pneumonia ([address] 04/13/2019  
 SBO (small bowel obstruction) ([address] 04/12/2019  Small bowel obstruction ([address] 04/12/2019  Severe protein-calorie malnutrition ([address] 04/12/2019  Hyponatremia 04/12/2019  Pelvic abscess in female 04/12/2019  Anemia 04/12/2019  Sepsis ([address] 04/12/2019  Peritoneal carcinomatosis ([address] 03/29/2019  Appendix carcinoma ([address] 03/29/2019  Sepsis following intra-abdominal surgery ([address] 03/28/2019  Neoplasm of appendix 03/28/2019  Colitis 11/29/2017 Date: 5/13/2019            Total Time (in minutes): 25          MRM 1 MEDICAL ONCOLOGY Mental Status:   [x] Alert [  ] Hollace Lights [  ]  Confused  [  ] Minimally responsive Communication Status: [  ] Impaired Speech [  ] Nonverbal -N/A Physical Status:   [  ] Oxygen in use  [  ] Hard of Hearing [  ] Vision Impaired [  ] Ambulatory  [  ] Ambulatory with assistance [  ] Non-ambulatory -N/A Music Preferences, Background: [address] Clinical Problem addressed: Decrease anxiety, increase relaxation. Goal(s) met in session: 
Physical/Pain management (Scale of 1-10): Pre-session rating: Pt denied pain. Post-session rating: Pt denied pain. [  ] Increased relaxation   [  ] Regulated breathing patterns [  ] Decreased muscle tension   [  ] Minimized physical distress Emotional/Psychological: 
[  ] Increased self-expression   [  ] Decreased aggressive behavior [  ] Decreased sadness   [  ] Discussed healthy coping skills [  ] Improved mood    [  ] Decreased withdrawn behavior Social: 
[  ] Decreased feelings of isolation/loneliness [x] Positive social interaction [  ] Provided support and/or comfort for family/friends Spiritual: 
[  ] Spiritual support    [  ] Expressed peace [  ] Expressed valerie    [  ] Discussed beliefs Techniques Utilized (Check all that apply):  
[  ] Procedural support MT [x] Music for relaxation [x] Patient preferred music 
[  ] Vonda analysis  [  ] Song choice  [  ] Music for validation [  ] Entrainment  [  ] Progressive muscle relax. [  ] Guided visualization [  ] Herchel Gist  [  ] Patient instrument playing [  ] Sydnie Harden writing [  ] Andee Galeazzi along   [  ] Deanne Cespedes  [  ] Sensory stimulation [  ] Active Listening  [  ] Music for spiritual support [  ] Making of CDs as gifts Session Observations:  Referral from Sena Muir, Palliative . Patient (pt) was alert lying in bed and her spouse Army Whalen was at bedside. Pt's nurse encouraged pt to be receptive to music therapy, and pt agreed to try a song. After asking how pt was feeling, this music therapist and music therapy intern (MT team) asked pt about her music preferences. Pt shared these. MT team sat at bedside and pt chose to hear a [employer]. MT intern sang and played the [address] on [employer] with guitar. Pt increased eye contact with her spouse during this song. She and her spouse thanked MT team for the music, and pt said she wanted quiet now. MT team expressed understanding and pt denied any further needs. Will follow as able. Mahnaz Pina, MT-BC (Music Therapist-Board Certified) 
and 
Isaías \"Kelsey\" Lotus, Music Therapy Intern Spiritual Care Department Referral-based service

## 2019-05-13 NOTE — PROGRESS NOTES
Medicare pt has received, reviewed, and signed 2nd IM letter informing them of their right to appeal the discharge. Signed copy has been placed on pt bedside chart. Hitesh Nicholson [phone]

## 2019-05-14 NOTE — PROGRESS NOTES
Patient unable to sign discharge paperwork due to periodic confusion. Prescriptions picked up by daughter in law before discharge to get medications filled before the patient arrived at home. PIV removed prior to discharge. Patient discharged [facility] for [facility] care. [facility] left before getting patients discharge paperwork from RN. Home Hospice nurse called RN to see if patient had left yet. Hospice nurse fax number received to fax discharge paperwork. Discharge paperwork successfully faxed to Hospice nurse at [phone]. Received confirmation fax was sent. Hospice nurse stated she would call the unit back if she did not receive discharge paperwork. 1135: Hospice nurse (Jan) called unit to notify RN she received Discharge paperwork.

## 2019-05-14 NOTE — PROGRESS NOTES
Problem: Pressure Injury - Risk of 
Goal: *Prevention of pressure injury Description Document Pankaj Scale and appropriate interventions in the flowsheet. Outcome: Resolved/Met Problem: Patient Education: Go to Patient Education Activity Goal: Patient/Family Education Outcome: Resolved/Met Problem: Falls - Risk of 
Goal: *Absence of Falls Description Document Feliberto Razo Fall Risk and appropriate interventions in the flowsheet. Outcome: Resolved/Met Problem: Patient Education: Go to Patient Education Activity Goal: Patient/Family Education Outcome: Resolved/Met Problem: Pain Goal: *Control of Pain Outcome: Resolved/Met Goal: *PALLIATIVE CARE:  Alleviation of Pain Outcome: Resolved/Met Problem: Patient Education: Go to Patient Education Activity Goal: Patient/Family Education Outcome: Resolved/Met Problem: Patient Education: Go to Patient Education Activity Goal: Patient/Family Education Outcome: Resolved/Met

## 2019-05-14 NOTE — PROGRESS NOTES
09:45 Writer notified [facility] ETA will now be between 10:15-10:30 AM.  Writer notified Hospice RN of time change. John Pires, MSW 
[phone] 
 
 
08:59 am, Pt has been accepted for [facility] for 10:00 am.   
 
John Pires, MSW 
[phone] 
 
08:38 am, Referral sent for [facility]. Awaiting acceptance. Pt to d/c home w/Hospice. Hospice admission scheduled for 11:00 am. 
 
John Pires, MSW 
[phone]

## 2019-05-14 NOTE — HOSPICE
[facility] RN consultation visit note. Order for hospice noted. History and events of this hospitalization reviewed. Events of the previous 24 hours reviewed In to visit with Radha Vieyra who reported she was looking forward to being home with her family. Coordinated with home hospice admit nurse Sonali Wilson. Please call [phone] if questions or concerns Angelica Saleem RN [facility]

## 2019-05-14 NOTE — PROGRESS NOTES
Oncology End of Shift Note Bedside shift change report given to Charlene Milan RN (incoming nurse) by Mikaela German RN (outgoing nurse) on Blancas Bristle. Report included the following information SBAR, Kardex and MAR. Shift Summary: Pt slept most of the night, abd dressing CDI, zofran twice for nausea, using the bedpan, ileostomy emptied once Issues for Physician to Address:   
 
Patient on Cardiac Monitoring? [] Yes 
[x] No 
 
Rhythm:   
 
 
 
Shift Events Mikaela German RN
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
## 2019-05-14 NOTE — PROGRESS NOTES
Problem: Pressure Injury - Risk of 
Goal: *Prevention of pressure injury Description Document Pankaj Scale and appropriate interventions in the flowsheet. Outcome: Progressing Towards Goal 
  
Problem: Falls - Risk of 
Goal: *Absence of Falls Description Document Durenda Aaron Fall Risk and appropriate interventions in the flowsheet. Outcome: Progressing Towards Goal 
  
Problem: Pain Goal: *Control of Pain Outcome: Progressing Towards Goal 
Goal: *PALLIATIVE CARE:  Alleviation of Pain Outcome: Progressing Towards Goal

## 2019-05-17 PROBLEM — C48.2: Status: ACTIVE | Noted: 2019-01-01

## 2019-05-17 PROBLEM — C48.0: Status: ACTIVE | Noted: 2019-01-01

## 2019-05-17 NOTE — H&P
[employer] [facility] to Those in Need 
[phone] Patient Name: Lincoln Alegre YOB: 1955 Date of Provider Hospice Visit: 05/17/19 Level of Care:   [] General Inpatient (GIP)    [] Routine   [x] Respite Current Location of Care: 
[] [address] [place] [address] [] [facility] [x] [facility] [] [facility] [] [facility], patient referred from: 
[] [address] [place] [address] [] [facility] [] [facility] [] [facility] [] Home [] Other:  
 
Date of Original Hospice Admission: 5/14/19 Hospice Medical Director at time of admission: Dr Imani Stoll Principle Hospice Diagnosis: Secondary malignant neoplasm of retroperitoneum and peritoneum ([facility] [address] [facility] Sherin Alegre is a 71y.o. year old who was admitted to [facility]. The patient's principle diagnosis has resulted in weakness, debility, pain issues, nausea, anorexia, ascites, bowel obstruction. Functionally, the patient's Karnofsky and/or Palliative Performance Scale has declined over a period of weeks and is estimated at 30-40% . The patient is dependent on the following ADLs: 
She is dependent for all ALDs. CT Scan 5/9/19 Objective information that support this patients limited prognosis includes:  
IMPRESSION:  
1. Decreased pleural effusions with bilateral calcified pleural plaques 
suggesting asbestos exposure. 2. Decreased ascites. 3. Status post right colectomy with right lower quadrant ileostomy and 
decompressed colon. 4. Partial small bowel obstruction presumably secondary to adhesions as there is 
a lot of stranding around the distal small bowel in the pelvis and right lower 
quadrant. 5. Right paracolic gutter fluid collection. The pigtail catheter has been 
removed. 6. Status post hysterectomy with air and fluid in the vagina. 
  
The patient/family chose comfort measures with the support of Hospice. HOSPICE DIAGNOSES Active Symptoms: 
1. Weakness, fatigue 2. Nausea 3. Abdominal pain PLAN 1. Admitted respite for caregiver breakdown 2. Continue home meds 3.  and SW to support family needs 4. Disposition: home after respite Prognosis estimated based on 05/17/19 clinical assessment is:  
[] Hours to Days [x] Days to Weeks   
[] Other: 
 
Communicated plan of care with: Hospice Case Manager; Hospice IDT; Care Team 
 
 GOALS OF CARE Patient/Medical POA stated Goal of Care:  Comfort care 
 
[x] I have reviewed and/or updated ACP information in the Advance Care Planning Navigator. This information is available in the [address] link in the patient's chart header. Primary Decision [facility] Agent):   Primary Decision Maker: [address] Edwena Favre" - Son - [phone] Secondary Decision Maker: Richie Painting - Friend - [phone] Resuscitation Status: DNR If DNR is there a Durable DNR on file? : [x] Yes [] No (If no, complete Durable DNR) REVIEW OF SYSTEMS The following systems were: [] reviewed  [] unable to be reviewed Positive ROS include: 
Constitutional: fatigue, weakness, in pain, short of breath Ears/nose/mouth/throat: increased airway secretions Respiratory:shortness of breath, wheezing Gastrointestinal:poor appetite, nausea, vomiting, abdominal pain, constipation, diarrhea Musculoskeletal:pain, deformities, swelling legs Neurologic:confusion, hallucinations, weakness Psychiatric:anxiety, feeling depressed, poor sleep Endocrine:  
 
  
 
 FUNCTIONAL ASSESSMENT Palliative Performance Scale (PPS): 30% PSYCHOSOCIAL/SPIRITUAL ASSESSMENT Active Problems: 
  Malignant neoplasm of retroperitoneum and peritoneum ([facility] [address] (5/17/2019) Past Medical History:  
Diagnosis Date  Acid reflux  GERD (gastroesophageal reflux disease)  Hypertension  Other ill-defined conditions(799.89)   
 high cholesterol  Psychiatric disorder   
 depression  Thyroid disease   
 hyperthyroidism Past Surgical History:  
Procedure Laterality Date  COLONOSCOPY N/A 11/30/2017 COLONOSCOPY performed by Alee Sena MD at [facility] ENDOSCOPY  COLONOSCOPY N/A 3/20/2019 COLONOSCOPY performed by Kehinde Montemayor MD at [facility] ENDOSCOPY  COLONOSCOPY N/A 3/28/2019 COLONOSCOPY performed by Kehinde Montemayor MD at [facility] ENDOSCOPY 2021 Bibi Soni N/A 3/20/2019 SIGMOIDOSCOPY FLEXIBLE performed by Kehinde Montemayor MD at [facility] ENDOSCOPY  
 HX CHOLECYSTECTOMY  HX GYN    
 hysterectomy  HX HEENT    
 thyroid Social History Tobacco Use  Smoking status: Never Smoker  Smokeless tobacco: Never Used Substance Use Topics  Alcohol use: Yes Alcohol/week: 0.6 oz Types: 1 Cans of beer per week Comment: Socially. Family History Problem Relation Age of Onset  Cancer Mother  Colon Cancer Father  Cancer Father Allergies Allergen Reactions  Chlorhexidine Towelette Itching Pt c/o itching and skin dryness; no rash noted.  Codeine Itching  Lisinopril Angioedema Current Facility-Administered Medications Medication Dose Route Frequency  fentaNYL (DURAGESIC) 75 mcg/hr patch 1 Patch  1 Patch TransDERmal Q72H  
 amitriptyline (ELAVIL) tablet 25 mg  25 mg Oral QHS  acetaminophen (TYLENOL) suppository 650 mg  650 mg Rectal Q4H PRN  
 bisacodyl (DULCOLAX) suppository 10 mg  10 mg Rectal DAILY PRN  
 butalbital-acetaminophen-caffeine (FIORICET, ESGIC) -40 mg per tablet 1 Tab  1 Tab Oral Q6H PRN  
 docusate sodium (COLACE) capsule 100 mg  100 mg Oral DAILY PRN  
 famotidine (PEPCID) tablet 20 mg  20 mg Oral BID  
 haloperidol (HALDOL) 2 mg/mL oral solution 2 mg  2 mg Oral Q6H PRN  
 HYDROmorphone (DILAUDID) 1 mg/mL oral solution 1 mg  1 mg SubLINGual Q4H PRN  
 hyoscyamine SL (LEVSIN/SL) tablet 0.125 mg  0.125 mg SubLINGual Q4H PRN  
 LORazepam (INTENSOL) 2 mg/mL oral concentrate 1 mg  1 mg SubLINGual Q4H PRN  
  morphine (ROXANOL) 100 mg/5 mL (20 mg/mL) concentrated solution 5 mg  5 mg Oral Q3H PRN  
 ondansetron (ZOFRAN ODT) tablet 4 mg  4 mg Oral Q8H PRN  
 [START ON 5/18/2019] pantoprazole (PROTONIX) tablet 40 mg  40 mg Oral ACB  prochlorperazine (COMPAZINE) tablet 5 mg  5 mg Oral Q6H PRN  
 QUEtiapine (SEROquel) tablet 25 mg  25 mg Oral QHS PHYSICAL EXAM  
 
Wt Readings from Last 3 Encounters:  
05/14/19 54 kg (119 lb) 05/02/19 54.2 kg (119 lb 7.8 oz) 04/30/19 57.2 kg (126 lb) Visit Vitals /73 (BP 1 Location: Right arm, BP Patient Position: At rest) Pulse (!) 110 Temp 98.3 °F (36.8 °C) Resp 18 SpO2 100% Pertinent Lab and or Imaging Tests: 
Lab Results Component Value Date/Time Sodium 133 (L) 05/06/2019 03:28 AM  
 Potassium 4.0 05/06/2019 03:28 AM  
 Chloride 103 05/06/2019 03:28 AM  
 CO2 22 05/06/2019 03:28 AM  
 Anion gap 8 05/06/2019 03:28 AM  
 Glucose 93 05/06/2019 03:28 AM  
 BUN 3 (L) 05/06/2019 03:28 AM  
 Creatinine 0.59 05/06/2019 03:28 AM  
 BUN/Creatinine ratio 5 (L) 05/06/2019 03:28 AM  
 GFR est AA >60 05/06/2019 03:28 AM  
 GFR est non-AA >60 05/06/2019 03:28 AM  
 Calcium 8.6 05/06/2019 03:28 AM  
 
Lab Results Component Value Date/Time  Protein, total 9.4 (H) 05/02/2019 12:58 PM  
 Albumin 2.9 (L) 05/02/2019 12:58 PM  
 
   
 
Phillip Tobin NP

## 2019-05-17 NOTE — ROUTINE PROCESS
Bedside, Verbal and Written shift change report given to Susy Dixon RN (oncoming nurse) by Felisa Quick RN (offgoing nurse). Report included the following information SBAR, Kardex, MAR and Recent Results.

## 2019-05-17 NOTE — HOSPICE
Patient going into Respite today at [address] [facility]. Attempted to call floor to give report on patient but did not receive an answer. Will call back.

## 2019-05-18 NOTE — PROGRESS NOTES
Problem: Falls - Risk of 
Goal: *Absence of Falls Description Document Redgie Curet Fall Risk and appropriate interventions in the flowsheet. Outcome: Progressing Towards Goal 
  
Problem: Pressure Injury - Risk of 
Goal: *Prevention of pressure injury Description Document Pankaj Scale and appropriate interventions in the flowsheet.  
Outcome: Progressing Towards Goal

## 2019-05-18 NOTE — HOSPICE
[facility] 
[phone] Respite Nursing Note Patient Name: Meg Mckeon YOB: 1955 Age: 71 y.o. Date of Visit: 05/18/19 Facility of Care: [facility] Patient Room: [facility]/ Hospice Attending: Leana Plummer MD 
Hospice Diagnosis: Malignant neoplasm of retroperitoneum and peritoneum ([address] [C48.0, C48.2] Level of Care: Respite ASSESSMENT & PLAN 1. Patient admitted to Respite level of care due to:  
 [x] Caregiver Exhaustion:  Landen Hatfield [x] Caregiver Breakdown: pt having behavioral issues. Pt calls police when upset with . 2.  Continue current medications with change of Dilaudid to PO from SL. 
3.  Increase frequency of PRN Morphine and Lorazepam to every 2 hours. 4.  Added Gabapentin 600mg po at bedtime per home medications. Nursing Interventions: Pt states unable to sleep, unsure of home medications. Will complete medication reconciliation. Spiritual Interventions:  and MSW visits as needed. Psych/ Social/ Emotional Interventions: Active listening and emotional support provided. Pt appears calm and able to express needs. Care Coordination Needs: with nurse Shelly Ozuna. Care Plan and New Orders Discussed / Approved with Philly Deluna NP Description History and Chart Review List number of doses of PRN medications in last 24 hours: 
Medication 1: Dilaudid 1mg SL Number of doses: 4 Medication 2: Roxanol 5mg SL Number of doses: 5 Medication 3: Ativan 1mg SL Number of doses: 1 DISCHARGE PLANNING 1. Discharge Plan: Return home Wed, May 22. 
2. Patient/Family teaching: changes to medication and route. 3. Response to patient/family teaching: agree with plan of care. ASSESSMENT   
KARNOFSKY: 30% FAST:  
 
Prognosis estimated based on 05/18/19 clinical assessment is:  
[] Few to Many Hours [] Hours to Days  
[] Few to Many Days  
[] Days to Lizeth Guppy [x] Few to Many Weeks  
[] Weeks to Months [] Few to Many Months Quality Measure: Patient self-reports:  [x]  Yes   []  No 
ESAS:  
Time of Assessment: 12:00 Pain (1-10):5 Fatigue (1-10): 3 Shortness of breath (1-10):1 Nausea (1-10): 4 Appetite (1-10): Anxiety: (1-10): Depression: (1-10): Well-being: (1-10): Constipation: _ Yes  _ No 
 
CLINICAL INFORMATION Patient Vitals for the past 12 hrs: 
 Temp Pulse Resp BP SpO2  
05/18/19 0823 98.2 °F (36.8 °C) (!) 105 20 109/77 96 % Currently this patient has: 
[] Supplemental O2  
[] IV   
[] PICC [] PORT  
[] NG Tube   
[] PEG Tube  
[] Ostomy    
[] Franco draining _______ urine 
[] Other SIGNS/PHYSICAL FINDINGS Skin: 
[] Warm, dry, supple, intact and color normal for race 
[] Warm  
[] Dry  
[] Cool    
[] Clammy      
[] Diaphoretic Turgor 
 [] Normal 
 [] Decreased Color: 
 [] Pink 
 [] Pale 
 [] Cyanotic 
 [] Erythema 
 [] Jaundice [] Normal for Race 
[]  Wounds: 
 
Neuro: 
[] Lethargy 
[] Restlessness / agitation 
[] Confusion / delirium 
[] Hallucinations 
[] Responds to maximal stimulation 
[] Unresponsive 
[] Seizures Cardiac:[] Dyspnea on Exertion 
[] JVD [] Murmur 
[] Palpitations [] Hypotension 
[] Hypertension 
[] Tachycardia [] Bradycardia 
[] Irregular HR 
[] Pulses Decreased 
[] Pulses Absent 
[] Edema:       (Location, Grade and Pitting) [] Mottling:      (Location) Respiratory:Breath sounds:  
 [] Diminished 
 [] Wheeze 
 [] Rhonchi 
 [] Rales  
[] Even and unlabored 
[] Labored:           
[] Cough 
 [] Non Productive 
 [] Productive 
  [] Description:          
[] Deep suctioned  
[] O2 at ___ LPM 
[] High flow oxygen greater than 10 LPM 
[] Bi-Pap GI: 
[] Abdomen (describe) [] Ascites 
[] Nausea 
[] Vomiting 
[] Incontinent of bowels 
[] Bowel sounds (yes/no) [] Diarrhea 
[] Constipation (see above including last bowel movement) [] Checked for impaction 
[x] Last BM 5/18 Nutrition Diet:__Reg________ Appetite:  
[] Good  
[x] Fair [] Poor  
[] Tube Feeding :[x] Voiding 
[] Incontinent  
[] Franco Musculoskeletal 
[] Balance/[place] Unsteady [x] Weak Strength:  
 [] Normal  
 [] Limited [x] Decreasing Activities:  
 [] Up as tolerated 
 [x] Bedridden  
 [] Specify: 
 
SAFETY [x] 24 hr. Caregiver [x] Side rails ? [x] Hospital bed  
[] Reviewed Falls & Safety ALLERGIES AND MEDICATIONS Allergies: Allergies Allergen Reactions  Chlorhexidine Towelette Itching Pt c/o itching and skin dryness; no rash noted.  Codeine Itching  Lisinopril Angioedema Current Facility-Administered Medications Medication Dose Route Frequency  LORazepam (INTENSOL) 2 mg/mL oral concentrate 1 mg  1 mg SubLINGual Q2H PRN  
 morphine (ROXANOL) 100 mg/5 mL (20 mg/mL) concentrated solution 5 mg  5 mg Oral Q2H PRN  
 HYDROmorphone (DILAUDID) tablet 1 mg  1 mg Oral Q4H PRN  
 gabapentin (NEURONTIN) capsule 600 mg  600 mg Oral QHS  fentaNYL (DURAGESIC) 75 mcg/hr patch 1 Patch  1 Patch TransDERmal Q72H  
 amitriptyline (ELAVIL) tablet 25 mg  25 mg Oral QHS  acetaminophen (TYLENOL) suppository 650 mg  650 mg Rectal Q4H PRN  
 bisacodyl (DULCOLAX) suppository 10 mg  10 mg Rectal DAILY PRN  
 butalbital-acetaminophen-caffeine (FIORICET, ESGIC) -40 mg per tablet 1 Tab  1 Tab Oral Q6H PRN  
 docusate sodium (COLACE) capsule 100 mg  100 mg Oral DAILY PRN  
 famotidine (PEPCID) tablet 20 mg  20 mg Oral BID  
 haloperidol (HALDOL) 2 mg/mL oral solution 2 mg  2 mg Oral Q6H PRN  
 hyoscyamine SL (LEVSIN/SL) tablet 0.125 mg  0.125 mg SubLINGual Q4H PRN  
 ondansetron (ZOFRAN ODT) tablet 4 mg  4 mg Oral Q8H PRN  pantoprazole (PROTONIX) tablet 40 mg  40 mg Oral ACB  prochlorperazine (COMPAZINE) tablet 5 mg  5 mg Oral Q6H PRN  
 QUEtiapine (SEROquel) tablet 25 mg  25 mg Oral QHS Visit Time In: 12:00 Visit Time Out: 12:45

## 2019-05-18 NOTE — PROGRESS NOTES
Oncology End of Shift Note Bedside shift change report given to Ronda Samuel RN (incoming nurse) by Cash Head (outgoing nurse) on Shyann Hull. Report included the following information SBAR, Kardex and MAR. Shift Summary: Patient's pain and nausea does not seem to be controlled. Patient vomited multiple times today. Gabapentin is ordered for patient. Zenobia is available q2 prn, ativan q2 prn and sublingual dilaudid was switched to oral. Ostomy was emptied twice during shift. Issues for Physician to Address:  None. Patient on Cardiac Monitoring? [] Yes 
[x] No 
 
Rhythm:   
 
 
 
 
 
 
Cash Head

## 2019-05-18 NOTE — PROGRESS NOTES
Oncology End of Shift Note Bedside shift change report given to [facility], RN (incoming nurse) by Akhil Juarez (outgoing nurse) on Candi Blazing. Report included the following information SBAR, Kardex, Intake/Output, MAR and Recent Results. Shift Summary: changed colostomy bag and wafer x2 d/t leaking. Pt medicated multiples times for pain. Issues for Physician to Address:  Pain management Patient on Cardiac Monitoring? [] Yes 
[x] No 
 
Rhythm:   
 
 
 
Shift Events See above Akhil Juarez

## 2019-05-19 NOTE — PROGRESS NOTES
Problem: Falls - Risk of 
Goal: *Absence of Falls Description Document Marilee Contreras Fall Risk and appropriate interventions in the flowsheet.  
Outcome: Progressing Towards Goal

## 2019-05-19 NOTE — PROGRESS NOTES
Oncology End of Shift Note Bedside shift change report given to [facility], RN (incoming nurse) by Nawaf Gonzalez (outgoing nurse) on Jackie Rico. Report included the following information SBAR, Kardex, Intake/Output, MAR and Recent Results. Shift Summary: 2 doses of dilaudid tablets. Pt does not like the \"squirt\" pain medication. May need to increase dose to the full 2 mg tabs. Slept well with addition of gabapentin Issues for Physician to Address:  Pain management Patient on Cardiac Monitoring? [] Yes 
[x] No 
 
Rhythm:   
 
 
 
Shift Events See above Nawaf Gonzalez

## 2019-05-19 NOTE — PROGRESS NOTES
Oncology End of Shift Note Bedside shift change report given to Perla Michele RN (incoming nurse) by Alphonso Mazariegos (outgoing nurse) on [facility]. Report included the following information SBAR, Kardex and MAR. Shift Summary: Patient received pain medication throughout the shift. Patient did not feel as nauseated as yesterday. Colostomy was emptied multiple times. Patient sat in the chair for a few hours. Issues for Physician to Address:  None. Patient on Cardiac Monitoring? [] Yes 
[x] No 
 
Rhythm:   
 
 
 
 
Alphonso Mazariegos

## 2019-05-20 NOTE — PROGRESS NOTES
Problem: Falls - Risk of 
Goal: *Absence of Falls Description Document Trell Carranza Fall Risk and appropriate interventions in the flowsheet. Outcome: Progressing Towards Goal 
  
Problem: Patient Education: Go to Patient Education Activity Goal: Patient/Family Education Outcome: Progressing Towards Goal 
  
Problem: Pressure Injury - Risk of 
Goal: *Prevention of pressure injury Description Document Pankaj Scale and appropriate interventions in the flowsheet. Outcome: Progressing Towards Goal 
  
Problem: Patient Education: Go to Patient Education Activity Goal: Patient/Family Education Outcome: Progressing Towards Goal

## 2019-05-20 NOTE — HOSPICE
[facility] Help to Those in Need 
[phone] Routine Nursing Note Patient Name: Mariel Santiago YOB: 1955 Age: 71 y.o. Date of Visit: 05/20/19 Facility of Care: [facility] Patient Room: [address] [place] [address] Hospice Attending: Alyssa Rios MD 
Hospice Diagnosis: Malignant neoplasm of retroperitoneum and peritoneum ([facility] [address] [C48.0, C48.2] Level of Care:  
 
ASSESSMENT, PLAN AND INTERVENTIONS 1. Patient admitted to Routine Level of Care 2. RESPITE - Day 3 3. Spiritual Interventions: None thus far today Psych/ Social/ Emotional Interventions: Very introspective with minimal interaction with this RN, despite offers to assist in repositioning and dietary needs. Care Coordination Needs: 
 
Care plan and New Orders discussed / approved with  MD. Dr. Roxann Yeung RN Description History and Chart Review List number of doses of PRN medications in last 24 hours: 
Medication 1:HYDROMORPHONE 2MG 06:00 Number of doses: X1 Medication 2: FENTANYL 75MG PATCH Number of doses: SCHEDULED Q72HRS Medication 3:  
Number of doses: 
 
DISCHARGE PLANNING 1. Discharge Plan: Return to former disposition 2. Patient/Family teaching: Ms. Malia Gaspar aware of length of respite stay 3. Response to patient/family teaching: N/A 
 
ASSESSMENT   
KARNOFSKY: 30 
 
Prognosis estimated based on 05/20/19 clinical assessment is:  
[] Few to Many Hours [] Hours to Days  
[] Few to Many Days  
[] Days to [facility] [x] Few to Many Weeks  
[] Weeks to Months  
[] Few to Many Months Quality Measure: Patient self-reports:  [x] Yes    [] No 
ESAS:  
Time of Assessment: 12:30pm 
Pain (1-10):0/10 denies at present time Fatigue (1-10): 2/10 Shortness of breath (1-10)1/10 Nausea (1-1 2/10 Appetite (1-10): 2/10 Anxiety: (1-10): Depression: (1-10): Well-being: (1-10): Constipation: _ Yes  _x No 
LAST BM: 5/20/19 CLINICAL INFORMATION Patient Vitals for the past 12 hrs: Temp Pulse Resp BP SpO2  
05/20/19 0800 98.1 °F (36.7 °C) (!) 109 16 108/70 98 % Currently this patient has: 
[] Supplemental O2  
[] IV   
[] PICC [] PORT  
[] NG Tube   
[] PEG Tube  
[] Ostomy    
[] Franco draining _______ urine 
[] Other:  
 
SIGNS/PHYSICAL FINDINGS Skin (including wound): 
[] Warm, dry, supple, intact and color normal for race [x] Warm  
[x] Dry  
[] Cool    
[] Clammy      
[] Diaphoretic Turgor 
 [] Normal 
 [x] Decreased Color: 
 [] Pink [x] Pale 
 [] Cyanotic 
 [] Erythema 
 [] Jaundice [] Normal for Race 
[x]  Wounds: central abdominal dressing dry and intact Neuro: 
[x] Lethargy 
[] Restlessness / agitation 
[] Confusion / delirium 
[] Hallucinations 
[] Responds to maximal stimulation 
[] Unresponsive 
[] Seizures Cardiac:[x] Dyspnea on Exertion 
[] JVD [] Murmur 
[] Palpitations [x] Hypotension 
[] Hypertension 
[x] Tachycardia [] Bradycardia 
[] Irregular HR 
[] Pulses Decreased 
[] Pulses Absent 
[] Edema:       (Location, Grade and Pitting) [] Mottling:      (Location) Respiratory:Breath sounds:  
 [x] Diminished 
 [] Wheeze 
 [] Rhonchi 
 [] Rales [x] Even and unlabored 
[] Labored:           
[] Cough 
 [] Non Productive 
 [] Productive 
  [] Description:          
[] Deep suctioned  
[] O2 at ___ LPM 
[] High flow oxygen greater than 10 LPM 
[] Bi-Pap GI [x] Abdomen soft - mildly tender 1/10 [] Ascites [x] Nausea 1/10 [] Vomiting 
[] Incontinent of bowels [x] Bowel sounds YES 
[] Diarrhea 
[] Constipation (see above including last bowel movement) [] Checked for impaction 
[x] Last BM 5/20/19 COLOSTOMY Nutrition Diet:__________ Appetite:  
[] Good  
[] Fair  
[x] Poor  
[] Tube Feeding  [x] Voiding 
[] Incontinent  
[] Franco Musculoskeletal 
[] Balance/[place] Unsteady [x] Weak Strength:  
 [] Normal  
 [x] Limited  
 [] Decreasing Activities:  
 [x] Up as tolerated 
 [] Bedridden  
 [] Specify: 
 
SAFETY [x] 24 hr. Caregiver [x] Side rails ? [x] Hospital bed  
[x] Reviewed Falls & Safety ALLERGIES AND MEDICATIONS Allergies: Allergies Allergen Reactions  Chlorhexidine Towelette Itching Pt c/o itching and skin dryness; no rash noted.  Codeine Itching  Lisinopril Angioedema Current Facility-Administered Medications Medication Dose Route Frequency  LORazepam (INTENSOL) 2 mg/mL oral concentrate 1 mg  1 mg SubLINGual Q2H PRN  
 morphine (ROXANOL) 100 mg/5 mL (20 mg/mL) concentrated solution 5 mg  5 mg Oral Q2H PRN  
 HYDROmorphone (DILAUDID) tablet 1 mg  1 mg Oral Q4H PRN  
 gabapentin (NEURONTIN) capsule 600 mg  600 mg Oral QHS  fentaNYL (DURAGESIC) 75 mcg/hr patch 1 Patch  1 Patch TransDERmal Q72H  
 amitriptyline (ELAVIL) tablet 25 mg  25 mg Oral QHS  acetaminophen (TYLENOL) suppository 650 mg  650 mg Rectal Q4H PRN  
 bisacodyl (DULCOLAX) suppository 10 mg  10 mg Rectal DAILY PRN  
 butalbital-acetaminophen-caffeine (FIORICET, ESGIC) -40 mg per tablet 1 Tab  1 Tab Oral Q6H PRN  
 docusate sodium (COLACE) capsule 100 mg  100 mg Oral DAILY PRN  
 famotidine (PEPCID) tablet 20 mg  20 mg Oral BID  
 haloperidol (HALDOL) 2 mg/mL oral solution 2 mg  2 mg Oral Q6H PRN  
 hyoscyamine SL (LEVSIN/SL) tablet 0.125 mg  0.125 mg SubLINGual Q4H PRN  
 ondansetron (ZOFRAN ODT) tablet 4 mg  4 mg Oral Q8H PRN  pantoprazole (PROTONIX) tablet 40 mg  40 mg Oral ACB  prochlorperazine (COMPAZINE) tablet 5 mg  5 mg Oral Q6H PRN  
 QUEtiapine (SEROquel) tablet 25 mg  25 mg Oral QHS Visit Time In:12:30 Visit Time Out: 13:15

## 2019-05-20 NOTE — PROGRESS NOTES
Oncology End of Shift Note Bedside shift change report given to Jose Luis Muir RN (incoming nurse) by Lu Ortiz (outgoing nurse) on [facility]. Report included the following information SBAR, Kardex, Intake/Output, MAR and Accordion. Shift Summary: patient respite day 3, PRN pain meds given x1, ostomy found to be leaking so had to change ostomy bag, also had to change abdominal dressing due to being soiled from ostomy bag leaking Issues for Physician to Address:   
 
Patient on Cardiac Monitoring? [] Yes 
[x] No 
 
Rhythm:   
 
 
 
Shift Events Lu Ortiz

## 2019-05-21 NOTE — HOSPICE
[facility] 
[phone] Respite Nursing Note Patient Name: Enriqueta Gomez YOB: 1955 Age: 71 y.o. Date of Visit: 05/21/19 Facility of Care: [facility] Patient Room: [address] [place] [address] Hospice Attending: Anshu Schwartz MD 
Hospice Diagnosis: Malignant neoplasm of retroperitoneum and peritoneum ([facility] [address] [C48.0, C48.2] Level of Care: Respite ASSESSMENT & PLAN 1. Patient admitted to Respite level of care due to:  
 [x] Caregiver Exhaustion: DAY 4 
 [] Caregiver Breakdown:  
2.    
3.   
 
Nursing Interventions: RECEIVED PRDERS FOR YEAST INFECTION Spiritual Interventions:  
Psych/ Social/ Emotional Interventions: PT DIFFICULT TO UNDERSTAND Care Coordination Needs: DISCHARGE PLANNING Care Plan and New Orders Discussed / Approved with Josette Hunt MD. Description History and Chart Review List number of doses of PRN medications in last 24 hours: 
Medication 1: HYDROMORPHONE 
Number of doses:4 Medication 2: MORPHINE Number of doses: 1 Medication 3:  
Number of doses: 
 
DISCHARGE PLANNING 1. Discharge Plan: [facility] 2. Patient/Family teaching: NO FAMILY IN ROOM 3. Response to patient/family teaching: NONE 
 
ASSESSMENT   
KARNOFSKY: 30 
 
FAST: N/A Prognosis estimated based on 05/21/19 clinical assessment is:  
[] Few to Many Hours [] Hours to Days [x] Few to Many Days  
[] Days to Weeks  
[] Few to Many Weeks  
[] Weeks to Months  
[] Few to Many Months Quality Measure: Patient self-reports:  [x]  Yes   []  No 
ESAS:  
Time of Assessment: 1300 Pain (1-10):6 Fatigue (1-10): 6 Shortness of breath (1-10):0 Nausea (1-10): 0 Appetite (1-10): Anxiety: (1-10): Depression: (1-10): Well-being: (1-10): Constipation: _ Yes  _ No 
 
CLINICAL INFORMATION Patient Vitals for the past 12 hrs: 
 Temp Pulse Resp BP SpO2  
05/21/19 0815 97.4 °F (36.3 °C) (!) 102 18 91/66 99 % Currently this patient has: 
[] Supplemental O2  
 [] IV   
[] PICC [] PORT  
[] NG Tube   
[] PEG Tube  
[x] Ostomy    
[] Franco draining _______ urine 
[] Other SIGNS/PHYSICAL FINDINGS Skin: 
[] Warm, dry, supple, intact and color normal for race [x] Warm  
[] Dry  
[] Cool    
[] Clammy      
[] Diaphoretic Turgor 
 [] Normal 
 [x] Decreased Color: 
 [] Pink [x] Pale 
 [] Cyanotic 
 [] Erythema 
 [] Jaundice [] Normal for Race 
[]  Wounds: 
 
Neuro: 
[x] Lethargy 
[] Restlessness / agitation 
[x] Confusion / delirium 
[] Hallucinations 
[] Responds to maximal stimulation 
[] Unresponsive 
[] Seizures Cardiac:[] Dyspnea on Exertion 
[] JVD [] Murmur 
[] Palpitations [] Hypotension 
[] Hypertension 
[] Tachycardia [] Bradycardia 
[] Irregular HR 
[] Pulses Decreased 
[] Pulses Absent 
[] Edema:       
[] Mottling:       
 
Respiratory:Breath sounds:  
 [x] Diminished 
 [] Wheeze 
 [] Rhonchi 
 [] Rales [x] Even and unlabored 
[] Labored:           
[] Cough 
 [] Non Productive 
 [] Productive 
  [] Description:          
[] Deep suctioned  
[] O2 at ___ LPM 
[] High flow oxygen greater than 10 LPM 
[] Bi-Pap GI: 
[x] Abdomen FIRM, NON DISTENDED [] Ascites 
[] Nausea 
[] Vomiting 
[] Incontinent of bowels [x] Bowel sounds (yes) [x] Diarrhea 
[] Constipation (see above including last bowel movement) [] Checked for impaction 
[x] Last BM 5/21/2019 Nutrition Diet:__________ Appetite:  
[] Good  
[] Fair  
[x] Poor  
[] Tube Feeding :[] Voiding 
[x] Incontinent  
[] Franco Musculoskeletal 
[] Balance/[place] Unsteady [x] Weak Strength:  
 [] Normal  
 [] Limited [x] Decreasing Activities:  
 [] Up as tolerated 
 [x] Bedridden  
 [] Specify: 
 
SAFETY [] 24 hr. Caregiver [x] Side rails ? [x] Hospital bed  
[x] Reviewed Falls & Safety ALLERGIES AND MEDICATIONS Allergies: Allergies Allergen Reactions  Chlorhexidine Towelette Itching Pt c/o itching and skin dryness; no rash noted.  Codeine Itching  Lisinopril Angioedema Current Facility-Administered Medications Medication Dose Route Frequency  nystatin (MYCOSTATIN) 100,000 unit/mL oral suspension 500,000 Units  500,000 Units Oral QID  fluconazole (DIFLUCAN) tablet 200 mg  200 mg Oral ONCE  
 [START ON 5/22/2019] fluconazole (DIFLUCAN) tablet 100 mg  100 mg Oral DAILY  nystatin (MYCOSTATIN) 100,000 unit/gram powder   Topical BID  LORazepam (INTENSOL) 2 mg/mL oral concentrate 1 mg  1 mg SubLINGual Q2H PRN  
 morphine (ROXANOL) 100 mg/5 mL (20 mg/mL) concentrated solution 5 mg  5 mg Oral Q2H PRN  
 HYDROmorphone (DILAUDID) tablet 1 mg  1 mg Oral Q4H PRN  
 gabapentin (NEURONTIN) capsule 600 mg  600 mg Oral QHS  fentaNYL (DURAGESIC) 75 mcg/hr patch 1 Patch  1 Patch TransDERmal Q72H  
 amitriptyline (ELAVIL) tablet 25 mg  25 mg Oral QHS  acetaminophen (TYLENOL) suppository 650 mg  650 mg Rectal Q4H PRN  
 bisacodyl (DULCOLAX) suppository 10 mg  10 mg Rectal DAILY PRN  
 butalbital-acetaminophen-caffeine (FIORICET, ESGIC) -40 mg per tablet 1 Tab  1 Tab Oral Q6H PRN  
 docusate sodium (COLACE) capsule 100 mg  100 mg Oral DAILY PRN  
 famotidine (PEPCID) tablet 20 mg  20 mg Oral BID  
 haloperidol (HALDOL) 2 mg/mL oral solution 2 mg  2 mg Oral Q6H PRN  
 hyoscyamine SL (LEVSIN/SL) tablet 0.125 mg  0.125 mg SubLINGual Q4H PRN  
 ondansetron (ZOFRAN ODT) tablet 4 mg  4 mg Oral Q8H PRN  pantoprazole (PROTONIX) tablet 40 mg  40 mg Oral ACB  prochlorperazine (COMPAZINE) tablet 5 mg  5 mg Oral Q6H PRN  
 QUEtiapine (SEROquel) tablet 25 mg  25 mg Oral QHS Visit Time In: 1300 Visit Time Out: 1400

## 2019-05-21 NOTE — PROGRESS NOTES
Oncology End of Shift Note Bedside shift change report given to [facility], RN (incoming nurse) by Musa Tidwell (outgoing nurse) on Donnamdarrine Speemmaer. Report included the following information SBAR, Kardex and MAR.

## 2019-05-21 NOTE — HOSPICE
[facility] Good Help to Those in Need 
[phone] Patient Name: Pam Garcia YOB: 1955 Date of Provider Hospice Visit: 05/21/19 Level of Care:   [] General Inpatient (GIP)    [] Routine   [x] Respite Current Location of Care: 
[] [facility] [] [facility] [x] [facility] [] [facility] [] [facility], patient referred from: 
[] [facility] [] [facility] [] [facility] [] [facility] [] Home [] Other:  
 
Date of Original Hospice Admission: 5/14/19 Hospice Medical Director at time of admission: Dr Halie Hicks Principle Hospice Diagnosis: Secondary malignant neoplasm of retroperitoneum and peritoneum ([facility] [facility] Key Garcia is a 71y.o. year old who was admitted to [facility]. The patient's principle diagnosis has resulted in weakness, debility, pain issues, nausea, anorexia, ascites, bowel obstruction. Functionally, the patient's Karnofsky and/or Palliative Performance Scale has declined over a period of weeks and is estimated at 30-40% . The patient is dependent on the following ADLs: 
She is dependent for all ALDs. CT Scan 5/9/19 Objective information that support this patients limited prognosis includes:  
IMPRESSION:  
1. Decreased pleural effusions with bilateral calcified pleural plaques 
suggesting asbestos exposure. 2. Decreased ascites. 3. Status post right colectomy with right lower quadrant ileostomy and 
decompressed colon. 4. Partial small bowel obstruction presumably secondary to adhesions as there is 
a lot of stranding around the distal small bowel in the pelvis and right lower 
quadrant. 5. Right paracolic gutter fluid collection. The pigtail catheter has been 
removed. 6. Status post hysterectomy with air and fluid in the vagina. 
  
The patient/family chose comfort measures with the support of Hospice. HOSPICE DIAGNOSES Active Symptoms: 
1. Weakness, fatigue 2. Nausea 3. Abdominal pain PLAN 1. Admitted respite for caregiver breakdown 2. Continue pain medication for abdominal pain 3. Add nystatin oral susp for oral thrush and diflucan and nystatin for candida rash in groin and buttocks area 4. Franco catheter-pt getting weaker and with fungal rash 5.  
 
6.  and SW to support family needs 7. Disposition: home after respite Prognosis estimated based on 05/21/19 clinical assessment is:  
[] Hours to Days [x] Days to Weeks   
[] Other: 
 
Communicated plan of care with: Hospice Case Manager; Hospice IDT; Care Team 
 
 GOALS OF CARE Patient/Medical POA stated Goal of Care:  Comfort care 
 
[x] I have reviewed and/or updated ACP information in the Advance Care Planning Navigator. This information is available in the [address] link in the patient's chart header. Primary Decision [facility] Agent):   Primary Decision Maker: [address] Reuben Workman" - Son - [phone] Secondary Decision Maker: Renaldo Foster - Friend - [phone] Resuscitation Status: DNR If DNR is there a Durable DNR on file? : [x] Yes [] No (If no, complete Durable DNR) REVIEW OF SYSTEMS The following systems were: [] reviewed  [] unable to be reviewed Positive ROS include: 
Constitutional: fatigue, weakness, in pain, short of breath Ears/nose/mouth/throat: increased airway secretions Respiratory:shortness of breath, wheezing Gastrointestinal:poor appetite, nausea, vomiting, abdominal pain, constipation, diarrhea Musculoskeletal:pain, deformities, swelling legs Neurologic:confusion, hallucinations, weakness Psychiatric:anxiety, feeling depressed, poor sleep Endocrine:  
 
  
 
 FUNCTIONAL ASSESSMENT Palliative Performance Scale (PPS): 30% PSYCHOSOCIAL/SPIRITUAL ASSESSMENT Active Problems: 
  Malignant neoplasm of retroperitoneum and peritoneum ([facility] [address] (5/17/2019) Past Medical History:  
Diagnosis Date  Acid reflux  GERD (gastroesophageal reflux disease)  Hypertension  Other ill-defined conditions(799.89)   
 high cholesterol  Psychiatric disorder   
 depression  Thyroid disease   
 hyperthyroidism Past Surgical History:  
Procedure Laterality Date  COLONOSCOPY N/A 11/30/2017 COLONOSCOPY performed by Abdullahi José MD at [facility] ENDOSCOPY  COLONOSCOPY N/A 3/20/2019 COLONOSCOPY performed by Na Reyna MD at [facility] ENDOSCOPY  COLONOSCOPY N/A 3/28/2019 COLONOSCOPY performed by Na Reyna MD at [facility] ENDOSCOPY [address] N/A 3/20/2019 SIGMOIDOSCOPY FLEXIBLE performed by Na Reyna MD at [facility] ENDOSCOPY  
 HX CHOLECYSTECTOMY  HX GYN    
 hysterectomy  HX HEENT    
 thyroid Social History Tobacco Use  Smoking status: Never Smoker  Smokeless tobacco: Never Used Substance Use Topics  Alcohol use: Yes Alcohol/week: 0.6 oz Types: 1 Cans of beer per week Comment: Socially. Family History Problem Relation Age of Onset  Cancer Mother  Colon Cancer Father  Cancer Father Allergies Allergen Reactions  Chlorhexidine Towelette Itching Pt c/o itching and skin dryness; no rash noted.  Codeine Itching  Lisinopril Angioedema Current Facility-Administered Medications Medication Dose Route Frequency  nystatin (MYCOSTATIN) 100,000 unit/mL oral suspension 500,000 Units  500,000 Units Oral QID  fluconazole (DIFLUCAN) tablet 200 mg  200 mg Oral ONCE  
 [START ON 5/22/2019] fluconazole (DIFLUCAN) tablet 100 mg  100 mg Oral DAILY  nystatin (MYCOSTATIN) 100,000 unit/gram powder   Topical BID  LORazepam (INTENSOL) 2 mg/mL oral concentrate 1 mg  1 mg SubLINGual Q2H PRN  
 morphine (ROXANOL) 100 mg/5 mL (20 mg/mL) concentrated solution 5 mg  5 mg Oral Q2H PRN  
 HYDROmorphone (DILAUDID) tablet 1 mg  1 mg Oral Q4H PRN  
 gabapentin (NEURONTIN) capsule 600 mg  600 mg Oral QHS  fentaNYL (DURAGESIC) 75 mcg/hr patch 1 Patch  1 Patch TransDERmal Q72H  amitriptyline (ELAVIL) tablet 25 mg  25 mg Oral QHS  acetaminophen (TYLENOL) suppository 650 mg  650 mg Rectal Q4H PRN  
 bisacodyl (DULCOLAX) suppository 10 mg  10 mg Rectal DAILY PRN  
 butalbital-acetaminophen-caffeine (FIORICET, ESGIC) -40 mg per tablet 1 Tab  1 Tab Oral Q6H PRN  
 docusate sodium (COLACE) capsule 100 mg  100 mg Oral DAILY PRN  
 famotidine (PEPCID) tablet 20 mg  20 mg Oral BID  
 haloperidol (HALDOL) 2 mg/mL oral solution 2 mg  2 mg Oral Q6H PRN  
 hyoscyamine SL (LEVSIN/SL) tablet 0.125 mg  0.125 mg SubLINGual Q4H PRN  
 ondansetron (ZOFRAN ODT) tablet 4 mg  4 mg Oral Q8H PRN  pantoprazole (PROTONIX) tablet 40 mg  40 mg Oral ACB  prochlorperazine (COMPAZINE) tablet 5 mg  5 mg Oral Q6H PRN  
 QUEtiapine (SEROquel) tablet 25 mg  25 mg Oral QHS PHYSICAL EXAM  
 
Wt Readings from Last 3 Encounters:  
05/14/19 119 lb (54 kg) 05/02/19 119 lb 7.8 oz (54.2 kg) 04/30/19 126 lb (57.2 kg) Visit Vitals BP 91/66 (BP 1 Location: Right arm, BP Patient Position: At rest) Pulse (!) 102 Temp 97.4 °F (36.3 °C) Resp 18 SpO2 99% Pertinent Lab and or Imaging Tests: 
Lab Results Component Value Date/Time Sodium 133 (L) 05/06/2019 03:28 AM  
 Potassium 4.0 05/06/2019 03:28 AM  
 Chloride 103 05/06/2019 03:28 AM  
 CO2 22 05/06/2019 03:28 AM  
 Anion gap 8 05/06/2019 03:28 AM  
 Glucose 93 05/06/2019 03:28 AM  
 BUN 3 (L) 05/06/2019 03:28 AM  
 Creatinine 0.59 05/06/2019 03:28 AM  
 BUN/Creatinine ratio 5 (L) 05/06/2019 03:28 AM  
 GFR est AA >60 05/06/2019 03:28 AM  
 GFR est non-AA >60 05/06/2019 03:28 AM  
 Calcium 8.6 05/06/2019 03:28 AM  
 
Lab Results Component Value Date/Time Protein, total 9.4 (H) 05/02/2019 12:58 PM  
 Albumin 2.9 (L) 05/02/2019 12:58 PM  
 
   
15 minutes D/w hospice NR 
Alex Cody MD

## 2019-05-21 NOTE — PROGRESS NOTES
Oncology End of Shift Note Bedside shift change report given to Lea Barahona RN (incoming nurse) by Milli Lopez (outgoing nurse) on [facility]. Report included the following information SBAR, Kardex and MAR. Shift Summary: Patient is started on nystatin oral and powder for excoriation. Diflucan was started as patient has yeast infection. Patient refused to let nurse attempt to insert sheets. Patient will be discharged tomorrow. Issues for Physician to Address:  None. Patient on Cardiac Monitoring? [] Yes 
[x] No 
 
Rhythm:   
 
 
 
 
 
Milli Lopez

## 2019-05-21 NOTE — PROGRESS NOTES
5:27 PM RN attempted to insert sheets, RN unsuccessful. Patient is refusing to let RN attempt to insert sheets.

## 2019-05-22 NOTE — INTERDISCIPLINARY ROUNDS
Oncology Interdisciplinary rounds were held today to discuss patient plan of care and outcomes. The following members were present: Nursing, Physician, Case Management, Pharmacy, and PT/OT Actual Length of Stay: 5 Expected Length of Stay: - - - Plan            Discharge Respite Care Home with hospice? ?

## 2019-05-22 NOTE — PROGRESS NOTES
I have reviewed discharge instructions with the patient and family. The patient and family verbalized understanding. Discharge medications reviewed with patient and family and appropriate educational materials and side effects teaching were provided. Follow-up appointments reviewed. Opportunity for questions and clarification was provided. .  Patient's belongings gathered and sent with patient. Patient is ready for discharge. [facility] picked patient up to take home. Ken Arias

## 2019-05-22 NOTE — HOSPICE
[employer] RN note:  Discharge delayed until 4:30pm via [facility]. Family meeting with pt, family and home team this morning. Medications with family to take home. Franco placed for comfort. All in agreement with transport home post 5 day respite stay. Pt is alert, voice very faint, pt declining. Thank you for the opportunity to care for this pt and family. Please contact hospice at [phone] with any questions or concerns.

## 2019-05-22 NOTE — PROGRESS NOTES
Oncology End of Shift Note Bedside shift change report given to [facility], RN (incoming nurse) by Rhina Walker (outgoing nurse) on [facility]. Report included the following information SBAR, Kardex and MAR. Shift Summary: pt confused during night, family at bed side all night. Colostomy bag changed x1. Prn pain meds given x1 Issues for Physician to Address:   
 
Patient on Cardiac Monitoring? No 
[] Yes 
[] No 
 
Rhythm:   
 
 
 
Shift Events Rhina Walker

## 2019-05-22 NOTE — PROGRESS NOTES
Patient is discharged home with sheets per hospice RN order. Family educated on sheets care. Opportunity for questions provided.

## 2019-05-26 NOTE — DISCHARGE SUMMARY
Hospice Discharge Summary [address] [place] [address] Good Help to Those in Need Date of Admission: 5/17/2019 Date of Discharge: 5/22/2019 Quita Sutton is a 71y.o. year old who was admitted to [address] [place] [address] at [id number] with a Hospice diagnosis of Malignant ne[facility] retroperitoneum and [facility]um [facility] [C48.0, C48.2]. Colton Hanna is a 71y.o. year old who was admitted to [address] [place] [address].  
  
The patient's principle diagnosis has resulted in weakness, debility, pain issues, nausea, anorexia, ascites, bowel obstruction.  
  
  
Functionally, the patient's Karnofsky and/or Palliative Performance Scale has declined over a period of weeks and is estimated at 30-40% . The patient is dependent on the following ADLs: 
She is dependent for all ALDs. CT Scan 5/9/19 Objective information that support this patients limited prognosis includes:  
IMPRESSION:  
1. Decreased pleural effusions with bilateral calcified pleural plaques 
suggesting asbestos exposure. 2. Decreased ascites. 3. Status post right colectomy with right lower quadrant ileostomy and 
decompressed colon. 4. Partial small bowel obstruction presumably secondary to adhesions as there is 
a lot of stranding around the distal small bowel in the pelvis and right lower 
quadrant. 5. Right paracolic gutter fluid collection. The pigtail catheter has been 
removed. 6. Status post hysterectomy with air and fluid in the vagina. 
  
The patient/family chose comfort measures with the support of Hospice. The patient's care was focused on comfort and the patient passed away on 5/22/2019. Add in Hospice Summary through Plan from most recent Progress Note

## 2019-05-28 ENCOUNTER — HOME CARE VISIT (OUTPATIENT)
Dept: HOSPICE | Facility: HOSPICE | Age: 64
End: 2019-05-28
Payer: MEDICARE

## 2019-05-30 NOTE — TELEPHONE ENCOUNTER
MEENA sees notes that case management associate was involved. SW left a msg to call if this is still an issue.

## 2020-10-22 NOTE — PROGRESS NOTES
Filled at [facility], so would be too soon to refill. Pt did not  the order yet. Sent portal message asking if we should cancel the [facility] order and reorder with [facility].    Oncology End of Shift Note Bedside shift change report given to Becky Santacruz RN (incoming nurse) by Daisy Blake (outgoing nurse) on Quita Sutton. Report included the following information SBAR, Kardex, Intake/Output, MAR and Accordion. Shift Summary: no significant changes noted throughout the shift, emptied illeostomy x4 Issues for Physician to Address:   
 
Patient on Cardiac Monitoring? [] Yes 
[x] No 
 
Rhythm:   
 
 
 
Shift Events Daisy Blake

## 2022-05-10 NOTE — PROGRESS NOTES
F/U for abscess in abdominal cavity S: Ms. Lincoln Alegre was seen by me today during rounds. At this time, she is resting + comfortably. No BM. +flatus, Denies any stool. She looks better to me. Has multiple questions O: Blood pressure 151/74, pulse 78, temperature 98.4 °F (36.9 °C), resp. rate 22, height 5' 5\" (1.651 m), weight 64 kg (141 lb 1.5 oz), SpO2 97 %. Gen: Patient is in no acute distress. There is no jaundice. Lungs: Clear to auscultation bilaterally . Heart:+RRR. Abd: Soft,+distended, bowel sounds dec. Firm . Extremities: Warm. Cross sectional imaging:  
KUB Stable abdomen including nonspecific gas distention of bowel loops. Stable chest with pleural effusions and  left greater than right lower lobe airspace disease/atelectasis. Lab Results Component Value Date/Time WBC 8.5 04/16/2019 04:07 AM  
 HGB 10.9 (L) 04/16/2019 04:07 AM  
 HCT 32.4 (L) 04/16/2019 04:07 AM  
 PLATELET 995 (H) 70/03/7210 04:07 AM  
 MCV 86.2 04/16/2019 04:07 AM  
. 
Lab Results Component Value Date/Time Sodium 137 04/16/2019 04:07 AM  
 Potassium 3.3 (L) 04/16/2019 04:07 AM  
 Chloride 102 04/16/2019 04:07 AM  
 CO2 29 04/16/2019 04:07 AM  
 Anion gap 6 04/16/2019 04:07 AM  
 Glucose 109 (H) 04/16/2019 04:07 AM  
 BUN 2 (L) 04/16/2019 04:07 AM  
 Creatinine 0.37 (L) 04/16/2019 04:07 AM  
 BUN/Creatinine ratio 5 (L) 04/16/2019 04:07 AM  
 GFR est AA >60 04/16/2019 04:07 AM  
 GFR est non-AA >60 04/16/2019 04:07 AM  
 Calcium 7.7 (L) 04/16/2019 04:07 AM  
 Bilirubin, total 0.6 04/12/2019 12:47 PM  
 AST (SGOT) 17 04/12/2019 12:47 PM  
 Alk. phosphatase 104 04/12/2019 12:47 PM  
 Protein, total 7.1 04/12/2019 12:47 PM  
 Albumin 2.3 (L) 04/12/2019 12:47 PM  
 Globulin 4.8 (H) 04/12/2019 12:47 PM  
 A-G Ratio 0.5 (L) 04/12/2019 12:47 PM  
 ALT (SGPT) 12 04/12/2019 12:47 PM  
 
 
 
A: Principal Problem: 
  [facility] (4/12/2019) Active Problems: Sepsis following intra-abdominal surgery ([address] (3/28/2019) Peritoneal carcinomatosis ([address] (3/29/2019) Appendix carcinoma ([address] (3/29/2019) Small bowel obstruction ([address] (4/12/2019) Severe protein-calorie malnutrition ([address] (4/12/2019) Hyponatremia (4/12/2019) Pelvic abscess in female (4/12/2019) Anemia (4/12/2019) Sepsis ([address] (4/12/2019) Postprocedural intra-abdominal sepsis ([address] (4/13/2019) Sepsis due to pneumonia ([address] (4/13/2019) Micro: 
HEAVY ESCHERICHIA COLI Culture result: Abnormal       Final  
HEAVY KLEBSIELLA OXYTOCA Culture result: Abnormal       Final  
HEAVY KLEBSIELLA PNEUMONIAE Culture result: Abnormal       Final  
HEAVY ENTEROCOCCUS FAECALIS GROUP D Culture result: Abnormal       Final  
HEAVY ENTEROCOCCUS GALLINARUM GROUP D . Sarah Anderson INTRINSICALLY   
 
 
P:  CT scan abd/pelvis reviewed and d/w pt. X ray abdomen reviewed. Care d/w covering RN. She has flatus but no BM yet. Trial of liquids as per gen surg but she had cramping and didn't want to try more. Following with you. Heather Matthew MD 
4/16/2019 English

## 2022-06-27 NOTE — WOUND CARE
Wound care nurse called Dr Isla Sacks at ext# [phone]. Patient was discharged from ED [facility] yesterday, she stepped on her home wound VAC tubing this am, and pulled dressing off and came to ED. Patient was set up with [facility] before discharge,  in ED will contact [facility] and they will go out tomorrow to see her. Meantime, MD instructed to place a NS moist to dry dressing on patient and send her home. She is not going to be re-admitted for dressing coming off.  
 
Brittney Cool RN, [employer] 
 


## 2022-08-25 NOTE — DISCHARGE SUMMARY
No change is discharge summary for 5/13. Patient stayed an additional night because [address] [place] [address] could not admit patient until 5/14. Unable to obtain

## 2024-04-18 NOTE — PROGRESS NOTES
Patient complaining of 10/10 sharp LLQ Abdominal pain. Dr. Skyla Carmen aware ordered 25mcg IV fentanyl now. Patient refused

## 2025-05-03 NOTE — WOUND CARE
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  Was supposed to have removed on 5/1/2025  On Eliquis 5 mg p.o. twice daily -was held with heparin drip on board, can now resume   Wound VAC/Ileostomy care: Pt. [id number] placement for discharge. 1. When patient discharged from hospital Wound VAC equipment and dressing needs to be removed from patient and wound care as follows:daily, NS moist to dry dressing changes daily until wound VAC is restarted at facility. 2. Ileostomy care: pouch changes x2/week (currently Mon-Thurs schedule) on schedule or promptly if leaking. Pouch emptying needs to be done when 1/3-1/2 full - pouch will over fill and leak if not emptied. Stoma measures 1 1/8\" round. Using a [place] cut to fit pouch system #[id number] and a [place] adapt stoma barrier ring# [id number]. Vacuum Assisted Closure Mid Abdominal (Active) Vac Status Suction, continuous 5/9/2019  2:00 PM  
Suction (mmHg) 125 5/9/2019  2:00 PM  
Site Assessment Clean 5/9/2019  2:00 PM  
Dressing Status New;Occlusive 5/9/2019  2:00 PM  
Drainage Chamber Level (ml) 100 ml 5/9/2019  2:00 PM  
C[place] Changed Yes 5/9/2019  2:00 PM  
Output (ml) 100 ml 5/9/2019  2:00 PM  
Number of days:   
   
Wound Abdomen (Active) Dressing Status Removed 5/9/2019  1:59 PM  
Dressing Type Vacuum dressing 5/9/2019  1:59 PM  
Incision Site Well Approximated No 5/9/2019  1:59 PM  
Non-staged Wound Description Full thickness 5/9/2019  1:59 PM  
Wound Length (cm) 10 cm 5/9/2019  1:59 PM  
Wound Width (cm) 2 cm 5/9/2019  1:59 PM  
Wound Depth (cm) 1.5 cm 5/9/2019  1:59 PM  
Wound Volume (cm^3) 30 cm^3 5/9/2019  1:59 PM  
Condition of [place];Slough 5/9/2019  1:59 PM  
Condition of Edges Open 5/9/2019  1:59 PM  
Tissue Type Percent Pink 85 5/9/2019  1:59 PM  
Tissue Type Percent Yellow 15 5/9/2019  1:59 PM  
Drainage Amount Small 5/9/2019  1:59 PM  
Drainage Color Serosanguinous 5/9/2019  1:59 PM  
Wound Odor None 5/9/2019  1:59 PM  
Lisset-wound Assessment Intact 5/9/2019  1:59 PM  
Cleansing and Cleansing Agents  Dermal wound cleanser 5/9/2019  1:59 PM  
Dressing Changed Changed/New 5/9/2019  1:59 PM  
 Dressing Type Applied Vacuum dressing 5/9/2019  1:59 PM  
Procedure Tolerated Well 5/9/2019  1:59 PM  
Number of days: 42 Wound VAC dressing change: 
- wound re-measured today and there is measurable decrease in size. Wound is mostly (85%) granulation but there is some adherent slough (15%) in mid wound that no granulation can cover. - wound and nikolai-wound skin cleaned and prepped 
- x1 piece of black foam placed in wound, occlusively covered, TRAC pad applied and NPWT -125 mm/hg, continuous started. Ileostomy pouch changed. Patient having thicker, but still liquid stools. Color appears more tan-brown now. Plan: Spoke with Hospice nurse and she is unsure if wound VAC will continue at [facility] or not. Spoke with Dr Ayesha Metcalf and will recommend wound care orders if wound VAC is not feasible at [facility].  nurse to continue to follow patient in-house through chart and dressing changes.  
 
Agueda Cardenas RN, [facility]

## (undated) DEVICE — TOWEL 4 PLY TISS 19X30 SUE WHT

## (undated) DEVICE — Device

## (undated) DEVICE — Z DISCONTINUED PER MEDLINE LINE GAS SAMPLING O2/CO2 LNG AD 13 FT NSL W/ TBNG FILTERLINE

## (undated) DEVICE — SOLUTION IV 1000ML 0.9% SOD CHL

## (undated) DEVICE — 7" (18CM) APPX 0.31 ML, SMALLBORE EXT SET W/REMV MICROCLAVE™ CLEAR, CLAMP, ROTATING LUER: Brand: ICU MEDICAL

## (undated) DEVICE — KENDALL SCD EXPRESS SLEEVES, KNEE LENGTH, MEDIUM: Brand: KENDALL SCD

## (undated) DEVICE — DBD-PACK,LAPAROTOMY,2 REINFORCED GOWNS: Brand: MEDLINE

## (undated) DEVICE — REM POLYHESIVE ADULT PATIENT RETURN ELECTRODE: Brand: VALLEYLAB

## (undated) DEVICE — SYR 10ML LUER LOK 1/5ML GRAD --

## (undated) DEVICE — LOADING UNIT WITH DST SERIES TECHNOLOGY: Brand: GIA

## (undated) DEVICE — SET ADMIN 16ML TBNG L100IN 2 Y INJ SITE IV PIGGY BK DISP

## (undated) DEVICE — 1200 GUARD II KIT W/5MM TUBE W/O VAC TUBE: Brand: GUARDIAN

## (undated) DEVICE — DRAPE FLD WRM W44XL66IN C6L FOR INTRATEMP SYS THERMABASIN

## (undated) DEVICE — STERILE POLYISOPRENE POWDER-FREE SURGICAL GLOVES: Brand: PROTEXIS

## (undated) DEVICE — SYR 3ML LL TIP 1/10ML GRAD --

## (undated) DEVICE — LARGE, DISPOSABLE ALEXIS O C-SECTION PROTECTOR - RETRACTOR: Brand: ALEXIS ® O C-SECTION PROTECTOR - RETRACTOR

## (undated) DEVICE — SUTURE VCRL SZ 3-0 L27IN ABSRB VLT SH L26MM 1/2 CIR J784G

## (undated) DEVICE — CURVED, LARGE JAW, OPEN SEALER/DIVIDER NANO-COATED: Brand: LIGASURE IMPACT

## (undated) DEVICE — HANDLE LT SNAP ON ULT DURABLE LENS FOR TRUMPF ALC DISPOSABLE

## (undated) DEVICE — FORCEPS BX L240CM JAW DIA2.8MM L CAP W/ NDL MIC MESH TOOTH

## (undated) DEVICE — NEEDLE HYPO 18GA L1.5IN PNK S STL HUB POLYPR SHLD REG BVL

## (undated) DEVICE — SPONGE LAP 18X18IN STRL -- 5/PK

## (undated) DEVICE — MEDI-VAC YANK SUCT HNDL W/TPRD BULBOUS TIP: Brand: CARDINAL HEALTH

## (undated) DEVICE — SYR ASSEMB INFL BLLN 60ML --

## (undated) DEVICE — ESOPHAGEAL BALLOON DILATATION CATHETER: Brand: CRE FIXED WIRE

## (undated) DEVICE — INSULATED BLADE ELECTRODE: Brand: EDGE

## (undated) DEVICE — ROCKER SWITCH PENCIL BLADE ELECTRODE, HOLSTER: Brand: EDGE

## (undated) DEVICE — SUTURE PERMAHAND SZ 2-0 L30IN NONABSORBABLE BLK SILK W/O A305H

## (undated) DEVICE — INFECTION CONTROL KIT SYS

## (undated) DEVICE — BLUNT TROCAR WITH THREADED ANCHOR: Brand: VERSAONE

## (undated) DEVICE — CONTAINER SPEC 20 ML LID NEUT BUFF FORMALIN 10 % POLYPR STS

## (undated) DEVICE — KENDALL RADIOLUCENT FOAM MONITORING ELECTRODE RECTANGULAR SHAPE: Brand: KENDALL

## (undated) DEVICE — DUAL LUMEN STOMACH TUBE MULTI-FUNCTIONAL PORT: Brand: SALEM SUMP

## (undated) DEVICE — SUTURE PERMAHAND SZ 3-0 L30IN NONABSORBABLE BLK SILK BRAID A304H

## (undated) DEVICE — DEVON™ KNEE AND BODY STRAP 60" X 3" (1.5 M X 7.6 CM): Brand: DEVON

## (undated) DEVICE — SUTURE SZ 0 27IN 5/8 CIR UR-6  TAPER PT VIOLET ABSRB VICRYL J603H

## (undated) DEVICE — CATH IV AUTOGRD BC PNK 20GA 25 -- INSYTE

## (undated) DEVICE — SUTURE MCRYL SZ 5-0 L18IN ABSRB UD L19MM PS-2 3/8 CIR PRIM Y495G

## (undated) DEVICE — BAG SPEC BIOHZRD 10 X 10 IN --

## (undated) DEVICE — BASIN EMSIS 16OZ GRAPHITE PLAS KID SHP MOLD GRAD FOR ORAL

## (undated) DEVICE — NEONATAL-ADULT SPO2 SENSOR: Brand: NELLCOR

## (undated) DEVICE — FORCEPS BX L160CM DIA8MM GRSP DISECT CUP TIP NONLOCKING ROT

## (undated) DEVICE — Device: Brand: MEDEX

## (undated) DEVICE — SUT SLK 3-0 30IN SH BLK --

## (undated) DEVICE — (D)PREP SKN CHLRAPRP APPL 26ML -- CONVERT TO ITEM 371833

## (undated) DEVICE — BLOCK BITE ENDOSCP AD 21 MM W/ DIL BLU LF DISP

## (undated) DEVICE — DEVICE TRNSF SPIK STL 2008S] MICROTEK MEDICAL INC]

## (undated) DEVICE — UNIVERSAL FIXATION CANNULA: Brand: VERSAONE

## (undated) DEVICE — SURGICAL PROCEDURE PACK BASIN MAJ SET CUST NO CAUT

## (undated) DEVICE — STERILE POLYISOPRENE POWDER-FREE SURGICAL GLOVES WITH EMOLLIENT COATING: Brand: PROTEXIS

## (undated) DEVICE — NEEDLE HYPO 25GA L1.5IN BVL ORIENTED ECLIPSE

## (undated) DEVICE — ARTICULATING RELOAD WITH TRI-STAPLE TECHNOLOGY: Brand: ENDO GIA

## (undated) DEVICE — STAPLER INT L60MM STD TISS BLU 7/8 FIRING W/ 3.5MM 21 TI

## (undated) DEVICE — TRAY CATH CATH OD16FR 200ML URIN M SIL CNTR ENTRY F

## (undated) DEVICE — TRAY PREP DRY W/ PREM GLV 2 APPL 6 SPNG 2 UNDPD 1 OVERWRAP

## (undated) DEVICE — BLADELESS OPTICAL TROCAR WITH FIXATION CANNULA: Brand: VERSAPORT

## (undated) DEVICE — BLADE ASSEMB CLP HAIR FINE --

## (undated) DEVICE — CATH URETH INTMIT ROB 14FR FUN -- USE ITEM 179521

## (undated) DEVICE — SUTURE PDS II SZ 1 L36IN ABSRB VLT L48MM CTX 1/2 CIR Z371T

## (undated) DEVICE — STAPLER WITH DST SERIES TECHNOLOGY: Brand: GIA

## (undated) DEVICE — SUTURE PDS II SZ 1 L96IN ABSRB VLT TP-1 L65MM 1/2 CIR Z880G

## (undated) DEVICE — SPONGE: SPECIALTY PEANUT XR 100/CS: Brand: MEDICAL ACTION INDUSTRIES

## (undated) DEVICE — SOLIDIFIER MEDC 1200ML -- CONVERT TO 356117

## (undated) DEVICE — OSTOMY POUCH CLAMP: Brand: HOLLISTER

## (undated) DEVICE — CANNULA CUSH AD W/ 14FT TBG

## (undated) DEVICE — 3000CC GUARDIAN II: Brand: GUARDIAN

## (undated) DEVICE — TOWEL SURG W17XL27IN STD BLU COT NONFENESTRATED PREWASHED

## (undated) DEVICE — 3M™ MEDIPORE™ H SOFT CLOTH TAPE SHORT ROLL TAPE 6INCHES X 2 YARDS 16 ROLLS/CASE 2866S: Brand: 3M™ MEDIPORE™

## (undated) DEVICE — ABDOMINAL PAD: Brand: DERMACEA

## (undated) DEVICE — POWER SHELL: Brand: SIGNIA

## (undated) DEVICE — TRAY CATH OD16FR SIL URIN M STATLOK STBL DEV SURSTP

## (undated) DEVICE — HEX-LOCKING BLADE ELECTRODE: Brand: EDGE

## (undated) DEVICE — STAPLER SKIN 35CT WD STRL DISP -- MULTIFIRE PREMIUM

## (undated) DEVICE — SUTURE PDS II SZ 1 L54IN ABSRB VLT L65MM TP-1 1/2 CIR Z879G

## (undated) DEVICE — 1-PIECE DRAINABLE OSTOMY POUCH, FLEXWEAR: Brand: PREMIER

## (undated) DEVICE — SUTURE PERMAHAND SZ 4-0 L12X30IN NONABSORBABLE BLK SILK A303H

## (undated) DEVICE — SUTURE PERMAHAND SZ 3-0 L18IN NONABSORBABLE BLK L26MM SH C013D

## (undated) DEVICE — POOLE SUCTION INSTRUMENT WITH REMOVABLE SHEATH: Brand: POOLE

## (undated) DEVICE — SURGICAL PROCEDURE KIT GEN LAPAROSCOPY LF